# Patient Record
Sex: MALE | Race: OTHER | HISPANIC OR LATINO | Employment: FULL TIME | URBAN - METROPOLITAN AREA
[De-identification: names, ages, dates, MRNs, and addresses within clinical notes are randomized per-mention and may not be internally consistent; named-entity substitution may affect disease eponyms.]

---

## 2018-09-17 ENCOUNTER — OFFICE VISIT (OUTPATIENT)
Dept: CARDIOLOGY CLINIC | Facility: CLINIC | Age: 23
End: 2018-09-17
Payer: COMMERCIAL

## 2018-09-17 VITALS
BODY MASS INDEX: 36.58 KG/M2 | HEART RATE: 70 BPM | HEIGHT: 66 IN | SYSTOLIC BLOOD PRESSURE: 166 MMHG | DIASTOLIC BLOOD PRESSURE: 94 MMHG | OXYGEN SATURATION: 98 % | WEIGHT: 227.6 LBS

## 2018-09-17 DIAGNOSIS — I10 ESSENTIAL HYPERTENSION: Primary | ICD-10-CM

## 2018-09-17 DIAGNOSIS — R53.83 FATIGUE, UNSPECIFIED TYPE: ICD-10-CM

## 2018-09-17 DIAGNOSIS — I10 HYPERTENSION, UNCONTROLLED: ICD-10-CM

## 2018-09-17 PROCEDURE — 99214 OFFICE O/P EST MOD 30 MIN: CPT | Performed by: INTERNAL MEDICINE

## 2018-09-17 PROCEDURE — 93000 ELECTROCARDIOGRAM COMPLETE: CPT | Performed by: INTERNAL MEDICINE

## 2018-09-17 RX ORDER — HYDROCHLOROTHIAZIDE 12.5 MG/1
12.5 TABLET ORAL DAILY
Qty: 30 TABLET | Refills: 11 | Status: SHIPPED | OUTPATIENT
Start: 2018-09-17 | End: 2018-10-18

## 2018-09-17 RX ORDER — AMLODIPINE BESYLATE 10 MG/1
10 TABLET ORAL
COMMUNITY
Start: 2018-09-06 | End: 2018-10-18 | Stop reason: SDUPTHER

## 2018-09-17 NOTE — PROGRESS NOTES
Cardiology Consultation     He Meneses  [id number]  1995  [address] [facility] [address]    1  Essential hypertension  POCT ECG     C/c:HYPERTENSION    HPI: 21-YEAR-OLD MALE with no significant past medical history except anxiety was recently diagnosed with hypertension  PATIENT IS HERE FOR EVALUATION OF HYPERTENSION  Patient complains of fatigue which was started recently after anxiety attacks but denies having any chest pain, shortness of breath, palpitations, dizziness syncope  Patient took Norvasc at 11:00 a m  This morning and blood pressure has still remain high  Patient is family history of hypertension in mother and maternal grandparents  His maternal grandparents have congestive heart failure  EKG shows normal sinus rhythm,, normal EKG  Past Medical History:   Diagnosis Date    Anxiety     Hypertension      Social History     Social History    Marital status: Single     Spouse name: N/A    Number of children: N/A    Years of education: N/A     Occupational History    Not on file       Social History Main Topics    Smoking status: Never Smoker    Smokeless tobacco: Never Used    Alcohol use No    Drug use: No    Sexual activity: Not on file     Other Topics Concern    Not on file     Social History Narrative    No narrative on file      Family History   Problem Relation Age of Onset    Heart failure Maternal Grandmother     Heart failure Maternal Grandfather      Past Surgical History:   Procedure Laterality Date    ANKLE SURGERY      FETAL SURGERY FOR CONGENITAL HERNIA         Current Outpatient Prescriptions:     amLODIPine (NORVASC) 10 mg tablet, Take 10 mg by mouth, Disp: , Rfl:   No Known Allergies  Vitals:    09/17/18 1414   BP: 166/94   Pulse: 70   SpO2: 98%   Weight: 103 kg (227 lb 9 6 oz)   Height: 5' 5 5" (1 664 m)       Labs:  No results found for any previous visit  Imaging: No results found  Review of Systems:  Review of Systems   Constitutional: Positive for fatigue  Negative for diaphoresis  HENT: Negative for congestion and facial swelling  Eyes: Negative for photophobia and visual disturbance  Respiratory: Negative for chest tightness and shortness of breath  Cardiovascular: Negative for chest pain, palpitations and leg swelling  Gastrointestinal: Negative for abdominal pain and blood in stool  Endocrine: Negative for cold intolerance and heat intolerance  Musculoskeletal: Negative for arthralgias and myalgias  Skin: Negative for pallor and rash  Neurological: Negative for dizziness and syncope  Physical Exam:  Physical Exam   Constitutional: He is oriented to person, place, and time  He appears well-developed and well-nourished  HENT:   Head: Normocephalic and atraumatic  Eyes: Conjunctivae and EOM are normal  Pupils are equal, round, and reactive to light  Neck: Normal range of motion  Neck supple  No JVD present  No thyromegaly present  Cardiovascular: Normal rate, regular rhythm, normal heart sounds and intact distal pulses  Exam reveals no gallop and no friction rub  No murmur heard  Pulmonary/Chest: Effort normal and breath sounds normal  No respiratory distress  He has no wheezes  He has no rales  Abdominal: Soft  Bowel sounds are normal  He exhibits no distension  There is no tenderness  Musculoskeletal: Normal range of motion  He exhibits no edema  Neurological: He is alert and oriented to person, place, and time  He has normal reflexes  Skin: Skin is warm and dry  Psychiatric: He has a normal mood and affect  Discussion/Summary:  1  Hypertension:  Uncontrolled in spite of taking amlodipine  Fatigue is probably due to uncontrolled hypertension     recheck blood pressure by me was still 150/90 mm of mercury   I will add hydrochlorothiazide low-dose   echocardiogram to evaluate for left ventricular hypertrophy  If the blood pressure remains high will consider getting renal Doppler to rule out renal artery stenosis      Salt restriction     patient was counseled about monitoring blood pressure, salt restriction and weight loss     follow-up in 4-6 weeks

## 2018-10-18 ENCOUNTER — OFFICE VISIT (OUTPATIENT)
Dept: CARDIOLOGY CLINIC | Facility: CLINIC | Age: 23
End: 2018-10-18
Payer: COMMERCIAL

## 2018-10-18 VITALS
OXYGEN SATURATION: 97 % | BODY MASS INDEX: 36.64 KG/M2 | SYSTOLIC BLOOD PRESSURE: 170 MMHG | DIASTOLIC BLOOD PRESSURE: 66 MMHG | WEIGHT: 228 LBS | HEART RATE: 84 BPM | HEIGHT: 66 IN

## 2018-10-18 DIAGNOSIS — I10 HYPERTENSION, UNCONTROLLED: Primary | ICD-10-CM

## 2018-10-18 DIAGNOSIS — R53.83 FATIGUE, UNSPECIFIED TYPE: ICD-10-CM

## 2018-10-18 PROCEDURE — 99213 OFFICE O/P EST LOW 20 MIN: CPT | Performed by: INTERNAL MEDICINE

## 2018-10-18 RX ORDER — AMLODIPINE BESYLATE 10 MG/1
10 TABLET ORAL DAILY
Qty: 90 TABLET | Refills: 3 | Status: SHIPPED | OUTPATIENT
Start: 2018-10-18 | End: 2018-11-14 | Stop reason: ALTCHOICE

## 2018-10-18 RX ORDER — CHLORTHALIDONE 25 MG/1
25 TABLET ORAL DAILY
Qty: 30 TABLET | Refills: 11 | Status: SHIPPED | OUTPATIENT
Start: 2018-10-18 | End: 2018-11-14 | Stop reason: ALTCHOICE

## 2018-10-18 NOTE — PROGRESS NOTES
Cardiology Consultation     Esau Ramos  [id number]  1995  [address] [place] [address]    C/c:HYPERTENSION     HPI:  77-year-old male with past medical history of hypertension is here for follow-up  Patient was started on hydrochlorothiazide last visit but blood pressure appears to be still elevated  He constantly monitors his blood pressure, I went over the log  His systolic blood pressure is usually between 140-160 mm of mercury  Fatigue is improved  He denies having any other cardiac symptoms including chest pain, shortness of breath, palpitations, dizziness or syncope      Patient is family history of hypertension in mother and maternal grandparents  His maternal grandparents have congestive heart failure  Patient Active Problem List   Diagnosis    Hypertension, uncontrolled    Fatigue     Past Medical History:   Diagnosis Date    Anxiety     Hypertension      Social History     Social History    Marital status: Single     Spouse name: N/A    Number of children: N/A    Years of education: N/A     Occupational History    Not on file       Social History Main Topics    Smoking status: Never Smoker    Smokeless tobacco: Never Used    Alcohol use No    Drug use: No    Sexual activity: Not on file     Other Topics Concern    Not on file     Social History Narrative    No narrative on file      Family History   Problem Relation Age of Onset    Heart failure Maternal Grandmother     Heart failure Maternal Grandfather      Past Surgical History:   Procedure Laterality Date    ANKLE SURGERY      FETAL SURGERY FOR CONGENITAL HERNIA         Current Outpatient Prescriptions:     hydrochlorothiazide (HYDRODIURIL) 12 5 mg tablet, Take 1 tablet (12 5 mg total) by mouth daily, Disp: 30 tablet, Rfl: 11    amLODIPine (NORVASC) 10 mg tablet, Take 10 mg by mouth, Disp: , Rfl: No Known Allergies  Vitals:    10/18/18 1024   BP: 170/66   BP Location: Right arm   Patient Position: Sitting   Cuff Size: Adult   Pulse: 84   SpO2: 97%   Weight: 103 kg (228 lb)   Height: 5' 5 5" (1 664 m)       Labs:  No results found for any previous visit  Imaging: No results found  Review of Systems:  Review of Systems   Constitutional: Negative for diaphoresis and fatigue  HENT: Negative for congestion and facial swelling  Eyes: Negative for photophobia and visual disturbance  Respiratory: Negative for chest tightness and shortness of breath  Cardiovascular: Negative for chest pain, palpitations and leg swelling  Gastrointestinal: Negative for abdominal pain and blood in stool  Endocrine: Negative for cold intolerance and heat intolerance  Musculoskeletal: Negative for arthralgias and myalgias  Skin: Negative for pallor and rash  Neurological: Negative for dizziness and syncope  Physical Exam:  Physical Exam   Constitutional: He is oriented to person, place, and time  He appears well-developed and well-nourished  HENT:   Head: Normocephalic and atraumatic  Eyes: Pupils are equal, round, and reactive to light  Conjunctivae and EOM are normal    Neck: Normal range of motion  Neck supple  No JVD present  No thyromegaly present  Cardiovascular: Normal rate, regular rhythm, normal heart sounds and intact distal pulses  Exam reveals no gallop and no friction rub  No murmur heard  Pulmonary/Chest: Effort normal and breath sounds normal  No respiratory distress  He has no wheezes  He has no rales  Abdominal: Soft  Bowel sounds are normal  He exhibits no distension  There is no tenderness  Musculoskeletal: Normal range of motion  He exhibits no edema  Neurological: He is alert and oriented to person, place, and time  He has normal reflexes  Skin: Skin is warm and dry  Psychiatric: He has a normal mood and affect         Discussion/Summary:  Hypertension, uncontrolled:  I switch the patient chlorthalidone  Continue Norvasc  Check BMP to evaluate for hyperkalemia and ROSI  ASKED PATIENT CONTINUE TO MONITOR HIS BLOOD PRESSURE AND CALL ME IF IT REMAINS HIGH    Echocardiogram pending    Follow-up in 6 weeks

## 2018-11-14 ENCOUNTER — OFFICE VISIT (OUTPATIENT)
Dept: FAMILY MEDICINE CLINIC | Facility: CLINIC | Age: 23
End: 2018-11-14
Payer: COMMERCIAL

## 2018-11-14 VITALS
RESPIRATION RATE: 16 BRPM | DIASTOLIC BLOOD PRESSURE: 82 MMHG | HEIGHT: 66 IN | OXYGEN SATURATION: 98 % | HEART RATE: 76 BPM | WEIGHT: 224 LBS | BODY MASS INDEX: 36 KG/M2 | SYSTOLIC BLOOD PRESSURE: 122 MMHG

## 2018-11-14 DIAGNOSIS — I10 ESSENTIAL HYPERTENSION: Primary | ICD-10-CM

## 2018-11-14 DIAGNOSIS — M25.50 ARTHRALGIA, UNSPECIFIED JOINT: ICD-10-CM

## 2018-11-14 DIAGNOSIS — E78.2 MIXED HYPERLIPIDEMIA: ICD-10-CM

## 2018-11-14 DIAGNOSIS — E55.9 HYPOVITAMINOSIS D: ICD-10-CM

## 2018-11-14 DIAGNOSIS — I10 HYPERTENSION, UNCONTROLLED: ICD-10-CM

## 2018-11-14 DIAGNOSIS — R53.82 CHRONIC FATIGUE: ICD-10-CM

## 2018-11-14 PROCEDURE — 3074F SYST BP LT 130 MM HG: CPT | Performed by: FAMILY MEDICINE

## 2018-11-14 PROCEDURE — 1036F TOBACCO NON-USER: CPT | Performed by: FAMILY MEDICINE

## 2018-11-14 PROCEDURE — 99202 OFFICE O/P NEW SF 15 MIN: CPT | Performed by: FAMILY MEDICINE

## 2018-11-14 PROCEDURE — 3079F DIAST BP 80-89 MM HG: CPT | Performed by: FAMILY MEDICINE

## 2018-11-14 PROCEDURE — 3008F BODY MASS INDEX DOCD: CPT | Performed by: FAMILY MEDICINE

## 2018-11-14 RX ORDER — LISINOPRIL AND HYDROCHLOROTHIAZIDE 12.5; 1 MG/1; MG/1
1 TABLET ORAL DAILY
Qty: 90 TABLET | Refills: 1 | Status: SHIPPED | OUTPATIENT
Start: 2018-11-14 | End: 2019-03-20 | Stop reason: ALTCHOICE

## 2018-11-14 RX ORDER — PROPRANOLOL HCL 60 MG
60 CAPSULE, EXTENDED RELEASE 24HR ORAL DAILY
Qty: 90 CAPSULE | Refills: 1 | Status: SHIPPED | OUTPATIENT
Start: 2018-11-14 | End: 2019-06-28 | Stop reason: SDUPTHER

## 2018-11-14 NOTE — PATIENT INSTRUCTIONS
Get the fasting blood work done  Limit the salt intake to less than 1500 mg a day  A avoid fast food  Increase the intake of fresh green leafy vegetables   Walk daily  We will call you with result of the blood work and vitamin-D  Take those blood pressure pills every single day

## 2019-02-08 ENCOUNTER — TELEPHONE (OUTPATIENT)
Dept: FAMILY MEDICINE CLINIC | Facility: CLINIC | Age: 24
End: 2019-02-08

## 2019-02-08 ENCOUNTER — HOSPITAL ENCOUNTER (EMERGENCY)
Facility: HOSPITAL | Age: 24
Discharge: HOME | End: 2019-02-08
Attending: EMERGENCY MEDICINE | Admitting: EMERGENCY MEDICINE
Payer: COMMERCIAL

## 2019-02-08 ENCOUNTER — APPOINTMENT (EMERGENCY)
Dept: RADIOLOGY | Facility: HOSPITAL | Age: 24
End: 2019-02-08
Payer: COMMERCIAL

## 2019-02-08 VITALS
SYSTOLIC BLOOD PRESSURE: 133 MMHG | OXYGEN SATURATION: 97 % | WEIGHT: 241.84 LBS | RESPIRATION RATE: 15 BRPM | BODY MASS INDEX: 38.87 KG/M2 | HEART RATE: 62 BPM | DIASTOLIC BLOOD PRESSURE: 60 MMHG | TEMPERATURE: 97.5 F | HEIGHT: 66 IN

## 2019-02-08 DIAGNOSIS — I49.1 PREMATURE ATRIAL CONTRACTIONS: ICD-10-CM

## 2019-02-08 DIAGNOSIS — R00.2 PALPITATIONS: Primary | ICD-10-CM

## 2019-02-08 LAB
ANION GAP SERPL CALCULATED.3IONS-SCNC: 7 MMOL/L (ref 4–13)
BASOPHILS # BLD AUTO: 0.02 THOUSANDS/ΜL (ref 0–0.1)
BASOPHILS NFR BLD AUTO: 0 % (ref 0–1)
BUN SERPL-MCNC: 12 MG/DL (ref 5–25)
CALCIUM SERPL-MCNC: 9.3 MG/DL (ref 8.3–10.1)
CHLORIDE SERPL-SCNC: 101 MMOL/L (ref 100–108)
CO2 SERPL-SCNC: 30 MMOL/L (ref 21–32)
CREAT SERPL-MCNC: 0.88 MG/DL (ref 0.6–1.3)
EOSINOPHIL # BLD AUTO: 0.07 THOUSAND/ΜL (ref 0–0.61)
EOSINOPHIL NFR BLD AUTO: 2 % (ref 0–6)
ERYTHROCYTE [DISTWIDTH] IN BLOOD BY AUTOMATED COUNT: 11.9 % (ref 11.6–15.1)
GFR SERPL CREATININE-BSD FRML MDRD: 120 ML/MIN/1.73SQ M
GLUCOSE SERPL-MCNC: 97 MG/DL (ref 65–140)
HCT VFR BLD AUTO: 45.1 % (ref 36.5–49.3)
HGB BLD-MCNC: 15.3 G/DL (ref 12–17)
IMM GRANULOCYTES # BLD AUTO: 0.02 THOUSAND/UL (ref 0–0.2)
IMM GRANULOCYTES NFR BLD AUTO: 0 % (ref 0–2)
LYMPHOCYTES # BLD AUTO: 1.63 THOUSANDS/ΜL (ref 0.6–4.47)
LYMPHOCYTES NFR BLD AUTO: 36 % (ref 14–44)
MCH RBC QN AUTO: 28 PG (ref 26.8–34.3)
MCHC RBC AUTO-ENTMCNC: 33.9 G/DL (ref 31.4–37.4)
MCV RBC AUTO: 83 FL (ref 82–98)
MONOCYTES # BLD AUTO: 0.42 THOUSAND/ΜL (ref 0.17–1.22)
MONOCYTES NFR BLD AUTO: 9 % (ref 4–12)
NEUTROPHILS # BLD AUTO: 2.36 THOUSANDS/ΜL (ref 1.85–7.62)
NEUTS SEG NFR BLD AUTO: 53 % (ref 43–75)
NRBC BLD AUTO-RTO: 0 /100 WBCS
PLATELET # BLD AUTO: 240 THOUSANDS/UL (ref 149–390)
PMV BLD AUTO: 9.5 FL (ref 8.9–12.7)
POTASSIUM SERPL-SCNC: 4.2 MMOL/L (ref 3.5–5.3)
RBC # BLD AUTO: 5.46 MILLION/UL (ref 3.88–5.62)
SODIUM SERPL-SCNC: 138 MMOL/L (ref 136–145)
TROPONIN I SERPL-MCNC: <0.02 NG/ML
WBC # BLD AUTO: 4.52 THOUSAND/UL (ref 4.31–10.16)

## 2019-02-08 PROCEDURE — 99285 EMERGENCY DEPT VISIT HI MDM: CPT

## 2019-02-08 PROCEDURE — 80048 BASIC METABOLIC PNL TOTAL CA: CPT | Performed by: EMERGENCY MEDICINE

## 2019-02-08 PROCEDURE — 71046 X-RAY EXAM CHEST 2 VIEWS: CPT

## 2019-02-08 PROCEDURE — 93005 ELECTROCARDIOGRAM TRACING: CPT

## 2019-02-08 PROCEDURE — 85025 COMPLETE CBC W/AUTO DIFF WBC: CPT | Performed by: EMERGENCY MEDICINE

## 2019-02-08 PROCEDURE — 84484 ASSAY OF TROPONIN QUANT: CPT | Performed by: EMERGENCY MEDICINE

## 2019-02-08 PROCEDURE — 36415 COLL VENOUS BLD VENIPUNCTURE: CPT | Performed by: EMERGENCY MEDICINE

## 2019-02-08 RX ADMIN — METOPROLOL TARTRATE 12.5 MG: 25 TABLET, FILM COATED ORAL at 12:43

## 2019-02-08 NOTE — ED PROVIDER NOTES
History  Chief Complaint   Patient presents with    Palpitations     pt states chest palpitations with chest tightness starting yesterday;     17yo male with h/o depression anxiety p/w intermittent feeling of palpitations, feels like his chest has a "hiccup" for the past day and with the hiccup it feels tight for a second but no actual pain  Has had similar in the past and was seen at [facility], saw a cardiologist as outpatient and saw a PCP and on medications for anxiety and blood pressure  No leg pain/swelling  No SOB  No fever/chills/n/v/d  No abd pain  History provided by:  Patient  Palpitations   Palpitations quality:  Irregular  Onset quality: At rest  Duration:  1 hour  Timing:  Intermittent  Progression:  Waxing and waning  Chronicity:  New  Context: anxiety    Context: not nicotine and not stimulant use    Relieved by:  Nothing  Worsened by:  Stress  Ineffective treatments:  None tried  Associated symptoms: no back pain, no chest pressure, no diaphoresis, no dizziness, no lower extremity edema, no malaise/fatigue, no shortness of breath and no vomiting    Risk factors: no hx of PE and no hypercoagulable state        Prior to Admission Medications   Prescriptions Last Dose Informant Patient Reported? Taking?   lisinopril-hydrochlorothiazide (PRINZIDE,ZESTORETIC) 10-12 5 MG per tablet   No Yes   Sig: Take 1 tablet by mouth daily   propranolol (INDERAL LA) 60 mg 24 hr capsule   No Yes   Sig: Take 1 capsule (60 mg total) by mouth daily      Facility-Administered Medications: None       Past Medical History:   Diagnosis Date    Anxiety     Hypertension        Past Surgical History:   Procedure Laterality Date    ANKLE SURGERY      FETAL SURGERY FOR CONGENITAL HERNIA         Family History   Problem Relation Age of Onset    Heart failure Maternal Grandmother     Heart failure Maternal Grandfather      I have reviewed and agree with the history as documented      Social History   Substance Use Topics  Smoking status: Never Smoker    Smokeless tobacco: Never Used    Alcohol use No        Review of Systems   Constitutional: Negative for diaphoresis and malaise/fatigue  Respiratory: Negative for shortness of breath  Gastrointestinal: Negative for vomiting  Musculoskeletal: Negative for back pain  Neurological: Negative for dizziness  All other systems reviewed and are negative  Physical Exam  Physical Exam   Constitutional: He appears well-developed and well-nourished  HENT:   Head: Normocephalic and atraumatic  Eyes: Pupils are equal, round, and reactive to light  EOM are normal    Neck: Neck supple  Cardiovascular: Normal rate, regular rhythm and normal heart sounds  No murmur heard  Pulmonary/Chest: Effort normal and breath sounds normal  No respiratory distress  He has no wheezes  Abdominal: Soft  Bowel sounds are normal  He exhibits no distension  There is no tenderness  Musculoskeletal: He exhibits no edema or tenderness  Neurological: He is alert  Psychiatric: His mood appears anxious  Vitals reviewed        Vital Signs  ED Triage Vitals   Temperature Pulse Respirations Blood Pressure SpO2   02/08/19 1050 02/08/19 1048 02/08/19 1048 02/08/19 1048 02/08/19 1048   97 5 °F (36 4 °C) 79 18 143/77 98 %      Temp Source Heart Rate Source Patient Position - Orthostatic VS BP Location FiO2 (%)   02/08/19 1050 02/08/19 1048 02/08/19 1048 02/08/19 1048 --   Oral Monitor Lying Right arm       Pain Score       --                  Vitals:    02/08/19 1048   BP: 143/77   Pulse: 79   Patient Position - Orthostatic VS: Lying       Visual Acuity      ED Medications  Medications   metoprolol tartrate (LOPRESSOR) tablet 12 5 mg (not administered)       Diagnostic Studies  Results Reviewed     Procedure Component Value Units Date/Time    Troponin I [[id number]]  (Normal) Collected:  02/08/19 1146    Lab Status:  Final result Specimen:  Blood from Arm, Right Updated:  02/08/19 1212 Troponin I <0 02 ng/mL     Basic metabolic panel [[id number]] Collected:  02/08/19 1146    Lab Status:  Final result Specimen:  Blood from Arm, Right Updated:  02/08/19 1202     Sodium 138 mmol/L      Potassium 4 2 mmol/L      Chloride 101 mmol/L      CO2 30 mmol/L      ANION GAP 7 mmol/L      BUN 12 mg/dL      Creatinine 0 88 mg/dL      Glucose 97 mg/dL      Calcium 9 3 mg/dL      eGFR 120 ml/min/1 73sq m     Narrative:         National Kidney Disease Education Program recommendations are as follows:  GFR calculation is accurate only with a steady state creatinine  Chronic Kidney disease less than 60 ml/min/1 73 sq  meters  Kidney failure less than 15 ml/min/1 73 sq  meters      CBC and differential [[id number]] Collected:  02/08/19 1146    Lab Status:  Final result Specimen:  Blood from Arm, Right Updated:  02/08/19 1151     WBC 4 52 Thousand/uL      RBC 5 46 Million/uL      Hemoglobin 15 3 g/dL      Hematocrit 45 1 %      MCV 83 fL      MCH 28 0 pg      MCHC 33 9 g/dL      RDW 11 9 %      MPV 9 5 fL      Platelets 126 Thousands/uL      nRBC 0 /100 WBCs      Neutrophils Relative 53 %      Immat GRANS % 0 %      Lymphocytes Relative 36 %      Monocytes Relative 9 %      Eosinophils Relative 2 %      Basophils Relative 0 %      Neutrophils Absolute 2 36 Thousands/µL      Immature Grans Absolute 0 02 Thousand/uL      Lymphocytes Absolute 1 63 Thousands/µL      Monocytes Absolute 0 42 Thousand/µL      Eosinophils Absolute 0 07 Thousand/µL      Basophils Absolute 0 02 Thousands/µL                  XR chest 2 views   ED Interpretation by Cherelle Tompkins MD (02/08 1232)   NAD                 Procedures  ECG 12 Lead Documentation  Date/Time: 2/8/2019 12:11 PM  Performed by: Riya Gardner  Authorized by: Riya Gardner     Indications / Diagnosis:  Palpitations  ECG reviewed by me, the ED Provider: yes    Patient location:  ED  Rate:     ECG rate:  71    ECG rate assessment: normal    Rhythm:     Rhythm: sinus rhythm Ectopy:     Ectopy: PAC    ST segments:     ST segments:  Normal  T waves:     T waves: inverted      Inverted:  III and aVF           Phone Contacts  ED Phone Contact    ED Course                               MDM  Number of Diagnoses or Management Options  Palpitations: new and requires workup  Premature atrial contractions: new and requires workup     Amount and/or Complexity of Data Reviewed  Clinical lab tests: ordered and reviewed  Tests in the radiology section of CPT®: ordered and reviewed  Independent visualization of images, tracings, or specimens: yes    Risk of Complications, Morbidity, and/or Mortality  Presenting problems: high    Patient Progress  Patient progress: stable      Disposition  Final diagnoses:   Palpitations   Premature atrial contractions     Time reflects when diagnosis was documented in both MDM as applicable and the Disposition within this note     Time User Action Codes Description Comment    2/8/2019 12:19 PM Abhijeet Oswaldo, [address] [R00 2] Palpitations     2/8/2019 12:19 PM Abhijeet Oswaldo, [address] [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     2/8/2019 12:20 PM Janet Matias [I49 1] Premature atrial contractions       ED Disposition     ED Disposition Condition Date/Time Comment    Discharge  Fri Feb 8, 2019 12:20 PM Lloyd Saldaña discharge to home/self care      Condition at discharge: Good        Follow-up Information     Follow up With Specialties Details Why Contact Info Additional Information    Cleophas Cogan, [address] [facility], Nurse Practitioner   [phone] [facility] Emergency Department Emergency Medicine  If symptoms worsen [address] [address] ED, [address], [place], [address]          Patient's Medications   Discharge Prescriptions    No medications on file     No discharge procedures on file      ED Provider  Electronically Signed by           Jamee Edward MD  02/08/19 5621

## 2019-02-08 NOTE — DISCHARGE INSTRUCTIONS
Premature Atrial Contractions, Ambulatory Care   GENERAL INFORMATION:   Premature atrial contractions (PACs)  are an interruption in your heart rhythm  PACs happen when your heart gets an early signal to pump  PACs are common and usually have no cause  Your risk is increased by stress, fatigue, caffeine, alcohol, or tobacco  Pregnancy and medical conditions such as heart disease or high blood pressure may also increase your risk  Most people have skipped heartbeats from time to time  Follow up with your healthcare provider so the cause of your [facility] can be diagnosed and treated  Common symptoms include the following:   · Palpitations (fast, forceful heartbeats in an irregular rhythm)    · A missed or skipped heartbeat     · Chest pain or shortness of breath    · Lightheadedness, dizziness, or feeling faint    · Tiredness with exercise or activity  Seek immediate care for the following symptoms:   · Squeezing, pressure, or pain in your chest that lasts longer than 5 minutes or returns    · Discomfort or pain in your back, neck, jaw, stomach, or arm     · Trouble breathing    · Nausea or vomiting    · Lightheadedness or a sudden cold sweat, especially with chest pain or trouble breathing  Treatment for PACs  is usually not needed  You may be given medicine to strengthen or regulate your heartbeat  Prevent more PACs:   · Avoid alcohol and caffeine  These can increase your PACs  · Do not smoke  If you smoke, it is never too late to quit  Smoking may worsen heart problems  Ask your healthcare provider for information if you need help quitting  · Exercise as directed  Ask your healthcare provider about the best exercise plan for you  Follow up with your healthcare provider as directed:  Write down your questions so you remember to ask them during your visits  CARE AGREEMENT:   You have the right to help plan your care  Learn about your health condition and how it may be treated   Discuss treatment options with your caregivers to decide what care you want to receive  You always have the right to refuse treatment  The above information is an  only  It is not intended as medical advice for individual conditions or treatments  Talk to your doctor, nurse or pharmacist before following any medical regimen to see if it is safe and effective for you  © 2014 [address] is for End User's use only and may not be sold, redistributed or otherwise used for commercial purposes  All illustrations and images included in CareNotes® are the copyrighted property of [employer] , [employer]  or [employer]  Heart Palpitations   WHAT YOU NEED TO KNOW:   Heart palpitations are feelings that your heart races, jumps, throbs, or flutters  You may feel extra beats, no beats for a short time, or skipped beats  You may have these feelings in your chest, throat, or neck  They may happen when you are sitting, standing, or lying  Heart palpitations may be frightening, but are usually not caused by a serious problem  DISCHARGE INSTRUCTIONS:   Call 911 or have someone else call for any of the following:   · You have any of the following signs of a heart attack:      ¨ Squeezing, pressure, or pain in your chest that lasts longer than 5 minutes or returns    ¨ Discomfort or pain in your back, neck, jaw, stomach, or arm     ¨ Trouble breathing    ¨ Nausea or vomiting    ¨ Lightheadedness or a sudden cold sweat, especially with chest pain or trouble breathing    · You have any of the following signs of a stroke:      ¨ Numbness or drooping on one side of your face     ¨ Weakness in an arm or leg    ¨ Confusion or difficulty speaking    ¨ Dizziness, a severe headache, or vision loss    · You faint or lose consciousness  Return to the emergency department if:   · Your palpitations happen more often or get more intense       Contact your healthcare provider if:   · You have new or worsening swelling in your feet or ankles  · You have questions or concerns about your condition or care  Follow up with your healthcare provider as directed: You may need to follow up with a cardiologist  Cristina Denny may need tests to check for heart problems that cause palpitations  Write down your questions so you remember to ask them during your visits  Keep a record:  Write down when your palpitations start and stop, what you were doing when they started, and your symptoms  Keep track of what you ate or drank within a few hours of your palpitations  Include anything that seemed to help your symptoms, such as lying down or holding your breath  This record will help you and your healthcare provider learn what triggers your palpitations  Bring this record with you to your follow up visits  Help prevent heart palpitations:   · Manage stress and anxiety  Find ways to relax such as listening to music, meditating, or doing yoga  Exercise can also help decrease stress and anxiety  Talk to someone you trust about your stress or anxiety  You can also talk to a therapist      · Get plenty of sleep every night  Ask your healthcare provider how much sleep you need each night  · Do not drink caffeine or alcohol  Caffeine and alcohol can make your palpitations worse  Caffeine is found in soda, coffee, tea, chocolate, and drinks that increase your energy  · Do not smoke  Nicotine and other chemicals in cigarettes and cigars may damage your heart and blood vessels  Ask your healthcare provider for information if you currently smoke and need help to quit  E-cigarettes or smokeless tobacco still contain nicotine  Talk to your healthcare provider before you use these products  · Do not use illegal drugs  Talk to your healthcare provider if you use illegal drugs and want help to quit  © 2017 [address] Information is for End User's use only and may not be sold, redistributed or otherwise used for commercial purposes   All illustrations and images included in CareNotes® are the copyrighted property of A [employer] A M [employer]  or [employer]  The above information is an  only  It is not intended as medical advice for individual conditions or treatments  Talk to your doctor, nurse or pharmacist before following any medical regimen to see if it is safe and effective for you

## 2019-02-08 NOTE — ED NOTES
Discharged reviewed with pt  Pt verbalized understanding and has no further questions at this time  Pt ambulatory off unit with steady gait       Shelli Currie RN  02/08/19 1925

## 2019-02-08 NOTE — TELEPHONE ENCOUNTER
Patient's mother called in today stating that the patient has been c/o having palpitations since yesterday she stated that he took his medication throughout the day developed them, she was not sure which medication was causing them  I informed the patient mom that he has not been seen since November and has this ever happened before she said no he's never had them and has been on the meds regularly, I advised that he go to the ER or call Dr Nalini Duke because he is a pt there and he is having cardiac symptoms, mom complied I also told her that if they cannot get her in then we will try to get her in with another provider

## 2019-02-09 LAB
ATRIAL RATE: 62 BPM
P AXIS: 45 DEGREES
PR INTERVAL: 138 MS
QRS AXIS: 98 DEGREES
QRSD INTERVAL: 88 MS
QT INTERVAL: 344 MS
QTC INTERVAL: 373 MS
T WAVE AXIS: 3 DEGREES
VENTRICULAR RATE: 71 BPM

## 2019-02-09 PROCEDURE — 93010 ELECTROCARDIOGRAM REPORT: CPT | Performed by: INTERNAL MEDICINE

## 2019-02-13 ENCOUNTER — OFFICE VISIT (OUTPATIENT)
Dept: FAMILY MEDICINE CLINIC | Facility: CLINIC | Age: 24
End: 2019-02-13
Payer: COMMERCIAL

## 2019-02-13 VITALS
OXYGEN SATURATION: 97 % | HEIGHT: 66 IN | SYSTOLIC BLOOD PRESSURE: 102 MMHG | DIASTOLIC BLOOD PRESSURE: 70 MMHG | HEART RATE: 76 BPM | WEIGHT: 234 LBS | RESPIRATION RATE: 16 BRPM | TEMPERATURE: 98.4 F | BODY MASS INDEX: 37.61 KG/M2

## 2019-02-13 DIAGNOSIS — R05.9 COUGH: Primary | ICD-10-CM

## 2019-02-13 DIAGNOSIS — R01.1 CARDIAC MURMUR: ICD-10-CM

## 2019-02-13 PROCEDURE — 3008F BODY MASS INDEX DOCD: CPT | Performed by: FAMILY MEDICINE

## 2019-02-13 PROCEDURE — 99214 OFFICE O/P EST MOD 30 MIN: CPT | Performed by: FAMILY MEDICINE

## 2019-02-13 PROCEDURE — 1036F TOBACCO NON-USER: CPT | Performed by: FAMILY MEDICINE

## 2019-02-13 RX ORDER — AZITHROMYCIN 250 MG/1
TABLET, FILM COATED ORAL
Qty: 6 TABLET | Refills: 0 | Status: SHIPPED | OUTPATIENT
Start: 2019-02-13 | End: 2019-02-13

## 2019-02-13 RX ORDER — AZITHROMYCIN 250 MG/1
TABLET, FILM COATED ORAL
Qty: 6 TABLET | Refills: 0 | Status: SHIPPED | OUTPATIENT
Start: 2019-02-13 | End: 2019-02-17

## 2019-02-13 NOTE — PROGRESS NOTES
Standard Visit   Assessment/Plan:     Visit Diagnosis:  Diagnoses and all orders for this visit:    Cough  -     Discontinue: azithromycin (ZITHROMAX) 250 mg tablet; Take 2 tablets today then 1 tablet daily x 4 days  -     azithromycin (ZITHROMAX) 250 mg tablet; Take 2 tablets today then 1 tablet daily x 4 days    Cardiac murmur  -     Holter monitor - 24 hour; Future  -     Echo complete with contrast if indicated; Future      Please cancel the Zithromax that went to the [address] [place] [address] Street  I have reset the Zithromax to his home start pharmacy in [facility]  Holter monitor has been ordered  2D echo has been ordered  He has been advised to avoid all caffeine  Make sure you get enough sleep  Lack of sleep has been associated with increased palpitations and APCs  Lambert Thompson has been advised the difference between PVCs and APCs  Make sure you get enough sleep avoid excess alcohol  no redbull  and  energy drinks    Subjective:    Patient ID: Lisseth Corona is a 25 y o  male       Patient is here with the following concerns:    Was seen in the emergency room for palpitations  Is being treated for hypertension  with lisinopril/hydrochlorothiazide  Also takes Inderal LA 60 mg once a day   Does see a cardiologist Dr Ada Pederson  EKG was done in the emergency room which showed normal sinus rhythm with APCs  He is compliant and consistent with his Inderal LA  Blood pressure in the office today was 102/64  Electrolytes in the emergency room show a potassium of 4 2  Troponins were negative  We will do a magnesium level as follow-up  He does have a dry cough which could be a Ace cough  The we could consider switching the lisinopril due to this dry cough  He does complain of postnasal drip and sinus congestion  We are going to consider a trial of a Z-Bertin to clear up his sinuses and postnasal drip and cough if his cough continues  We will consider switching him to losartan due to the cough  He does state he is not getting a proper amount of sleep  His chanelle is pregnant and she goes to work at 4 o'clock in the morning and they speak every night at this time        The following portions of the patient's history were reviewed and updated as appropriate: allergies, current medications, past family history, past medical history, past social history, past surgical history and problem list     Review of Systems   HENT: Positive for congestion and sinus pain  Respiratory: Positive for cough  Cardiovascular: Positive for palpitations  Negative for chest pain and leg swelling  Psychiatric/Behavioral: Positive for sleep disturbance           /70   Pulse 76   Temp 98 4 °F (36 9 °C)   Resp 16   Ht 5' 6" (1 676 m)   Wt 106 kg (234 lb)   SpO2 97%   BMI 37 77 kg/m²   Social History     Socioeconomic History    Marital status: Single     Spouse name: Not on file    Number of children: Not on file    Years of education: Not on file    Highest education level: Not on file   Occupational History    Not on file   Social Needs    Financial resource strain: Not on file    Food insecurity:     Worry: Not on file     Inability: Not on file    Transportation needs:     Medical: Not on file     Non-medical: Not on file   Tobacco Use    Smoking status: Never Smoker    Smokeless tobacco: Never Used   Substance and Sexual Activity    Alcohol use: No    Drug use: No    Sexual activity: Not on file   Lifestyle    Physical activity:     Days per week: Not on file     Minutes per session: Not on file    Stress: Not on file   Relationships    Social connections:     Talks on phone: Not on file     Gets together: Not on file     Attends Yarsanism service: Not on file     Active member of club or organization: Not on file     Attends meetings of clubs or organizations: Not on file     Relationship status: Not on file    Intimate partner violence:     Fear of current or ex partner: Not on file     Emotionally abused: Not on file     Physically abused: Not on file     Forced sexual activity: Not on file   Other Topics Concern    Not on file   Social History Narrative    Not on file     Past Medical History:   Diagnosis Date    Anxiety     Hypertension      Family History   Problem Relation Age of Onset    Heart failure Maternal Grandmother     Heart failure Maternal Grandfather      Past Surgical History:   Procedure Laterality Date    ANKLE SURGERY      FETAL SURGERY FOR CONGENITAL HERNIA         Current Outpatient Medications:     lisinopril-hydrochlorothiazide (PRINZIDE,ZESTORETIC) 10-12 5 MG per tablet, Take 1 tablet by mouth daily, Disp: 90 tablet, Rfl: 01    propranolol (INDERAL LA) 60 mg 24 hr capsule, Take 1 capsule (60 mg total) by mouth daily, Disp: 90 capsule, Rfl: 1    azithromycin (ZITHROMAX) 250 mg tablet, Take 2 tablets today then 1 tablet daily x 4 days, Disp: 6 tablet, Rfl: 0      Objective:     Physical Exam   Constitutional: He is oriented to person, place, and time  He appears well-developed and well-nourished  HENT:   Head: Normocephalic and atraumatic  Eyes: Pupils are equal, round, and reactive to light  Neck: Normal range of motion  Neck supple  Cardiovascular: Normal rate  Pulmonary/Chest: Effort normal and breath sounds normal    Abdominal: Soft  Musculoskeletal: Normal range of motion  Neurological: He is alert and oriented to person, place, and time  Skin: Skin is warm and dry  Psychiatric: He has a normal mood and affect  Nursing note and vitals reviewed        Social History     Socioeconomic History    Marital status: Single     Spouse name: Not on file    Number of children: Not on file    Years of education: Not on file    Highest education level: Not on file   Occupational History    Not on file   Social Needs    Financial resource strain: Not on file    Food insecurity:     Worry: Not on file     Inability: Not on file    Transportation needs:     Medical: Not on file     Non-medical: Not on file   Tobacco Use    Smoking status: Never Smoker    Smokeless tobacco: Never Used   Substance and Sexual Activity    Alcohol use: No    Drug use: No    Sexual activity: Not on file   Lifestyle    Physical activity:     Days per week: Not on file     Minutes per session: Not on file    Stress: Not on file   Relationships    Social connections:     Talks on phone: Not on file     Gets together: Not on file     Attends Buddhism service: Not on file     Active member of club or organization: Not on file     Attends meetings of clubs or organizations: Not on file     Relationship status: Not on file    Intimate partner violence:     Fear of current or ex partner: Not on file     Emotionally abused: Not on file     Physically abused: Not on file     Forced sexual activity: Not on file   Other Topics Concern    Not on file   Social History Narrative    Not on file     Past Medical History:   Diagnosis Date    Anxiety     Hypertension        Current Outpatient Medications:     lisinopril-hydrochlorothiazide (PRINZIDE,ZESTORETIC) 10-12 5 MG per tablet, Take 1 tablet by mouth daily, Disp: 90 tablet, Rfl: 01    propranolol (INDERAL LA) 60 mg 24 hr capsule, Take 1 capsule (60 mg total) by mouth daily, Disp: 90 capsule, Rfl: 1    azithromycin (ZITHROMAX) 250 mg tablet, Take 2 tablets today then 1 tablet daily x 4 days, Disp: 6 tablet, Rfl: 0  Family History   Problem Relation Age of Onset    Heart failure Maternal Grandmother     Heart failure Maternal Grandfather        Patient Instructions   Please cancel the Zithromax that went to the [address] [place] [address] Street  I have reset the Zithromax to his home start pharmacy in [facility]  Holter monitor has been ordered  2D echo has been ordered  He has been advised to avoid all caffeine  Make sure you get enough sleep  Lack of sleep has been associated with increased palpitations and Kostas Cullen has been advised the difference between PVCs and APCs  Make sure you get enough sleep avoid excess alcohol  no redbull  and  energy drinks          MARTIN Gregory

## 2019-02-13 NOTE — PATIENT INSTRUCTIONS
Please cancel the Zithromax that went to the [address] [place] [address]  I have reset the Zithromax to his home start pharmacy in [facility]  Holter monitor has been ordered  2D echo has been ordered  He has been advised to avoid all caffeine  Make sure you get enough sleep  Lack of sleep has been associated with increased palpitations and APCs  Braden has been advised the difference between PVCs and APCs  Make sure you get enough sleep avoid excess alcohol  no redbull  and  energy drinks

## 2019-03-06 ENCOUNTER — HOSPITAL ENCOUNTER (OUTPATIENT)
Dept: NON INVASIVE DIAGNOSTICS | Facility: CLINIC | Age: 24
Discharge: HOME | End: 2019-03-06
Payer: COMMERCIAL

## 2019-03-06 DIAGNOSIS — R01.1 CARDIAC MURMUR: ICD-10-CM

## 2019-03-06 DIAGNOSIS — R00.2 PALPITATIONS: ICD-10-CM

## 2019-03-06 PROCEDURE — 93225 XTRNL ECG REC<48 HRS REC: CPT

## 2019-03-06 PROCEDURE — 93306 TTE W/DOPPLER COMPLETE: CPT

## 2019-03-06 PROCEDURE — 93226 XTRNL ECG REC<48 HR SCAN A/R: CPT

## 2019-03-06 PROCEDURE — 93306 TTE W/DOPPLER COMPLETE: CPT | Performed by: INTERNAL MEDICINE

## 2019-03-08 PROCEDURE — 93227 XTRNL ECG REC<48 HR R&I: CPT | Performed by: INTERNAL MEDICINE

## 2019-03-20 ENCOUNTER — OFFICE VISIT (OUTPATIENT)
Dept: FAMILY MEDICINE CLINIC | Facility: CLINIC | Age: 24
End: 2019-03-20
Payer: COMMERCIAL

## 2019-03-20 VITALS
HEIGHT: 66 IN | OXYGEN SATURATION: 97 % | RESPIRATION RATE: 14 BRPM | SYSTOLIC BLOOD PRESSURE: 140 MMHG | BODY MASS INDEX: 38.25 KG/M2 | TEMPERATURE: 96.6 F | DIASTOLIC BLOOD PRESSURE: 80 MMHG | HEART RATE: 76 BPM | WEIGHT: 238 LBS

## 2019-03-20 DIAGNOSIS — R00.2 PALPITATION: ICD-10-CM

## 2019-03-20 DIAGNOSIS — E78.2 MIXED HYPERLIPIDEMIA: ICD-10-CM

## 2019-03-20 DIAGNOSIS — I10 ESSENTIAL HYPERTENSION: Primary | ICD-10-CM

## 2019-03-20 DIAGNOSIS — D64.9 ANEMIA, UNSPECIFIED TYPE: ICD-10-CM

## 2019-03-20 PROCEDURE — 3008F BODY MASS INDEX DOCD: CPT | Performed by: FAMILY MEDICINE

## 2019-03-20 PROCEDURE — 1036F TOBACCO NON-USER: CPT | Performed by: FAMILY MEDICINE

## 2019-03-20 PROCEDURE — 99214 OFFICE O/P EST MOD 30 MIN: CPT | Performed by: FAMILY MEDICINE

## 2019-03-20 RX ORDER — AMLODIPINE BESYLATE AND BENAZEPRIL HYDROCHLORIDE 5; 20 MG/1; MG/1
CAPSULE ORAL
Qty: 90 CAPSULE | Refills: 1 | Status: SHIPPED | OUTPATIENT
Start: 2019-03-20 | End: 2019-06-28 | Stop reason: SDUPTHER

## 2019-03-20 NOTE — PROGRESS NOTES
Standard Visit   Assessment/Plan:     Visit Diagnosis:  Diagnoses and all orders for this visit:    Essential hypertension  -     amLODIPine-benazepril (LOTREL 5-20) 5-20 MG per capsule; Take 1 pill daily in the morning  -     Microalbumin / creatinine urine ratio  -     TSH, 3rd generation with Free T4 reflex; Future  -     Comprehensive metabolic panel; Future    Mixed hyperlipidemia  -     Lipid panel; Future    Anemia, unspecified type  -     CBC and differential; Future    Palpitation          Subjective:    Patient ID: Gilbert Pinto is a 25 y o  male  Patient is here with the following concerns:    Here for follow-up  Blood pressure is up a little bit today  He is on lisinopril/hydrochlorothiazide  Blood pressure is not at goal  He is on Inderal LA a  For palpitations  The palpitations have settled down  With the blood pressure is not at goal  He was seen in the emergency room for palpitations  We did an echo  Which showed trivial valve regurgitation other than that he was good  Ejection fraction was normal  Holter monitor showed just a few APCs other than that every the echo was essentially normal  He is here with his  They are expecting their 1st baby in September all is well        The following portions of the patient's history were reviewed and updated as appropriate: allergies, current medications, past family history, past medical history, past social history, past surgical history and problem list     Review of Systems   Constitutional: Negative for activity change and fever  HENT: Negative for nosebleeds and trouble swallowing  Eyes: Negative  Respiratory: Negative  Cardiovascular: Negative for palpitations  Gastrointestinal: Negative for abdominal pain, blood in stool and vomiting  Endocrine: Negative for cold intolerance  Genitourinary: Negative  Musculoskeletal: Negative for neck stiffness  Skin: Negative for pallor     Allergic/Immunologic: Negative for immunocompromised state  Neurological: Negative for seizures and facial asymmetry  Hematological: Negative for adenopathy  Psychiatric/Behavioral: Negative for agitation and suicidal ideas           /80   Pulse 76   Temp (!) 96 6 °F (35 9 °C)   Resp 14   Ht 5' 6" (1 676 m)   Wt 108 kg (238 lb)   SpO2 97%   BMI 38 41 kg/m²   Social History     Socioeconomic History    Marital status: Single     Spouse name: Not on file    Number of children: Not on file    Years of education: Not on file    Highest education level: Not on file   Occupational History    Not on file   Social Needs    Financial resource strain: Not on file    Food insecurity:     Worry: Not on file     Inability: Not on file    Transportation needs:     Medical: Not on file     Non-medical: Not on file   Tobacco Use    Smoking status: Never Smoker    Smokeless tobacco: Never Used   Substance and Sexual Activity    Alcohol use: No    Drug use: No    Sexual activity: Not on file   Lifestyle    Physical activity:     Days per week: Not on file     Minutes per session: Not on file    Stress: Not on file   Relationships    Social connections:     Talks on phone: Not on file     Gets together: Not on file     Attends Religion service: Not on file     Active member of club or organization: Not on file     Attends meetings of clubs or organizations: Not on file     Relationship status: Not on file    Intimate partner violence:     Fear of current or ex partner: Not on file     Emotionally abused: Not on file     Physically abused: Not on file     Forced sexual activity: Not on file   Other Topics Concern    Not on file   Social History Narrative    Not on file     Past Medical History:   Diagnosis Date    Anxiety     Hypertension      Family History   Problem Relation Age of Onset    Heart failure Maternal Grandmother     Heart failure Maternal Grandfather      Past Surgical History:   Procedure Laterality Date    ANKLE SURGERY      FETAL SURGERY FOR CONGENITAL HERNIA         Current Outpatient Medications:     propranolol (INDERAL LA) 60 mg 24 hr capsule, Take 1 capsule (60 mg total) by mouth daily, Disp: 90 capsule, Rfl: 1    amLODIPine-benazepril (LOTREL 5-20) 5-20 MG per capsule, Take 1 pill daily in the morning, Disp: 90 capsule, Rfl: 1      Objective:     Physical Exam   Constitutional: He is oriented to person, place, and time  He appears well-developed and well-nourished  HENT:   Head: Normocephalic and atraumatic  Eyes: Pupils are equal, round, and reactive to light  Neck: Normal range of motion  Neck supple  Cardiovascular: Normal rate  Pulmonary/Chest: Effort normal and breath sounds normal    Abdominal: Soft  Musculoskeletal: Normal range of motion  Neurological: He is alert and oriented to person, place, and time  Skin: Skin is warm and dry  Psychiatric: He has a normal mood and affect  Nursing note and vitals reviewed        Social History     Socioeconomic History    Marital status: Single     Spouse name: Not on file    Number of children: Not on file    Years of education: Not on file    Highest education level: Not on file   Occupational History    Not on file   Social Needs    Financial resource strain: Not on file    Food insecurity:     Worry: Not on file     Inability: Not on file    Transportation needs:     Medical: Not on file     Non-medical: Not on file   Tobacco Use    Smoking status: Never Smoker    Smokeless tobacco: Never Used   Substance and Sexual Activity    Alcohol use: No    Drug use: No    Sexual activity: Not on file   Lifestyle    Physical activity:     Days per week: Not on file     Minutes per session: Not on file    Stress: Not on file   Relationships    Social connections:     Talks on phone: Not on file     Gets together: Not on file     Attends Rastafarian service: Not on file     Active member of club or organization: Not on file     Attends meetings of clubs or organizations: Not on file     Relationship status: Not on file    Intimate partner violence:     Fear of current or ex partner: Not on file     Emotionally abused: Not on file     Physically abused: Not on file     Forced sexual activity: Not on file   Other Topics Concern    Not on file   Social History Narrative    Not on file     Past Medical History:   Diagnosis Date    Anxiety     Hypertension        Current Outpatient Medications:     propranolol (INDERAL LA) 60 mg 24 hr capsule, Take 1 capsule (60 mg total) by mouth daily, Disp: 90 capsule, Rfl: 1    amLODIPine-benazepril (LOTREL 5-20) 5-20 MG per capsule, Take 1 pill daily in the morning, Disp: 90 capsule, Rfl: 1  Family History   Problem Relation Age of Onset    Heart failure Maternal Grandmother     Heart failure Maternal Grandfather        Patient Instructions   Stop the lisinopril hydrochlorothiazide  We are going to start Lotrel  After 4 or 5 days get her blood work done fasting  Keep going with the Inderal propanolol however  Please watch the salt  In fact throw the salt out of the house              Angelina Caldera

## 2019-03-20 NOTE — PATIENT INSTRUCTIONS
Stop the lisinopril hydrochlorothiazide  We are going to start Lotrel  After 4 or 5 days get her blood work done fasting  Keep going with the Inderal propanolol however  Please watch the salt  In fact throw the salt out of the house

## 2019-06-28 DIAGNOSIS — I10 ESSENTIAL HYPERTENSION: ICD-10-CM

## 2019-06-28 RX ORDER — AMLODIPINE BESYLATE AND BENAZEPRIL HYDROCHLORIDE 5; 20 MG/1; MG/1
CAPSULE ORAL
Qty: 90 CAPSULE | Refills: 0 | Status: SHIPPED | OUTPATIENT
Start: 2019-06-28 | End: 2020-05-27

## 2019-07-03 ENCOUNTER — TELEPHONE (OUTPATIENT)
Dept: FAMILY MEDICINE CLINIC | Facility: CLINIC | Age: 24
End: 2019-07-03

## 2019-07-03 RX ORDER — AMLODIPINE BESYLATE AND BENAZEPRIL HYDROCHLORIDE 5; 20 MG/1; MG/1
CAPSULE ORAL
Qty: 90 CAPSULE | Refills: 1 | Status: SHIPPED | OUTPATIENT
Start: 2019-07-03 | End: 2019-07-09 | Stop reason: SDUPTHER

## 2019-07-03 RX ORDER — PROPRANOLOL HCL 60 MG
CAPSULE, EXTENDED RELEASE 24HR ORAL
Qty: 90 CAPSULE | Refills: 0 | Status: SHIPPED | OUTPATIENT
Start: 2019-07-03 | End: 2019-11-05 | Stop reason: SDUPTHER

## 2019-07-09 DIAGNOSIS — I10 ESSENTIAL HYPERTENSION: ICD-10-CM

## 2019-07-09 RX ORDER — AMLODIPINE BESYLATE AND BENAZEPRIL HYDROCHLORIDE 5; 20 MG/1; MG/1
CAPSULE ORAL
Qty: 90 CAPSULE | Refills: 1 | Status: SHIPPED | OUTPATIENT
Start: 2019-07-09 | End: 2020-03-26 | Stop reason: ALTCHOICE

## 2019-11-05 DIAGNOSIS — I10 ESSENTIAL HYPERTENSION: ICD-10-CM

## 2019-11-06 RX ORDER — PROPRANOLOL HCL 60 MG
CAPSULE, EXTENDED RELEASE 24HR ORAL
Qty: 90 CAPSULE | Refills: 0 | Status: SHIPPED | OUTPATIENT
Start: 2019-11-06 | End: 2020-02-25

## 2020-02-25 DIAGNOSIS — I10 ESSENTIAL HYPERTENSION: ICD-10-CM

## 2020-02-25 RX ORDER — PROPRANOLOL HCL 60 MG
CAPSULE, EXTENDED RELEASE 24HR ORAL
Qty: 30 CAPSULE | Refills: 0 | Status: SHIPPED | OUTPATIENT
Start: 2020-02-25 | End: 2020-04-03 | Stop reason: SDUPTHER

## 2020-02-25 NOTE — TELEPHONE ENCOUNTER
Please let him know I approved his meds, but he needs appt with [facility] for further refills

## 2020-02-25 NOTE — TELEPHONE ENCOUNTER
Requested medication(s) are due for refill today: Yes  Patient has already received a courtesy refill: Yes  Other reason request has been forwarded to provider: Patient has not been seen since 3/2019   Please assist Dolsie patient

## 2020-03-25 ENCOUNTER — TELEPHONE (OUTPATIENT)
Dept: FAMILY MEDICINE CLINIC | Facility: CLINIC | Age: 25
End: 2020-03-25

## 2020-03-26 ENCOUNTER — TELEMEDICINE (OUTPATIENT)
Dept: FAMILY MEDICINE CLINIC | Facility: CLINIC | Age: 25
End: 2020-03-26
Payer: COMMERCIAL

## 2020-03-26 VITALS
BODY MASS INDEX: 38.25 KG/M2 | DIASTOLIC BLOOD PRESSURE: 88 MMHG | WEIGHT: 238 LBS | HEIGHT: 66 IN | SYSTOLIC BLOOD PRESSURE: 122 MMHG

## 2020-03-26 DIAGNOSIS — Z11.4 SCREENING FOR HIV WITHOUT PRESENCE OF RISK FACTORS: ICD-10-CM

## 2020-03-26 DIAGNOSIS — R53.83 FATIGUE, UNSPECIFIED TYPE: ICD-10-CM

## 2020-03-26 DIAGNOSIS — I10 ESSENTIAL HYPERTENSION: Primary | ICD-10-CM

## 2020-03-26 PROCEDURE — 99213 OFFICE O/P EST LOW 20 MIN: CPT | Performed by: FAMILY MEDICINE

## 2020-03-26 RX ORDER — AMLODIPINE BESYLATE AND BENAZEPRIL HYDROCHLORIDE 10; 40 MG/1; MG/1
1 CAPSULE ORAL DAILY
Qty: 90 CAPSULE | Refills: 1 | Status: SHIPPED | OUTPATIENT
Start: 2020-03-26 | End: 2020-05-26 | Stop reason: SDUPTHER

## 2020-03-26 NOTE — PROGRESS NOTES
Virtual Regular Visit    Problem List Items Addressed This Visit        Other    Fatigue      Other Visit Diagnoses     Essential hypertension    -  Primary    Relevant Medications    amLODIPine-benazepril (LOTREL) 10-40 MG per capsule    Screening for HIV without presence of risk factors        Relevant Orders    HIV 1/2 Antigen/Antibody (4th Generation) w Reflex [facility]               Reason for visit is blood pressure check and review of blood pressure medications      Encounter provider MARTIN Ojeda    Provider located at [facility] [address] [place] [id number] [phone]      Recent Visits  Date Type Provider Dept   03/25/20 Telephone Daniel Cano [address] recent visits within past 7 days and meeting all other requirements     Today's Visits  Date Type Provider Dept   03/26/20 Telemedicine MARTIN Ojeda [facility] [address] [facility]   Showing today's visits and meeting all other requirements     Future Appointments  No visits were found meeting these conditions  Showing future appointments within next 150 days and meeting all other requirements        After connecting through [employer], the patient was identified by name and date of birth  Farhana Florence was informed that this is a telemedicine visit and that the visit is being conducted through [facility] and patient was informed that this is not a secure, HIPAA-complaint platform  he agrees to proceed  which may not be secure and therefore, might not be HIPAA-compliant  My office door was closed  No one else was in the room  He acknowledged consent and understanding of privacy and security of the video platform  The patient has agreed to participate and understands they can discontinue the visit at any time      Subjective  Farhana Florence is a 22 y o  male with history of essential hypertension  Last seen patient was put on Lotrel 5/20  He is also on propanolol LA for palpitations  And blood pressure control  When started on the Lotrel he was asked to get fasting blood work done and to check his potassium which he did not do  He is reminded to get that blood work done now today  Blood pressure is not quite to goal  Blood pressure has come down  However he does have episodes where he states his blood pressure is 150/100  He is 5'6 "and weighs 238  He has an elevated BMI   He has no travel history        Past Medical History:   Diagnosis Date    Anxiety     Hypertension        Past Surgical History:   Procedure Laterality Date    ANKLE SURGERY      FETAL SURGERY FOR CONGENITAL HERNIA         Current Outpatient Medications   Medication Sig Dispense Refill    amLODIPine-benazepril (LOTREL 5-20) 5-20 MG per capsule TAKE ONE CAPSULE BY MOUTH EVERY MORNING 90 capsule 0    amLODIPine-benazepril (LOTREL) 10-40 MG per capsule Take 1 capsule by mouth daily 90 capsule 1    propranolol (INDERAL LA) 60 mg 24 hr capsule TAKE ONE CAPSULE BY MOUTH DAILY IN THE AM 30 capsule 0     No current facility-administered medications for this visit  No Known Allergies    Review of Systems   Constitutional: Negative for activity change and fever  HENT: Negative for nosebleeds and trouble swallowing  Eyes: Negative  Respiratory: Negative  Cardiovascular: Negative for palpitations  Gastrointestinal: Negative for abdominal pain, blood in stool and vomiting  Endocrine: Negative for cold intolerance  Genitourinary: Negative  Musculoskeletal: Negative for neck stiffness  Skin: Negative for pallor  Allergic/Immunologic: Negative for immunocompromised state  Neurological: Negative for seizures and facial asymmetry  Hematological: Negative for adenopathy  Psychiatric/Behavioral: Negative for agitation and suicidal ideas  Physical Exam   Constitutional: He is oriented to person, place, and time   He appears well-developed and well-nourished  No distress  HENT:   Head: Normocephalic and atraumatic  Eyes: Pupils are equal, round, and reactive to light  Conjunctivae are normal  Right eye exhibits no discharge  Neck: Normal range of motion  No tracheal deviation present  Thick neck   Pulmonary/Chest: Effort normal  No respiratory distress  Musculoskeletal: Normal range of motion  He exhibits no edema or deformity  Neurological: He is alert and oriented to person, place, and time  Skin: Skin is dry  No rash noted  He is not diaphoretic  No erythema  No pallor  Psychiatric: He has a normal mood and affect  His behavior is normal  Judgment and thought content normal    Vitals reviewed  I spent 20 minutes with the patient during this visit

## 2020-03-26 NOTE — PATIENT INSTRUCTIONS
Increase Lotrel to 10/40  New script called in to pharmacy  Must get potassium and kidney function checked  Advised to get labs drawn today at hospital lab  Reminded to keep sodium intake less than 1500 mg daily  Weight loss  Exercise  Follow-up in 4 weeks  Blood pressure check daily write down the numbers

## 2020-04-03 DIAGNOSIS — I10 ESSENTIAL HYPERTENSION: ICD-10-CM

## 2020-04-03 RX ORDER — PROPRANOLOL HCL 60 MG
60 CAPSULE, EXTENDED RELEASE 24HR ORAL DAILY
Qty: 30 CAPSULE | Refills: 3 | Status: SHIPPED | OUTPATIENT
Start: 2020-04-03 | End: 2020-05-04 | Stop reason: SDUPTHER

## 2020-05-04 DIAGNOSIS — I10 ESSENTIAL HYPERTENSION: ICD-10-CM

## 2020-05-04 RX ORDER — PROPRANOLOL HCL 60 MG
60 CAPSULE, EXTENDED RELEASE 24HR ORAL DAILY
Qty: 30 CAPSULE | Refills: 3 | Status: SHIPPED | OUTPATIENT
Start: 2020-05-04 | End: 2020-10-02 | Stop reason: SDUPTHER

## 2020-05-26 DIAGNOSIS — I10 ESSENTIAL HYPERTENSION: ICD-10-CM

## 2020-05-27 ENCOUNTER — TELEPHONE (OUTPATIENT)
Dept: FAMILY MEDICINE CLINIC | Facility: CLINIC | Age: 25
End: 2020-05-27

## 2020-05-27 DIAGNOSIS — I10 ESSENTIAL HYPERTENSION: ICD-10-CM

## 2020-05-27 RX ORDER — AMLODIPINE BESYLATE AND BENAZEPRIL HYDROCHLORIDE 10; 40 MG/1; MG/1
1 CAPSULE ORAL DAILY
Qty: 30 CAPSULE | Refills: 0 | Status: SHIPPED | OUTPATIENT
Start: 2020-05-27 | End: 2020-05-27

## 2020-05-27 RX ORDER — AMLODIPINE BESYLATE AND BENAZEPRIL HYDROCHLORIDE 10; 40 MG/1; MG/1
1 CAPSULE ORAL DAILY
Qty: 90 CAPSULE | Refills: 1 | Status: SHIPPED | OUTPATIENT
Start: 2020-05-27 | End: 2020-12-21 | Stop reason: SDUPTHER

## 2020-10-02 DIAGNOSIS — I10 ESSENTIAL HYPERTENSION: ICD-10-CM

## 2020-10-02 RX ORDER — PROPRANOLOL HCL 60 MG
60 CAPSULE, EXTENDED RELEASE 24HR ORAL DAILY
Qty: 30 CAPSULE | Refills: 3 | Status: SHIPPED | OUTPATIENT
Start: 2020-10-02 | End: 2021-04-29

## 2020-12-21 DIAGNOSIS — I10 ESSENTIAL HYPERTENSION: ICD-10-CM

## 2020-12-22 RX ORDER — AMLODIPINE BESYLATE AND BENAZEPRIL HYDROCHLORIDE 10; 40 MG/1; MG/1
CAPSULE ORAL
Qty: 90 CAPSULE | Refills: 0 | Status: SHIPPED | OUTPATIENT
Start: 2020-12-22 | End: 2021-07-08

## 2020-12-22 RX ORDER — AMLODIPINE BESYLATE AND BENAZEPRIL HYDROCHLORIDE 10; 40 MG/1; MG/1
1 CAPSULE ORAL DAILY
Qty: 90 CAPSULE | Refills: 1 | Status: SHIPPED | OUTPATIENT
Start: 2020-12-22 | End: 2021-07-07

## 2021-02-09 ENCOUNTER — HOSPITAL ENCOUNTER (EMERGENCY)
Facility: HOSPITAL | Age: 26
Discharge: HOME | End: 2021-02-09
Attending: EMERGENCY MEDICINE | Admitting: EMERGENCY MEDICINE
Payer: COMMERCIAL

## 2021-02-09 ENCOUNTER — APPOINTMENT (EMERGENCY)
Dept: RADIOLOGY | Facility: HOSPITAL | Age: 26
End: 2021-02-09
Payer: COMMERCIAL

## 2021-02-09 VITALS
HEART RATE: 75 BPM | HEIGHT: 66 IN | WEIGHT: 260 LBS | RESPIRATION RATE: 18 BRPM | OXYGEN SATURATION: 99 % | SYSTOLIC BLOOD PRESSURE: 138 MMHG | TEMPERATURE: 98.7 F | DIASTOLIC BLOOD PRESSURE: 80 MMHG | BODY MASS INDEX: 41.78 KG/M2
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
## 2021-02-09 DIAGNOSIS — S16.1XXA STRAIN OF NECK MUSCLE, INITIAL ENCOUNTER: Primary | ICD-10-CM

## 2021-02-09 PROCEDURE — 72040 X-RAY EXAM NECK SPINE 2-3 VW: CPT

## 2021-02-09 PROCEDURE — 99284 EMERGENCY DEPT VISIT MOD MDM: CPT | Performed by: PHYSICIAN ASSISTANT

## 2021-02-09 PROCEDURE — 99284 EMERGENCY DEPT VISIT MOD MDM: CPT

## 2021-02-09 RX ORDER — CYCLOBENZAPRINE HCL 10 MG
10 TABLET ORAL 2 TIMES DAILY PRN
Qty: 20 TABLET | Refills: 0 | Status: SHIPPED | OUTPATIENT
Start: 2021-02-09 | End: 2022-03-22

## 2021-02-09 RX ORDER — NAPROXEN 500 MG/1
500 TABLET ORAL 2 TIMES DAILY WITH MEALS
Qty: 30 TABLET | Refills: 0 | Status: SHIPPED | OUTPATIENT
Start: 2021-02-09 | End: 2022-03-22
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
## 2021-02-22 ENCOUNTER — TELEMEDICINE (OUTPATIENT)
Dept: FAMILY MEDICINE CLINIC | Facility: CLINIC | Age: 26
End: 2021-02-22
Payer: COMMERCIAL

## 2021-02-22 DIAGNOSIS — Z20.822 EXPOSURE TO COVID-19 VIRUS: ICD-10-CM

## 2021-02-22 DIAGNOSIS — B34.9 VIRAL INFECTION, UNSPECIFIED: ICD-10-CM

## 2021-02-22 PROCEDURE — 99214 OFFICE O/P EST MOD 30 MIN: CPT | Performed by: NURSE PRACTITIONER

## 2021-02-22 PROCEDURE — U0003 INFECTIOUS AGENT DETECTION BY NUCLEIC ACID (DNA OR RNA); SEVERE ACUTE RESPIRATORY SYNDROME CORONAVIRUS 2 (SARS-COV-2) (CORONAVIRUS DISEASE [COVID-19]), AMPLIFIED PROBE TECHNIQUE, MAKING USE OF HIGH THROUGHPUT TECHNOLOGIES AS DESCRIBED BY CMS-2020-01-R: HCPCS | Performed by: NURSE PRACTITIONER

## 2021-02-22 PROCEDURE — U0005 INFEC AGEN DETEC AMPLI PROBE: HCPCS | Performed by: NURSE PRACTITIONER

## 2021-02-22 NOTE — PROGRESS NOTES
COVID-19 Virtual Visit     Assessment/Plan:    Problem List Items Addressed This Visit     None      Visit Diagnoses     Exposure to COVID-19 virus          Advised 10 day quarantine from last exposure on 02/14  Relevant Orders    Novel Coronavirus (Covid-19),PCR [facility] - Collected in Office    Viral infection, unspecified          Will obtain COVID-19 testing in office  Advised isolation  Advised symptomatic care  Relevant Orders    Novel Coronavirus (Covid-19),PCR [facility] - Collected in Office         Disposition:     I recommended the patient to come to our office to perform PCR testing for COVID-19  I have spent 15 minutes directly with the patient  Encounter provider MARTIN Long    Provider located at [address]  [place] [address] [facility]  [address] [place] [address] [address]    Recent Visits  No visits were found meeting these conditions  Showing recent visits within past 7 days and meeting all other requirements     Today's Visits  Date Type Provider Dept   02/22/21 Telemedicine MARTIN Wu Pg Jose Prescott 8 today's visits and meeting all other requirements     Future Appointments  No visits were found meeting these conditions  Showing future appointments within next 150 days and meeting all other requirements      This virtual check-in was done via Google Duo and patient was informed that this is not a secure, HIPAA-compliant platform  He agrees to proceed  Patient agrees to participate in a virtual check in via telephone or video visit instead of presenting to the office to address urgent/immediate medical needs  Patient is aware this is a billable service  After connecting through [facility], the patient was identified by name and date of birth   Severo Oleg was informed that this was a telemedicine visit and that the exam was being conducted confidentially over secure lines  My office door was closed  No one else was in the room  Ramiro Davis acknowledged consent and understanding of privacy and security of the telemedicine visit  I informed the patient that I have reviewed his record in Epic and presented the opportunity for him to ask any questions regarding the visit today  The patient agreed to participate  Subjective:   Ramiro Davis is a 32 y o  male who is concerned about COVID-19  Patient's symptoms include nasal congestion, rhinorrhea, loss of taste and headache  Patient denies fever, chills, fatigue, malaise, sore throat, anosmia, cough, shortness of breath, chest tightness, abdominal pain, nausea, vomiting, diarrhea and myalgias       Date of symptom onset: 2/21/2021  Date of exposure: 2/14/2021    Exposure:   Contact with a person who is under investigation (PUI) for or who is positive for COVID-19 within the last 14 days?: Yes    Hospitalized recently for fever and/or lower respiratory symptoms?: No      Currently a healthcare worker that is involved in direct patient care?: No      Works in a special setting where the risk of COVID-19 transmission may be high? (this may include long-term care, correctional and penitentiary facilities; homeless shelters; assisted-living facilities and group homes ): No      Resident in a special setting where the risk of COVID-19 transmission may be high? (this may include long-term care, correctional and penitentiary facilities; homeless shelters; assisted-living facilities and group homes ): No      No results found for: Esau Parr, [address] [place] [address], [address] [place] [address],[address] [address]  Past Medical History:   Diagnosis Date    Anxiety     Hypertension      Past Surgical History:   Procedure Laterality Date    ANKLE SURGERY      FETAL SURGERY FOR CONGENITAL HERNIA       Current Outpatient Medications   Medication Sig Dispense Refill    amLODIPine-benazepril (LOTREL) 10-40 MG per capsule Take 1 capsule by mouth daily 90 capsule 1    amLODIPine-benazepril (LOTREL) 10-40 MG per capsule TAKE ONE CAPSULE BY MOUTH EVERY DAY 90 capsule 0    cyclobenzaprine (FLEXERIL) 10 mg tablet Take 1 tablet (10 mg total) by mouth 2 (two) times a day as needed for muscle spasms 20 tablet 0    naproxen (NAPROSYN) 500 mg tablet Take 1 tablet (500 mg total) by mouth 2 (two) times a day with meals 30 tablet 0    propranolol (INDERAL LA) 60 mg 24 hr capsule Take 1 capsule (60 mg total) by mouth daily 30 capsule 3     No current facility-administered medications for this visit  No Known Allergies    Review of Systems   Constitutional: Negative for chills, fatigue and fever  HENT: Positive for congestion and rhinorrhea  Negative for sore throat  Respiratory: Negative for cough, chest tightness and shortness of breath  Gastrointestinal: Negative for abdominal pain, diarrhea, nausea and vomiting  Musculoskeletal: Negative for myalgias  Neurological: Positive for headaches  Objective: There were no vitals filed for this visit  Physical Exam  Constitutional:       Appearance: Normal appearance  Pulmonary:      Effort: Pulmonary effort is normal    Neurological:      Mental Status: He is alert and oriented to person, place, and time  Psychiatric:         Mood and Affect: Mood normal        VIRTUAL VISIT DISCLAIMER    Marco Christine acknowledges that he has consented to an online visit or consultation  He understands that the online visit is based solely on information provided by him, and that, in the absence of a face-to-face physical evaluation by the physician, the diagnosis he receives is both limited and provisional in terms of accuracy and completeness  This is not intended to replace a full medical face-to-face evaluation by the physician  Marco Christine understands and accepts these terms

## 2021-02-23 LAB — SARS-COV-2 RNA RESP QL NAA+PROBE: POSITIVE

## 2021-02-24 ENCOUNTER — TELEPHONE (OUTPATIENT)
Dept: FAMILY MEDICINE CLINIC | Facility: CLINIC | Age: 26
End: 2021-02-24

## 2021-02-24 ENCOUNTER — TELEMEDICINE (OUTPATIENT)
Dept: FAMILY MEDICINE CLINIC | Facility: CLINIC | Age: 26
End: 2021-02-24
Payer: COMMERCIAL

## 2021-02-24 DIAGNOSIS — U07.1 COVID-19: Primary | ICD-10-CM

## 2021-02-24 PROCEDURE — 99213 OFFICE O/P EST LOW 20 MIN: CPT | Performed by: NURSE PRACTITIONER

## 2021-02-24 NOTE — PROGRESS NOTES
COVID-19 Virtual Visit     Assessment/Plan:    Problem List Items Addressed This Visit        Other    COVID-19 - Primary      Discussed monoclonal antibody treatment with patient  Patient declined treatment as he is improving rapidly  Patient is to quarantine for 10 days from the onset of symptoms  His quarantine will end on 03/04/2021  Patient is isolating by himself  Patient will call if he has any further questions  Disposition:     I recommended continued isolation until at least 24 hours have passed since recovery defined as resolution of fever without the use of fever-reducing medications AND improvement in COVID symptoms AND 10 days have passed since onset of symptoms (or 10 days have passed since date of first positive viral diagnostic test for asymptomatic patients)  Patient declines monoclonal antibody treatment  I have spent 15 minutes directly with the patient  Encounter provider MARTIN Nuñez    Provider located at [address] [place] [id number]  [phone]    Recent Visits  Date Type Provider Dept   02/22/21 Telemedicine Nikki Yeager, [address] [place] [address]   Showing recent visits within past 7 days and meeting all other requirements     Today's Visits  Date Type Provider Dept   02/24/21 Telephone Vonda Yeager, [address] [place] [address]   02/24/21 Telephone MARTIN Velázquez [facility] [address] [place] [address]   02/24/21 Telemedicine MARTIN Wu [facility] [address] [place] [address]   Showing today's visits and meeting all other requirements     Future Appointments  No visits were found meeting these conditions     Showing future appointments within next 150 days and meeting all other requirements      This virtual check-in was done via Google Duo and patient was informed that this is not a secure, HIPAA-compliant platform  He agrees to proceed  Patient agrees to participate in a virtual check in via telephone or video visit instead of presenting to the office to address urgent/immediate medical needs  Patient is aware this is a billable service  After connecting through [facility], the patient was identified by name and date of birth  Dorcas Henry was informed that this was a telemedicine visit and that the exam was being conducted confidentially over secure lines  My office door was closed  No one else was in the room  Dorcas Henry acknowledged consent and understanding of privacy and security of the telemedicine visit  I informed the patient that I have reviewed his record in Epic and presented the opportunity for him to ask any questions regarding the visit today  The patient agreed to participate  Subjective:   Dorcas Henry is a 32 y o  male who has been screened for COVID-19  Symptom change since last report: improving  Patient's symptoms include nasal congestion, rhinorrhea, anosmia and loss of taste  Patient denies fever, chills, fatigue, malaise, sore throat, cough, shortness of breath, chest tightness, abdominal pain, nausea, vomiting, diarrhea, myalgias and headaches  Vanessa Hennessy has been staying home and has isolated themselves in his home  He is taking care to not share personal items and is cleaning all surfaces that are touched often, like counters, tabletops, and doorknobs using household cleaning sprays or wipes  He is wearing a mask when he leaves his room       Date of symptom onset: 2/21/2021  Date of exposure: 2/14/2021  Date of positive COVID-19 PCR: 2/22/2021    Lab Results   Component Value Date    SARSCOV2 Positive (A) 02/22/2021     Past Medical History:   Diagnosis Date    Anxiety     Hypertension      Past Surgical History:   Procedure Laterality Date    ANKLE SURGERY      FETAL SURGERY FOR CONGENITAL HERNIA       Current Outpatient Medications   Medication Sig Dispense Refill    amLODIPine-benazepril (LOTREL) 10-40 MG per capsule Take 1 capsule by mouth daily 90 capsule 1    amLODIPine-benazepril (LOTREL) 10-40 MG per capsule TAKE ONE CAPSULE BY MOUTH EVERY DAY 90 capsule 0    cyclobenzaprine (FLEXERIL) 10 mg tablet Take 1 tablet (10 mg total) by mouth 2 (two) times a day as needed for muscle spasms 20 tablet 0    naproxen (NAPROSYN) 500 mg tablet Take 1 tablet (500 mg total) by mouth 2 (two) times a day with meals 30 tablet 0    propranolol (INDERAL LA) 60 mg 24 hr capsule Take 1 capsule (60 mg total) by mouth daily 30 capsule 3     No current facility-administered medications for this visit  No Known Allergies    Review of Systems   Constitutional: Negative for chills, fatigue and fever  HENT: Positive for congestion and rhinorrhea  Negative for sore throat  Respiratory: Negative for cough, chest tightness and shortness of breath  Gastrointestinal: Negative for abdominal pain, diarrhea, nausea and vomiting  Musculoskeletal: Negative for myalgias  Neurological: Negative for headaches  Objective: There were no vitals filed for this visit  Physical Exam  Constitutional:       Appearance: Normal appearance  Pulmonary:      Effort: Pulmonary effort is normal    Neurological:      Mental Status: He is alert and oriented to person, place, and time  Psychiatric:         Mood and Affect: Mood normal        VIRTUAL VISIT DISCLAIMER    Zuly Thorne acknowledges that he has consented to an online visit or consultation  He understands that the online visit is based solely on information provided by him, and that, in the absence of a face-to-face physical evaluation by the physician, the diagnosis he receives is both limited and provisional in terms of accuracy and completeness  This is not intended to replace a full medical face-to-face evaluation by the physician  Zuly Thorne understands and accepts these terms

## 2021-02-24 NOTE — PROGRESS NOTES
COVID-19 Virtual Visit     Assessment/Plan:    Problem List Items Addressed This Visit     None      Visit Diagnoses     [id number]    -  Primary         COVID-19 Plan     Encounter provider MARTIN Gilliland    Provider located at [address] [id number] [phone]    Recent Visits  Date Type Provider Dept   02/22/21 Telemedicine MARTIN Wu Pg 8 recent visits within past 7 days and meeting all other requirements     Today's Visits  Date Type Provider Dept   02/24/21 Telemedicine MARTIN Wu Pg [place] [address] [facility]   Showing today's visits and meeting all other requirements     Future Appointments  No visits were found meeting these conditions  Showing future appointments within next 150 days and meeting all other requirements      COVID-19 HPI  Lab Results   Component Value Date    SARSCOV2 Positive (A) 02/22/2021     Past Medical History:   Diagnosis Date    Anxiety     Hypertension      Past Surgical History:   Procedure Laterality Date    ANKLE SURGERY      FETAL SURGERY FOR CONGENITAL HERNIA       Current Outpatient Medications   Medication Sig Dispense Refill    amLODIPine-benazepril (LOTREL) 10-40 MG per capsule Take 1 capsule by mouth daily 90 capsule 1    amLODIPine-benazepril (LOTREL) 10-40 MG per capsule TAKE ONE CAPSULE BY MOUTH EVERY DAY 90 capsule 0    cyclobenzaprine (FLEXERIL) 10 mg tablet Take 1 tablet (10 mg total) by mouth 2 (two) times a day as needed for muscle spasms 20 tablet 0    naproxen (NAPROSYN) 500 mg tablet Take 1 tablet (500 mg total) by mouth 2 (two) times a day with meals 30 tablet 0    propranolol (INDERAL LA) 60 mg 24 hr capsule Take 1 capsule (60 mg total) by mouth daily 30 capsule 3     No current facility-administered medications for this visit        No Known Allergies    Review of Systems  Objective: There were no vitals filed for this visit  Physical Exam  VIRTUAL VISIT DISCLAIMER    Key Iqbal acknowledges that he has consented to an online visit or consultation  He understands that the online visit is based solely on information provided by him, and that, in the absence of a face-to-face physical evaluation by the physician, the diagnosis he receives is both limited and provisional in terms of accuracy and completeness  This is not intended to replace a full medical face-to-face evaluation by the physician  Key Iqbal understands and accepts these terms

## 2021-02-24 NOTE — ASSESSMENT & PLAN NOTE
Discussed monoclonal antibody treatment with patient  Patient declined treatment as he is improving rapidly  Patient is to quarantine for 10 days from the onset of symptoms  His quarantine will end on 03/04/2021  Patient is isolating by himself  Patient will call if he has any further questions

## 2021-04-29 DIAGNOSIS — I10 ESSENTIAL HYPERTENSION: ICD-10-CM

## 2021-04-29 RX ORDER — PROPRANOLOL HCL 60 MG
CAPSULE, EXTENDED RELEASE 24HR ORAL
Qty: 90 CAPSULE | Refills: 0 | Status: SHIPPED | OUTPATIENT
Start: 2021-04-29 | End: 2021-04-29 | Stop reason: SDUPTHER

## 2021-05-06 RX ORDER — PROPRANOLOL HCL 60 MG
60 CAPSULE, EXTENDED RELEASE 24HR ORAL DAILY
Qty: 90 CAPSULE | Refills: 0 | Status: SHIPPED | OUTPATIENT
Start: 2021-05-06 | End: 2021-07-08

## 2021-05-06 NOTE — TELEPHONE ENCOUNTER
Patient called back  He has still not received his medication  This was called in when Lindsay Bueno went on maternity leave  Could you please send this in for the patient?

## 2021-07-07 DIAGNOSIS — I10 ESSENTIAL HYPERTENSION: ICD-10-CM

## 2021-07-08 DIAGNOSIS — I10 ESSENTIAL HYPERTENSION: ICD-10-CM

## 2021-07-08 RX ORDER — AMLODIPINE BESYLATE AND BENAZEPRIL HYDROCHLORIDE 10; 40 MG/1; MG/1
1 CAPSULE ORAL DAILY
Qty: 90 CAPSULE | Refills: 0 | Status: SHIPPED | OUTPATIENT
Start: 2021-07-08 | End: 2021-07-08 | Stop reason: SDUPTHER

## 2021-07-08 RX ORDER — PROPRANOLOL HCL 60 MG
CAPSULE, EXTENDED RELEASE 24HR ORAL
Qty: 90 CAPSULE | Refills: 0 | Status: SHIPPED | OUTPATIENT
Start: 2021-07-08 | End: 2021-07-08 | Stop reason: SDUPTHER

## 2021-07-08 NOTE — TELEPHONE ENCOUNTER
Patient is requesting medication be sent to [address] [place] [address] in [address]  Please advise

## 2021-07-09 RX ORDER — AMLODIPINE BESYLATE AND BENAZEPRIL HYDROCHLORIDE 10; 40 MG/1; MG/1
1 CAPSULE ORAL DAILY
Qty: 90 CAPSULE | Refills: 0 | OUTPATIENT
Start: 2021-07-09

## 2021-07-28 ENCOUNTER — TELEMEDICINE (OUTPATIENT)
Dept: FAMILY MEDICINE CLINIC | Facility: CLINIC | Age: 26
End: 2021-07-28
Payer: COMMERCIAL

## 2021-07-28 DIAGNOSIS — B34.9 VIRAL INFECTION, UNSPECIFIED: Primary | ICD-10-CM

## 2021-07-28 PROCEDURE — 99213 OFFICE O/P EST LOW 20 MIN: CPT | Performed by: NURSE PRACTITIONER

## 2021-07-28 NOTE — PROGRESS NOTES
COVID-19 Outpatient Progress Note    Assessment/Plan:    Problem List Items Addressed This Visit     None      Visit Diagnoses     Viral infection, unspecified    -  Primary      Will obtain COVID-19 test in office  Advised to isolate until results come back  Advised supportive care with fluids and TheraFlu as this is helping  Relevant Orders    Novel Coronavirus (Covid-19),PCR [facility] - Collected in Office         Disposition:     I recommended the patient to come to our office to perform PCR testing for COVID-19  I have spent 15 minutes directly with the patient  Verification of patient location:    Patient is located in the following state in which I hold an active license [place]    Encounter provider MARTIN Grajeda    Provider located at [address] [place] [address] [address] [facility] [address]  [address] [address] [phone]    Recent Visits  No visits were found meeting these conditions  Showing recent visits within past 7 days and meeting all other requirements  Today's Visits  Date Type Provider Dept   07/28/21 Telemedicine MARTIN Wu Pg Jose Prescott 8 today's visits and meeting all other requirements  Future Appointments  No visits were found meeting these conditions  Showing future appointments within next 150 days and meeting all other requirements     This virtual check-in was done via [employer] and patient was informed that this is a secure, HIPAA-compliant platform  He agrees to proceed  Patient agrees to participate in a virtual check in via telephone or video visit instead of presenting to the office to address urgent/immediate medical needs  Patient is aware this is a billable service  After connecting through [facility], the patient was identified by name and date of birth   Attica Cousin was informed that this was a telemedicine visit and that the exam was being conducted confidentially over secure lines  My office door was closed  No one else was in the room  Ammy Shankar acknowledged consent and understanding of privacy and security of the telemedicine visit  I informed the patient that I have reviewed his record in Epic and presented the opportunity for him to ask any questions regarding the visit today  The patient agreed to participate  Subjective:   Ammy Shankar is a 32 y o  male who is concerned about COVID-19  Patient's symptoms include nasal congestion, rhinorrhea, sore throat, shortness of breath and chest tightness  Patient denies fever, chills, fatigue, malaise, anosmia, loss of taste, cough, abdominal pain, nausea, vomiting and diarrhea  Date of symptom onset: 7/23/2021    Exposure:   Contact with a person who is under investigation (PUI) for or who is positive for COVID-19 within the last 14 days?: No    Hospitalized recently for fever and/or lower respiratory symptoms?: No      Currently a healthcare worker that is involved in direct patient care?: No      Lab Results   Component Value Date    SARSCOV2 Positive (A) 02/22/2021     Past Medical History:   Diagnosis Date    Anxiety     Hypertension      Past Surgical History:   Procedure Laterality Date    ANKLE SURGERY      FETAL SURGERY FOR CONGENITAL HERNIA       Current Outpatient Medications   Medication Sig Dispense Refill    amLODIPine-benazepril (LOTREL) 10-40 MG per capsule Take 1 capsule by mouth daily 90 capsule 0    cyclobenzaprine (FLEXERIL) 10 mg tablet Take 1 tablet (10 mg total) by mouth 2 (two) times a day as needed for muscle spasms 20 tablet 0    naproxen (NAPROSYN) 500 mg tablet Take 1 tablet (500 mg total) by mouth 2 (two) times a day with meals 30 tablet 0    propranolol (INDERAL LA) 60 mg 24 hr capsule Take 1 capsule (60 mg total) by mouth daily 90 capsule 0     No current facility-administered medications for this visit       No Known Allergies    Review of Systems   Constitutional: Negative for chills, fatigue and fever  HENT: Positive for congestion, rhinorrhea and sore throat  Respiratory: Positive for chest tightness and shortness of breath  Negative for cough  Gastrointestinal: Negative for abdominal pain, diarrhea, nausea and vomiting  Objective: There were no vitals filed for this visit  Physical Exam  Constitutional:       Appearance: Normal appearance  Pulmonary:      Effort: Pulmonary effort is normal    Neurological:      Mental Status: He is alert and oriented to person, place, and time  VIRTUAL VISIT 19 Claire Nelida verbally agrees to participate in [employer]  Pt is aware that [employer] could be limited without vital signs or the ability to perform a full hands-on physical Bria Walls understands he or the provider may request at any time to terminate the video visit and request the patient to seek care or treatment in person

## 2021-07-29 PROCEDURE — U0005 INFEC AGEN DETEC AMPLI PROBE: HCPCS | Performed by: NURSE PRACTITIONER

## 2021-07-29 PROCEDURE — U0003 INFECTIOUS AGENT DETECTION BY NUCLEIC ACID (DNA OR RNA); SEVERE ACUTE RESPIRATORY SYNDROME CORONAVIRUS 2 (SARS-COV-2) (CORONAVIRUS DISEASE [COVID-19]), AMPLIFIED PROBE TECHNIQUE, MAKING USE OF HIGH THROUGHPUT TECHNOLOGIES AS DESCRIBED BY CMS-2020-01-R: HCPCS | Performed by: NURSE PRACTITIONER

## 2021-07-30 LAB — SARS-COV-2 RNA RESP QL NAA+PROBE: NEGATIVE

## 2021-08-27 ENCOUNTER — OFFICE VISIT (OUTPATIENT)
Dept: CARDIOLOGY CLINIC | Facility: CLINIC | Age: 26
End: 2021-08-27
Payer: COMMERCIAL

## 2021-08-27 VITALS
BODY MASS INDEX: 41.46 KG/M2 | OXYGEN SATURATION: 99 % | WEIGHT: 258 LBS | SYSTOLIC BLOOD PRESSURE: 156 MMHG | HEIGHT: 66 IN | DIASTOLIC BLOOD PRESSURE: 100 MMHG | HEART RATE: 71 BPM

## 2021-08-27 DIAGNOSIS — I10 ESSENTIAL HYPERTENSION: Primary | ICD-10-CM

## 2021-08-27 PROCEDURE — 99214 OFFICE O/P EST MOD 30 MIN: CPT | Performed by: INTERNAL MEDICINE

## 2021-08-27 PROCEDURE — 93000 ELECTROCARDIOGRAM COMPLETE: CPT | Performed by: INTERNAL MEDICINE

## 2021-08-27 RX ORDER — HYDROCHLOROTHIAZIDE 25 MG/1
25 TABLET ORAL DAILY
Qty: 90 TABLET | Refills: 3 | Status: SHIPPED | OUTPATIENT
Start: 2021-08-27 | End: 2021-08-30
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
## 2021-08-31 ENCOUNTER — TELEMEDICINE (OUTPATIENT)
Dept: FAMILY MEDICINE CLINIC | Facility: CLINIC | Age: 26
End: 2021-08-31
Payer: COMMERCIAL

## 2021-08-31 DIAGNOSIS — Z11.9 ENCOUNTER FOR SCREENING FOR INFECTIOUS AND PARASITIC DISEASES, UNSPECIFIED: Primary | ICD-10-CM

## 2021-08-31 PROCEDURE — 99212 OFFICE O/P EST SF 10 MIN: CPT | Performed by: NURSE PRACTITIONER

## 2021-08-31 NOTE — PROGRESS NOTES
COVID-19 Outpatient Progress Note    Assessment/Plan:    Problem List Items Addressed This Visit     None      Visit Diagnoses     Encounter for screening for infectious and parasitic diseases, unspecified    -  Primary    Screening test for [id number] for concert  Relevant Orders    Novel Coronavirus (Covid-19),PCR [facility] - Collected in Office         Disposition:     I recommended the patient to come to our office to perform PCR testing for COVID-19  Patient needs screening COVID-19 test to attend concert on Friday, 9/3  I have spent 15 minutes directly with the patient  Verification of patient location:    Patient is located in the following state in which I hold an active license [place]    Encounter provider MARTIN King    Provider located at [address] [place] [address] [address] [place] [id number] [phone]    Recent Visits  No visits were found meeting these conditions  Showing recent visits within past 7 days and meeting all other requirements  Today's Visits  Date Type Provider Dept   08/31/21 Telemedicine MARTIN Wu Pg Jose Prescott 8 today's visits and meeting all other requirements  Future Appointments  No visits were found meeting these conditions  Showing future appointments within next 150 days and meeting all other requirements     This virtual check-in was done via [employer] and patient was informed that this is a secure, HIPAA-compliant platform  He agrees to proceed  Patient agrees to participate in a virtual check in via telephone or video visit instead of presenting to the office to address urgent/immediate medical needs  Patient is aware this is a billable service  After connecting through [facility], the patient was identified by name and date of birth   Addis Tam was informed that this was a telemedicine visit and that the exam was being conducted facility-administered medications for this visit  No Known Allergies    Review of Systems   Constitutional: Negative for chills, fatigue and fever  HENT: Negative for congestion, rhinorrhea and sore throat  Respiratory: Negative for cough, chest tightness and shortness of breath  Gastrointestinal: Negative for abdominal pain, diarrhea, nausea and vomiting  Musculoskeletal: Negative for myalgias  Neurological: Negative for headaches  Objective: There were no vitals filed for this visit  Physical Exam  Constitutional:       Appearance: Normal appearance  Pulmonary:      Effort: Pulmonary effort is normal    Neurological:      Mental Status: He is alert and oriented to person, place, and time  VIRTUAL VISIT 19 Clarie Krause verbally agrees to participate in [employer]  Pt is aware that [employer] could be limited without vital signs or the ability to perform a full hands-on physical John Mares understands he or the provider may request at any time to terminate the video visit and request the patient to seek care or treatment in person

## 2021-09-01 PROCEDURE — U0003 INFECTIOUS AGENT DETECTION BY NUCLEIC ACID (DNA OR RNA); SEVERE ACUTE RESPIRATORY SYNDROME CORONAVIRUS 2 (SARS-COV-2) (CORONAVIRUS DISEASE [COVID-19]), AMPLIFIED PROBE TECHNIQUE, MAKING USE OF HIGH THROUGHPUT TECHNOLOGIES AS DESCRIBED BY CMS-2020-01-R: HCPCS | Performed by: NURSE PRACTITIONER

## 2021-09-01 PROCEDURE — U0005 INFEC AGEN DETEC AMPLI PROBE: HCPCS | Performed by: NURSE PRACTITIONER

## 2021-09-02 ENCOUNTER — CLINICAL SUPPORT (OUTPATIENT)
Dept: CARDIOLOGY CLINIC | Facility: CLINIC | Age: 26
End: 2021-09-02
Payer: COMMERCIAL

## 2021-09-02 VITALS
RESPIRATION RATE: 16 BRPM | SYSTOLIC BLOOD PRESSURE: 130 MMHG | DIASTOLIC BLOOD PRESSURE: 76 MMHG | BODY MASS INDEX: 41.46 KG/M2 | HEIGHT: 66 IN | OXYGEN SATURATION: 96 % | HEART RATE: 73 BPM | WEIGHT: 258 LBS

## 2021-09-02 DIAGNOSIS — I10 HYPERTENSION, UNCONTROLLED: Primary | ICD-10-CM

## 2021-09-02 LAB — SARS-COV-2 RNA RESP QL NAA+PROBE: NEGATIVE

## 2021-09-02 PROCEDURE — 99211 OFF/OP EST MAY X REQ PHY/QHP: CPT

## 2021-09-02 RX ORDER — HYDROCHLOROTHIAZIDE 25 MG/1
25 TABLET ORAL DAILY
Qty: 90 TABLET | Refills: 3 | Status: SHIPPED | OUTPATIENT
Start: 2021-09-02 | End: 2022-04-18 | Stop reason: SDUPTHER

## 2021-09-02 NOTE — PROGRESS NOTES
Patient is in the office today instructed by Dr Markell Hunt for a nurse visit BP check  Patient is currently taking Amlodipine-Benazepril 10-40 mg and Propanolol 60 mg  No symptoms out of the ordinary  LA: 130/76  RA: 124/74   HR: 73     Discussed with Dr Stevenson Hodges  No changes

## 2021-09-02 NOTE — PROGRESS NOTES
Good Morning Dr Elisha Bennett,    All three telephone numbers for this patient are either disconnected or restricted  The [phone] telephone number the voice mail is full

## 2021-09-03 NOTE — PROGRESS NOTES
I spoke to the patient over the phone yesterday evening    He is taking hydrochlorothiazide   Please send him lab script so he can get it done( patient should fast at least 8 hour prior to labs)

## 2021-09-09 ENCOUNTER — TELEPHONE (OUTPATIENT)
Dept: CARDIOLOGY CLINIC | Facility: CLINIC | Age: 26
End: 2021-09-09

## 2021-09-27 ENCOUNTER — HOSPITAL ENCOUNTER (OUTPATIENT)
Dept: NON INVASIVE DIAGNOSTICS | Facility: CLINIC | Age: 26
Discharge: HOME | End: 2021-09-27
Payer: COMMERCIAL

## 2021-09-27 DIAGNOSIS — I10 ESSENTIAL HYPERTENSION: ICD-10-CM

## 2021-09-27 PROCEDURE — 93306 TTE W/DOPPLER COMPLETE: CPT

## 2021-09-27 PROCEDURE — 93306 TTE W/DOPPLER COMPLETE: CPT | Performed by: INTERNAL MEDICINE

## 2021-10-25 DIAGNOSIS — I10 ESSENTIAL HYPERTENSION: ICD-10-CM

## 2021-10-26 RX ORDER — AMLODIPINE BESYLATE AND BENAZEPRIL HYDROCHLORIDE 10; 40 MG/1; MG/1
1 CAPSULE ORAL DAILY
Qty: 90 CAPSULE | Refills: 0 | OUTPATIENT
Start: 2021-10-26 | End: 2022-01-24

## 2021-12-09 ENCOUNTER — TELEMEDICINE (OUTPATIENT)
Dept: FAMILY MEDICINE CLINIC | Facility: CLINIC | Age: 26
End: 2021-12-09
Payer: COMMERCIAL

## 2021-12-09 DIAGNOSIS — Z20.822 EXPOSURE TO COVID-19 VIRUS: ICD-10-CM

## 2021-12-09 DIAGNOSIS — B34.9 VIRAL INFECTION, UNSPECIFIED: ICD-10-CM

## 2021-12-09 PROCEDURE — U0005 INFEC AGEN DETEC AMPLI PROBE: HCPCS | Performed by: NURSE PRACTITIONER

## 2021-12-09 PROCEDURE — 99213 OFFICE O/P EST LOW 20 MIN: CPT | Performed by: NURSE PRACTITIONER

## 2021-12-09 PROCEDURE — U0003 INFECTIOUS AGENT DETECTION BY NUCLEIC ACID (DNA OR RNA); SEVERE ACUTE RESPIRATORY SYNDROME CORONAVIRUS 2 (SARS-COV-2) (CORONAVIRUS DISEASE [COVID-19]), AMPLIFIED PROBE TECHNIQUE, MAKING USE OF HIGH THROUGHPUT TECHNOLOGIES AS DESCRIBED BY CMS-2020-01-R: HCPCS | Performed by: NURSE PRACTITIONER

## 2021-12-10 LAB — SARS-COV-2 RNA RESP QL NAA+PROBE: NEGATIVE

## 2021-12-28 ENCOUNTER — IMMUNIZATIONS (OUTPATIENT)
Dept: FAMILY MEDICINE CLINIC | Facility: HOSPITAL | Age: 26
End: 2021-12-28

## 2021-12-28 DIAGNOSIS — Z23 ENCOUNTER FOR IMMUNIZATION: Primary | ICD-10-CM

## 2021-12-28 PROCEDURE — 91300 COVID-19 PFIZER VACC 0.3 ML: CPT

## 2021-12-28 PROCEDURE — 0001A COVID-19 PFIZER VACC 0.3 ML: CPT

## 2021-12-29 DIAGNOSIS — Z20.822 EXPOSURE TO COVID-19 VIRUS: Primary | ICD-10-CM

## 2021-12-29 DIAGNOSIS — R68.83 CHILLS: ICD-10-CM

## 2021-12-29 DIAGNOSIS — R52 BODY ACHES: ICD-10-CM

## 2021-12-29 PROCEDURE — U0003 INFECTIOUS AGENT DETECTION BY NUCLEIC ACID (DNA OR RNA); SEVERE ACUTE RESPIRATORY SYNDROME CORONAVIRUS 2 (SARS-COV-2) (CORONAVIRUS DISEASE [COVID-19]), AMPLIFIED PROBE TECHNIQUE, MAKING USE OF HIGH THROUGHPUT TECHNOLOGIES AS DESCRIBED BY CMS-2020-01-R: HCPCS | Performed by: FAMILY MEDICINE

## 2021-12-29 PROCEDURE — U0005 INFEC AGEN DETEC AMPLI PROBE: HCPCS | Performed by: FAMILY MEDICINE

## 2021-12-30 LAB — SARS-COV-2 RNA RESP QL NAA+PROBE: POSITIVE

## 2022-01-03 ENCOUNTER — TELEPHONE (OUTPATIENT)
Dept: FAMILY MEDICINE CLINIC | Facility: CLINIC | Age: 27
End: 2022-01-03

## 2022-01-03 NOTE — TELEPHONE ENCOUNTER
Called patient was not accepting calls right know and was not able to leave a message on his voice mail

## 2022-01-03 NOTE — TELEPHONE ENCOUNTER
Joby Zimmerman   2017 9:45 AM   Office Visit    Dept Phone:  [phone]   Encounter #:  [id number]    Provider:  RADHA Lima   Department:  [facility] MEDICAL [facility] INTERNAL MEDICINE                Your Full Care Plan              Your Updated Medication List          This list is accurate as of: 17 10:43 AM.  Always use your most recent med list.                raNITIdine 150 MG tablet   Commonly known as:  ZANTAC   Take 1 tablet by mouth 2 (Two) Times a Day.               We Performed the Following     Tdap Vaccine Greater Than or Equal To 8yo IM       You Were Diagnosed With        Codes Comments    Routine general medical examination at a health care facility    -  Primary ICD-10-CM: Z00.00  ICD-9-CM: V70.0     Anxiety     ICD-10-CM: F41.9  ICD-9-CM: 300.00     Chest pain, unspecified type     ICD-10-CM: R07.9  ICD-9-CM: 786.50     Need for Tdap vaccination     ICD-10-CM: Z23  ICD-9-CM: V06.1       Instructions     None    Patient Instructions History      Upcoming Appointments     Visit Type Date Time Department    NEW PATIENT 2017  9:45 AM [address]      [employer] Signup     [facility] [employer] allows you to send messages to your doctor, view your test results, renew your prescriptions, schedule appointments, and more. To sign up, go to [employer] and click on the Sign Up Now link in the New User? box. Enter your [employer] Activation Code exactly as it appears below along with the last four digits of your Social Security Number and your Date of Birth () to complete the sign-up process. If you do not sign up before the expiration date, you must request a new code.    [employer] Activation Code: [email or web address]  Expires: 2017 12:46 AM    If you have questions, you can email [email or web address] or call [phone] to talk to our [employer] staff. Remember, [employer] is NOT to be used for urgent needs. For medical  Was calling to let patient know his covid was + needs an f/u visit per Marge Womack "emergencies, dial 911.               Other Info from Your Visit           Allergies     No Known Allergies      Reason for Visit     Establish Care     ER follow up from 1/11/17 Chest pain, left arm numbness and tingling, Anxiety.     Heartburn Prescribed Ranitidine at the ER and was told his chest pain was from heartburn.    Anxiety           Vital Signs     Blood Pressure Temperature Height Weight Body Mass Index Smoking Status    110/82 98.2 °F (36.8 °C) (Temporal Artery ) 72\" (182.9 cm) 197 lb (89.4 kg) 26.72 kg/m2 Former Smoker      Problems and Diagnoses Noted     Routine medical exam    -  Primary    Anxiety problem        Chest pain        Need for diphtheria-tetanus-pertussis (Tdap) vaccine            "

## 2022-01-24 DIAGNOSIS — I10 ESSENTIAL HYPERTENSION: ICD-10-CM

## 2022-01-24 RX ORDER — AMLODIPINE BESYLATE AND BENAZEPRIL HYDROCHLORIDE 10; 40 MG/1; MG/1
CAPSULE ORAL
Qty: 76 CAPSULE | Refills: 0 | Status: SHIPPED | OUTPATIENT
Start: 2022-01-24 | End: 2022-04-12 | Stop reason: SDUPTHER

## 2022-01-24 RX ORDER — PROPRANOLOL HCL 60 MG
60 CAPSULE, EXTENDED RELEASE 24HR ORAL DAILY
Qty: 90 CAPSULE | Refills: 0 | Status: SHIPPED | OUTPATIENT
Start: 2022-01-24 | End: 2022-04-18 | Stop reason: SDUPTHER

## 2022-03-22 ENCOUNTER — OFFICE VISIT (OUTPATIENT)
Dept: UROLOGY | Facility: CLINIC | Age: 27
End: 2022-03-22
Payer: COMMERCIAL

## 2022-03-22 VITALS
DIASTOLIC BLOOD PRESSURE: 66 MMHG | OXYGEN SATURATION: 97 % | BODY MASS INDEX: 41.14 KG/M2 | SYSTOLIC BLOOD PRESSURE: 128 MMHG | WEIGHT: 256 LBS | HEART RATE: 69 BPM | HEIGHT: 66 IN | RESPIRATION RATE: 18 BRPM

## 2022-03-22 DIAGNOSIS — D29.4 ANGIOKERATOMA OF SCROTUM: ICD-10-CM

## 2022-03-22 DIAGNOSIS — M54.50 ACUTE RIGHT-SIDED LOW BACK PAIN WITHOUT SCIATICA: ICD-10-CM

## 2022-03-22 DIAGNOSIS — N50.82 SCROTAL PAIN: Primary | ICD-10-CM

## 2022-03-22 LAB
BACTERIA UR QL AUTO: ABNORMAL /HPF
BILIRUB UR QL STRIP: NEGATIVE
CLARITY UR: CLEAR
COLOR UR: ABNORMAL
GLUCOSE UR STRIP-MCNC: NEGATIVE MG/DL
HGB UR QL STRIP.AUTO: NEGATIVE
KETONES UR STRIP-MCNC: NEGATIVE MG/DL
LEUKOCYTE ESTERASE UR QL STRIP: NEGATIVE
MUCOUS THREADS UR QL AUTO: ABNORMAL
NITRITE UR QL STRIP: NEGATIVE
NON-SQ EPI CELLS URNS QL MICRO: ABNORMAL /HPF
PH UR STRIP.AUTO: 5.5 [PH]
PROT UR STRIP-MCNC: NEGATIVE MG/DL
RBC #/AREA URNS AUTO: ABNORMAL /HPF
SP GR UR STRIP.AUTO: 1.02 (ref 1–1.03)
TRANS CELLS #/AREA URNS HPF: PRESENT /[HPF]
UROBILINOGEN UR STRIP-ACNC: <2 MG/DL
WBC #/AREA URNS AUTO: ABNORMAL /HPF

## 2022-03-22 PROCEDURE — 99204 OFFICE O/P NEW MOD 45 MIN: CPT | Performed by: UROLOGY

## 2022-03-22 PROCEDURE — 87086 URINE CULTURE/COLONY COUNT: CPT | Performed by: UROLOGY

## 2022-03-22 PROCEDURE — 81001 URINALYSIS AUTO W/SCOPE: CPT | Performed by: UROLOGY

## 2022-03-22 RX ORDER — AMOXICILLIN 875 MG/1
875 TABLET, COATED ORAL EVERY 12 HOURS
COMMUNITY
Start: 2022-03-18 | End: 2022-04-18 | Stop reason: ALTCHOICE

## 2022-03-22 RX ORDER — HYDROCODONE BITARTRATE AND ACETAMINOPHEN 5; 325 MG/1; MG/1
TABLET ORAL
COMMUNITY
Start: 2022-03-18 | End: 2022-03-22

## 2022-03-22 RX ORDER — IBUPROFEN 800 MG/1
TABLET ORAL
COMMUNITY
Start: 2022-03-18 | End: 2022-04-18 | Stop reason: ALTCHOICE
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
## 2022-03-22 NOTE — PATIENT INSTRUCTIONS
Scrotal Pain   WHAT YOU NEED TO KNOW:   What do I need to know about scrotal pain? Scrotal pain can happen at any age  The cause of scrotal pain can range from a minor injury to a serious medical condition  It is very important to seek immediate care if you have scrotal pain  The pain may be a warning sign of a serious condition that will need treatment  Without immediate care, you may be at increased risk for losing a testicle or being sterile (not having children)  What may cause scrotal pain? · Torsion (twisting) of the testicle, cord that carries sperm from the testicle, or tissue attached to the testicle    · An infection of the testicle or other area in the scrotum    · A hydrocele (fluid buildup around the testicle) or varicocele (blood backup in veins in the scrotum)    · An inguinal hernia (tissue pushed out of place in your groin)    · Christina gangrene (tissue death of the area between the scrotum and anus)    · A urinary tract infection or stone that is passing, or an infected appendix    · An injury in your groin or scrotum    What are the warning signs of a serious medical problem? Seek care immediately if you have any of the following:  · Pain that starts suddenly or is severe    · Swelling in your scrotum or groin, especially if you also have severe pain or are vomiting    · Red or black patches of skin on your scrotum or area between your penis and anus    · Blisters anywhere in your groin or scrotum    · A fever    How is the cause of scrotal pain diagnosed? Your healthcare provider will examine you and ask about your pain  Tell the provider when the pain started and how long it lasts  Your provider will ask if pain started in another area and moved to your scrotum  The pain may also have moved from your scrotum to another area  Tell your provider if you have pain during exercise or if you had an injury to your groin   Also tell your provider if you have any problems urinating or if any discharge came out of your penis  Your provider may also ask about your sexual activity  · Blood tests  may be used to check for signs of infection  · Ultrasound pictures  may show a problem with your testicles or tissues in your scrotum  An ultrasound may also show kidney stones or other problems that may be causing your pain  How is scrotal pain treated? Treatment will depend on the cause of your pain:  · Prescription pain medicine  may be given  Ask your healthcare provider how to take this medicine safely  Some prescription pain medicines contain acetaminophen  Do not take other medicines that contain acetaminophen without talking to your healthcare provider  Too much acetaminophen may cause liver damage  Prescription pain medicine may cause constipation  Ask your healthcare provider how to prevent or treat constipation  · NSAIDs , such as ibuprofen, help decrease swelling, pain, and fever  This medicine is available with or without a doctor's order  NSAIDs can cause stomach bleeding or kidney problems in certain people  If you take blood thinner medicine, always ask your healthcare provider if NSAIDs are safe for you  Always read the medicine label and follow directions  · Antibiotics  are used to treat a bacterial infection  · Surgery  may be needed to untwist the testicle, or cord, or to remove dead or infected tissue  What can I do to manage my symptoms? · Wear a support device, if directed  A support device, such as a jock strap, can help keep your scrotum lifted and supported  This can help decrease pain  · Apply ice to your scrotum  Ice helps decrease pain and swelling  Use an ice pack, or put crushed ice in a plastic bag  Cover the pack or bag with a towel before you apply it to your scrotum  Apply ice for 15 to 20 minutes every hour, or as directed  When should I seek immediate care? · You have any warning signs of a serious problem      · You have pain or swelling that starts or gets worse quickly  · You have skin changes in your scrotum, such as a dark patch  · You have a fever  When should I contact my healthcare provider? · Your pain does not get better, even after you take pain medicine  · You have new or worsening pain  · You have questions or concerns about your condition or care  CARE AGREEMENT:   You have the right to help plan your care  Learn about your health condition and how it may be treated  Discuss treatment options with your healthcare providers to decide what care you want to receive  You always have the right to refuse treatment  The above information is an  only  It is not intended as medical advice for individual conditions or treatments  Talk to your doctor, nurse or pharmacist before following any medical regimen to see if it is safe and effective for you  © Copyright [employer] 2022 Information is for End User's use only and may not be sold, redistributed or otherwise used for commercial purposes   All illustrations and images included in CareNotes® are the copyrighted property of [employer]  or [address] [place], [place] [address]elizabeth

## 2022-03-23 LAB — BACTERIA UR CULT: NORMAL

## 2022-04-12 DIAGNOSIS — I10 ESSENTIAL HYPERTENSION: ICD-10-CM

## 2022-04-12 RX ORDER — AMLODIPINE BESYLATE AND BENAZEPRIL HYDROCHLORIDE 10; 40 MG/1; MG/1
1 CAPSULE ORAL DAILY
Qty: 76 CAPSULE | Refills: 0 | OUTPATIENT
Start: 2022-04-12

## 2022-04-12 RX ORDER — AMLODIPINE BESYLATE AND BENAZEPRIL HYDROCHLORIDE 10; 40 MG/1; MG/1
1 CAPSULE ORAL DAILY
Qty: 7 CAPSULE | Refills: 0 | Status: SHIPPED | OUTPATIENT
Start: 2022-04-12 | End: 2022-04-18 | Stop reason: SDUPTHER

## 2022-04-12 NOTE — TELEPHONE ENCOUNTER
Patient came in wondering if you could send in a weeks worth supply   He made an appointment for Monday
Patient has not been seen in the office >1 year
Pt  Called for a refill of  amLODIPine-benazepril (LOTREL) 10-40 MG per capsule
Tried to call patient to let him know medication was sent in  Phone says it is not taking calls right now 
15 y/o s/p PSF l5-S1 for idiopathic scoliosis on 8/20    Plan:  Continue present care  PO ad rosibel  Pain via rapid recovery guideline  D/C a line  OOB  PT

## 2022-04-18 ENCOUNTER — OFFICE VISIT (OUTPATIENT)
Dept: FAMILY MEDICINE CLINIC | Facility: CLINIC | Age: 27
End: 2022-04-18
Payer: COMMERCIAL

## 2022-04-18 VITALS
WEIGHT: 260.8 LBS | HEIGHT: 66 IN | SYSTOLIC BLOOD PRESSURE: 128 MMHG | BODY MASS INDEX: 41.91 KG/M2 | HEART RATE: 79 BPM | DIASTOLIC BLOOD PRESSURE: 74 MMHG | OXYGEN SATURATION: 97 % | TEMPERATURE: 96.7 F

## 2022-04-18 DIAGNOSIS — Z00.00 ANNUAL PHYSICAL EXAM: Primary | ICD-10-CM

## 2022-04-18 DIAGNOSIS — R00.2 PALPITATIONS: ICD-10-CM

## 2022-04-18 DIAGNOSIS — F41.9 ANXIETY: ICD-10-CM

## 2022-04-18 DIAGNOSIS — H61.23 BILATERAL IMPACTED CERUMEN: ICD-10-CM

## 2022-04-18 DIAGNOSIS — L30.9 ECZEMA, UNSPECIFIED TYPE: ICD-10-CM

## 2022-04-18 DIAGNOSIS — E66.01 MORBID OBESITY WITH BMI OF 40.0-44.9, ADULT (HCC): ICD-10-CM

## 2022-04-18 DIAGNOSIS — Z11.4 SCREENING FOR HIV (HUMAN IMMUNODEFICIENCY VIRUS): ICD-10-CM

## 2022-04-18 DIAGNOSIS — I10 ESSENTIAL HYPERTENSION: ICD-10-CM

## 2022-04-18 DIAGNOSIS — I10 HYPERTENSION, UNCONTROLLED: ICD-10-CM

## 2022-04-18 DIAGNOSIS — Z11.59 NEED FOR HEPATITIS C SCREENING TEST: ICD-10-CM

## 2022-04-18 DIAGNOSIS — R53.83 FATIGUE, UNSPECIFIED TYPE: ICD-10-CM

## 2022-04-18 PROCEDURE — 69210 REMOVE IMPACTED EAR WAX UNI: CPT

## 2022-04-18 PROCEDURE — 99395 PREV VISIT EST AGE 18-39: CPT

## 2022-04-18 RX ORDER — PROPRANOLOL HCL 60 MG
60 CAPSULE, EXTENDED RELEASE 24HR ORAL DAILY
Qty: 90 CAPSULE | Refills: 0 | Status: SHIPPED | OUTPATIENT
Start: 2022-04-18 | End: 2022-08-01 | Stop reason: SDUPTHER

## 2022-04-18 RX ORDER — MOMETASONE FUROATE 1 MG/G
CREAM TOPICAL DAILY
Qty: 45 G | Refills: 0 | Status: SHIPPED | OUTPATIENT
Start: 2022-04-18 | End: 2022-07-14 | Stop reason: ALTCHOICE

## 2022-04-18 RX ORDER — HYDROCHLOROTHIAZIDE 25 MG/1
25 TABLET ORAL DAILY
Qty: 90 TABLET | Refills: 3 | Status: SHIPPED | OUTPATIENT
Start: 2022-04-18

## 2022-04-18 RX ORDER — HYDROXYZINE HYDROCHLORIDE 25 MG/1
25 TABLET, FILM COATED ORAL EVERY 6 HOURS PRN
Qty: 30 TABLET | Refills: 0 | Status: SHIPPED | OUTPATIENT
Start: 2022-04-18 | End: 2022-07-14 | Stop reason: ALTCHOICE

## 2022-04-18 RX ORDER — AMLODIPINE BESYLATE AND BENAZEPRIL HYDROCHLORIDE 10; 40 MG/1; MG/1
1 CAPSULE ORAL DAILY
Qty: 7 CAPSULE | Refills: 0 | Status: SHIPPED | OUTPATIENT
Start: 2022-04-18 | End: 2022-04-25 | Stop reason: SDUPTHER

## 2022-04-18 NOTE — PATIENT INSTRUCTIONS
Discussed with patient  Blood pressure medications sent to pharmacy  Try to increase your activity to 150 minutes a week  Try to get 7-8 hours of sleep every night  Okay to take Atarax half an hour before bed and as needed for anxiety during the day  If it causes excessive sleepiness during the day will reduce dose  Sent mometasone for rash on your right foot  You can use twice a day  Keep feet dry and clean and well moisturized  Have stress test will call with results  Lab work from Cardiology ordered as well as lab work for me please have that done while fasting  Wellness Visit for Adults   AMBULATORY CARE:   A wellness visit  is when you see your healthcare provider to get screened for health problems  Your healthcare provider will also give you advice on how to stay healthy  Write down your questions so you remember to ask them  Ask your healthcare provider how often you should have a wellness visit  What happens at a wellness visit:  Your healthcare provider will ask about your health, and your family history of health problems  This includes high blood pressure, heart disease, and cancer  He or she will ask if you have symptoms that concern you, if you smoke, and about your mood  You may also be asked about your intake of medicines, supplements, food, and alcohol  Any of the following may be done:  · Your weight  will be checked  Your height may also be checked so your body mass index (BMI) can be calculated  Your BMI shows if you are at a healthy weight  · Your blood pressure  and heart rate will be checked  Your temperature may also be checked  · Blood and urine tests  may be done  Blood tests may be done to check your cholesterol levels  Abnormal cholesterol levels increase your risk for heart disease and stroke  You may also need a blood or urine test to check for diabetes if you are at increased risk  Urine tests may be done to look for signs of an infection or kidney disease      · A physical exam  includes checking your heartbeat and lungs with a stethoscope  Your healthcare provider may also check your skin to look for sun damage  · Screening tests  may be recommended  A screening test is done to check for diseases that may not cause symptoms  The screening tests you may need depend on your age, gender, family history, and lifestyle habits  For example, colorectal screening may be recommended if you are 48years old or older  Screening tests you need if you are a woman:   · A Pap smear  is used to screen for cervical cancer  Pap smears are usually done every 3 to 5 years depending on your age  You may need them more often if you have had abnormal Pap smear test results in the past  Ask your healthcare provider how often you should have a Pap smear  · A mammogram  is an x-ray of your breasts to screen for breast cancer  Experts recommend mammograms every 2 years starting at age 48 years  You may need a mammogram at age 52 years or younger if you have an increased risk for breast cancer  Talk to your healthcare provider about when you should start having mammograms and how often you need them  Vaccines you may need:   · Get an influenza vaccine  every year  The influenza vaccine protects you from the flu  Several types of viruses cause the flu  The viruses change over time, so new vaccines are made each year  · Get a tetanus-diphtheria (Td) booster vaccine  every 10 years  This vaccine protects you against tetanus and diphtheria  Tetanus is a severe infection that may cause painful muscle spasms and lockjaw  Diphtheria is a severe bacterial infection that causes a thick covering in the back of your mouth and throat  · Get a human papillomavirus (HPV) vaccine  if you are female and aged 23 to 32 or male 23 to 24 and never received it  This vaccine protects you from HPV infection  HPV is the most common infection spread by sexual contact   HPV may also cause vaginal, penile, and anal cancers  · Get a pneumococcal vaccine  if you are aged 72 years or older  The pneumococcal vaccine is an injection given to protect you from pneumococcal disease  Pneumococcal disease is an infection caused by pneumococcal bacteria  The infection may cause pneumonia, meningitis, or an ear infection  · Get a shingles vaccine  if you are 60 or older, even if you have had shingles before  The shingles vaccine is an injection to protect you from the varicella-zoster virus  This is the same virus that causes chickenpox  Shingles is a painful rash that develops in people who had chickenpox or have been exposed to the virus  How to eat healthy:  My Plate is a model for planning healthy meals  It shows the types and amounts of foods that should go on your plate  Fruits and vegetables make up about half of your plate, and grains and protein make up the other half  A serving of dairy is included on the side of your plate  The amount of calories and serving sizes you need depends on your age, gender, weight, and height  Examples of healthy foods are listed below:  · Eat a variety of vegetables  such as dark green, red, and orange vegetables  You can also include canned vegetables low in sodium (salt) and frozen vegetables without added butter or sauces  · Eat a variety of fresh fruits , canned fruit in 100% juice, frozen fruit, and dried fruit  · Include whole grains  At least half of the grains you eat should be whole grains  Examples include whole-wheat bread, wheat pasta, brown rice, and whole-grain cereals such as oatmeal     · Eat a variety of protein foods such as seafood (fish and shellfish), lean meat, and poultry without skin (turkey and chicken)  Examples of lean meats include pork leg, shoulder, or tenderloin, and beef round, sirloin, tenderloin, and extra lean ground beef  Other protein foods include eggs and egg substitutes, beans, peas, soy products, nuts, and seeds      · Choose low-fat dairy products such as skim or 1% milk or low-fat yogurt, cheese, and cottage cheese  · Limit unhealthy fats  such as butter, hard margarine, and shortening  Exercise:  Exercise at least 30 minutes per day on most days of the week  Some examples of exercise include walking, biking, dancing, and swimming  You can also fit in more physical activity by taking the stairs instead of the elevator or parking farther away from stores  Include muscle strengthening activities 2 days each week  Regular exercise provides many health benefits  It helps you manage your weight, and decreases your risk for type 2 diabetes, heart disease, stroke, and high blood pressure  Exercise can also help improve your mood  Ask your healthcare provider about the best exercise plan for you  General health and safety guidelines:   · Do not smoke  Nicotine and other chemicals in cigarettes and cigars can cause lung damage  Ask your healthcare provider for information if you currently smoke and need help to quit  E-cigarettes or smokeless tobacco still contain nicotine  Talk to your healthcare provider before you use these products  · Limit alcohol  A drink of alcohol is 12 ounces of beer, 5 ounces of wine, or 1½ ounces of liquor  · Lose weight, if needed  Being overweight increases your risk of certain health conditions  These include heart disease, high blood pressure, type 2 diabetes, and certain types of cancer  · Protect your skin  Do not sunbathe or use tanning beds  Use sunscreen with a SPF 15 or higher  Apply sunscreen at least 15 minutes before you go outside  Reapply sunscreen every 2 hours  Wear protective clothing, hats, and sunglasses when you are outside  · Drive safely  Always wear your seatbelt  Make sure everyone in your car wears a seatbelt  A seatbelt can save your life if you are in an accident  Do not use your cell phone when you are driving  This could distract you and cause an accident   Pull over if you need to make a call or send a text message  · Practice safe sex  Use latex condoms if are sexually active and have more than one partner  Your healthcare provider may recommend screening tests for sexually transmitted infections (STIs)  · Wear helmets, lifejackets, and protective gear  Always wear a helmet when you ride a bike or motorcycle, go skiing, or play sports that could cause a head injury  Wear protective equipment when you play sports  Wear a lifejacket when you are on a boat or doing water sports  © Copyright [employer] 2022 Information is for End User's use only and may not be sold, redistributed or otherwise used for commercial purposes  All illustrations and images included in CareNotes® are the copyrighted property of [employer] A M [employer] Inc  or Chad Lui  The above information is an  only  It is not intended as medical advice for individual conditions or treatments  Talk to your doctor, nurse or pharmacist before following any medical regimen to see if it is safe and effective for you  Weight Management   AMBULATORY CARE:   Why it is important to manage your weight:  Being overweight increases your risk of health conditions such as heart disease, high blood pressure, type 2 diabetes, and certain types of cancer  It can also increase your risk for osteoarthritis, sleep apnea, and other respiratory problems  Aim for a slow, steady weight loss  Even a small amount of weight loss can lower your risk of health problems  Risks of being overweight:  Extra weight can cause many health problems, including the following:  · Diabetes (high blood sugar level)    · High blood pressure or high cholesterol    · Heart disease    · Stroke    · Gallbladder or liver disease    · Cancer of the colon, breast, prostate, liver, or kidney    · Sleep apnea    · Arthritis or gout    Screening  is done to check for health conditions before you have signs or symptoms   If you are 35 to 70 years old, your blood sugar level may be checked every 3 years for signs of prediabetes or diabetes  Your healthcare provider will check your blood pressure at each visit  High blood pressure can lead to a stroke or other problems  Your provider may check for signs of heart disease, cancer, or other health problems  How to lose weight safely:  A safe and healthy way to lose weight is to eat fewer calories and get regular exercise  · You can lose up about 1 pound a week by decreasing the number of calories you eat by 500 calories each day  You can decrease calories by eating smaller portion sizes or by cutting out high-calorie foods  Read labels to find out how many calories are in the foods you eat  · You can also burn calories with exercise such as walking, swimming, or biking  You will be more likely to keep weight off if you make these changes part of your lifestyle  Exercise at least 30 minutes per day on most days of the week  You can also fit in more physical activity by taking the stairs instead of the elevator or parking farther away from stores  Ask your healthcare provider about the best exercise plan for you  Healthy meal plan for weight management:  A healthy meal plan includes a variety of foods, contains fewer calories, and helps you stay healthy  A healthy meal plan includes the following:     · Eat whole-grain foods more often  A healthy meal plan should contain fiber  Fiber is the part of grains, fruits, and vegetables that is not broken down by your body  Whole-grain foods are healthy and provide extra fiber in your diet  Some examples of whole-grain foods are whole-wheat breads and pastas, oatmeal, brown rice, and bulgur  · Eat a variety of vegetables every day  Include dark, leafy greens such as spinach, kale, obb greens, and mustard greens  Eat yellow and orange vegetables such as carrots, sweet potatoes, and winter squash  · Eat a variety of fruits every day  Choose fresh or canned fruit (canned in its own juice or light syrup) instead of juice  Fruit juice has very little or no fiber  · Eat low-fat dairy foods  Drink fat-free (skim) milk or 1% milk  Eat fat-free yogurt and low-fat cottage cheese  Try low-fat cheeses such as mozzarella and other reduced-fat cheeses  · Choose meat and other protein foods that are low in fat  Choose beans or other legumes such as split peas or lentils  Choose fish, skinless poultry (chicken or turkey), or lean cuts of red meat (beef or pork)  Before you cook meat or poultry, cut off any visible fat  · Use less fat and oil  Try baking foods instead of frying them  Add less fat, such as margarine, sour cream, regular salad dressing and mayonnaise to foods  Eat fewer high-fat foods  Some examples of high-fat foods include french fries, doughnuts, ice cream, and cakes  · Eat fewer sweets  Limit foods and drinks that are high in sugar  This includes candy, cookies, regular soda, and sweetened drinks  Ways to decrease calories:   · Eat smaller portions  ? Use a small plate with smaller servings  ? Do not eat second helpings  ? When you eat at a restaurant, ask for a box and place half of your meal in the box before you eat  ? Share an entrée with someone else  · Replace high-calorie snacks with healthy, low-calorie snacks  ? Choose fresh fruit, vegetables, fat-free rice cakes, or air-popped popcorn instead of potato chips, nuts, or chocolate  ? Choose water or calorie-free drinks instead of soda or sweetened drinks  · Do not shop for groceries when you are hungry  You may be more likely to make unhealthy food choices  Take a grocery list of healthy foods and shop after you have eaten  · Eat regular meals  Do not skip meals  Skipping meals can lead to overeating later in the day  This can make it harder for you to lose weight   Eat a healthy snack in place of a meal if you do not have time to eat a regular meal  Talk with a dietitian to help you create a meal plan and schedule that is right for you  Other things to consider as you try to lose weight:   · Be aware of situations that may give you the urge to overeat, such as eating while watching television  Find ways to avoid these situations  For example, read a book, go for a walk, or do crafts  · Meet with a weight loss support group or friends who are also trying to lose weight  This may help you stay motivated to continue working on your weight loss goals  © Copyright [employer] 2022 Information is for End User's use only and may not be sold, redistributed or otherwise used for commercial purposes  All illustrations and images included in CareNotes® are the copyrighted property of A [employer] A M , Inc  or Chad Conway   The above information is an  only  It is not intended as medical advice for individual conditions or treatments  Talk to your doctor, nurse or pharmacist before following any medical regimen to see if it is safe and effective for you  Obesity   AMBULATORY CARE:   Obesity  means your body mass index (BMI) is greater than 30  Your healthcare provider will use your height and weight to measure your BMI  The risks of obesity include  many health problems, including injuries or physical disability  · Diabetes (high blood sugar level)    · High blood pressure or high cholesterol    · Heart disease    · Stroke    · Gallbladder or liver disease    · Cancer of the colon, breast, prostate, liver, or kidney    · Sleep apnea    · Arthritis or gout    Screening  is done to check for health conditions before you have signs or symptoms  If you are 28to 79years old, your blood sugar level may be checked every 3 years for signs of prediabetes or diabetes  Your healthcare provider will check your blood pressure at each visit  High blood pressure can lead to a stroke or other problems   Your provider may check for signs of heart disease, cancer, or other health problems  Seek care immediately if:   · You have a severe headache, confusion, or difficulty speaking  · You have weakness on one side of your body  · You have chest pain, sweating, or shortness of breath  Call your doctor if:   · You have symptoms of gallbladder or liver disease, such as pain in your upper abdomen  · You have knee or hip pain and discomfort while walking  · You have symptoms of diabetes, such as intense hunger and thirst, and frequent urination  · You have symptoms of sleep apnea, such as snoring or daytime sleepiness  · You have questions or concerns about your condition or care  Treatment for obesity  focuses on helping you lose weight to improve your health  Even a small decrease in BMI can reduce the risk for many health problems  Your healthcare provider will help you set a weight-loss goal   · Lifestyle changes  are the first step in treating obesity  These include making healthy food choices and getting regular physical activity  Your healthcare provider may suggest a weight-loss program that involves coaching, education, and therapy  · Medicine  may help you lose weight when it is used with a healthy foods and physical activity  · Surgery  can help you lose weight if you are very obese and have other health problems  There are several types of weight-loss surgery  Ask your healthcare provider for more information  Tips for safe weight loss:   · Set small, realistic goals  An example of a small goal is to walk for 20 minutes 5 days a week  Yuridia goal is to lose 5% of your body weight  · Tell friends, family members, and coworkers about your goals  and ask for their support  Ask a friend to lose weight with you, or join a weight-loss support group  · Identify foods or triggers that may cause you to overeat , and find ways to avoid them  Remove tempting high-calorie foods from your home and workplace   Place a bowl of fresh fruit on your kitchen counter  If stress causes you to eat, then find other ways to cope with stress  A counselor or therapist may be able to help you  · Keep a diary to track what you eat and drink  Also write down how many minutes of physical activity you do each day  Weigh yourself once a week and record it in your diary  Eating changes: You will need to eat 500 to 1,000 fewer calories each day than you currently eat to lose 1 to 2 pounds a week  The following changes will help you cut calories:  · Eat smaller portions  Use small plates, no larger than 9 inches in diameter  Fill your plate half full of fruits and vegetables  Measure your food using measuring cups until you know what a serving size looks like  · Eat 3 meals and 1 or 2 snacks each day  Plan your meals in advance  Anh Resendiz and eat at home most of the time  Eat slowly  Do not skip meals  Skipping meals can lead to overeating later in the day  This can make it harder for you to lose weight  Talk with a dietitian to help you make a meal plan and schedule that is right for you  · Eat fruits and vegetables at every meal   They are low in calories and high in fiber, which makes you feel full  Do not add butter, margarine, or cream sauce to vegetables  Use herbs to season steamed vegetables  · Eat less fat and fewer fried foods  Eat more baked or grilled chicken and fish  These protein sources are lower in calories and fat than red meat  Limit fast food  Dress your salads with olive oil and vinegar instead of bottled dressing  · Limit the amount of sugar you eat  Do not drink sugary beverages  Limit alcohol  Activity changes:  Physical activity is good for your body in many ways  It helps you burn calories and build strong muscles  It decreases stress and depression, and improves your mood  It can also help you sleep better   Talk to your healthcare provider before you begin an exercise program   · Exercise for at least 30 minutes 5 days a week  Start slowly  Set aside time each day for physical activity that you enjoy and that is convenient for you  It is best to do both weight training and an activity that increases your heart rate, such as walking, bicycling, or swimming  · Find ways to be more active  Do yard work and housecleaning  Walk up the stairs instead of using elevators  Spend your leisure time going to events that require walking, such as outdoor festivals or fairs  This extra physical activity can help you lose weight and keep it off  Follow up with your doctor as directed: You may need to meet with a dietitian  Write down your questions so you remember to ask them during your visits  © Copyright [employer] 2022 Information is for End User's use only and may not be sold, redistributed or otherwise used for commercial purposes  All illustrations and images included in CareNotes® are the copyrighted property of [employer] A M , Inc  or [facility] [employer]   The above information is an  only  It is not intended as medical advice for individual conditions or treatments  Talk to your doctor, nurse or pharmacist before following any medical regimen to see if it is safe and effective for you  Cholesterol and Your Health   AMBULATORY CARE:   Cholesterol  is a waxy, fat-like substance  Your body uses cholesterol to make hormones and new cells, and to protect nerves  Cholesterol is made by your body  It also comes from certain foods you eat, such as meat and dairy products  Your healthcare provider can help you set goals for your cholesterol levels  He or she can help you create a plan to meet your goals  Cholesterol level goals: Your cholesterol level goals depend on your risk for heart disease, your age, and your other health conditions   The following are general guidelines:  · Total cholesterol  includes low-density lipoprotein (LDL), high-density lipoprotein (HDL), and triglyceride levels  The total cholesterol level should be lower than 200 mg/dL and is best at about 150 mg/dL  · LDL cholesterol  is called bad cholesterol  because it forms plaque in your arteries  As plaque builds up, your arteries become narrow, and less blood flows through  When plaque decreases blood flow to your heart, you may have chest pain  If plaque completely blocks an artery that brings blood to your heart, you may have a heart attack  Plaque can break off and form blood clots  Blood clots may block arteries in your brain and cause a stroke  The level should be less than 130 mg/dL and is best at about 100 mg/dL  · HDL cholesterol  is called good cholesterol  because it helps remove LDL cholesterol from your arteries  It does this by attaching to LDL cholesterol and carrying it to your liver  Your liver breaks down LDL cholesterol so your body can get rid of it  High levels of HDL cholesterol can help prevent a heart attack and stroke  Low levels of HDL cholesterol can increase your risk for heart disease, heart attack, and stroke  The level should be 60 mg/dL or higher  · Triglycerides  are a type of fat that store energy from foods you eat  High levels of triglycerides also cause plaque buildup  This can increase your risk for a heart attack or stroke  If your triglyceride level is high, your LDL cholesterol level may also be high  The level should be less than 150 mg/dL  Any of the following can increase your risk for high cholesterol:   · Smoking cigarettes    · Being overweight or obese, or not getting enough exercise    · Drinking large amounts of alcohol    · A medical condition such as hypertension (high blood pressure) or diabetes    · Certain genes passed from your parents to you    · Age older than 65 years    What you need to know about having your cholesterol levels checked: Adults 21to 39years of age should have their cholesterol levels checked every 4 to 6 years   Adults 45 years or older should have their cholesterol checked every 1 to 2 years  You may need your cholesterol checked more often, or at a younger age, if you have risk factors for heart disease  You may also need to have your cholesterol checked more often if you have other health conditions, such as diabetes  Blood tests are used to check cholesterol levels  Blood tests measure your levels of triglycerides, LDL cholesterol, and HDL cholesterol  How healthy fats affect your cholesterol levels:  Healthy fats, also called unsaturated fats, help lower LDL cholesterol and triglyceride levels  Healthy fats include the following:  · Monounsaturated fats  are found in foods such as olive oil, canola oil, avocado, nuts, and olives  · Polyunsaturated fats,  such as omega 3 fats, are found in fish, such as salmon, trout, and tuna  They can also be found in plant foods such as flaxseed, walnuts, and soybeans  How unhealthy fats affect your cholesterol levels:  Unhealthy fats increase LDL cholesterol and triglyceride levels  They are found in foods high in cholesterol, saturated fat, and trans fat:  · Cholesterol  is found in eggs, dairy, and meat  · Saturated fat  is found in butter, cheese, ice cream, whole milk, and coconut oil  Saturated fat is also found in meat, such as sausage, hot dogs, and bologna  · Trans fat  is found in liquid oils and is used in fried and baked foods  Foods that contain trans fats include chips, crackers, muffins, sweet rolls, microwave popcorn, and cookies  Treatment  for high cholesterol will also decrease your risk of heart disease, heart attack, and stroke  Treatment may include any of the following:  · Lifestyle changes  may include food, exercise, weight loss, and quitting smoking  You may also need to decrease the amount of alcohol you drink  Your healthcare provider will want you to start with lifestyle changes  Other treatment may be added if lifestyle changes are not enough   Your healthcare provider may recommend you work with a team to manage hyperlipidemia  The team may include medical experts such as a dietitian, an exercise or physical therapist, and a behavior therapist  Your family members may be included in helping you create lifestyle changes  · Medicines  may be given to lower your LDL cholesterol, triglyceride levels, or total cholesterol level  You may need medicines to lower your cholesterol if any of the following is true:    ? You have a history of stroke, TIA, unstable angina, or a heart attack  ? Your LDL cholesterol level is 190 mg/dL or higher  ? You are age 36 to 76 years, have diabetes or heart disease risk factors, and your LDL cholesterol is 70 mg/dL or higher  · Supplements  include fish oil, red yeast rice, and garlic  Fish oil may help lower your triglyceride and LDL cholesterol levels  It may also increase your HDL cholesterol level  Red yeast rice may help decrease your total cholesterol level and LDL cholesterol level  Garlic may help lower your total cholesterol level  Do not take any supplements without talking to your healthcare provider  Food changes you can make to lower your cholesterol levels:  A dietitian can help you create a healthy eating plan  He or she can show you how to read food labels and choose foods low in saturated fat, trans fats, and cholesterol  · Decrease the total amount of fat you eat  Choose lean meats, fat-free or 1% fat milk, and low-fat dairy products, such as yogurt and cheese  Try to limit or avoid red meats  Limit or do not eat fried foods or baked goods, such as cookies  · Replace unhealthy fats with healthy fats  Cook foods in olive oil or canola oil  Choose soft margarines that are low in saturated fat and trans fat  Seeds, nuts, and avocados are other examples of healthy fats  · Eat foods with omega-3 fats  Examples include salmon, tuna, mackerel, walnuts, and flaxseed  Eat fish 2 times per week  Pregnant women should not eat fish that have high levels of mercury, such as shark, swordfish, and ritika mackerel  · Increase the amount of high-fiber foods you eat  High-fiber foods can help lower your LDL cholesterol  Aim to get between 20 and 30 grams of fiber each day  Fruits and vegetables are high in fiber  Eat at least 5 servings each day  Other high-fiber foods are whole-grain or whole-wheat breads, pastas, or cereals, and brown rice  Eat 3 ounces of whole-grain foods each day  Increase fiber slowly  You may have abdominal discomfort, bloating, and gas if you add fiber to your diet too quickly  · Eat healthy protein foods  Examples include low-fat dairy products, skinless chicken and turkey, fish, and nuts  · Limit foods and drinks that are high in sugar  Your dietitian or healthcare provider can help you create daily limits for high-sugar foods and drinks  The limit may be lower if you have diabetes or another health condition  Limits can also help you lose weight if needed  Lifestyle changes you can make to lower your cholesterol levels:   · Maintain a healthy weight  Ask your healthcare provider what a healthy weight is for you  Ask him or her to help you create a weight loss plan if needed  Weight loss can decrease your total cholesterol and triglyceride levels  Weight loss may also help keep your blood pressure at a healthy level  · Be physically active throughout the day  Physical activity, such as exercise, can help lower your total cholesterol level and maintain a healthy weight  Physical activity can also help increase your HDL cholesterol level  Work with your healthcare provider to create an program that is right for you  Get at least 30 to 40 minutes of moderate physical activity most days of the week  Examples of exercise include brisk walking, swimming, or biking  Also include strength training at least 2 times each week   Your healthcare providers can help you create a physical activity plan  · Do not smoke  Nicotine and other chemicals in cigarettes and cigars can raise your cholesterol levels  Ask your healthcare provider for information if you currently smoke and need help to quit  E-cigarettes or smokeless tobacco still contain nicotine  Talk to your healthcare provider before you use these products  · Limit or do not drink alcohol  Alcohol can increase your triglyceride levels  Ask your healthcare provider before you drink alcohol  Ask how much is okay for you to drink in 24 hours or 1 week  Follow up with your doctor as directed:  Write down your questions so you remember to ask them during your visits  © Copyright [employer] 2022 Information is for End User's use only and may not be sold, redistributed or otherwise used for commercial purposes  All illustrations and images included in CareNotes® are the copyrighted property of [employer]  or [facility] Prasanth Conway   The above information is an  only  It is not intended as medical advice for individual conditions or treatments  Talk to your doctor, nurse or pharmacist before following any medical regimen to see if it is safe and effective for you

## 2022-04-18 NOTE — ASSESSMENT & PLAN NOTE
Mometasone steroid cream sent to pharmacy  Use cream twice daily  Keep feet clean and dry and well moisturized

## 2022-04-18 NOTE — ASSESSMENT & PLAN NOTE
Okay to take Atarax as needed during the day for anxiety  If it makes you excessively sleepy call and we will reduce your daytime dose  Try to increase sleep to 7-8 hours a night

## 2022-04-18 NOTE — PROGRESS NOTES
[facility] [address]    NAME: Marco Christine  AGE: 32 y o  SEX: male  : 1995     DATE: 2022     Assessment and Plan:     Problem List Items Addressed This Visit        Cardiovascular and Mediastinum    Essential hypertension       Recheck 128/74  Continue current medications  Reduce salt in your diet any NSAIDs  Make follow-up appointment with Cardiology  Try to increase sleep to 70 hours a night  Start exercising 150 minutes a week  Tried to lose weight  Relevant Medications    amLODIPine-benazepril (LOTREL) 10-40 MG per capsule    hydrochlorothiazide (HYDRODIURIL) 25 mg tablet    propranolol (INDERAL LA) 60 mg 24 hr capsule       Musculoskeletal and Integument    Eczema       Mometasone steroid cream sent to pharmacy  Use cream twice daily  Keep feet clean and dry and well moisturized  Relevant Medications    mometasone (ELOCON) 0 1 % cream       Other    Fatigue      Try to increase sleep to 7-8 hours a night  Take Atarax 1/2 hour to 1 hour before going to bed  Limit TV video games and cell phone use an hour before bed  Vitamin D and thyroid levels checked  Will call with results  Relevant Orders    Vitamin D 25 hydroxy    Anxiety       Okay to take Atarax as needed during the day for anxiety  If it makes you excessively sleepy call and we will reduce your daytime dose  Try to increase sleep to 7-8 hours a night  Relevant Medications    hydrOXYzine HCL (ATARAX) 25 mg tablet    Morbid obesity with BMI of 40 0-44 9, adult (HCC)       Try to reduce portion sizes  Eat lots of fruits and vegetables  Increase exercise to 150 minutes a week  Referral to weight management placed  Relevant Orders    Microalbumin / creatinine urine ratio    Ambulatory referral to Weight Management    Palpitations       Get 7-8 hours of sleep a night    Have stress test performed will call with results  Continue to abstain from caffeine  Relevant Orders    Stress test only, exercise      Other Visit Diagnoses     Annual physical exam    -  Primary    Screening for HIV (human immunodeficiency virus)        Relevant Orders    HIV 1/2 Antigen/Antibody (4th Generation) w Reflex [facility]    Need for hepatitis C screening test        Relevant Orders    Hepatitis C antibody          Immunizations and preventive care screenings were discussed with patient today  Appropriate education was printed on patient's after visit summary  Counseling:  Alcohol/drug use: discussed moderation in alcohol intake, the recommendations for healthy alcohol use, and avoidance of illicit drug use  Dental Health: discussed importance of regular tooth brushing, flossing, and dental visits  Injury prevention: discussed safety/seat belts, safety helmets, smoke detectors, carbon dioxide detectors, and smoking near bedding or upholstery  Sexual health: discussed sexually transmitted diseases, partner selection, use of condoms, avoidance of unintended pregnancy, and contraceptive alternatives  · Exercise: the importance of regular exercise/physical activity was discussed  Recommend exercise 3-5 times per week for at least 30 minutes  Return in 1 year (on 4/18/2023)  Chief Complaint:     Chief Complaint   Patient presents with    Medication Refill     annual physical due     Follow-up      History of Present Illness:     Adult Annual Physical   Patient here for a comprehensive physical exam  The patient reports no problems  Diet and Physical Activity  · Diet/Nutrition: poor diet and consuming 3-5 servings of fruits/vegetables daily  · Exercise: no formal exercise  Depression Screening  PHQ-2/9 Depression Screening         General Health  · Sleep: gets 4-6 hours of sleep on average  · Hearing: normal - bilateral   · Vision: goes for regular eye exams     · Dental: regular dental visits   Health  · History of STDs?: no      Review of Systems:     Review of Systems   Constitutional: Negative for chills, fatigue and fever  HENT: Negative for ear pain, sinus pressure, sinus pain and sore throat  Eyes: Negative for pain and visual disturbance  Respiratory: Negative for cough and shortness of breath  Cardiovascular: Positive for palpitations (randomly)  Negative for chest pain  Gastrointestinal: Negative for abdominal pain, constipation, diarrhea and vomiting  Genitourinary: Negative for dysuria and hematuria  Musculoskeletal: Positive for arthralgias (Feels it's related to weight gain  )  Negative for back pain  Skin: Positive for rash (Right foot eczematous rash on dorsal dise of foot  )  Negative for color change  Neurological: Negative for seizures and syncope  Psychiatric/Behavioral: Positive for agitation  Negative for decreased concentration  All other systems reviewed and are negative       Past Medical History:     Past Medical History:   Diagnosis Date    Anxiety     Hypertension       Past Surgical History:     Past Surgical History:   Procedure Laterality Date    ANKLE SURGERY      FETAL SURGERY FOR CONGENITAL HERNIA        Social History:     Social History     Socioeconomic History    Marital status: Single     Spouse name: None    Number of children: None    Years of education: None    Highest education level: None   Occupational History    None   Tobacco Use    Smoking status: Never Smoker    Smokeless tobacco: Never Used   Substance and Sexual Activity    Alcohol use: No    Drug use: No    Sexual activity: None   Other Topics Concern    None   Social History Narrative    None     Social Determinants of Health     Financial Resource Strain: Not on file   Food Insecurity: Not on file   Transportation Needs: Not on file   Physical Activity: Not on file   Stress: Not on file   Social Connections: Not on file   Intimate Partner Violence: Not on file   Housing Stability: Not on file      Family History:     Family History   Problem Relation Age of Onset    Heart failure Maternal Grandmother     Heart failure Maternal Grandfather       Current Medications:     Current Outpatient Medications   Medication Sig Dispense Refill    amLODIPine-benazepril (LOTREL) 10-40 MG per capsule Take 1 capsule by mouth daily 7 capsule 0    hydrochlorothiazide (HYDRODIURIL) 25 mg tablet Take 1 tablet (25 mg total) by mouth daily 90 tablet 3    propranolol (INDERAL LA) 60 mg 24 hr capsule Take 1 capsule (60 mg total) by mouth daily 90 capsule 0    hydrOXYzine HCL (ATARAX) 25 mg tablet Take 1 tablet (25 mg total) by mouth every 6 (six) hours as needed for itching 30 tablet 0    mometasone (ELOCON) 0 1 % cream Apply topically daily 45 g 0     No current facility-administered medications for this visit  Allergies:     No Known Allergies   Physical Exam:     /74   Pulse 79   Temp (!) 96 7 °F (35 9 °C) (Tympanic)   Ht 5' 6" (1 676 m)   Wt 118 kg (260 lb 12 8 oz)   SpO2 97%   BMI 42 09 kg/m²     Physical Exam  Vitals and nursing note reviewed  Constitutional:       Appearance: Normal appearance  He is well-developed  He is obese  HENT:      Head: Normocephalic and atraumatic  Right Ear: There is impacted cerumen  Left Ear: There is impacted cerumen  Nose: No congestion  Mouth/Throat:      Mouth: Mucous membranes are moist       Pharynx: No posterior oropharyngeal erythema  Eyes:      Conjunctiva/sclera: Conjunctivae normal    Cardiovascular:      Rate and Rhythm: Normal rate and regular rhythm  Heart sounds: No murmur heard  Pulmonary:      Effort: Pulmonary effort is normal  No respiratory distress  Breath sounds: Normal breath sounds  Abdominal:      Palpations: Abdomen is soft  Tenderness: There is no abdominal tenderness  Musculoskeletal:      Cervical back: Normal range of motion and neck supple  Right lower leg: No edema  Left lower leg: No edema  Skin:     General: Skin is warm and dry  Neurological:      General: No focal deficit present  Mental Status: He is alert  Psychiatric:         Mood and Affect: Mood normal          Behavior: Behavior normal           Godwin Sullivan, 1959 [facility] [address] [facility]   Ear cerumen removal    Date/Time: 4/18/2022 10:52 AM  Performed by: MARTIN Mata  Authorized by: MARTIN Mata   Universal Protocol:  Consent: Verbal consent obtained  Risks and benefits: risks, benefits and alternatives were discussed  Consent given by: patient  Time out: Immediately prior to procedure a "time out" was called to verify the correct patient, procedure, equipment, support staff and site/side marked as required  Timeout called at: 4/18/2022 10:53 AM   Patient understanding: patient states understanding of the procedure being performed  Required items: required blood products, implants, devices, and special equipment available  Patient identity confirmed: verbally with patient      Patient location:  Clinic  Procedure details:     Local anesthetic:  None    Location:  L ear and R ear    Procedure type: irrigation with instrumentation      Instrumentation: loop      Approach:  External  Post-procedure details:     Complication:  None    Hearing quality:  Improved    Patient tolerance of procedure:   Tolerated well, no immediate complications

## 2022-04-18 NOTE — ASSESSMENT & PLAN NOTE
Try to reduce portion sizes  Eat lots of fruits and vegetables  Increase exercise to 150 minutes a week  Referral to weight management placed

## 2022-04-18 NOTE — ASSESSMENT & PLAN NOTE
Recheck 128/74  Continue current medications  Reduce salt in your diet any NSAIDs  Make follow-up appointment with Cardiology  Try to increase sleep to 70 hours a night  Start exercising 150 minutes a week  Tried to lose weight

## 2022-04-18 NOTE — ASSESSMENT & PLAN NOTE
Get 7-8 hours of sleep a night  Have stress test performed will call with results  Continue to abstain from caffeine

## 2022-04-18 NOTE — ASSESSMENT & PLAN NOTE
Try to increase sleep to 7-8 hours a night  Take Atarax 1/2 hour to 1 hour before going to bed  Limit TV video games and cell phone use an hour before bed  Vitamin D and thyroid levels checked  Will call with results

## 2022-04-25 DIAGNOSIS — I10 ESSENTIAL HYPERTENSION: ICD-10-CM

## 2022-04-25 RX ORDER — AMLODIPINE BESYLATE AND BENAZEPRIL HYDROCHLORIDE 10; 40 MG/1; MG/1
1 CAPSULE ORAL DAILY
Qty: 30 CAPSULE | Refills: 0 | Status: SHIPPED | OUTPATIENT
Start: 2022-04-25 | End: 2022-05-26 | Stop reason: SDUPTHER

## 2022-04-25 NOTE — TELEPHONE ENCOUNTER
Patient called stating his amlodipine that was sent in was only a seven day supply  Could you please send in a 30 day supply?

## 2022-04-27 ENCOUNTER — TELEPHONE (OUTPATIENT)
Dept: FAMILY MEDICINE CLINIC | Facility: CLINIC | Age: 27
End: 2022-04-27

## 2022-05-03 ENCOUNTER — TELEPHONE (OUTPATIENT)
Dept: OTHER | Facility: OTHER | Age: 27
End: 2022-05-03

## 2022-05-26 DIAGNOSIS — I10 ESSENTIAL HYPERTENSION: ICD-10-CM

## 2022-05-26 RX ORDER — AMLODIPINE BESYLATE AND BENAZEPRIL HYDROCHLORIDE 10; 40 MG/1; MG/1
1 CAPSULE ORAL DAILY
Qty: 30 CAPSULE | Refills: 0 | Status: SHIPPED | OUTPATIENT
Start: 2022-05-26 | End: 2022-07-01 | Stop reason: SDUPTHER

## 2022-05-27 NOTE — TELEPHONE ENCOUNTER
Tried calling patient to make him aware that his refill was approved  His voicemail was not to setup to leave a message

## 2022-05-28 DIAGNOSIS — M25.571 PAIN, JOINT, ANKLE AND FOOT, RIGHT: Primary | ICD-10-CM

## 2022-06-23 ENCOUNTER — HOSPITAL ENCOUNTER (EMERGENCY)
Facility: HOSPITAL | Age: 27
Discharge: HOME | End: 2022-06-23
Attending: EMERGENCY MEDICINE
Payer: COMMERCIAL

## 2022-06-23 ENCOUNTER — APPOINTMENT (EMERGENCY)
Dept: RADIOLOGY | Facility: HOSPITAL | Age: 27
End: 2022-06-23
Payer: COMMERCIAL

## 2022-06-23 VITALS
TEMPERATURE: 97.6 F | BODY MASS INDEX: 41.97 KG/M2 | SYSTOLIC BLOOD PRESSURE: 136 MMHG | DIASTOLIC BLOOD PRESSURE: 70 MMHG | OXYGEN SATURATION: 100 % | RESPIRATION RATE: 18 BRPM | HEART RATE: 73 BPM | WEIGHT: 260 LBS

## 2022-06-23 DIAGNOSIS — R00.2 PALPITATIONS: Primary | ICD-10-CM

## 2022-06-23 PROCEDURE — 99284 EMERGENCY DEPT VISIT MOD MDM: CPT

## 2022-06-23 PROCEDURE — 99285 EMERGENCY DEPT VISIT HI MDM: CPT | Performed by: EMERGENCY MEDICINE

## 2022-06-23 PROCEDURE — 93005 ELECTROCARDIOGRAM TRACING: CPT

## 2022-06-23 NOTE — ED PROVIDER NOTES
History  Chief Complaint   Patient presents with    Chest Pain     Pt  Presents to ER with c/o midsternal chest pain that started while he was sitting down about 30 minutes ago  Pt  Describes the pain as "a stuck hiccup in my chest "      Patient presents to emergency department for evaluation of palpitations  He feels like his heart skips a beat  He was not too fast or too slow  The ulcer was less than an hour prior to arrival and has already improved  He does have a history of hypertension and anxiety  Marissa Suárez no nausea initially has some arm numbness but now he does not have and feels normal     The patient does not smoke or drink  Patient's past surgical history is negative      History provided by:  Patient and parent   used: No        Prior to Admission Medications   Prescriptions Last Dose Informant Patient Reported? Taking? amLODIPine-benazepril (LOTREL) 10-40 MG per capsule   No No   Sig: Take 1 capsule by mouth daily   hydrOXYzine HCL (ATARAX) 25 mg tablet   No No   Sig: Take 1 tablet (25 mg total) by mouth every 6 (six) hours as needed for itching   hydrochlorothiazide (HYDRODIURIL) 25 mg tablet   No No   Sig: Take 1 tablet (25 mg total) by mouth daily   mometasone (ELOCON) 0 1 % cream   No No   Sig: Apply topically daily   propranolol (INDERAL LA) 60 mg 24 hr capsule   No No   Sig: Take 1 capsule (60 mg total) by mouth daily      Facility-Administered Medications: None       Past Medical History:   Diagnosis Date    Anxiety     Hypertension        Past Surgical History:   Procedure Laterality Date    ANKLE SURGERY      FETAL SURGERY FOR CONGENITAL HERNIA         Family History   Problem Relation Age of Onset    Heart failure Maternal Grandmother     Heart failure Maternal Grandfather      I have reviewed and agree with the history as documented      E-Cigarette/Vaping    E-Cigarette Use Never User      E-Cigarette/Vaping Substances     Social History     Tobacco Use    Smoking status: Never Smoker    Smokeless tobacco: Never Used   Vaping Use    Vaping Use: Never used   Substance Use Topics    Alcohol use: No    Drug use: No       Review of Systems   Constitutional: Negative for chills and fever  HENT: Negative for ear pain and sore throat  Eyes: Negative for pain and visual disturbance  Respiratory: Negative for cough and shortness of breath  Cardiovascular: Negative for chest pain, palpitations and leg swelling  Gastrointestinal: Negative for abdominal pain and vomiting  Genitourinary: Negative for dysuria and hematuria  Musculoskeletal: Negative for arthralgias and back pain  Skin: Negative for color change and rash  Neurological: Negative for seizures and syncope  All other systems reviewed and are negative  Physical Exam  Physical Exam  Vitals and nursing note reviewed  Constitutional:       Appearance: He is well-developed  HENT:      Head: Normocephalic and atraumatic  Eyes:      Extraocular Movements: Extraocular movements intact  Conjunctiva/sclera: Conjunctivae normal       Pupils: Pupils are equal, round, and reactive to light  Cardiovascular:      Rate and Rhythm: Normal rate and regular rhythm  Heart sounds: Normal heart sounds  Heart sounds not distant  No murmur heard  Pulmonary:      Effort: Pulmonary effort is normal  No tachypnea or respiratory distress  Breath sounds: Normal breath sounds  No stridor  Chest:      Chest wall: No mass, tenderness or edema  Abdominal:      Palpations: Abdomen is soft  Tenderness: There is no abdominal tenderness  Musculoskeletal:      Cervical back: Neck supple  Skin:     General: Skin is warm and dry  Capillary Refill: Capillary refill takes less than 2 seconds  Neurological:      General: No focal deficit present  Mental Status: He is alert and oriented to person, place, and time     Psychiatric:         Mood and Affect: Mood normal          Behavior: Behavior normal          Vital Signs  ED Triage Vitals [06/23/22 1642]   Temperature Pulse Respirations Blood Pressure SpO2   97 6 °F (36 4 °C) 73 18 136/70 100 %      Temp Source Heart Rate Source Patient Position - Orthostatic VS BP Location FiO2 (%)   Temporal Monitor Sitting Left arm --      Pain Score       --           Vitals:    06/23/22 1642   BP: 136/70   Pulse: 73   Patient Position - Orthostatic VS: Sitting         Visual Acuity      ED Medications  Medications - No data to display    Diagnostic Studies  Results Reviewed     None                 XR chest 2 views    (Results Pending)              Procedures  Procedures         ED Course             HEART Risk Score    Flowsheet Row Most Recent Value   Heart Score Risk Calculator    History 0 Filed at: 06/23/2022 1716   ECG 0 Filed at: 06/23/2022 1716   Age 0 Filed at: 06/23/2022 1716   Risk Factors 0 Filed at: 06/23/2022 1716   Troponin --   HEART Score --                                      MDM  Number of Diagnoses or Management Options  Palpitations  Diagnosis management comments: EKG was done at 5:00 p m     This shows normal sinus rhythm rate of 71 beats per minute  Right axis deviation  No ST elevations or depressions  Initially order a chest x-ray because of chief complaint of chest pain however patient does not have any chest pain or had any chest pain prior to arrival   I offered blood work to look at the electrolytes but after consultation with mom who was in the room they decided not to do blood work  Since symptoms already improved         Amount and/or Complexity of Data Reviewed  Tests in the radiology section of CPT®: ordered and reviewed    Patient Progress  Patient progress: stable      Disposition  Final diagnoses:   Palpitations     Time reflects when diagnosis was documented in both MDM as applicable and the Disposition within this note     Time User Action Codes Description Comment    6/23/2022  5:16 PM Yonathan Barnes Add [R00 2] Palpitations       ED Disposition     ED Disposition   Discharge    Condition   Stable    Date/Time   Thu Jun 23, 2022  5:16 PM    Comment   Gilbert Pinto discharge to home/self care  Follow-up Information     Follow up With Specialties Details Why [address] [facility] Schedule an appointment as soon as possible for a visit in 1 week  [address] [facility] [address] [phone]            Patient's Medications   Discharge Prescriptions    No medications on file       No discharge procedures on file      PDMP Review     None          ED Provider  Electronically Signed by           Ronnie Weston MD  06/23/22 1453

## 2022-06-24 LAB
ATRIAL RATE: 71 BPM
P AXIS: 60 DEGREES
PR INTERVAL: 152 MS
QRS AXIS: 97 DEGREES
QRSD INTERVAL: 88 MS
QT INTERVAL: 350 MS
QTC INTERVAL: 380 MS
T WAVE AXIS: 30 DEGREES
VENTRICULAR RATE: 71 BPM

## 2022-06-24 PROCEDURE — 93010 ELECTROCARDIOGRAM REPORT: CPT | Performed by: INTERNAL MEDICINE

## 2022-07-01 DIAGNOSIS — I10 ESSENTIAL HYPERTENSION: ICD-10-CM

## 2022-07-01 RX ORDER — AMLODIPINE BESYLATE AND BENAZEPRIL HYDROCHLORIDE 10; 40 MG/1; MG/1
1 CAPSULE ORAL DAILY
Qty: 30 CAPSULE | Refills: 0 | Status: SHIPPED | OUTPATIENT
Start: 2022-07-01 | End: 2022-08-01 | Stop reason: SDUPTHER

## 2022-07-01 NOTE — TELEPHONE ENCOUNTER
Patient would like to refill   amLODIPine-benazepril (LOTREL) 10-40 MG per capsule Take      He is currently completely out      Thank You

## 2022-07-08 ENCOUNTER — OFFICE VISIT (OUTPATIENT)
Dept: CARDIOLOGY CLINIC | Facility: CLINIC | Age: 27
End: 2022-07-08
Payer: COMMERCIAL

## 2022-07-08 VITALS
HEIGHT: 66 IN | RESPIRATION RATE: 16 BRPM | OXYGEN SATURATION: 99 % | BODY MASS INDEX: 41.62 KG/M2 | WEIGHT: 259 LBS | SYSTOLIC BLOOD PRESSURE: 128 MMHG | HEART RATE: 84 BPM | DIASTOLIC BLOOD PRESSURE: 76 MMHG

## 2022-07-08 DIAGNOSIS — I10 PRIMARY HYPERTENSION: Primary | ICD-10-CM

## 2022-07-08 DIAGNOSIS — I10 ESSENTIAL HYPERTENSION: ICD-10-CM

## 2022-07-08 PROCEDURE — 99214 OFFICE O/P EST MOD 30 MIN: CPT | Performed by: INTERNAL MEDICINE
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
## 2022-07-14 ENCOUNTER — TELEMEDICINE (OUTPATIENT)
Dept: FAMILY MEDICINE CLINIC | Facility: CLINIC | Age: 27
End: 2022-07-14
Payer: COMMERCIAL

## 2022-07-14 DIAGNOSIS — Z20.822 EXPOSURE TO COVID-19 VIRUS: Primary | ICD-10-CM

## 2022-07-14 PROCEDURE — 99213 OFFICE O/P EST LOW 20 MIN: CPT | Performed by: NURSE PRACTITIONER

## 2022-07-14 PROCEDURE — U0005 INFEC AGEN DETEC AMPLI PROBE: HCPCS | Performed by: NURSE PRACTITIONER

## 2022-07-14 PROCEDURE — U0003 INFECTIOUS AGENT DETECTION BY NUCLEIC ACID (DNA OR RNA); SEVERE ACUTE RESPIRATORY SYNDROME CORONAVIRUS 2 (SARS-COV-2) (CORONAVIRUS DISEASE [COVID-19]), AMPLIFIED PROBE TECHNIQUE, MAKING USE OF HIGH THROUGHPUT TECHNOLOGIES AS DESCRIBED BY CMS-2020-01-R: HCPCS | Performed by: NURSE PRACTITIONER

## 2022-07-14 NOTE — PROGRESS NOTES
COVID-19 Outpatient Progress Note    Assessment/Plan:    Problem List Items Addressed This Visit    None     Visit Diagnoses     Exposure to COVID-19 virus    -  Primary    Household exposures, needs to be tested for work  At this time, denies symptoms  Discussed quarantine until results, educated on quarantine length if positve    Relevant Orders    COVID Only - Office Collect         Disposition:     Recommended patient to come to the office to test for COVID-19  I have spent 5 minutes directly with the patient  Greater than 50% of this time was spent in counseling/coordination of care regarding: patient and family education and impressions  Encounter provider MARTIN Esteban    Provider located at [facility] [address] [facility]  [address] [facility] [address] [phone]    Recent Visits  No visits were found meeting these conditions  Showing recent visits within past 7 days and meeting all other requirements  Today's Visits  Date Type Provider Dept   07/14/22 [address] [facility], MARTIN [facility] [address] [facility]   Showing today's visits and meeting all other requirements  Future Appointments  No visits were found meeting these conditions  Showing future appointments within next 150 days and meeting all other requirements     This virtual check-in was done via [facility] and patient was informed that this is a secure, HIPAA-compliant platform  He agrees to proceed  Patient agrees to participate in a virtual check in via telephone or video visit instead of presenting to the office to address urgent/immediate medical needs  Patient is aware this is a billable service  After connecting through [facility], the patient was identified by name and date of birth  Verónica Rich was informed that this was a telemedicine visit and that the exam was being conducted confidentially over secure lines   My office door was closed  No one else was in the room  Yandel Palencia acknowledged consent and understanding of privacy and security of the telemedicine visit  I informed the patient that I have reviewed his record in Epic and presented the opportunity for him to ask any questions regarding the visit today  The patient agreed to participate  Verification of patient location:  Patient is located in the following state in which I hold an active license: [place]    Subjective:   Yandel Palencia is a 32 y o  male who is concerned about COVID-19  Patient is currently asymptomatic  Patient denies fever, chills, fatigue, malaise, congestion, rhinorrhea, sore throat, anosmia, loss of taste, cough, shortness of breath, chest tightness, abdominal pain, nausea, vomiting, diarrhea, myalgias and headaches       - Date of exposure: 7/11/2022      COVID-19 vaccination status: Partially vaccinated    Exposure:   Contact with a person who is under investigation (PUI) for or who is positive for COVID-19 within the last 14 days?: Yes    Hospitalized recently for fever and/or lower respiratory symptoms?: No      Currently a healthcare worker that is involved in direct patient care?: No      Works in a special setting where the risk of COVID-19 transmission may be high? (this may include long-term care, correctional and custodial facilities; homeless shelters; assisted-living facilities and group homes ): No      Resident in a special setting where the risk of COVID-19 transmission may be high? (this may include long-term care, correctional and custodial facilities; homeless shelters; assisted-living facilities and group homes ): No      Lab Results   Component Value Date    SARSCOV2 Positive (A) 12/29/2021     Past Medical History:   Diagnosis Date    Anxiety     Hypertension      Past Surgical History:   Procedure Laterality Date    ANKLE SURGERY      FETAL SURGERY FOR CONGENITAL HERNIA       Current Outpatient Medications   Medication Sig Dispense Refill    amLODIPine-benazepril (LOTREL) 10-40 MG per capsule Take 1 capsule by mouth daily 30 capsule 0    hydrochlorothiazide (HYDRODIURIL) 25 mg tablet Take 1 tablet (25 mg total) by mouth daily 90 tablet 3    propranolol (INDERAL LA) 60 mg 24 hr capsule Take 1 capsule (60 mg total) by mouth daily 90 capsule 0     No current facility-administered medications for this visit  No Known Allergies    Review of Systems   Constitutional: Negative for chills, fatigue and fever  HENT: Negative for congestion, rhinorrhea and sore throat  Eyes: Negative for pain and itching  Respiratory: Negative for cough, chest tightness and shortness of breath  Cardiovascular: Negative for chest pain and palpitations  Gastrointestinal: Negative for abdominal pain, diarrhea, nausea and vomiting  Genitourinary: Negative for decreased urine volume  Musculoskeletal: Negative for myalgias  Neurological: Negative for dizziness, weakness and headaches  Psychiatric/Behavioral: Negative for confusion  Objective: There were no vitals filed for this visit  Physical Exam  Constitutional:       General: He is not in acute distress  Appearance: Normal appearance  He is not ill-appearing  HENT:      Head: Normocephalic  Right Ear: External ear normal       Left Ear: External ear normal    Pulmonary:      Effort: Pulmonary effort is normal  No respiratory distress  Musculoskeletal:         General: Normal range of motion  Cervical back: Normal range of motion  Skin:     Coloration: Skin is not pale  Neurological:      General: No focal deficit present  Mental Status: He is alert and oriented to person, place, and time  Psychiatric:         Mood and Affect: Mood normal          Behavior: Behavior normal          VIRTUAL VISIT DISCLAIMER    Victorina Calero verbally agrees to participate in [employer]   Pt is aware that Virtual Care Services could be limited without vital signs or the ability to perform a full hands-on physical Angel Boyer understands he or the provider may request at any time to terminate the video visit and request the patient to seek care or treatment in person

## 2022-07-16 LAB — SARS-COV-2 RNA RESP QL NAA+PROBE: NEGATIVE

## 2022-07-26 ENCOUNTER — APPOINTMENT (OUTPATIENT)
Dept: LAB | Facility: HOSPITAL | Age: 27
End: 2022-07-26
Payer: COMMERCIAL

## 2022-07-26 DIAGNOSIS — R53.83 FATIGUE, UNSPECIFIED TYPE: ICD-10-CM

## 2022-07-26 DIAGNOSIS — Z11.59 NEED FOR HEPATITIS C SCREENING TEST: ICD-10-CM

## 2022-07-26 DIAGNOSIS — E66.01 MORBID OBESITY WITH BMI OF 40.0-44.9, ADULT (HCC): ICD-10-CM

## 2022-07-26 DIAGNOSIS — Z11.4 SCREENING FOR HIV (HUMAN IMMUNODEFICIENCY VIRUS): ICD-10-CM

## 2022-07-26 DIAGNOSIS — I10 HYPERTENSION, UNCONTROLLED: ICD-10-CM

## 2022-07-26 LAB
25(OH)D3 SERPL-MCNC: 24.3 NG/ML (ref 30–100)
ANION GAP SERPL CALCULATED.3IONS-SCNC: 9 MMOL/L (ref 4–13)
BUN SERPL-MCNC: 13 MG/DL (ref 5–25)
CALCIUM SERPL-MCNC: 9.4 MG/DL (ref 8.3–10.1)
CHLORIDE SERPL-SCNC: 98 MMOL/L (ref 96–108)
CO2 SERPL-SCNC: 29 MMOL/L (ref 21–32)
CREAT SERPL-MCNC: 0.81 MG/DL (ref 0.6–1.3)
CREAT UR-MCNC: 188 MG/DL
GFR SERPL CREATININE-BSD FRML MDRD: 121 ML/MIN/1.73SQ M
GLUCOSE P FAST SERPL-MCNC: 90 MG/DL (ref 65–99)
MICROALBUMIN UR-MCNC: 9.2 MG/L (ref 0–20)
MICROALBUMIN/CREAT 24H UR: 5 MG/G CREATININE (ref 0–30)
POTASSIUM SERPL-SCNC: 3.9 MMOL/L (ref 3.5–5.3)
SODIUM SERPL-SCNC: 136 MMOL/L (ref 135–147)

## 2022-07-26 PROCEDURE — 36415 COLL VENOUS BLD VENIPUNCTURE: CPT | Performed by: INTERNAL MEDICINE

## 2022-07-26 PROCEDURE — 87389 HIV-1 AG W/HIV-1&-2 AB AG IA: CPT

## 2022-07-26 PROCEDURE — 80048 BASIC METABOLIC PNL TOTAL CA: CPT | Performed by: INTERNAL MEDICINE

## 2022-07-26 PROCEDURE — 82570 ASSAY OF URINE CREATININE: CPT

## 2022-07-26 PROCEDURE — 82043 UR ALBUMIN QUANTITATIVE: CPT

## 2022-07-26 PROCEDURE — 82306 VITAMIN D 25 HYDROXY: CPT

## 2022-07-26 PROCEDURE — 86803 HEPATITIS C AB TEST: CPT

## 2022-07-27 ENCOUNTER — TELEPHONE (OUTPATIENT)
Dept: FAMILY MEDICINE CLINIC | Facility: CLINIC | Age: 27
End: 2022-07-27

## 2022-07-27 LAB
HCV AB SER QL: NORMAL
HIV 1+2 AB+HIV1 P24 AG SERPL QL IA: NORMAL

## 2022-07-27 NOTE — TELEPHONE ENCOUNTER
----- Message from Nadiya Obrien, [address]  sent at 7/27/2022 10:03 AM EDT -----  Can you let her know her labs look good except her vit d is low and I would like her to start taking D3 2000 u daily and we will recheck in 3 months  Thanks

## 2022-07-27 NOTE — TELEPHONE ENCOUNTER
Tried calling pt about results on his Vitamin D but his voicemail box has not been set up yet so I can't leave a voicemail  I will try again later and leave a wizboo message as well  Last login was 07/16

## 2022-08-01 DIAGNOSIS — I10 ESSENTIAL HYPERTENSION: ICD-10-CM

## 2022-08-01 RX ORDER — AMLODIPINE BESYLATE AND BENAZEPRIL HYDROCHLORIDE 10; 40 MG/1; MG/1
1 CAPSULE ORAL DAILY
Qty: 30 CAPSULE | Refills: 0 | Status: SHIPPED | OUTPATIENT
Start: 2022-08-01 | End: 2022-09-01 | Stop reason: SDUPTHER

## 2022-08-01 RX ORDER — PROPRANOLOL HCL 60 MG
60 CAPSULE, EXTENDED RELEASE 24HR ORAL DAILY
Qty: 90 CAPSULE | Refills: 0 | Status: SHIPPED | OUTPATIENT
Start: 2022-08-01

## 2022-09-01 DIAGNOSIS — I10 ESSENTIAL HYPERTENSION: ICD-10-CM

## 2022-09-01 RX ORDER — AMLODIPINE BESYLATE AND BENAZEPRIL HYDROCHLORIDE 10; 40 MG/1; MG/1
1 CAPSULE ORAL DAILY
Qty: 30 CAPSULE | Refills: 2 | Status: SHIPPED | OUTPATIENT
Start: 2022-09-01 | End: 2022-10-07 | Stop reason: SDUPTHER

## 2022-09-16 DIAGNOSIS — I10 HYPERTENSION, UNCONTROLLED: ICD-10-CM

## 2022-09-16 RX ORDER — HYDROCHLOROTHIAZIDE 25 MG/1
25 TABLET ORAL DAILY
Qty: 90 TABLET | Refills: 3 | Status: SHIPPED | OUTPATIENT
Start: 2022-09-16

## 2022-10-07 DIAGNOSIS — I10 ESSENTIAL HYPERTENSION: ICD-10-CM

## 2022-10-07 RX ORDER — AMLODIPINE BESYLATE AND BENAZEPRIL HYDROCHLORIDE 10; 40 MG/1; MG/1
1 CAPSULE ORAL DAILY
Qty: 90 CAPSULE | Refills: 1 | Status: SHIPPED | OUTPATIENT
Start: 2022-10-07
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
## 2022-11-15 DIAGNOSIS — I10 ESSENTIAL HYPERTENSION: ICD-10-CM

## 2022-11-15 RX ORDER — PROPRANOLOL HCL 60 MG
60 CAPSULE, EXTENDED RELEASE 24HR ORAL DAILY
Qty: 90 CAPSULE | Refills: 0 | Status: SHIPPED | OUTPATIENT
Start: 2022-11-15

## 2022-12-27 DIAGNOSIS — I10 HYPERTENSION, UNCONTROLLED: ICD-10-CM

## 2022-12-27 RX ORDER — HYDROCHLOROTHIAZIDE 25 MG/1
25 TABLET ORAL DAILY
Qty: 90 TABLET | Refills: 3 | Status: SHIPPED | OUTPATIENT
Start: 2022-12-27

## 2022-12-28 ENCOUNTER — VBI (OUTPATIENT)
Dept: ADMINISTRATIVE | Facility: OTHER | Age: 27
End: 2022-12-28

## 2023-01-09 ENCOUNTER — OFFICE VISIT (OUTPATIENT)
Dept: FAMILY MEDICINE CLINIC | Facility: CLINIC | Age: 28
End: 2023-01-09

## 2023-01-09 VITALS
HEIGHT: 66 IN | DIASTOLIC BLOOD PRESSURE: 70 MMHG | OXYGEN SATURATION: 98 % | HEART RATE: 81 BPM | TEMPERATURE: 97 F | SYSTOLIC BLOOD PRESSURE: 120 MMHG | BODY MASS INDEX: 42.01 KG/M2 | WEIGHT: 261.4 LBS

## 2023-01-09 DIAGNOSIS — E66.01 MORBID OBESITY WITH BMI OF 40.0-44.9, ADULT (HCC): ICD-10-CM

## 2023-01-09 DIAGNOSIS — I10 ESSENTIAL HYPERTENSION: ICD-10-CM

## 2023-01-09 DIAGNOSIS — Z00.00 ANNUAL PHYSICAL EXAM: Primary | ICD-10-CM

## 2023-01-09 DIAGNOSIS — H61.23 BILATERAL IMPACTED CERUMEN: ICD-10-CM

## 2023-01-09 DIAGNOSIS — F41.9 ANXIETY: ICD-10-CM

## 2023-01-09 DIAGNOSIS — R00.2 PALPITATIONS: ICD-10-CM

## 2023-01-09 RX ORDER — AMLODIPINE BESYLATE AND BENAZEPRIL HYDROCHLORIDE 10; 40 MG/1; MG/1
1 CAPSULE ORAL DAILY
Qty: 90 CAPSULE | Refills: 1 | Status: SHIPPED | OUTPATIENT
Start: 2023-01-09

## 2023-01-09 NOTE — ASSESSMENT & PLAN NOTE
Following with cardiology, will get smart watch  Currently asymptomatic  Did have palps a few weeks ago, but feels it was related to anxiety

## 2023-01-09 NOTE — PROGRESS NOTES
[facility] [address]     NAME: Erica Naranjo  AGE: 32 y o  SEX: male  : 1995     DATE: 2023     Assessment and Plan:     Problem List Items Addressed This Visit        Cardiovascular and Mediastinum    Essential hypertension     Blood pressure is perfect today  Please schedule renal ultrasound  Continue current medications  Have fasting lab work  Follow-up in 6 months  Relevant Medications    amLODIPine-benazepril (LOTREL) 10-40 MG per capsule    Other Relevant Orders    Lipid panel    Microalbumin / creatinine urine ratio    CBC and Platelet    Comprehensive metabolic panel    TSH, 3rd generation with Free T4 reflex    Vitamin D 25 hydroxy    VAS renal artery complete       Other    Anxiety     Patient is aware of anxiety and able to better control it  Stable at this time  Morbid obesity with BMI of 40 0-44 9, adult ([address] )     Will increase exercise to 20-30 minutes daily  Will increase fruits and vegetables, have lab work, follow up in 6 months  Relevant Orders    Lipid panel    Hemoglobin A1C    Microalbumin / creatinine urine ratio    CBC and Platelet    Comprehensive metabolic panel    TSH, 3rd generation with Free T4 reflex    Vitamin D 25 hydroxy    Palpitations     Following with cardiology, will get smart watch  Currently asymptomatic  Did have palps a few weeks ago, but feels it was related to anxiety  Other Visit Diagnoses     Annual physical exam    -  Primary    Relevant Orders    Ambulatory Referral to Ophthalmology    Bilateral impacted cerumen        Relevant Orders    Ear cerumen removal (Completed)          Immunizations and preventive care screenings were discussed with patient today  Appropriate education was printed on patient's after visit summary      Counseling:  Alcohol/drug use: discussed moderation in alcohol intake, the recommendations for healthy alcohol use, and avoidance of illicit drug use  Dental Health: discussed importance of regular tooth brushing, flossing, and dental visits  Injury prevention: discussed safety/seat belts, safety helmets, smoke detectors, carbon dioxide detectors, and smoking near bedding or upholstery  Sexual health: discussed sexually transmitted diseases, partner selection, use of condoms, avoidance of unintended pregnancy, and contraceptive alternatives  Exercise: the importance of regular exercise/physical activity was discussed  Recommend exercise 3-5 times per week for at least 30 minutes  BMI Counseling: Body mass index is 42 19 kg/m²  The BMI is above normal  Nutrition recommendations include decreasing portion sizes, encouraging healthy choices of fruits and vegetables, increasing intake of lean protein and reducing intake of cholesterol  Exercise recommendations include exercising 3-5 times per week  Rationale for BMI follow-up plan is due to patient being overweight or obese  Depression Screening and Follow-up Plan: Patient was screened for depression during today's encounter  They screened negative with a PHQ-2 score of 0  Return in 6 months (on 7/9/2023)  Chief Complaint:     Chief Complaint   Patient presents with   • Annual Exam      History of Present Illness:     Adult Annual Physical   Patient here for a comprehensive physical exam  The patient reports no problems  Diet and Physical Activity  Diet/Nutrition: consuming 3-5 servings of fruits/vegetables daily  Exercise: no formal exercise  Depression Screening  PHQ-2/9 Depression Screening    Little interest or pleasure in doing things: 0 - not at all  Feeling down, depressed, or hopeless: 0 - not at all  PHQ-2 Score: 0  PHQ-2 Interpretation: Negative depression screen       General Health  Sleep: sleeps well and gets 7-8 hours of sleep on average     Hearing: normal - bilateral   Vision: vision problems: bright lights in the perphery     Dental: regular dental visits, brushes teeth twice daily and flosses teeth occasionally   Health  History of STDs?: no      Review of Systems:     Review of Systems   Constitutional: Negative for chills, diaphoresis, fatigue and fever  HENT: Positive for congestion  Negative for ear pain, rhinorrhea, sinus pain and sore throat  Eyes: Negative for pain and visual disturbance  Respiratory: Negative for cough, chest tightness, shortness of breath and wheezing  Cardiovascular: Positive for palpitations (resolved)  Negative for chest pain  Gastrointestinal: Negative for abdominal pain, constipation, diarrhea, nausea and vomiting  Genitourinary: Negative for dysuria, frequency, hematuria and urgency  Musculoskeletal: Negative for arthralgias, back pain and myalgias  Skin: Negative for color change and rash  Neurological: Negative for dizziness, seizures, syncope, light-headedness and headaches  Psychiatric/Behavioral: Negative for dysphoric mood and sleep disturbance  The patient is nervous/anxious  All other systems reviewed and are negative       Past Medical History:     Past Medical History:   Diagnosis Date   • Anxiety    • Hypertension       Past Surgical History:     Past Surgical History:   Procedure Laterality Date   • ANKLE SURGERY     • FETAL SURGERY FOR CONGENITAL HERNIA        Social History:     Social History     Socioeconomic History   • Marital status: Single     Spouse name: None   • Number of children: None   • Years of education: None   • Highest education level: None   Occupational History   • None   Tobacco Use   • Smoking status: Never   • Smokeless tobacco: Never   Vaping Use   • Vaping Use: Never used   Substance and Sexual Activity   • Alcohol use: No   • Drug use: No   • Sexual activity: None   Other Topics Concern   • None   Social History Narrative   • None     Social Determinants of Health     Financial Resource Strain: Not on file   Food Insecurity: Not on file   Transportation Needs: Not on file   Physical Activity: Not on file   Stress: Not on file   Social Connections: Not on file   Intimate Partner Violence: Not on file   Housing Stability: Not on file      Family History:     Family History   Problem Relation Age of Onset   • Heart failure Maternal Grandmother    • Heart failure Maternal Grandfather       Current Medications:     Current Outpatient Medications   Medication Sig Dispense Refill   • amLODIPine-benazepril (LOTREL) 10-40 MG per capsule Take 1 capsule by mouth daily 90 capsule 1   • hydrochlorothiazide (HYDRODIURIL) 25 mg tablet Take 1 tablet (25 mg total) by mouth daily 90 tablet 3   • propranolol (INDERAL LA) 60 mg 24 hr capsule Take 1 capsule (60 mg total) by mouth daily 90 capsule 0     No current facility-administered medications for this visit  Allergies:     No Known Allergies   Physical Exam:     /70 (BP Location: Left arm, Patient Position: Sitting)   Pulse 81   Temp (!) 97 °F (36 1 °C) (Tympanic)   Ht 5' 6" (1 676 m)   Wt 119 kg (261 lb 6 4 oz)   SpO2 98%   BMI 42 19 kg/m²     Physical Exam  Vitals and nursing note reviewed  Constitutional:       General: He is not in acute distress  Appearance: Normal appearance  He is well-developed  He is not ill-appearing  HENT:      Head: Normocephalic and atraumatic  Right Ear: There is impacted cerumen  Left Ear: There is impacted cerumen  Nose: Nose normal       Mouth/Throat:      Mouth: Mucous membranes are moist       Pharynx: No posterior oropharyngeal erythema  Eyes:      Conjunctiva/sclera: Conjunctivae normal    Cardiovascular:      Rate and Rhythm: Normal rate and regular rhythm  Heart sounds: No murmur heard  Pulmonary:      Effort: Pulmonary effort is normal  No respiratory distress  Breath sounds: Normal breath sounds  Abdominal:      Palpations: Abdomen is soft  Tenderness: There is no abdominal tenderness     Musculoskeletal: General: No swelling  Cervical back: Neck supple  Right lower leg: Edema (trace) present  Left lower leg: Edema (trace) present  Skin:     General: Skin is warm and dry  Capillary Refill: Capillary refill takes less than 2 seconds  Neurological:      General: No focal deficit present  Mental Status: He is alert  Psychiatric:         Mood and Affect: Mood normal          Behavior: Behavior normal           MARTIN Gold   [address]   Ear cerumen removal    Date/Time: 1/9/2023 10:13 AM  Performed by: MARTIN Gold  Authorized by: MARTIN Gold   Universal Protocol:  Consent: Verbal consent obtained  Risks and benefits: risks, benefits and alternatives were discussed  Consent given by: patient  Time out: Immediately prior to procedure a "time out" was called to verify the correct patient, procedure, equipment, support staff and site/side marked as required  Timeout called at: 1/9/2023 10:13 AM   Patient understanding: patient states understanding of the procedure being performed  Patient consent: the patient's understanding of the procedure matches consent given  Required items: required blood products, implants, devices, and special equipment available  Patient identity confirmed: verbally with patient      Patient location:  Clinic  Procedure details:     Local anesthetic:  None    Location:  L ear and R ear    Procedure type: irrigation with instrumentation      Instrumentation: curette      Approach:  External  Post-procedure details:     Complication:  None    Hearing quality:  Improved    Patient tolerance of procedure:   Tolerated well, no immediate complications

## 2023-01-09 NOTE — ASSESSMENT & PLAN NOTE
Blood pressure is perfect today  Please schedule renal ultrasound  Continue current medications  Have fasting lab work  Follow-up in 6 months

## 2023-01-09 NOTE — PATIENT INSTRUCTIONS
Problem List Items Addressed This Visit          Cardiovascular and Mediastinum    Essential hypertension     Blood pressure is perfect today  Please schedule renal ultrasound  Continue current medications  Have fasting lab work  Follow-up in 6 months  Relevant Orders    Lipid panel    Microalbumin / creatinine urine ratio    CBC and Platelet    Comprehensive metabolic panel    TSH, 3rd generation with Free T4 reflex    Vitamin D 25 hydroxy       Other    Anxiety     Patient is aware of anxiety and able to better control it  Stable at this time  Morbid obesity with BMI of 40 0-44 9, adult ([facility] [address] )     Will increase exercise to 20-30 minutes daily  Will increase fruits and vegetables, have lab work, follow up in 6 months  Relevant Orders    Lipid panel    Hemoglobin A1C    Microalbumin / creatinine urine ratio    CBC and Platelet    Comprehensive metabolic panel    TSH, 3rd generation with Free T4 reflex    Vitamin D 25 hydroxy    Palpitations     Following with cardiology, will get smart watch  Currently asymptomatic  Did have palps a few weeks ago, but feels it was related to anxiety  Other Visit Diagnoses       Annual physical exam    -  Primary    Relevant Orders    Ambulatory Referral to Ophthalmology            Wellness Visit for Adults   AMBULATORY CARE:   A wellness visit  is when you see your healthcare provider to get screened for health problems  Your healthcare provider will also give you advice on how to stay healthy  Write down your questions so you remember to ask them  Ask your healthcare provider how often you should have a wellness visit  What happens at a wellness visit:  Your healthcare provider will ask about your health, and your family history of health problems  This includes high blood pressure, heart disease, and cancer  He or she will ask if you have symptoms that concern you, if you smoke, and about your mood   You may also be asked about your intake of medicines, supplements, food, and alcohol  Any of the following may be done: Your weight  will be checked  Your height may also be checked so your body mass index (BMI) can be calculated  Your BMI shows if you are at a healthy weight  Your blood pressure  and heart rate will be checked  Your temperature may also be checked  Blood and urine tests  may be done  Blood tests may be done to check your cholesterol levels  Abnormal cholesterol levels increase your risk for heart disease and stroke  You may also need a blood or urine test to check for diabetes if you are at increased risk  Urine tests may be done to look for signs of an infection or kidney disease  A physical exam  includes checking your heartbeat and lungs with a stethoscope  Your healthcare provider may also check your skin to look for sun damage  Screening tests  may be recommended  A screening test is done to check for diseases that may not cause symptoms  The screening tests you may need depend on your age, gender, family history, and lifestyle habits  For example, colorectal screening may be recommended if you are 48years old or older  Screening tests you need if you are a woman:   A Pap smear  is used to screen for cervical cancer  Pap smears are usually done every 3 to 5 years depending on your age  You may need them more often if you have had abnormal Pap smear test results in the past  Ask your healthcare provider how often you should have a Pap smear  A mammogram  is an x-ray of your breasts to screen for breast cancer  Experts recommend mammograms every 2 years starting at age 48 years  You may need a mammogram at age 52 years or younger if you have an increased risk for breast cancer  Talk to your healthcare provider about when you should start having mammograms and how often you need them  Vaccines you may need:   Get an influenza vaccine  every year  The influenza vaccine protects you from the flu   Several types of viruses cause the flu  The viruses change over time, so new vaccines are made each year  Get a tetanus-diphtheria (Td) booster vaccine  every 10 years  This vaccine protects you against tetanus and diphtheria  Tetanus is a severe infection that may cause painful muscle spasms and lockjaw  Diphtheria is a severe bacterial infection that causes a thick covering in the back of your mouth and throat  Get a human papillomavirus (HPV) vaccine  if you are female and aged 23 to 32 or male 23 to 24 and never received it  This vaccine protects you from HPV infection  HPV is the most common infection spread by sexual contact  HPV may also cause vaginal, penile, and anal cancers  Get a pneumococcal vaccine  if you are aged 72 years or older  The pneumococcal vaccine is an injection given to protect you from pneumococcal disease  Pneumococcal disease is an infection caused by pneumococcal bacteria  The infection may cause pneumonia, meningitis, or an ear infection  Get a shingles vaccine  if you are 60 or older, even if you have had shingles before  The shingles vaccine is an injection to protect you from the varicella-zoster virus  This is the same virus that causes chickenpox  Shingles is a painful rash that develops in people who had chickenpox or have been exposed to the virus  How to eat healthy:  My Plate is a model for planning healthy meals  It shows the types and amounts of foods that should go on your plate  Fruits and vegetables make up about half of your plate, and grains and protein make up the other half  A serving of dairy is included on the side of your plate  The amount of calories and serving sizes you need depends on your age, gender, weight, and height  Examples of healthy foods are listed below:  Eat a variety of vegetables  such as dark green, red, and orange vegetables  You can also include canned vegetables low in sodium (salt) and frozen vegetables without added butter or sauces      Eat a variety of fresh fruits , canned fruit in 100% juice, frozen fruit, and dried fruit  Include whole grains  At least half of the grains you eat should be whole grains  Examples include whole-wheat bread, wheat pasta, brown rice, and whole-grain cereals such as oatmeal     Eat a variety of protein foods such as seafood (fish and shellfish), lean meat, and poultry without skin (turkey and chicken)  Examples of lean meats include pork leg, shoulder, or tenderloin, and beef round, sirloin, tenderloin, and extra lean ground beef  Other protein foods include eggs and egg substitutes, beans, peas, soy products, nuts, and seeds  Choose low-fat dairy products such as skim or 1% milk or low-fat yogurt, cheese, and cottage cheese  Limit unhealthy fats  such as butter, hard margarine, and shortening  Exercise:  Exercise at least 30 minutes per day on most days of the week  Some examples of exercise include walking, biking, dancing, and swimming  You can also fit in more physical activity by taking the stairs instead of the elevator or parking farther away from stores  Include muscle strengthening activities 2 days each week  Regular exercise provides many health benefits  It helps you manage your weight, and decreases your risk for type 2 diabetes, heart disease, stroke, and high blood pressure  Exercise can also help improve your mood  Ask your healthcare provider about the best exercise plan for you  General health and safety guidelines:   Do not smoke  Nicotine and other chemicals in cigarettes and cigars can cause lung damage  Ask your healthcare provider for information if you currently smoke and need help to quit  E-cigarettes or smokeless tobacco still contain nicotine  Talk to your healthcare provider before you use these products  Limit alcohol  A drink of alcohol is 12 ounces of beer, 5 ounces of wine, or 1½ ounces of liquor  Lose weight, if needed    Being overweight increases your risk of certain health conditions  These include heart disease, high blood pressure, type 2 diabetes, and certain types of cancer  Protect your skin  Do not sunbathe or use tanning beds  Use sunscreen with a SPF 15 or higher  Apply sunscreen at least 15 minutes before you go outside  Reapply sunscreen every 2 hours  Wear protective clothing, hats, and sunglasses when you are outside  Drive safely  Always wear your seatbelt  Make sure everyone in your car wears a seatbelt  A seatbelt can save your life if you are in an accident  Do not use your cell phone when you are driving  This could distract you and cause an accident  Pull over if you need to make a call or send a text message  Practice safe sex  Use latex condoms if are sexually active and have more than one partner  Your healthcare provider may recommend screening tests for sexually transmitted infections (STIs)  Wear helmets, lifejackets, and protective gear  Always wear a helmet when you ride a bike or motorcycle, go skiing, or play sports that could cause a head injury  Wear protective equipment when you play sports  Wear a lifejacket when you are on a boat or doing water sports  © Copyright [employer] 2022 Information is for End User's use only and may not be sold, redistributed or otherwise used for commercial purposes  All illustrations and images included in CareNotes® are the copyrighted property of A D A M , Inc  or Chad Lui  The above information is an  only  It is not intended as medical advice for individual conditions or treatments  Talk to your doctor, nurse or pharmacist before following any medical regimen to see if it is safe and effective for you

## 2023-01-09 NOTE — ASSESSMENT & PLAN NOTE
Will increase exercise to 20-30 minutes daily  Will increase fruits and vegetables, have lab work, follow up in 6 months

## 2023-01-13 ENCOUNTER — TELEMEDICINE (OUTPATIENT)
Dept: FAMILY MEDICINE CLINIC | Facility: CLINIC | Age: 28
End: 2023-01-13

## 2023-01-13 DIAGNOSIS — J11.1 INFLUENZA: Primary | ICD-10-CM

## 2023-01-13 PROBLEM — U07.1 COVID-19: Status: RESOLVED | Noted: 2021-02-24 | Resolved: 2023-01-13

## 2023-01-13 RX ORDER — FLUTICASONE PROPIONATE 50 MCG
1 SPRAY, SUSPENSION (ML) NASAL DAILY
Qty: 18.2 ML | Refills: 1 | Status: SHIPPED | OUTPATIENT
Start: 2023-01-13

## 2023-01-13 RX ORDER — OSELTAMIVIR PHOSPHATE 75 MG/1
75 CAPSULE ORAL EVERY 12 HOURS SCHEDULED
Qty: 10 CAPSULE | Refills: 0 | Status: SHIPPED | OUTPATIENT
Start: 2023-01-13 | End: 2023-01-18

## 2023-01-13 RX ORDER — PREDNISONE 20 MG/1
20 TABLET ORAL DAILY
Qty: 5 TABLET | Refills: 0 | Status: SHIPPED | OUTPATIENT
Start: 2023-01-13 | End: 2023-01-18

## 2023-01-13 NOTE — PROGRESS NOTES
Virtual Regular Visit    Verification of patient location:    Patient is located in the following state in which I hold an active license [place]      Assessment/Plan:    Problem List Items Addressed This Visit    None  Visit Diagnoses     Influenza    -  Primary    swab today, will treat with Tamiflu twice daily for 5 days  flonase, mucinex twice daily, hydrate vit c, d and zinc  steroid burst for sore throat  Relevant Medications    oseltamivir (TAMIFLU) 75 mg capsule    fluticasone (FLONASE) 50 mcg/act nasal spray    predniSONE 20 mg tablet               Reason for visit is   Chief Complaint   Patient presents with   • Virtual Regular Visit        Encounter provider Lady Vega, [address]    Provider located at [facility] [id number] [facility]  [address] [place] [id number]  [phone]      Recent Visits  Date Type Provider Dept   01/09/23 Office Visit Lady Vega, [address] recent visits within past 7 days and meeting all other requirements  Today's Visits  Date Type Provider Dept   01/13/23 [facility] [phone] [address] [facility]   Showing today's visits and meeting all other requirements  Future Appointments  No visits were found meeting these conditions  Showing future appointments within next 150 days and meeting all other requirements       The patient was identified by name and date of birth  Reyes Jules was informed that this is a telemedicine visit and that the visit is being conducted through the [employer]  He agrees to proceed     My office door was closed  No one else was in the room  He acknowledged consent and understanding of privacy and security of the video platform  The patient has agreed to participate and understands they can discontinue the visit at any time  Patient is aware this is a billable service       Subjective  Selin Maciel Zan Hwang is a 32 y o  male feels like her has the flu again  Huan Brown started to feel sick Wednesday afternoon around 5 pm         Past Medical History:   Diagnosis Date   • Anxiety    • Hypertension        Past Surgical History:   Procedure Laterality Date   • ANKLE SURGERY     • FETAL SURGERY FOR CONGENITAL HERNIA         Current Outpatient Medications   Medication Sig Dispense Refill   • fluticasone (FLONASE) 50 mcg/act nasal spray 1 spray into each nostril daily 18 2 mL 1   • oseltamivir (TAMIFLU) 75 mg capsule Take 1 capsule (75 mg total) by mouth every 12 (twelve) hours for 5 days 10 capsule 0   • predniSONE 20 mg tablet Take 1 tablet (20 mg total) by mouth daily for 5 days 5 tablet 0   • amLODIPine-benazepril (LOTREL) 10-40 MG per capsule Take 1 capsule by mouth daily 90 capsule 1   • hydrochlorothiazide (HYDRODIURIL) 25 mg tablet Take 1 tablet (25 mg total) by mouth daily 90 tablet 3   • propranolol (INDERAL LA) 60 mg 24 hr capsule Take 1 capsule (60 mg total) by mouth daily 90 capsule 0     No current facility-administered medications for this visit  No Known Allergies    Review of Systems   Constitutional: Positive for activity change, chills, diaphoresis and fatigue  Negative for fever  HENT: Positive for congestion, sinus pressure, sinus pain and sore throat  Respiratory: Negative for cough, chest tightness and shortness of breath  Gastrointestinal: Positive for abdominal pain  Negative for constipation, diarrhea, nausea and vomiting  Genitourinary: Negative for dysuria, frequency and urgency  Musculoskeletal: Positive for arthralgias, myalgias, neck pain and neck stiffness  Neurological: Positive for dizziness, light-headedness and headaches  Psychiatric/Behavioral: Positive for sleep disturbance  Video Exam    There were no vitals filed for this visit  Physical Exam  Constitutional:       Appearance: He is ill-appearing  HENT:      Head: Normocephalic        Nose: Congestion present  Eyes:      Extraocular Movements: Extraocular movements intact  Pulmonary:      Effort: Pulmonary effort is normal    Musculoskeletal:      Cervical back: Normal range of motion  Neurological:      General: No focal deficit present  Mental Status: He is alert     Psychiatric:         Mood and Affect: Mood normal          Behavior: Behavior normal           I spent 10 minutes directly with the patient during this visit

## 2023-01-14 LAB
FLUAV RNA RESP QL NAA+PROBE: NEGATIVE
FLUBV RNA RESP QL NAA+PROBE: NEGATIVE
SARS-COV-2 RNA RESP QL NAA+PROBE: NEGATIVE

## 2023-02-15 DIAGNOSIS — I10 ESSENTIAL HYPERTENSION: ICD-10-CM

## 2023-02-15 RX ORDER — PROPRANOLOL HCL 60 MG
60 CAPSULE, EXTENDED RELEASE 24HR ORAL DAILY
Qty: 90 CAPSULE | Refills: 0 | Status: SHIPPED | OUTPATIENT
Start: 2023-02-15

## 2023-04-03 DIAGNOSIS — I10 HYPERTENSION, UNCONTROLLED: ICD-10-CM

## 2023-04-03 RX ORDER — HYDROCHLOROTHIAZIDE 25 MG/1
25 TABLET ORAL DAILY
Qty: 90 TABLET | Refills: 3 | Status: SHIPPED | OUTPATIENT
Start: 2023-04-03

## 2023-05-23 DIAGNOSIS — I10 ESSENTIAL HYPERTENSION: ICD-10-CM

## 2023-05-23 DIAGNOSIS — I10 HYPERTENSION, UNCONTROLLED: ICD-10-CM

## 2023-05-23 RX ORDER — HYDROCHLOROTHIAZIDE 25 MG/1
25 TABLET ORAL DAILY
Qty: 90 TABLET | Refills: 3 | Status: SHIPPED | OUTPATIENT
Start: 2023-05-23

## 2023-05-23 RX ORDER — PROPRANOLOL HCL 60 MG
60 CAPSULE, EXTENDED RELEASE 24HR ORAL DAILY
Qty: 90 CAPSULE | Refills: 0 | Status: SHIPPED | OUTPATIENT
Start: 2023-05-23

## 2023-06-05 ENCOUNTER — OFFICE VISIT (OUTPATIENT)
Dept: FAMILY MEDICINE CLINIC | Facility: CLINIC | Age: 28
End: 2023-06-05
Payer: COMMERCIAL

## 2023-06-05 VITALS
SYSTOLIC BLOOD PRESSURE: 126 MMHG | TEMPERATURE: 98 F | WEIGHT: 261 LBS | BODY MASS INDEX: 41.95 KG/M2 | OXYGEN SATURATION: 97 % | HEART RATE: 85 BPM | DIASTOLIC BLOOD PRESSURE: 78 MMHG | RESPIRATION RATE: 18 BRPM | HEIGHT: 66 IN

## 2023-06-05 DIAGNOSIS — G47.00 INSOMNIA, UNSPECIFIED TYPE: Primary | ICD-10-CM

## 2023-06-05 DIAGNOSIS — H61.23 BILATERAL IMPACTED CERUMEN: ICD-10-CM

## 2023-06-05 PROCEDURE — 99214 OFFICE O/P EST MOD 30 MIN: CPT

## 2023-06-05 PROCEDURE — 69210 REMOVE IMPACTED EAR WAX UNI: CPT

## 2023-06-05 RX ORDER — HYDROXYZINE HYDROCHLORIDE 10 MG/1
10 TABLET, FILM COATED ORAL
Qty: 90 TABLET | Refills: 0 | Status: SHIPPED | OUTPATIENT
Start: 2023-06-05

## 2023-06-05 NOTE — PROGRESS NOTES
Assessment/Plan:         Problem List Items Addressed This Visit        Other    Insomnia - Primary     Hydroxyzine 10 mg prior to bed, follow up as needed  Relevant Medications    hydrOXYzine HCL (ATARAX) 10 mg tablet   Other Visit Diagnoses     Bilateral impacted cerumen        ears bilaterally cleaned today    Relevant Orders    Ear cerumen removal            Subjective:      Patient ID: Nancy White is a 29 y o  male  Chin Cruz is here for ear cleaning  The following portions of the patient's history were reviewed and updated as appropriate:   Past Medical History:  He has a past medical history of Anxiety and Hypertension  ,  _______________________________________________________________________  Medical Problems:  does not have any pertinent problems on file ,  _______________________________________________________________________  Past Surgical History:   has a past surgical history that includes Ankle surgery and Fetal surgery for congenital hernia ,  _______________________________________________________________________  Family History:  family history includes Heart failure in his maternal grandfather and maternal grandmother ,  _______________________________________________________________________  Social History:   reports that he has never smoked  He has never used smokeless tobacco  He reports that he does not drink alcohol and does not use drugs  ,  _______________________________________________________________________  Allergies:  has No Known Allergies     _______________________________________________________________________  Current Outpatient Medications   Medication Sig Dispense Refill   • amLODIPine-benazepril (LOTREL) 10-40 MG per capsule Take 1 capsule by mouth daily 90 capsule 1   • hydrochlorothiazide (HYDRODIURIL) 25 mg tablet Take 1 tablet (25 mg total) by mouth daily 90 tablet 3   • hydrOXYzine HCL (ATARAX) 10 mg tablet Take 1 tablet (10 mg total) by mouth daily at "bedtime 90 tablet 0   • propranolol (INDERAL LA) 60 mg 24 hr capsule Take 1 capsule (60 mg total) by mouth daily 90 capsule 0   • fluticasone (FLONASE) 50 mcg/act nasal spray 1 spray into each nostril daily 18 2 mL 1     No current facility-administered medications for this visit      _______________________________________________________________________  Review of Systems   Constitutional: Negative for chills, diaphoresis, fatigue and fever  HENT: Negative for congestion, ear pain, nosebleeds, postnasal drip, rhinorrhea, sinus pressure, sinus pain and sore throat  Eyes: Negative for pain and visual disturbance  Respiratory: Negative for cough, chest tightness, shortness of breath and wheezing  Cardiovascular: Negative for chest pain and palpitations  Gastrointestinal: Negative for abdominal pain, constipation, diarrhea, nausea and vomiting  Genitourinary: Negative for dysuria, frequency, hematuria and urgency  Musculoskeletal: Negative for arthralgias, back pain and myalgias  Skin: Negative for color change and rash  Neurological: Negative for dizziness, seizures, syncope, light-headedness and headaches  Psychiatric/Behavioral: Positive for sleep disturbance  Negative for dysphoric mood  The patient is not nervous/anxious  All other systems reviewed and are negative  Objective:  Vitals:    06/05/23 1417   BP: 126/78   BP Location: Left arm   Patient Position: Sitting   Cuff Size: Standard   Pulse: 85   Resp: 18   Temp: 98 °F (36 7 °C)   TempSrc: Tympanic   SpO2: 97%   Weight: 118 kg (261 lb)   Height: 5' 6\" (1 676 m)     Body mass index is 42 13 kg/m²  Physical Exam  Vitals and nursing note reviewed  Constitutional:       General: He is not in acute distress  Appearance: Normal appearance  He is not ill-appearing  HENT:      Head: Normocephalic  Right Ear: Tympanic membrane, ear canal and external ear normal  There is no impacted cerumen        Left Ear: Tympanic " "membrane, ear canal and external ear normal  There is no impacted cerumen  Nose: Nose normal  No congestion  Mouth/Throat:      Mouth: Mucous membranes are moist       Pharynx: No posterior oropharyngeal erythema  Eyes:      General:         Right eye: No discharge  Left eye: No discharge  Extraocular Movements: Extraocular movements intact  Conjunctiva/sclera: Conjunctivae normal       Pupils: Pupils are equal, round, and reactive to light  Cardiovascular:      Rate and Rhythm: Normal rate and regular rhythm  Pulses: Normal pulses  Heart sounds: Normal heart sounds  No murmur heard  Pulmonary:      Effort: Pulmonary effort is normal  No respiratory distress  Breath sounds: Normal breath sounds  No wheezing  Abdominal:      General: Abdomen is flat  Bowel sounds are normal    Musculoskeletal:         General: Normal range of motion  Cervical back: Normal range of motion  Right lower leg: No edema  Left lower leg: No edema  Skin:     General: Skin is warm and dry  Neurological:      General: No focal deficit present  Mental Status: He is alert and oriented to person, place, and time  Psychiatric:         Mood and Affect: Mood normal          Behavior: Behavior normal          Ear cerumen removal    Date/Time: 6/5/2023 2:00 PM    Performed by: MARTIN Clinton  Authorized by: MARTIN Clinton  Universal Protocol:  Consent: Verbal consent obtained  Risks and benefits: risks, benefits and alternatives were discussed  Consent given by: patient  Time out: Immediately prior to procedure a \"time out\" was called to verify the correct patient, procedure, equipment, support staff and site/side marked as required    Timeout called at: 6/5/2023 2:39 PM   Patient understanding: patient states understanding of the procedure being performed  Patient consent: the patient's understanding of the procedure matches consent given  Procedure consent: " procedure consent matches procedure scheduled  Required items: required blood products, implants, devices, and special equipment available  Patient identity confirmed: verbally with patient      Patient location:  Clinic  Procedure details:     Location:  L ear and R ear    Procedure type: irrigation with instrumentation      Instrumentation: curette      Approach:  External  Post-procedure details:     Complication:  None    Hearing quality:  Improved    Patient tolerance of procedure:   Tolerated well, no immediate complications

## 2023-07-14 DIAGNOSIS — I10 HYPERTENSION, UNCONTROLLED: ICD-10-CM

## 2023-07-14 RX ORDER — HYDROCHLOROTHIAZIDE 25 MG/1
25 TABLET ORAL DAILY
Qty: 90 TABLET | Refills: 3 | Status: SHIPPED | OUTPATIENT
Start: 2023-07-14

## 2023-07-21 DIAGNOSIS — I10 ESSENTIAL HYPERTENSION: ICD-10-CM

## 2023-07-21 RX ORDER — AMLODIPINE AND BENAZEPRIL HYDROCHLORIDE 10; 40 MG/1; MG/1
1 CAPSULE ORAL DAILY
Qty: 90 CAPSULE | Refills: 1 | Status: SHIPPED | OUTPATIENT
Start: 2023-07-21

## 2023-08-24 DIAGNOSIS — I10 ESSENTIAL HYPERTENSION: ICD-10-CM

## 2023-08-24 RX ORDER — PROPRANOLOL HCL 60 MG
60 CAPSULE, EXTENDED RELEASE 24HR ORAL DAILY
Qty: 90 CAPSULE | Refills: 0 | Status: SHIPPED | OUTPATIENT
Start: 2023-08-24

## 2023-10-09 DIAGNOSIS — I10 ESSENTIAL HYPERTENSION: ICD-10-CM

## 2023-10-09 RX ORDER — AMLODIPINE AND BENAZEPRIL HYDROCHLORIDE 10; 40 MG/1; MG/1
1 CAPSULE ORAL DAILY
Qty: 90 CAPSULE | Refills: 1 | Status: SHIPPED | OUTPATIENT
Start: 2023-10-09

## 2023-10-17 ENCOUNTER — OFFICE VISIT (OUTPATIENT)
Dept: FAMILY MEDICINE CLINIC | Facility: CLINIC | Age: 28
End: 2023-10-17
Payer: COMMERCIAL

## 2023-10-17 VITALS
WEIGHT: 269.9 LBS | OXYGEN SATURATION: 98 % | SYSTOLIC BLOOD PRESSURE: 132 MMHG | DIASTOLIC BLOOD PRESSURE: 74 MMHG | BODY MASS INDEX: 43.37 KG/M2 | TEMPERATURE: 96.8 F | HEART RATE: 73 BPM | HEIGHT: 66 IN

## 2023-10-17 DIAGNOSIS — R05.9 COUGH, UNSPECIFIED TYPE: ICD-10-CM

## 2023-10-17 DIAGNOSIS — J02.9 SORE THROAT: Primary | ICD-10-CM

## 2023-10-17 LAB
S PYO AG THROAT QL: POSITIVE
SARS-COV-2 AG UPPER RESP QL IA: NEGATIVE
VALID CONTROL: NORMAL

## 2023-10-17 PROCEDURE — 99214 OFFICE O/P EST MOD 30 MIN: CPT

## 2023-10-17 PROCEDURE — 87880 STREP A ASSAY W/OPTIC: CPT

## 2023-10-17 PROCEDURE — 87811 SARS-COV-2 COVID19 W/OPTIC: CPT

## 2023-10-17 RX ORDER — PREDNISONE 20 MG/1
20 TABLET ORAL DAILY
Qty: 5 TABLET | Refills: 0 | Status: SHIPPED | OUTPATIENT
Start: 2023-10-17 | End: 2023-10-22

## 2023-10-17 RX ORDER — AMOXICILLIN 875 MG/1
875 TABLET, COATED ORAL 2 TIMES DAILY
Qty: 20 TABLET | Refills: 0 | Status: SHIPPED | OUTPATIENT
Start: 2023-10-17 | End: 2023-10-27

## 2023-10-17 NOTE — PROGRESS NOTES
Assessment/Plan:         Problem List Items Addressed This Visit    None  Visit Diagnoses     Sore throat    -  Primary    Will treat with amoxicillin and steroid, salt water gargles, new tooth brush after 24 hours. Stay well hydrated. Vitamin C, D and Zinc.    Relevant Medications    amoxicillin (AMOXIL) 875 mg tablet    predniSONE 20 mg tablet    Other Relevant Orders    POCT rapid strepA (Completed)    Cough, unspecified type        Will treat with amoxicillin and steroid, salt water gargles, new tooth brush after 24 hours. Stay well hydrated. Vitamin C, D and Zinc.    Relevant Orders    POCT Rapid Covid Ag (Completed)            Subjective:      Patient ID: Rosa Washington is a 29 y.o. male. Alix Pastrana is here, he has been sick since Sunday. He has just been drinking tea. He was outside putting up [employer] on Saturday and woke up like this on Sunday. Sore Throat   Associated symptoms include congestion and coughing. Pertinent negatives include no abdominal pain, diarrhea, ear pain, headaches, shortness of breath or vomiting. Cough  Associated symptoms include a sore throat. Pertinent negatives include no chest pain, chills, ear pain, fever, headaches, myalgias, rash, rhinorrhea or shortness of breath. The following portions of the patient's history were reviewed and updated as appropriate:   Past Medical History:  He has a past medical history of Anxiety and Hypertension. ,  _______________________________________________________________________  Medical Problems:  does not have any pertinent problems on file.,  _______________________________________________________________________  Past Surgical History:   has a past surgical history that includes Ankle surgery and Fetal surgery for congenital hernia.,  _______________________________________________________________________  Family History:  family history includes Heart failure in his maternal grandfather and maternal grandmother.,  _______________________________________________________________________  Social History:   reports that he has never smoked. He has never used smokeless tobacco. He reports that he does not drink alcohol and does not use drugs. ,  _______________________________________________________________________  Allergies:  has No Known Allergies. .  _______________________________________________________________________  Current Outpatient Medications   Medication Sig Dispense Refill   • amLODIPine-benazepril (LOTREL) 10-40 MG per capsule Take 1 capsule by mouth daily 90 capsule 1   • amoxicillin (AMOXIL) 875 mg tablet Take 1 tablet (875 mg total) by mouth 2 (two) times a day for 10 days 20 tablet 0   • hydrochlorothiazide (HYDRODIURIL) 25 mg tablet Take 1 tablet (25 mg total) by mouth daily 90 tablet 3   • hydrOXYzine HCL (ATARAX) 10 mg tablet Take 1 tablet (10 mg total) by mouth daily at bedtime 90 tablet 0   • predniSONE 20 mg tablet Take 1 tablet (20 mg total) by mouth daily for 5 days 5 tablet 0   • propranolol (INDERAL LA) 60 mg 24 hr capsule Take 1 capsule (60 mg total) by mouth daily 90 capsule 0   • fluticasone (FLONASE) 50 mcg/act nasal spray 1 spray into each nostril daily 18.2 mL 1     No current facility-administered medications for this visit.     _______________________________________________________________________  Review of Systems   Constitutional:  Positive for fatigue. Negative for chills and fever. HENT:  Positive for congestion and sore throat. Negative for ear pain, rhinorrhea, sinus pressure and sinus pain. Eyes:  Negative for pain and visual disturbance. Respiratory:  Positive for cough. Negative for shortness of breath. Cardiovascular:  Negative for chest pain and palpitations. Gastrointestinal:  Negative for abdominal pain, diarrhea, nausea and vomiting. Genitourinary:  Negative for dysuria, frequency, hematuria and urgency.    Musculoskeletal:  Negative for arthralgias, back pain and myalgias. Skin:  Negative for color change and rash. Neurological:  Negative for dizziness, seizures, syncope, light-headedness and headaches. Psychiatric/Behavioral:  Negative for dysphoric mood and sleep disturbance. The patient is not nervous/anxious. All other systems reviewed and are negative. Objective:  Vitals:    10/17/23 1401   BP: 132/74   BP Location: Left arm   Patient Position: Sitting   Cuff Size: Large   Pulse: 73   Temp: (!) 96.8 °F (36 °C)   SpO2: 98%   Weight: 122 kg (269 lb 14.4 oz)   Height: 5' 6" (1.676 m)     Body mass index is 43.56 kg/m². Physical Exam  Vitals and nursing note reviewed. Constitutional:       General: He is not in acute distress. Appearance: Normal appearance. He is not ill-appearing. HENT:      Head: Normocephalic. Right Ear: External ear normal. There is impacted cerumen. Left Ear: External ear normal. There is impacted cerumen. Nose: Congestion present. Mouth/Throat:      Mouth: Mucous membranes are moist.      Pharynx: Posterior oropharyngeal erythema present. Eyes:      General:         Right eye: No discharge. Left eye: No discharge. Extraocular Movements: Extraocular movements intact. Conjunctiva/sclera: Conjunctivae normal.      Pupils: Pupils are equal, round, and reactive to light. Cardiovascular:      Rate and Rhythm: Normal rate and regular rhythm. Pulses: Normal pulses. Heart sounds: Normal heart sounds. No murmur heard. Pulmonary:      Effort: Pulmonary effort is normal. No respiratory distress. Breath sounds: Normal breath sounds. No wheezing. Abdominal:      General: Abdomen is flat. Bowel sounds are normal.   Musculoskeletal:         General: Normal range of motion. Cervical back: Normal range of motion. Right lower leg: No edema. Left lower leg: No edema. Lymphadenopathy:      Cervical: No cervical adenopathy.    Skin:     General: Skin is warm and dry. Neurological:      General: No focal deficit present. Mental Status: He is alert and oriented to person, place, and time.    Psychiatric:         Mood and Affect: Mood normal.         Behavior: Behavior normal.

## 2023-11-15 ENCOUNTER — OFFICE VISIT (OUTPATIENT)
Dept: FAMILY MEDICINE CLINIC | Facility: CLINIC | Age: 28
End: 2023-11-15
Payer: COMMERCIAL

## 2023-11-15 VITALS
HEART RATE: 78 BPM | TEMPERATURE: 97.6 F | BODY MASS INDEX: 43.93 KG/M2 | DIASTOLIC BLOOD PRESSURE: 68 MMHG | SYSTOLIC BLOOD PRESSURE: 118 MMHG | WEIGHT: 272.2 LBS | OXYGEN SATURATION: 98 %

## 2023-11-15 DIAGNOSIS — U07.1 COVID-19: Primary | ICD-10-CM

## 2023-11-15 LAB
SARS-COV-2 AG UPPER RESP QL IA: POSITIVE
VALID CONTROL: ABNORMAL

## 2023-11-15 PROCEDURE — 99214 OFFICE O/P EST MOD 30 MIN: CPT

## 2023-11-15 PROCEDURE — 87811 SARS-COV-2 COVID19 W/OPTIC: CPT

## 2023-11-15 RX ORDER — NIRMATRELVIR AND RITONAVIR 300-100 MG
3 KIT ORAL 2 TIMES DAILY
Qty: 30 TABLET | Refills: 0 | Status: SHIPPED | OUTPATIENT
Start: 2023-11-15 | End: 2023-11-20

## 2023-11-15 RX ORDER — FLUTICASONE PROPIONATE 50 MCG
1 SPRAY, SUSPENSION (ML) NASAL DAILY
Qty: 15.8 ML | Refills: 1 | Status: SHIPPED | OUTPATIENT
Start: 2023-11-15

## 2023-11-15 NOTE — LETTER
November 15, 2023     Patient: Freda Jain  YOB: 1995  Date of Visit: 11/15/2023      To Whom it May Concern:    Freda Jain is under my professional care. Tung Zabala was seen in my office on 11/15/2023. Tung Zabala may return to work on 11/22/23 . If you have any questions or concerns, please don't hesitate to call.          Sincerely,          MARTIN Ballard        CC: No Recipients

## 2023-11-15 NOTE — PROGRESS NOTES
Assessment/Plan:         Problem List Items Addressed This Visit    None  Visit Diagnoses     COVID-19    -  Primary    Will start paxlovid, flonase daily, vit D, C and zinc, increase hydration, rest.    Relevant Medications    nirmatrelvir & ritonavir (Paxlovid, 300/100,) tablet therapy pack    fluticasone (FLONASE) 50 mcg/act nasal spray    Other Relevant Orders    POCT Rapid Covid Ag (Completed)            Subjective:      Patient ID: Emily Granger is a 29 y.o. male. [place] started to feel sick on Sunday. He tested pos for covid today. Headache      The following portions of the patient's history were reviewed and updated as appropriate:   Past Medical History:  He has a past medical history of Anxiety and Hypertension. ,  _______________________________________________________________________  Medical Problems:  does not have any pertinent problems on file.,  _______________________________________________________________________  Past Surgical History:   has a past surgical history that includes Ankle surgery and Fetal surgery for congenital hernia.,  _______________________________________________________________________  Family History:  family history includes Heart failure in his maternal grandfather and maternal grandmother.,  _______________________________________________________________________  Social History:   reports that he has never smoked. He has never used smokeless tobacco. He reports that he does not drink alcohol and does not use drugs. ,  _______________________________________________________________________  Allergies:  has No Known Allergies. .  _______________________________________________________________________  Current Outpatient Medications   Medication Sig Dispense Refill   • amLODIPine-benazepril (LOTREL) 10-40 MG per capsule Take 1 capsule by mouth daily 90 capsule 1   • fluticasone (FLONASE) 50 mcg/act nasal spray 1 spray into each nostril daily 15.8 mL 1   • hydrochlorothiazide (HYDRODIURIL) 25 mg tablet Take 1 tablet (25 mg total) by mouth daily 90 tablet 3   • hydrOXYzine HCL (ATARAX) 10 mg tablet Take 1 tablet (10 mg total) by mouth daily at bedtime 90 tablet 0   • nirmatrelvir & ritonavir (Paxlovid, 300/100,) tablet therapy pack Take 3 tablets by mouth 2 (two) times a day for 5 days Take 2 nirmatrelvir tablets + 1 ritonavir tablet together per dose 30 tablet 0   • propranolol (INDERAL LA) 60 mg 24 hr capsule Take 1 capsule (60 mg total) by mouth daily 90 capsule 0   • fluticasone (FLONASE) 50 mcg/act nasal spray 1 spray into each nostril daily 18.2 mL 1     No current facility-administered medications for this visit.     _______________________________________________________________________  Review of Systems   Constitutional:  Positive for diaphoresis. Negative for chills, fatigue and fever. HENT:  Positive for congestion. Negative for ear pain, sinus pressure, sinus pain and sore throat. Eyes:  Negative for pain and visual disturbance. Respiratory:  Negative for cough, chest tightness, shortness of breath and wheezing. Cardiovascular:  Negative for chest pain and palpitations. Gastrointestinal:  Negative for abdominal pain, constipation, diarrhea, nausea and vomiting. Genitourinary:  Negative for dysuria, frequency, hematuria and urgency. Musculoskeletal:  Negative for arthralgias, back pain and myalgias. Skin:  Negative for color change and rash. Neurological:  Positive for headaches. Negative for seizures and syncope. All other systems reviewed and are negative. Objective:  Vitals:    11/15/23 1438   BP: 118/68   BP Location: Left arm   Patient Position: Sitting   Cuff Size: Standard   Pulse: 78   Temp: 97.6 °F (36.4 °C)   SpO2: 98%   Weight: 123 kg (272 lb 3.2 oz)     Body mass index is 43.93 kg/m². Physical Exam  Vitals and nursing note reviewed. Constitutional:       Appearance: Normal appearance. He is not ill-appearing. HENT:      Head: Normocephalic. Right Ear: Tympanic membrane, ear canal and external ear normal. There is no impacted cerumen. Left Ear: Tympanic membrane, ear canal and external ear normal. There is no impacted cerumen. Nose: Nose normal. No congestion. Mouth/Throat:      Mouth: Mucous membranes are moist.   Eyes:      Extraocular Movements: Extraocular movements intact. Conjunctiva/sclera: Conjunctivae normal.      Pupils: Pupils are equal, round, and reactive to light. Cardiovascular:      Rate and Rhythm: Normal rate and regular rhythm. Heart sounds: Normal heart sounds. No murmur heard. Pulmonary:      Effort: Pulmonary effort is normal.      Breath sounds: Normal breath sounds. No wheezing. Abdominal:      Palpations: Abdomen is soft. Tenderness: There is no abdominal tenderness. Musculoskeletal:      Cervical back: Normal range of motion. No tenderness. Right lower leg: No edema. Left lower leg: No edema. Lymphadenopathy:      Cervical: No cervical adenopathy. Skin:     General: Skin is warm and dry. Neurological:      General: No focal deficit present. Mental Status: He is alert.    Psychiatric:         Mood and Affect: Mood normal.         Behavior: Behavior normal.

## 2023-12-04 ENCOUNTER — APPOINTMENT (OUTPATIENT)
Dept: LAB | Facility: HOSPITAL | Age: 28
End: 2023-12-04
Payer: COMMERCIAL

## 2023-12-04 DIAGNOSIS — E66.01 MORBID OBESITY WITH BMI OF 40.0-44.9, ADULT (HCC): ICD-10-CM

## 2023-12-04 DIAGNOSIS — I10 ESSENTIAL HYPERTENSION: ICD-10-CM

## 2023-12-04 LAB
25(OH)D3 SERPL-MCNC: 13.9 NG/ML (ref 30–100)
ALBUMIN SERPL BCP-MCNC: 4.4 G/DL (ref 3.5–5)
ALP SERPL-CCNC: 73 U/L (ref 34–104)
ALT SERPL W P-5'-P-CCNC: 24 U/L (ref 7–52)
ANION GAP SERPL CALCULATED.3IONS-SCNC: 5 MMOL/L
AST SERPL W P-5'-P-CCNC: 18 U/L (ref 13–39)
BILIRUB SERPL-MCNC: 0.47 MG/DL (ref 0.2–1)
BUN SERPL-MCNC: 13 MG/DL (ref 5–25)
CALCIUM SERPL-MCNC: 9.5 MG/DL (ref 8.4–10.2)
CHLORIDE SERPL-SCNC: 101 MMOL/L (ref 96–108)
CHOLEST SERPL-MCNC: 160 MG/DL
CO2 SERPL-SCNC: 31 MMOL/L (ref 21–32)
CREAT SERPL-MCNC: 0.76 MG/DL (ref 0.6–1.3)
CREAT UR-MCNC: 104.7 MG/DL
ERYTHROCYTE [DISTWIDTH] IN BLOOD BY AUTOMATED COUNT: 12 % (ref 11.6–15.1)
GFR SERPL CREATININE-BSD FRML MDRD: 124 ML/MIN/1.73SQ M
GLUCOSE P FAST SERPL-MCNC: 89 MG/DL (ref 65–99)
HCT VFR BLD AUTO: 42.6 % (ref 36.5–49.3)
HDLC SERPL-MCNC: 31 MG/DL
HGB BLD-MCNC: 14.2 G/DL (ref 12–17)
LDLC SERPL CALC-MCNC: 77 MG/DL (ref 0–100)
MCH RBC QN AUTO: 27.8 PG (ref 26.8–34.3)
MCHC RBC AUTO-ENTMCNC: 33.3 G/DL (ref 31.4–37.4)
MCV RBC AUTO: 83 FL (ref 82–98)
MICROALBUMIN UR-MCNC: <7 MG/L
MICROALBUMIN/CREAT 24H UR: <7 MG/G CREATININE (ref 0–30)
NONHDLC SERPL-MCNC: 129 MG/DL
PLATELET # BLD AUTO: 334 THOUSANDS/UL (ref 149–390)
PMV BLD AUTO: 9.3 FL (ref 8.9–12.7)
POTASSIUM SERPL-SCNC: 4.3 MMOL/L (ref 3.5–5.3)
PROT SERPL-MCNC: 8.7 G/DL (ref 6.4–8.4)
RBC # BLD AUTO: 5.11 MILLION/UL (ref 3.88–5.62)
SODIUM SERPL-SCNC: 137 MMOL/L (ref 135–147)
T4 FREE SERPL-MCNC: 0.69 NG/DL (ref 0.61–1.12)
TRIGL SERPL-MCNC: 261 MG/DL
TSH SERPL DL<=0.05 MIU/L-ACNC: 6.54 UIU/ML (ref 0.45–4.5)
WBC # BLD AUTO: 4.25 THOUSAND/UL (ref 4.31–10.16)

## 2023-12-04 PROCEDURE — 80053 COMPREHEN METABOLIC PANEL: CPT

## 2023-12-04 PROCEDURE — 80061 LIPID PANEL: CPT

## 2023-12-04 PROCEDURE — 83036 HEMOGLOBIN GLYCOSYLATED A1C: CPT

## 2023-12-04 PROCEDURE — 82306 VITAMIN D 25 HYDROXY: CPT

## 2023-12-04 PROCEDURE — 36415 COLL VENOUS BLD VENIPUNCTURE: CPT

## 2023-12-04 PROCEDURE — 82570 ASSAY OF URINE CREATININE: CPT

## 2023-12-04 PROCEDURE — 85027 COMPLETE CBC AUTOMATED: CPT

## 2023-12-04 PROCEDURE — 84443 ASSAY THYROID STIM HORMONE: CPT

## 2023-12-04 PROCEDURE — 82043 UR ALBUMIN QUANTITATIVE: CPT

## 2023-12-04 PROCEDURE — 84439 ASSAY OF FREE THYROXINE: CPT

## 2023-12-05 ENCOUNTER — OFFICE VISIT (OUTPATIENT)
Dept: FAMILY MEDICINE CLINIC | Facility: CLINIC | Age: 28
End: 2023-12-05
Payer: COMMERCIAL

## 2023-12-05 VITALS
BODY MASS INDEX: 43.23 KG/M2 | DIASTOLIC BLOOD PRESSURE: 78 MMHG | WEIGHT: 269 LBS | HEIGHT: 66 IN | HEART RATE: 78 BPM | OXYGEN SATURATION: 98 % | SYSTOLIC BLOOD PRESSURE: 120 MMHG

## 2023-12-05 DIAGNOSIS — R73.01 ELEVATED FASTING BLOOD SUGAR: Primary | ICD-10-CM

## 2023-12-05 DIAGNOSIS — I10 HYPERTENSION, UNCONTROLLED: ICD-10-CM

## 2023-12-05 DIAGNOSIS — D72.819 LEUKOPENIA, UNSPECIFIED TYPE: ICD-10-CM

## 2023-12-05 DIAGNOSIS — Z80.9 FAMILY HISTORY OF CANCER: ICD-10-CM

## 2023-12-05 DIAGNOSIS — R77.9 ELEVATED SERUM PROTEIN LEVEL: ICD-10-CM

## 2023-12-05 DIAGNOSIS — E03.9 HYPOTHYROIDISM, UNSPECIFIED TYPE: ICD-10-CM

## 2023-12-05 DIAGNOSIS — E55.9 VITAMIN D DEFICIENCY: ICD-10-CM

## 2023-12-05 DIAGNOSIS — I10 ESSENTIAL HYPERTENSION: ICD-10-CM

## 2023-12-05 LAB
EST. AVERAGE GLUCOSE BLD GHB EST-MCNC: 157 MG/DL
HBA1C MFR BLD: 7.1 %

## 2023-12-05 PROCEDURE — 99214 OFFICE O/P EST MOD 30 MIN: CPT

## 2023-12-05 RX ORDER — ERGOCALCIFEROL 1.25 MG/1
50000 CAPSULE ORAL WEEKLY
Qty: 16 CAPSULE | Refills: 0 | Status: SHIPPED | OUTPATIENT
Start: 2023-12-05

## 2023-12-05 RX ORDER — LEVOTHYROXINE SODIUM 0.03 MG/1
25 TABLET ORAL DAILY
Qty: 30 TABLET | Refills: 1 | Status: SHIPPED | OUTPATIENT
Start: 2023-12-05

## 2023-12-05 RX ORDER — PROPRANOLOL HCL 60 MG
60 CAPSULE, EXTENDED RELEASE 24HR ORAL DAILY
Qty: 90 CAPSULE | Refills: 0 | Status: SHIPPED | OUTPATIENT
Start: 2023-12-05

## 2023-12-05 RX ORDER — HYDROCHLOROTHIAZIDE 25 MG/1
25 TABLET ORAL DAILY
Qty: 90 TABLET | Refills: 3 | Status: SHIPPED | OUTPATIENT
Start: 2023-12-05

## 2023-12-05 RX ORDER — METFORMIN HYDROCHLORIDE 500 MG/1
500 TABLET, EXTENDED RELEASE ORAL
Qty: 30 TABLET | Refills: 3 | Status: SHIPPED | OUTPATIENT
Start: 2023-12-05

## 2023-12-05 NOTE — PROGRESS NOTES
Assessment/Plan:         Problem List Items Addressed This Visit        Endocrine    Hypothyroidism     Will start levothyroxine, have ultrasound, follow up in 6 weeks with lab work. Relevant Medications    levothyroxine (Euthyrox) 25 mcg tablet    Other Relevant Orders    TSH, 3rd generation with Free T4 reflex    US thyroid       Other    Vitamin D deficiency     Vitamin D is low, will send weekly prescription and also suggest nightly D3 4000 u or more           Relevant Medications    ergocalciferol (VITAMIN D2) 50,000 units    Family history of cancer     Patient requests referral to genetics. Relevant Orders    Ambulatory Referral to Genetics   Other Visit Diagnoses     Elevated fasting blood sugar    -  Primary    Nutrition referal, metformin daily, diet enducation given, recomend 20 minutes of exercise daily follow up in 3 months or sooner. Relevant Medications    metFORMIN (GLUCOPHAGE-XR) 500 mg 24 hr tablet    Other Relevant Orders    Comprehensive metabolic panel    CBC and differential    Ambulatory referral to Diabetic Education - use to refer for diabetes group classes, individual diabetes education, medical nutrition therapy, device training    Elevated serum protein level        Repeat lab work in 6 weeks, follow up in 6 weeks. Relevant Orders    Protein electrophoresis, serum    Protein electrophoresis, urine    Leukopenia, unspecified type        Repeat lab work in 6 weeks, follow up in 6 weeks. Relevant Orders    Peripheral Smear    Leukemia/Lymphoma flow cytometry            Subjective:      Patient ID: Mame Willis is a 29 y.o. male. [facility] [place] go [place] labs, he is concerned with elevated sugar, elevated protein, tsh abnormality.          The following portions of the patient's history were reviewed and updated as appropriate:   Past Medical History:  He has a past medical history of Anxiety and Hypertension. ,  _______________________________________________________________________  Medical Problems:  does not have any pertinent problems on file.,  _______________________________________________________________________  Past Surgical History:   has a past surgical history that includes Ankle surgery and Fetal surgery for congenital hernia.,  _______________________________________________________________________  Family History:  family history includes Heart failure in his maternal grandfather and maternal grandmother.,  _______________________________________________________________________  Social History:   reports that he has never smoked. He has never used smokeless tobacco. He reports that he does not drink alcohol and does not use drugs. ,  _______________________________________________________________________  Allergies:  has No Known Allergies. .  _______________________________________________________________________  Current Outpatient Medications   Medication Sig Dispense Refill   • amLODIPine-benazepril (LOTREL) 10-40 MG per capsule Take 1 capsule by mouth daily 90 capsule 1   • ergocalciferol (VITAMIN D2) 50,000 units Take 1 capsule (50,000 Units total) by mouth once a week 16 capsule 0   • hydrochlorothiazide (HYDRODIURIL) 25 mg tablet Take 1 tablet (25 mg total) by mouth daily 90 tablet 3   • levothyroxine (Euthyrox) 25 mcg tablet Take 1 tablet (25 mcg total) by mouth daily 30 tablet 1   • metFORMIN (GLUCOPHAGE-XR) 500 mg 24 hr tablet Take 1 tablet (500 mg total) by mouth daily with dinner 30 tablet 3   • propranolol (INDERAL LA) 60 mg 24 hr capsule Take 1 capsule (60 mg total) by mouth daily 90 capsule 0     No current facility-administered medications for this visit.     _______________________________________________________________________  Review of Systems   Constitutional:  Negative for chills and fever.    HENT:  Negative for congestion, ear pain, rhinorrhea, sinus pressure, sinus pain and sore throat. Eyes:  Negative for pain and visual disturbance. Respiratory:  Negative for cough, chest tightness, shortness of breath and wheezing. Cardiovascular:  Negative for chest pain and palpitations. Gastrointestinal:  Negative for abdominal pain, constipation, diarrhea, nausea and vomiting. Genitourinary:  Negative for dysuria, frequency, hematuria and urgency. Musculoskeletal:  Negative for arthralgias and back pain. Skin:  Negative for color change and rash. Neurological:  Negative for dizziness, seizures, syncope, light-headedness and headaches. Psychiatric/Behavioral:  Negative for dysphoric mood and sleep disturbance. The patient is not nervous/anxious. All other systems reviewed and are negative. Objective:  Vitals:    12/05/23 1137   BP: 120/78   Pulse: 78   SpO2: 98%   Weight: 122 kg (269 lb)   Height: 5' 6" (1.676 m)     Body mass index is 43.42 kg/m². Physical Exam  Vitals and nursing note reviewed. Constitutional:       General: He is not in acute distress. Appearance: Normal appearance. He is not ill-appearing. HENT:      Head: Normocephalic. Right Ear: Tympanic membrane, ear canal and external ear normal. There is no impacted cerumen. Left Ear: Tympanic membrane, ear canal and external ear normal. There is no impacted cerumen. Nose: Nose normal.      Mouth/Throat:      Mouth: Mucous membranes are moist.      Pharynx: No posterior oropharyngeal erythema. Eyes:      General:         Right eye: No discharge. Left eye: No discharge. Conjunctiva/sclera: Conjunctivae normal.   Cardiovascular:      Rate and Rhythm: Normal rate and regular rhythm. Pulses: Normal pulses. Heart sounds: Normal heart sounds. No murmur heard. Pulmonary:      Effort: Pulmonary effort is normal. No respiratory distress. Breath sounds: Normal breath sounds. No wheezing. Abdominal:      General: Abdomen is flat.  Bowel sounds are normal.   Musculoskeletal:         General: Normal range of motion. Cervical back: Normal range of motion. Right lower leg: No edema. Left lower leg: No edema. Skin:     General: Skin is warm and dry. Neurological:      General: No focal deficit present. Mental Status: He is alert and oriented to person, place, and time.    Psychiatric:         Mood and Affect: Mood normal.         Behavior: Behavior normal.

## 2023-12-05 NOTE — PATIENT INSTRUCTIONS
Problem List Items Addressed This Visit          Endocrine    Hypothyroidism     Will start levothyroxine, have ultrasound, follow up in 6 weeks with lab work. Relevant Medications    levothyroxine (Euthyrox) 25 mcg tablet    Other Relevant Orders    TSH, 3rd generation with Free T4 reflex    US thyroid       Other    Vitamin D deficiency     Vitamin D is low, will send weekly prescription and also suggest nightly D3 4000 u or more           Relevant Medications    ergocalciferol (VITAMIN D2) 50,000 units    Family history of cancer     Patient requests referral to genetics. Relevant Orders    Ambulatory Referral to Genetics     Other Visit Diagnoses       Elevated fasting blood sugar    -  Primary    Nutrition referal, metformin daily, diet enducation given, recomend 20 minutes of exercise daily follow up in 3 months or sooner. Relevant Medications    metFORMIN (GLUCOPHAGE-XR) 500 mg 24 hr tablet    Other Relevant Orders    Comprehensive metabolic panel    CBC and differential    Ambulatory referral to Diabetic Education - use to refer for diabetes group classes, individual diabetes education, medical nutrition therapy, device training    Elevated serum protein level        Repeat lab work in 6 weeks, follow up in 6 weeks. Relevant Orders    Protein electrophoresis, serum    Protein electrophoresis, urine    Leukopenia, unspecified type        Repeat lab work in 6 weeks, follow up in 6 weeks. Relevant Orders    Peripheral Smear    Leukemia/Lymphoma flow cytometry          Type 2 Diabetes Management for Adults   AMBULATORY CARE:   Type 2 diabetes  is a disease that affects how your body uses glucose (sugar). Either your body cannot make enough insulin, or it cannot use the insulin correctly. It is important to keep diabetes controlled to prevent damage to your heart, blood vessels, and other organs. Management will help you feel well and enjoy your daily activities.  Your diabetes care team providers can help you make a plan to fit diabetes care into your schedule. Your plan can change over time to fit your needs and your family's needs. Have someone call your local emergency number (911 in the [address]) if:   You cannot be woken. You have signs of diabetic ketoacidosis:     confusion, fatigue    vomiting    rapid heartbeat    fruity smelling breath    extreme thirst    dry mouth and skin    You have any of the following signs of a heart attack:      Squeezing, pressure, or pain in your chest    You may  also have any of the following:     Discomfort or pain in your back, neck, jaw, stomach, or arm    Shortness of breath    Nausea or vomiting    Lightheadedness or a sudden cold sweat    You have any of the following signs of a stroke:      Numbness or drooping on one side of your face     Weakness in an arm or leg    Confusion or difficulty speaking    Dizziness, a severe headache, or vision loss    Call your doctor or diabetes care team provider if:   You have a sore or wound that will not heal.    You have a change in the amount you urinate. Your blood sugar levels are higher than your target goals. You often have lower blood sugar levels than your target goals. Your skin is red, dry, warm, or swollen. You have trouble coping with diabetes, or you feel anxious or depressed. You have trouble following any part of your care plan, such as your meal plan. You have questions or concerns about your condition or care. What you need to know about high blood sugar levels:  High blood sugar levels may not cause any symptoms. You may feel more thirsty or urinate more often than usual. Over time, high blood sugar levels can damage your nerves, blood vessels, tissues, and organs.  The following can increase your blood sugar levels:  Large meals or large amounts of carbohydrates at one time    Less physical activity    Stress    Illness    A lower dose of diabetes medicine or insulin, or a late dose    What you need to know about low blood sugar levels:  Symptoms include feeling shaky, dizzy, irritable, or confused. You can prevent symptoms by keeping your blood sugar levels from going too low. Treat a low blood sugar level right away:      Drink 4 ounces of juice or have 1 tube of glucose gel. Check your blood sugar level again 10 to 15 minutes later. When the level goes back to normal, eat a meal or snack to prevent another decrease. Keep glucose gel, raisins, or hard candy with you at all times to treat a low blood sugar level. Your blood sugar level can get too low if you take diabetes medicine or insulin and do not eat enough food. If you use insulin, check your blood sugar level before you exercise. If your blood sugar level is below 100 mg/dL, eat 4 crackers or 2 ounces of raisins, or drink 4 ounces of juice. Check your level every 30 minutes if you exercise longer than 1 hour. You may need a snack during or after exercise. What you can do to manage your blood sugar levels:   Check your blood sugar levels as directed and as needed. Several items are available to use to check your levels. You may need to check by testing a drop of blood in a glucose monitor. You may instead be given a continuous glucose monitoring (CGM) device. The device is worn at all times. The CGM checks your blood sugar level every 5 minutes. It sends results to an electronic device such as a smart phone. A CGM can be used with or without an insulin pump. You and your diabetes care team providers will decide on the best method for you. The goal for blood sugar levels before meals  is between 80 and 130 mg/dL and 2 hours after eating  is lower than 180 mg/dL. Make healthy food choices. Work with a dietitian to create a meal plan that works for you and your schedule.  A dietitian can help you learn how to eat the right amount of carbohydrates (sugar and starchy foods) during your meals and snacks. Examples of carbohydrates are breads, cereals, rice, pasta, fruit, low-fat dairy, and sweets. Carbohydrates can raise your blood sugar level if you eat too many at one time. Eat high-fiber foods as directed. Fiber helps improve blood sugar levels. Fiber also lowers your risk for heart disease and other problems diabetes can cause. Examples of high-fiber foods include vegetables, whole-grain bread, and beans such as gerardo beans. Your dietitian can tell you how much fiber to have each day. Get regular physical activity. Physical activity can help you get to your target blood sugar level goal and manage your weight. Get at least 150 minutes of moderate to vigorous aerobic physical activity each week. Resistance training, such as lifting weights, should be done 3 times each week. Do not miss more than 2 days of physical activity in a row. Do not sit longer than 30 minutes at a time. Your healthcare provider can help you create an activity plan. The plan can include the best activities for you and can help you build your strength and endurance. Maintain a healthy weight. Ask your team what a healthy weight is for you. A healthy weight can help you control diabetes and prevent heart disease. Ask your team to help you create a weight-loss plan, if needed. Even a loss of 3% to 7% of your excess body weight can help make a difference in managing diabetes. Your team will help you set a weight-loss goal, such as 10 to 15 pounds, or 5% of your extra weight. Together you and your team can set manageable weight-loss goals. Take your diabetes medicine or insulin as directed. You may need diabetes medicine, insulin, or both to help control your blood sugar levels. Your healthcare provider will teach you how and when to take your diabetes medicine or insulin. You will also be taught about side effects oral diabetes medicine can cause.  Insulin may be injected or given through a pump or pen. You and your providers will decide on the best method for you: An insulin pump  is an implanted device that gives your insulin 24 hours a day. An insulin pump prevents the need for multiple insulin injections in a day. An insulin pen  is a device prefilled with the right amount of insulin. You and your family members will be taught how to draw up and give insulin  if this is the best method for you. Your providers will also teach you how to dispose of needles and syringes. You will learn how much insulin you need  and when to give it. You will be taught when not to give insulin. You will also be taught what to do if your blood sugar level drops too low. This may happen if you take insulin and do not eat the right amount of carbohydrates. More ways to manage type 2 diabetes:   Wear medical alert identification. Wear medical alert jewelry or carry a card that says you have diabetes. Ask your provider where to get these items. Do not smoke. Nicotine and other chemicals in cigarettes and cigars can cause lung and blood vessel damage. It also makes it more difficult to manage your diabetes. Ask your provider for information if you currently smoke and need help to quit. Do not use e-cigarettes or smokeless tobacco in place of cigarettes or to help you quit. They still contain nicotine. Check your feet each day for cuts, scratches, calluses, or other wounds. Look for redness and swelling, and feel for warmth. Wear shoes that fit well. Check your shoes for rocks or other objects that can hurt your feet. Do not walk barefoot or wear shoes without socks. Wear cotton socks to help keep your feet dry. Ask about vaccines you may need. You have a higher risk for serious illness if you get the flu, pneumonia, COVID-19, or hepatitis. Ask your provider if you should get vaccines to prevent these or other diseases, and when to get the vaccines.     Talk to your provider if you become stressed about diabetes care. Sometimes being able to fit diabetes care into your life can cause increased stress. The stress can cause you not to take care of yourself properly. Your provider can help by offering tips about self-care. A mental health provider can listen and offer help with self-care issues. Other types of counseling can help you make nutrition or physical activity changes. Have your A1c checked as directed. Your provider may check your A1c every 3 months, or 2 times each year if your diabetes is controlled. An A1c test shows the average amount of sugar in your blood over the past 2 to 3 months. Your provider will tell you what your A1c level should be. Have screening tests as directed. Your provider may recommend screening for complications of diabetes and other conditions that may develop. Some screenings may begin right away and some may happen within the first 5 years of diagnosis:    Examples of diabetes complications  include kidney problems, high cholesterol, high blood pressure, blood vessel problems, eye problems, and sleep apnea. You may be screened for a low vitamin B level  if you take oral diabetes medicine for a long time. You may be screened for polycystic ovarian syndrome (PCOS)  if you are of childbearing age. Follow up with your doctor or diabetes care team providers as directed: You may need to have blood tests done before your follow-up visit. The test results will show if changes need to be made in your treatment or self-care. Talk to your provider if you cannot afford your medicine. Write down your questions so you remember to ask them during your visits. © Copyright [employer] 2023 Information is for End User's use only and may not be sold, redistributed or otherwise used for commercial purposes. The above information is an  only. It is not intended as medical advice for individual conditions or treatments.  Talk to your doctor, nurse or pharmacist before following any medical regimen to see if it is safe and effective for you. Basic Carbohydrate Counting   AMBULATORY CARE:   Carbohydrate counting  is a way to plan your meals by counting the amount of carbohydrate in foods. Carbohydrates are the sugars, starches, and fiber found in fruit, grains, vegetables, and milk products. Carbohydrates increase your blood sugar levels. Carbohydrate counting can help you eat the right amount of carbohydrate to keep your blood sugar levels under control. What you need to know about planning meals using carbohydrate counting:  A dietitian or healthcare provider will help you develop a healthy meal plan that works best for you. You will be taught how much carbohydrate to eat or drink for each meal and snack. Your meal plan will be based on your age, weight, usual food intake, and physical activity level. If you have diabetes, it will also include your blood sugar levels and diabetes medicine. Once you know how much carbohydrate you should eat, you can decide what type of food you want to eat. You will need to know what foods contain carbohydrate and how much they contain. Keep track of the amount of carbohydrate in meals and snacks in order to follow your meal plan. Do not avoid carbohydrates or skip meals. Your blood sugar may fall too low if you do not eat enough carbohydrate or you skip meals. Foods that contain carbohydrate:   Breads:  Each serving of food listed below contains about 15 g of carbohydrate . 1 slice of bread (1 ounce) or 1 flour or corn tortilla (6 inch)    ½ of a hamburger bun or ¼ of a large bagel (about 1 ounce)    1 pancake (about 4 inches across and ¼ inch thick)    Cereals and grains:  Serving sizes of ready-to-eat cereals vary. Look at the serving size and the total carbohydrate amount listed on the food label. Each serving of food listed below contains about 15 g of carbohydrate .     ¾ cup of dry, unsweetened, ready-to-eat cereal or ¼ cup of low-fat granola     ½ cup of oatmeal or other cooked cereal     ? cup of cooked rice or pasta    Starchy vegetables and beans:  Each serving of food listed below contains about 15 g of carbohydrate . ½ cup of corn, green peas, sweet potatoes, or mashed potatoes    ¼ of a large baked potato    ½ cup of beans, lentils, and peas (garbanzo, gerardo, kidney, white, split, black-eyed)    Crackers and snacks:  Each serving of food listed below contains about 15 g of carbohydrate . 3 carmen cracker squares or 8 animal crackers     6 saltine-type crackers    3 cups of popcorn or ¾ ounce of pretzels, potato chips, or tortilla chips    Fruit:  Each serving of food listed below contains about 15 g of carbohydrate . 1 small (4 ounce) piece of fresh fruit or ¾ to 1 cup of fresh fruit    ½ cup of canned or frozen fruit, packed in natural juice    ½ cup (4 ounces) of unsweetened fruit juice    2 tablespoons of dried fruit    Desserts or sugary foods:  Each serving of food listed below contains about 15 g of carbohydrate . 2-inch square unfrosted cake or brownie     2 small cookies    ½ cup of ice cream, frozen yogurt, or nondairy frozen yogurt    ¼ cup of sherbet or sorbet    1 tablespoon of regular syrup, jam, or jelly    2 tablespoons of light syrup    Milk and yogurt:  Foods from the milk group contain about 12 g of carbohydrate per serving. 1 cup of fat-free or low-fat milk    1 cup of soy milk    ? cup of fat-free, yogurt sweetened with artificial sweetener    Non-starchy vegetables:  Each serving contains about 5 g of carbohydrate . Three servings of non-starch vegetables count as 1 carbohydrate serving. ½ cup of cooked vegetables or 1 cup of raw vegetables.  This includes beets, broccoli, cabbage, cauliflower, cucumber, mushrooms, tomatoes, and zucchini    ½ cup of vegetable juice    How to use carbohydrate counting to plan meals:   Count carbohydrate amounts using serving sizes:      Nathana dinner example: You plan to have pasta, tossed salad, and an 8-ounce glass of milk. Your healthcare provider tells you that you may have 4 carbohydrate servings for dinner. One carbohydrate serving of pasta is ? cup. One cup of pasta will equal 3 carbohydrate servings. An 8-ounce glass of milk will count as 1 carbohydrate serving. These amounts of food would equal 4 carbohydrate servings. One cup of tossed salad does not count toward your carbohydrate servings. Count carbohydrate amounts using food labels:  Find the total amount of carbohydrate in a packaged food by reading the food label. Food labels tell you the serving size of the food and the total carbohydrate amount in each serving. Find the serving size on the food label and then decide how many servings you will eat. Multiply the number of servings you plan to eat by the carbohydrate amount per serving. Granola bar snack example: Your meal plan allows you to have 2 carbohydrate servings (30 grams) of carbohydrate for a snack. You plan to eat 1 package of granola bars, which contains 2 bars. According to the food label, the serving size of food in this package is 1 bar. Each serving (1 bar) contains 25 grams of carbohydrate. The total amount of carbohydrate in this package of granola bars would be 50 g. Based on your meal plan, you should eat only 1 bar. Follow up with your doctor as directed:  Write down your questions so you remember to ask them during your visits. © Copyright [employer] 2023 Information is for End User's use only and may not be sold, redistributed or otherwise used for commercial purposes. The above information is an  only. It is not intended as medical advice for individual conditions or treatments. Talk to your doctor, nurse or pharmacist before following any medical regimen to see if it is safe and effective for you.     What to Do if Your Blood Sugar is Low   AMBULATORY CARE:   Low blood sugar levels (hypoglycemia) can happen with Type 1 and Type 2 diabetes. Low levels are more likely to happen if you use insulin. Hypoglycemia can cause you to have falls, accidents, and injuries. A blood sugar level that gets too low can lead to seizures, coma, and death. Learn to recognize the symptoms early so you can get treatment quickly. When your blood sugar is low you may feel:  Sweaty    Nervous or shaky    Anxious or irritable    Confused    A fast, pounding heartbeat    Extremely hungry    Have someone call your local emergency number (911 in the [address]) if:   You cannot be woken. You have a seizure. Call your doctor if:   You have symptoms of a low blood sugar level, such as trouble thinking, sweating, or a pounding heartbeat. Your blood sugar level is lower than normal and it does not improve with treatment. You often have lower blood sugar levels than your target goals. You have trouble coping with your illness, or you feel anxious or depressed. You have questions or concerns about your condition or care. What to do if you have symptoms of low blood sugar:   Check your blood sugar level, if possible. Your blood sugar level is too low if it is at or below 70 mg/dL. Eat or drink 15 grams of fast-acting carbohydrate. Fast-acting carbohydrates will raise your blood sugar level quickly. Examples of 15 grams of fast-acting carbohydrates:     4 ounces (½ cup) of fruit juice     4 ounces of regular soda    2 tablespoons of raisins     1 tube of glucose gel or 3 to 4 glucose tablets       Check your blood sugar level 15 minutes later. If the level is still low (less than 100 mg/dL), eat another 15 grams of carbohydrate. When the level returns to 100 mg/dL, eat a snack or meal that contains carbohydrates. This will help prevent another drop in blood sugar. Teach people close to you how to use your glucagon kit. Your blood sugar may be too low for you to be awake.  People need to know when and how to use your kit. Prevent low blood sugar levels:  Prevent low blood sugar by knowing what increases your risk. Ask your healthcare provider for ways to prevent low blood sugar levels. Any of the following can increase your risk of low blood sugar:  Fasting for tests or procedures    During or after intense exercise    Late or postponed meals    Sleeping (you may need a bedtime snack)     Drinking alcohol if you use insulin or insulin releasing pills    Follow up with your doctor as directed:  Write down your questions so you remember to ask them during your visits. © Copyright [employer] 2023 Information is for End User's use only and may not be sold, redistributed or otherwise used for commercial purposes. The above information is an  only. It is not intended as medical advice for individual conditions or treatments. Talk to your doctor, nurse or pharmacist before following any medical regimen to see if it is safe and effective for you.

## 2023-12-06 ENCOUNTER — TELEPHONE (OUTPATIENT)
Dept: HEMATOLOGY ONCOLOGY | Facility: CLINIC | Age: 28
End: 2023-12-06

## 2023-12-06 NOTE — TELEPHONE ENCOUNTER
I called [employer] to schedule a new patient appointment with the [facility].      Outcome: There was no answer and I was unable to leave a voice message. Follow-up:   At this time the referral will be closed and we will wait to hear back from the patient regarding scheduling this appointment.

## 2024-01-04 ENCOUNTER — TELEPHONE (OUTPATIENT)
Dept: DIABETES SERVICES | Facility: CLINIC | Age: 29
End: 2024-01-04

## 2024-01-04 DIAGNOSIS — E11.9 TYPE 2 DIABETES MELLITUS WITHOUT COMPLICATION, WITHOUT LONG-TERM CURRENT USE OF INSULIN (HCC): Primary | ICD-10-CM

## 2024-01-04 NOTE — TELEPHONE ENCOUNTER
Ben is scheduled to see the diabetic educator next week for medical nutrition therapy. While prepping her charts she noticed that he has since had an A1C done. Is there anyway you can update his diabetes education referral to include a diagnosis of diabetes? Thank you

## 2024-01-09 ENCOUNTER — TELEPHONE (OUTPATIENT)
Dept: DIABETES SERVICES | Facility: HOSPITAL | Age: 29
End: 2024-01-09

## 2024-01-09 NOTE — TELEPHONE ENCOUNTER
Pt was a no show for virtual diabetes education through [employer] today. I did try to call but you just get a busy tone, same thing when our staff tried to call and confirm.   Happy to try again in the future.

## 2024-01-11 ENCOUNTER — TELEMEDICINE (OUTPATIENT)
Dept: DIABETES SERVICES | Facility: HOSPITAL | Age: 29
End: 2024-01-11
Payer: COMMERCIAL

## 2024-01-11 VITALS — WEIGHT: 269 LBS | HEIGHT: 67 IN | BODY MASS INDEX: 42.22 KG/M2

## 2024-01-11 DIAGNOSIS — E11.9 TYPE 2 DIABETES MELLITUS WITHOUT COMPLICATION, WITHOUT LONG-TERM CURRENT USE OF INSULIN (HCC): Primary | ICD-10-CM

## 2024-01-11 PROCEDURE — 97802 MEDICAL NUTRITION INDIV IN: CPT | Performed by: DIETITIAN, REGISTERED

## 2024-01-11 NOTE — PROGRESS NOTES
Virtual Regular Visit    Verification of patient location:    Patient is located at Other in the following state in which I hold an active license [place]      Assessment/Plan:    Problem List Items Addressed This Visit    None           Reason for visit is   Chief Complaint   Patient presents with    Virtual Regular Visit          Encounter provider Agustina Mcghee RD    Provider located at [facility] [address] [place] [address]      Recent Visits  Date Type Provider Dept   01/09/24 Telephone Agustina Mcghee RD [facility] [place]   Showing recent visits within past 7 days and meeting all other requirements  Today's Visits  Date Type Provider Dept   01/11/24 Telemedicine Agustina Mcghee RD [facility] [place]   Showing today's visits and meeting all other requirements  Future Appointments  No visits were found meeting these conditions.  Showing future appointments within next 150 days and meeting all other requirements       The patient was identified by name and date of birth. Ben Daniel was informed that this is a telemedicine visit and that the visit is being conducted through the [employer] Teams platform. He agrees to proceed..  My office door was closed. No one else was in the room.  He acknowledged consent and understanding of privacy and security of the video platform. The patient has agreed to participate and understands they can discontinue the visit at any time.    Patient is aware this is a billable service.     Medical Nutrition Therapy     Assessment    Visit Type: Initial visit  Chief complaint:  Type 2 Diabetes     HPI: Met with Ben for initial medical nutrition therapy.  Newly diagnosed type 2 diabetes, A1c 7.1%.  Here today for help with eating and lifestyle change.  Since diagnosis he has been working on making changes.  He used to eat a larger breakfast, snacks somewhat throughout the day, have  "lunch and then go out for fast food of a second lunch later, and snack on junk food at night.  Since starting the metformin he states that his appetite is less.  He is eating eggs with a low-carb wrap for breakfast and that lasts him for many hours, sometimes does not even eat lunch.  We did discuss the importance of eating consistently and having 3 meals a day.  At his other meals, he has reduced his portions and reduced starches.  States he does not mind and actually enjoys healthy eating, has issue has always been eating too much.  That is been his primary focus.  He is also going to the gym on his days off, discussed the importance of regular physical activity as a part of diabetes management.  Together we discussed what foods contain CHO, reading a food label, serving sizes, and set a carb goal of 30-45g CHO/meal to promote weight loss with 15g snacks. Put together sample meals for Ben's reference and evaluated Ben's current eating plan. Good understanding, Bne will call with questions or for more education. Follow up as needed if not improving.    He is interested in being able to check his blood sugars.  Discussed that his insurance should pay for both a fingerstick meter and as of this week medical assistance will pay for continuous glucose monitor for people not on insulin.  He will call his PCP to request what ever he wants.          Ht Readings from Last 1 Encounters:   01/11/24 5' 6.5\" (1.689 m)     Wt Readings from Last 3 Encounters:   01/11/24 122 kg (269 lb)   12/05/23 122 kg (269 lb)   11/15/23 123 kg (272 lb 3.2 oz)        Body mass index is 42.77 kg/m².     Lab Results   Component Value Date    HGBA1C 7.1 (H) 12/04/2023       No results found for: \"CHOL\"  Lab Results   Component Value Date    HDL 31 (L) 12/04/2023     Lab Results   Component Value Date    LDLCALC 77 12/04/2023     Lab Results   Component Value Date    TRIG 261 (H) 12/04/2023     No results found for: \"CHOLHDL\"    Weight " Change: No    Medical Diagnosis/ICD 10 Code:  E11.9    Barriers to Learning: no barriers    Do you follow any special diet presently?: No  Who shops: patient and spouse  Who cooks: patient and spouse    Food Log: Completed via the method of food recall    Breakfast:up around 6:45am, breakfast 7:30am: omletes with veggies with low carb wraps every day since dr visit. Before was doing rudd egg and cheese on a hoagie roll  Morning Snack:not now.   Lunch: often isn't hungry now, if eating now it will be later 2:30: wife had been cooking, turkey meatloaf, salad with grilled chicken, lots of quiona, before was white rice and beans and potatoes or fast food, double lunch   Afternoon Snack:   Dinner: 6pm henry: same lunch. Soups  Evening Snack: stopped, atkins cheesecake bars, was chips of cake and chocolate milk. Bedtime 10ish used to be 1am. Likes yogurt. Not much fruit. [place] milk PB frozen fruit and flax seeds in exchange of lunch.   Beverages: water, stopped crystal light, chocolate milk at night done,   Eating out/Take out:was a lot more.   Exercise ADL, nothing structured, started doing the bike a little twice a week.     Nutrition Diagnosis:  Food and nutrition related knowledge deficit  related to Lack of prior exposure to accurate nutrition related information as evidenced by Verbalizes inaccurate or incomplete information    Intervention: plate method, label reading, behavior modification strategies, carbohydrate counting, meal planning, individualized meal plan, and monitoring portion control     Treatment Goals: Patient understands education and recommendations    Education Material Given  Ben was provided the Portion Booklet    Monitoring and evaluation:    Term code indicator   1.6.3 Carbohydrate Intake Criteria: 30-45g CHO per meal, 15g CHO snacks    Patient’s Response to Instruction:  Comprehensiongood  Motivationgood  Expected Compliancegood    Thank you for coming to the [facility]  [place] for education today.  Please feel free to call with any questions or concerns.    Agustina Mcghee, RD  [address]  [facility] [address]  [facility] [address]          Subjective  Ben Daniel is a 28 y.o. male see notes above .      HPI     Past Medical History:   Diagnosis Date    Anxiety     Hypertension        Past Surgical History:   Procedure Laterality Date    ANKLE SURGERY      FETAL SURGERY FOR CONGENITAL HERNIA         Current Outpatient Medications   Medication Sig Dispense Refill    amLODIPine-benazepril (LOTREL) 10-40 MG per capsule Take 1 capsule by mouth daily 90 capsule 1    ergocalciferol (VITAMIN D2) 50,000 units Take 1 capsule (50,000 Units total) by mouth once a week 16 capsule 0    hydrochlorothiazide (HYDRODIURIL) 25 mg tablet Take 1 tablet (25 mg total) by mouth daily 90 tablet 3    levothyroxine (Euthyrox) 25 mcg tablet Take 1 tablet (25 mcg total) by mouth daily 30 tablet 1    metFORMIN (GLUCOPHAGE-XR) 500 mg 24 hr tablet Take 1 tablet (500 mg total) by mouth daily with dinner 30 tablet 3    propranolol (INDERAL LA) 60 mg 24 hr capsule Take 1 capsule (60 mg total) by mouth daily 90 capsule 0     No current facility-administered medications for this visit.        No Known Allergies    Review of Systems    Video Exam    There were no vitals filed for this visit.    Physical Exam     Visit Time  Total Visit Duration: 45min

## 2024-01-23 ENCOUNTER — APPOINTMENT (OUTPATIENT)
Dept: LAB | Facility: HOSPITAL | Age: 29
End: 2024-01-23
Payer: COMMERCIAL

## 2024-01-23 DIAGNOSIS — D72.819 LEUKOPENIA, UNSPECIFIED TYPE: ICD-10-CM

## 2024-01-23 DIAGNOSIS — R73.01 ELEVATED FASTING BLOOD SUGAR: ICD-10-CM

## 2024-01-23 DIAGNOSIS — E03.9 HYPOTHYROIDISM, UNSPECIFIED TYPE: ICD-10-CM

## 2024-01-23 DIAGNOSIS — R77.9 ELEVATED SERUM PROTEIN LEVEL: ICD-10-CM

## 2024-01-23 LAB
ALBUMIN SERPL BCP-MCNC: 4.5 G/DL (ref 3.5–5)
ALP SERPL-CCNC: 68 U/L (ref 34–104)
ALT SERPL W P-5'-P-CCNC: 15 U/L (ref 7–52)
ANION GAP SERPL CALCULATED.3IONS-SCNC: 7 MMOL/L
AST SERPL W P-5'-P-CCNC: 17 U/L (ref 13–39)
BILIRUB SERPL-MCNC: 0.43 MG/DL (ref 0.2–1)
BUN SERPL-MCNC: 16 MG/DL (ref 5–25)
CALCIUM SERPL-MCNC: 9.5 MG/DL (ref 8.4–10.2)
CHLORIDE SERPL-SCNC: 102 MMOL/L (ref 96–108)
CO2 SERPL-SCNC: 29 MMOL/L (ref 21–32)
CREAT SERPL-MCNC: 0.85 MG/DL (ref 0.6–1.3)
ERYTHROCYTE [DISTWIDTH] IN BLOOD BY AUTOMATED COUNT: 11.9 % (ref 11.6–15.1)
GFR SERPL CREATININE-BSD FRML MDRD: 118 ML/MIN/1.73SQ M
GLUCOSE P FAST SERPL-MCNC: 92 MG/DL (ref 65–99)
HCT VFR BLD AUTO: 42.4 % (ref 36.5–49.3)
HGB BLD-MCNC: 14.5 G/DL (ref 12–17)
MCH RBC QN AUTO: 27.7 PG (ref 26.8–34.3)
MCHC RBC AUTO-ENTMCNC: 34.2 G/DL (ref 31.4–37.4)
MCV RBC AUTO: 81 FL (ref 82–98)
NRBC BLD AUTO-RTO: 0 /100 WBCS
PLATELET # BLD AUTO: 249 THOUSANDS/UL (ref 149–390)
PMV BLD AUTO: 9.3 FL (ref 8.9–12.7)
POTASSIUM SERPL-SCNC: 4.1 MMOL/L (ref 3.5–5.3)
PROT SERPL-MCNC: 8.5 G/DL (ref 6.4–8.4)
RBC # BLD AUTO: 5.23 MILLION/UL (ref 3.88–5.62)
SODIUM SERPL-SCNC: 138 MMOL/L (ref 135–147)
T4 FREE SERPL-MCNC: 0.95 NG/DL (ref 0.61–1.12)
TSH SERPL DL<=0.05 MIU/L-ACNC: 5.74 UIU/ML (ref 0.45–4.5)
WBC # BLD AUTO: 4.32 THOUSAND/UL (ref 4.31–10.16)

## 2024-01-23 PROCEDURE — 86334 IMMUNOFIX E-PHORESIS SERUM: CPT

## 2024-01-23 PROCEDURE — 36415 COLL VENOUS BLD VENIPUNCTURE: CPT

## 2024-01-23 PROCEDURE — 85007 BL SMEAR W/DIFF WBC COUNT: CPT

## 2024-01-23 PROCEDURE — 80053 COMPREHEN METABOLIC PANEL: CPT

## 2024-01-23 PROCEDURE — 84165 PROTEIN E-PHORESIS SERUM: CPT

## 2024-01-23 PROCEDURE — 88184 FLOWCYTOMETRY/ TC 1 MARKER: CPT

## 2024-01-23 PROCEDURE — 84443 ASSAY THYROID STIM HORMONE: CPT

## 2024-01-23 PROCEDURE — 88185 FLOWCYTOMETRY/TC ADD-ON: CPT

## 2024-01-23 PROCEDURE — 84439 ASSAY OF FREE THYROXINE: CPT

## 2024-01-24 DIAGNOSIS — D72.9 ABNORMAL WHITE BLOOD CELL (WBC): Primary | ICD-10-CM

## 2024-01-24 LAB
ALBUMIN SERPL ELPH-MCNC: 4.28 G/DL (ref 3.2–5.1)
ALBUMIN SERPL ELPH-MCNC: 52.9 % (ref 48–70)
ALBUMIN UR ELPH-MCNC: 100 %
ALPHA1 GLOB MFR UR ELPH: 0 %
ALPHA1 GLOB SERPL ELPH-MCNC: 0.28 G/DL (ref 0.15–0.47)
ALPHA1 GLOB SERPL ELPH-MCNC: 3.4 % (ref 1.8–7)
ALPHA2 GLOB MFR UR ELPH: 0 %
ALPHA2 GLOB SERPL ELPH-MCNC: 0.72 G/DL (ref 0.42–1.04)
ALPHA2 GLOB SERPL ELPH-MCNC: 8.9 % (ref 5.9–14.9)
B-GLOBULIN MFR UR ELPH: 0 %
BETA GLOB ABNORMAL SERPL ELPH-MCNC: 0.48 G/DL (ref 0.31–0.57)
BETA1 GLOB SERPL ELPH-MCNC: 5.9 % (ref 4.7–7.7)
BETA2 GLOB SERPL ELPH-MCNC: 5.1 % (ref 3.1–7.9)
BETA2+GAMMA GLOB SERPL ELPH-MCNC: 0.41 G/DL (ref 0.2–0.58)
GAMMA GLOB ABNORMAL SERPL ELPH-MCNC: 1.93 G/DL (ref 0.4–1.66)
GAMMA GLOB MFR UR ELPH: 0 %
GAMMA GLOB SERPL ELPH-MCNC: 23.8 % (ref 6.9–22.3)
IGG/ALB SER: 1.12 {RATIO} (ref 1.1–1.8)
INTERPRETATION UR IFE-IMP: NORMAL
PROT PATTERN SERPL ELPH-IMP: ABNORMAL
PROT PATTERN UR ELPH-IMP: NORMAL
PROT SERPL-MCNC: 8.1 G/DL (ref 6.4–8.2)
PROT UR-MCNC: 9.2 MG/DL
SCAN RESULT: NORMAL

## 2024-01-24 PROCEDURE — 86334 IMMUNOFIX E-PHORESIS SERUM: CPT | Performed by: PATHOLOGY

## 2024-01-24 PROCEDURE — 84165 PROTEIN E-PHORESIS SERUM: CPT | Performed by: PATHOLOGY

## 2024-01-24 PROCEDURE — 84166 PROTEIN E-PHORESIS/URINE/CSF: CPT | Performed by: PATHOLOGY

## 2024-01-26 ENCOUNTER — TELEMEDICINE (OUTPATIENT)
Dept: FAMILY MEDICINE CLINIC | Facility: CLINIC | Age: 29
End: 2024-01-26
Payer: COMMERCIAL

## 2024-01-26 DIAGNOSIS — E03.9 HYPOTHYROIDISM, UNSPECIFIED TYPE: ICD-10-CM

## 2024-01-26 DIAGNOSIS — R79.9 ABNORMAL BLOOD SMEAR: ICD-10-CM

## 2024-01-26 DIAGNOSIS — I10 ESSENTIAL HYPERTENSION: Primary | ICD-10-CM

## 2024-01-26 DIAGNOSIS — D47.2 GAMMOPATHY: Primary | ICD-10-CM

## 2024-01-26 DIAGNOSIS — Z11.3 SCREEN FOR STD (SEXUALLY TRANSMITTED DISEASE): ICD-10-CM

## 2024-01-26 LAB
BASOPHILS NFR BLD MANUAL: 1 % (ref 0–1)
IMM EOSINOPHIL NFR BLD MANUAL: 3 % (ref 0–6)
LYMPHOCYTES NFR BLD: 38 % (ref 14–44)
MONOCYTES NFR BLD AUTO: 12 % (ref 4–12)
NEUTS SEG NFR BLD AUTO: 46 % (ref 45–77)
PATHOLOGY REVIEW: YES
PLATELET BLD QL SMEAR: ADEQUATE
RBC MORPH BLD: NORMAL
TOTAL CELLS COUNTED SPEC: 100

## 2024-01-26 PROCEDURE — 99214 OFFICE O/P EST MOD 30 MIN: CPT

## 2024-01-26 PROCEDURE — 85060 BLOOD SMEAR INTERPRETATION: CPT | Performed by: PATHOLOGY

## 2024-01-26 RX ORDER — LEVOTHYROXINE SODIUM 0.05 MG/1
50 TABLET ORAL DAILY
Qty: 30 TABLET | Refills: 1 | Status: SHIPPED | OUTPATIENT
Start: 2024-01-26

## 2024-01-26 NOTE — ASSESSMENT & PLAN NOTE
Start increased dose of levothyroxine, repeat lab work in 4 to 6 weeks.  Call with any side effects.

## 2024-01-26 NOTE — PROGRESS NOTES
Virtual Regular Visit    Verification of patient location:    Patient is located at Other in the following state in which I hold an active license [place]      Assessment/Plan:    Problem List Items Addressed This Visit        Endocrine    Hypothyroidism     Start increased dose of levothyroxine, repeat lab work in 4 to 6 weeks.  Call with any side effects.         Relevant Orders    Comprehensive metabolic panel    Hemoglobin A1C       Cardiovascular and Mediastinum    Essential hypertension - Primary     Great job with with weight loss, continue current medicines.  Will continue to monitor         Relevant Orders    Comprehensive metabolic panel    Hemoglobin A1C   Other Visit Diagnoses     Abnormal blood smear        Will repeat lab work appointment is scheduled with hematology for follow-up.    Relevant Orders    Protein electrophoresis, serum    CBC and differential    Screen for STD (sexually transmitted disease)        Patient requests routine screening.    Relevant Orders    RPR-Syphilis Screening (Total Syphilis IGG/IGM)    Herpes I/II IgG DARIAN w Reflex to HSV-2    Chlamydia/GC amplified DNA by PCR    HIV 1/2 AG/AB w Reflex [facility] for 2 yr old and above    Hepatitis C antibody                 Reason for visit is   Chief Complaint   Patient presents with   • Virtual Regular Visit          Encounter provider MARTIN Shearer    Provider located at [place] [address] [place] [address] [address] [place] [address] [place] [address]  [phone]      Recent Visits  No visits were found meeting these conditions.  Showing recent visits within past 7 days and meeting all other requirements  Today's Visits  Date Type Provider Dept   01/26/24 Telemedicine MARTIN Shearer [address]   Showing today's visits and meeting all other requirements  Future Appointments  No visits were found meeting these conditions.  Showing future appointments  within next 150 days and meeting all other requirements       The patient was identified by name and date of birth. Ben Daniel was informed that this is a telemedicine visit and that the visit is being conducted through the [employer] platform. He agrees to proceed..  My office door was closed. No one else was in the room.  He acknowledged consent and understanding of privacy and security of the video platform. The patient has agreed to participate and understands they can discontinue the visit at any time.    Patient is aware this is a billable service.     Subjective  Ben Daniel is a 28 y.o. male visit to review labs.      Ben is having a visit to review labs.          Past Medical History:   Diagnosis Date   • Anxiety    • Hypertension        Past Surgical History:   Procedure Laterality Date   • ANKLE SURGERY     • FETAL SURGERY FOR CONGENITAL HERNIA         Current Outpatient Medications   Medication Sig Dispense Refill   • amLODIPine-benazepril (LOTREL) 10-40 MG per capsule Take 1 capsule by mouth daily 90 capsule 1   • ergocalciferol (VITAMIN D2) 50,000 units Take 1 capsule (50,000 Units total) by mouth once a week 16 capsule 0   • hydrochlorothiazide (HYDRODIURIL) 25 mg tablet Take 1 tablet (25 mg total) by mouth daily 90 tablet 3   • levothyroxine (Euthyrox) 50 mcg tablet Take 1 tablet (50 mcg total) by mouth daily 30 tablet 1   • metFORMIN (GLUCOPHAGE-XR) 500 mg 24 hr tablet Take 1 tablet (500 mg total) by mouth daily with dinner 30 tablet 3   • propranolol (INDERAL LA) 60 mg 24 hr capsule Take 1 capsule (60 mg total) by mouth daily 90 capsule 0     No current facility-administered medications for this visit.        No Known Allergies    Review of Systems   Constitutional:  Positive for fatigue. Negative for chills, diaphoresis, fever and unexpected weight change.   HENT:  Positive for congestion. Negative for ear pain, rhinorrhea, sinus pressure, sinus pain and sore throat.    Eyes:  Negative for pain  and visual disturbance.   Respiratory:  Negative for cough, chest tightness and shortness of breath.    Cardiovascular:  Negative for chest pain and palpitations.   Gastrointestinal:  Negative for abdominal pain, constipation, diarrhea, nausea and vomiting.   Genitourinary:  Negative for dysuria, frequency, hematuria and urgency.   Musculoskeletal:  Negative for arthralgias, back pain, joint swelling and myalgias.   Skin:  Negative for color change and rash.   Neurological:  Positive for headaches. Negative for dizziness, seizures, syncope and light-headedness.   Psychiatric/Behavioral:  Negative for dysphoric mood and sleep disturbance. The patient is not nervous/anxious.    All other systems reviewed and are negative.      Video Exam    There were no vitals filed for this visit.    Physical Exam  Vitals reviewed: patient unable to connect to video.   Pulmonary:      Effort: Pulmonary effort is normal.   Neurological:      General: No focal deficit present.      Mental Status: He is alert.   Psychiatric:         Mood and Affect: Mood normal.         Behavior: Behavior normal.          Visit Time  Total Visit Duration: 15

## 2024-01-30 ENCOUNTER — APPOINTMENT (OUTPATIENT)
Dept: LAB | Facility: HOSPITAL | Age: 29
End: 2024-01-30
Payer: COMMERCIAL

## 2024-01-30 DIAGNOSIS — D47.2 GAMMOPATHY: ICD-10-CM

## 2024-01-30 DIAGNOSIS — R79.9 ABNORMAL BLOOD SMEAR: ICD-10-CM

## 2024-01-30 DIAGNOSIS — E03.9 HYPOTHYROIDISM, UNSPECIFIED TYPE: ICD-10-CM

## 2024-01-30 DIAGNOSIS — Z11.3 SCREEN FOR STD (SEXUALLY TRANSMITTED DISEASE): ICD-10-CM

## 2024-01-30 DIAGNOSIS — I10 ESSENTIAL HYPERTENSION: ICD-10-CM

## 2024-01-30 LAB
ALBUMIN SERPL BCP-MCNC: 4.6 G/DL (ref 3.5–5)
ALP SERPL-CCNC: 68 U/L (ref 34–104)
ALT SERPL W P-5'-P-CCNC: 18 U/L (ref 7–52)
ANION GAP SERPL CALCULATED.3IONS-SCNC: 7 MMOL/L
AST SERPL W P-5'-P-CCNC: 14 U/L (ref 13–39)
BASOPHILS # BLD AUTO: 0.02 THOUSANDS/ÂΜL (ref 0–0.1)
BASOPHILS NFR BLD AUTO: 1 % (ref 0–1)
BILIRUB SERPL-MCNC: 0.56 MG/DL (ref 0.2–1)
BUN SERPL-MCNC: 15 MG/DL (ref 5–25)
CALCIUM SERPL-MCNC: 9.8 MG/DL (ref 8.4–10.2)
CHLORIDE SERPL-SCNC: 102 MMOL/L (ref 96–108)
CO2 SERPL-SCNC: 26 MMOL/L (ref 21–32)
CREAT SERPL-MCNC: 0.81 MG/DL (ref 0.6–1.3)
EOSINOPHIL # BLD AUTO: 0.06 THOUSAND/ÂΜL (ref 0–0.61)
EOSINOPHIL NFR BLD AUTO: 2 % (ref 0–6)
ERYTHROCYTE [DISTWIDTH] IN BLOOD BY AUTOMATED COUNT: 11.9 % (ref 11.6–15.1)
EST. AVERAGE GLUCOSE BLD GHB EST-MCNC: 111 MG/DL
GFR SERPL CREATININE-BSD FRML MDRD: 120 ML/MIN/1.73SQ M
GLUCOSE P FAST SERPL-MCNC: 91 MG/DL (ref 65–99)
HBA1C MFR BLD: 5.5 %
HCT VFR BLD AUTO: 45 % (ref 36.5–49.3)
HGB BLD-MCNC: 15.4 G/DL (ref 12–17)
HIV 1+2 AB+HIV1 P24 AG SERPL QL IA: NORMAL
HIV 2 AB SERPL QL IA: NORMAL
HIV1 AB SERPL QL IA: NORMAL
HIV1 P24 AG SERPL QL IA: NORMAL
IMM GRANULOCYTES # BLD AUTO: 0.01 THOUSAND/UL (ref 0–0.2)
IMM GRANULOCYTES NFR BLD AUTO: 0 % (ref 0–2)
LYMPHOCYTES # BLD AUTO: 1.75 THOUSANDS/ÂΜL (ref 0.6–4.47)
LYMPHOCYTES NFR BLD AUTO: 43 % (ref 14–44)
MCH RBC QN AUTO: 27.9 PG (ref 26.8–34.3)
MCHC RBC AUTO-ENTMCNC: 34.2 G/DL (ref 31.4–37.4)
MCV RBC AUTO: 82 FL (ref 82–98)
MONOCYTES # BLD AUTO: 0.39 THOUSAND/ÂΜL (ref 0.17–1.22)
MONOCYTES NFR BLD AUTO: 10 % (ref 4–12)
NEUTROPHILS # BLD AUTO: 1.8 THOUSANDS/ÂΜL (ref 1.85–7.62)
NEUTS SEG NFR BLD AUTO: 44 % (ref 43–75)
NRBC BLD AUTO-RTO: 0 /100 WBCS
PLATELET # BLD AUTO: 266 THOUSANDS/UL (ref 149–390)
PMV BLD AUTO: 9.2 FL (ref 8.9–12.7)
POTASSIUM SERPL-SCNC: 4 MMOL/L (ref 3.5–5.3)
PROT SERPL-MCNC: 8.6 G/DL (ref 6.4–8.4)
RBC # BLD AUTO: 5.51 MILLION/UL (ref 3.88–5.62)
SODIUM SERPL-SCNC: 135 MMOL/L (ref 135–147)
T4 FREE SERPL-MCNC: 0.98 NG/DL (ref 0.61–1.12)
TREPONEMA PALLIDUM IGG+IGM AB [PRESENCE] IN SERUM OR PLASMA BY IMMUNOASSAY: NORMAL
TSH SERPL DL<=0.05 MIU/L-ACNC: 5.54 UIU/ML (ref 0.45–4.5)
WBC # BLD AUTO: 4.03 THOUSAND/UL (ref 4.31–10.16)

## 2024-01-30 PROCEDURE — 3044F HG A1C LEVEL LT 7.0%: CPT

## 2024-01-30 PROCEDURE — 84439 ASSAY OF FREE THYROXINE: CPT

## 2024-01-30 PROCEDURE — 36415 COLL VENOUS BLD VENIPUNCTURE: CPT

## 2024-01-30 PROCEDURE — 84166 PROTEIN E-PHORESIS/URINE/CSF: CPT

## 2024-01-30 PROCEDURE — 86780 TREPONEMA PALLIDUM: CPT

## 2024-01-30 PROCEDURE — 86696 HERPES SIMPLEX TYPE 2 TEST: CPT

## 2024-01-30 PROCEDURE — 84165 PROTEIN E-PHORESIS SERUM: CPT

## 2024-01-30 PROCEDURE — 86803 HEPATITIS C AB TEST: CPT

## 2024-01-30 PROCEDURE — 84443 ASSAY THYROID STIM HORMONE: CPT

## 2024-01-30 PROCEDURE — 85025 COMPLETE CBC W/AUTO DIFF WBC: CPT

## 2024-01-30 PROCEDURE — 87591 N.GONORRHOEAE DNA AMP PROB: CPT

## 2024-01-30 PROCEDURE — 87389 HIV-1 AG W/HIV-1&-2 AB AG IA: CPT

## 2024-01-30 PROCEDURE — 80053 COMPREHEN METABOLIC PANEL: CPT

## 2024-01-30 PROCEDURE — 87491 CHLMYD TRACH DNA AMP PROBE: CPT

## 2024-01-30 PROCEDURE — 86695 HERPES SIMPLEX TYPE 1 TEST: CPT

## 2024-01-30 PROCEDURE — 83036 HEMOGLOBIN GLYCOSYLATED A1C: CPT

## 2024-01-31 ENCOUNTER — TELEPHONE (OUTPATIENT)
Dept: FAMILY MEDICINE CLINIC | Facility: CLINIC | Age: 29
End: 2024-01-31

## 2024-01-31 DIAGNOSIS — R73.09 ELEVATED HEMOGLOBIN A1C: Primary | ICD-10-CM

## 2024-01-31 DIAGNOSIS — E03.9 HYPOTHYROIDISM, UNSPECIFIED TYPE: Primary | ICD-10-CM

## 2024-01-31 LAB
ALBUMIN SERPL ELPH-MCNC: 4.4 G/DL (ref 3.2–5.1)
ALBUMIN SERPL ELPH-MCNC: 53.6 % (ref 48–70)
ALBUMIN UR ELPH-MCNC: 100 %
ALPHA1 GLOB MFR UR ELPH: 0 %
ALPHA1 GLOB SERPL ELPH-MCNC: 0.26 G/DL (ref 0.15–0.47)
ALPHA1 GLOB SERPL ELPH-MCNC: 3.2 % (ref 1.8–7)
ALPHA2 GLOB MFR UR ELPH: 0 %
ALPHA2 GLOB SERPL ELPH-MCNC: 0.7 G/DL (ref 0.42–1.04)
ALPHA2 GLOB SERPL ELPH-MCNC: 8.5 % (ref 5.9–14.9)
B-GLOBULIN MFR UR ELPH: 0 %
BETA GLOB ABNORMAL SERPL ELPH-MCNC: 0.48 G/DL (ref 0.31–0.57)
BETA1 GLOB SERPL ELPH-MCNC: 5.8 % (ref 4.7–7.7)
BETA2 GLOB SERPL ELPH-MCNC: 5.4 % (ref 3.1–7.9)
BETA2+GAMMA GLOB SERPL ELPH-MCNC: 0.44 G/DL (ref 0.2–0.58)
C TRACH DNA SPEC QL NAA+PROBE: NEGATIVE
GAMMA GLOB ABNORMAL SERPL ELPH-MCNC: 1.93 G/DL (ref 0.4–1.66)
GAMMA GLOB MFR UR ELPH: 0 %
GAMMA GLOB SERPL ELPH-MCNC: 23.5 % (ref 6.9–22.3)
HCV AB SER QL: NORMAL
HSV1 IGG SER IA-ACNC: 38.8 INDEX (ref 0–0.9)
HSV2 IGG SER IA-ACNC: <0.91 INDEX (ref 0–0.9)
IGG/ALB SER: 1.16 {RATIO} (ref 1.1–1.8)
N GONORRHOEA DNA SPEC QL NAA+PROBE: NEGATIVE
PROT PATTERN SERPL ELPH-IMP: ABNORMAL
PROT PATTERN UR ELPH-IMP: NORMAL
PROT SERPL-MCNC: 8.2 G/DL (ref 6.4–8.2)
PROT UR-MCNC: 15 MG/DL

## 2024-01-31 PROCEDURE — 84166 PROTEIN E-PHORESIS/URINE/CSF: CPT | Performed by: PATHOLOGY

## 2024-01-31 PROCEDURE — 84165 PROTEIN E-PHORESIS SERUM: CPT | Performed by: PATHOLOGY

## 2024-02-01 ENCOUNTER — TELEPHONE (OUTPATIENT)
Dept: FAMILY MEDICINE CLINIC | Facility: CLINIC | Age: 29
End: 2024-02-01

## 2024-02-01 DIAGNOSIS — D47.2 GAMMOPATHY: Primary | ICD-10-CM

## 2024-02-01 NOTE — TELEPHONE ENCOUNTER
----- Message from MARTIN Shearer sent at 2/1/2024  2:19 PM EST -----  Can you let Ben know that I spoke with hematology oncology and they want him to repeat the one test that was abnormal in 4-6 weeks. They said they think the results were most likely a result of infection or inflammation.

## 2024-02-02 ENCOUNTER — TELEPHONE (OUTPATIENT)
Dept: FAMILY MEDICINE CLINIC | Facility: CLINIC | Age: 29
End: 2024-02-02

## 2024-02-02 NOTE — TELEPHONE ENCOUNTER
Patient returned call and was given message to repeat Labs in 6 weeks Tyroid, CBC & Sugar.  Thanks

## 2024-02-27 ENCOUNTER — TELEPHONE (OUTPATIENT)
Dept: HEMATOLOGY ONCOLOGY | Facility: CLINIC | Age: 29
End: 2024-02-27

## 2024-02-27 NOTE — TELEPHONE ENCOUNTER
Appointment Change  Cancel, Reschedule, Change to Virtual      Who are you speaking with? Patient   If it is not the patient, is the caller listed on the communication consent form? N/A   Which provider is the appointment scheduled with? MARTIN Ma   When was the original appointment scheduled?    Please list date and time 2/28/24 @   At which location is the appointment scheduled to take place? [place]   Was the appointment rescheduled?     Was the appointment changed from an in person visit to a virtual visit?    If so, please list the details of the change. 3/25/24 @ 10   What is the reason for the appointment change? Has to work       Was STAR transport scheduled? No   Does STAR transport need to be scheduled for the new visit (if applicable) No   Does the patient need an infusion appointment rescheduled? No   Does the patient have an upcoming infusion appointment scheduled? If so, when? No   Is the patient undergoing chemotherapy? No   For appointments cancelled with less than 24 hours:  Was the no-show policy reviewed? N/A

## 2024-03-25 ENCOUNTER — OFFICE VISIT (OUTPATIENT)
Dept: HEMATOLOGY ONCOLOGY | Facility: CLINIC | Age: 29
End: 2024-03-25
Payer: COMMERCIAL

## 2024-03-25 VITALS
HEART RATE: 72 BPM | TEMPERATURE: 97.7 F | BODY MASS INDEX: 42.77 KG/M2 | OXYGEN SATURATION: 96 % | DIASTOLIC BLOOD PRESSURE: 82 MMHG | HEIGHT: 67 IN | RESPIRATION RATE: 17 BRPM | SYSTOLIC BLOOD PRESSURE: 120 MMHG

## 2024-03-25 DIAGNOSIS — D72.9 ABNORMAL WHITE BLOOD CELL (WBC): ICD-10-CM

## 2024-03-25 DIAGNOSIS — D53.9 NUTRITIONAL ANEMIA: ICD-10-CM

## 2024-03-25 DIAGNOSIS — E03.9 HYPOTHYROIDISM, UNSPECIFIED TYPE: ICD-10-CM

## 2024-03-25 DIAGNOSIS — D89.2 HYPERGAMMAGLOBULINEMIA: ICD-10-CM

## 2024-03-25 DIAGNOSIS — I10 ESSENTIAL HYPERTENSION: ICD-10-CM

## 2024-03-25 DIAGNOSIS — D70.8 OTHER NEUTROPENIA (HCC): Primary | ICD-10-CM

## 2024-03-25 PROCEDURE — 99244 OFF/OP CNSLTJ NEW/EST MOD 40: CPT

## 2024-03-25 NOTE — PROGRESS NOTES
Oncology Outpatient Consult Note  Ben Daniel 29 y.o. male MRN: @ Encounter: [id number]        Date:  3/25/2024        CC: Neutropenia      HPI:  Ben Daniel is seen for initial consultation 3/25/2024 regarding neutropenia.  Patient has PMH significant for HTN, hypothyroidism.    Patient had a positive COVID test on 11/15/2023.  On labs obtained 12/4/2023, he was found to be mildly leukopenic.  Repeat labs were obtained 1/23/2024, and a smear showed 1 large granular lymphocyte with path review which showed normocytic RBCs, 1 normal number and morphology of platelets, reactive granulocytes, monocytes and lymphocytes.  Patient reports that this time he was recovering from a common cold.  A flow cytometry was completed and was negative.  An SPEP and UPEP were completed as well.  SPEP showed hypogammaglobulinemia however, no monoclonal bands were noted.    Patient reports he has frequent upper respiratory infections in the wintertime.  He has had COVID 3 times per patient.  Denies any increased fatigue, fevers, drenching night sweats, unintentional weight loss, decreased appetite.  No abdominal pain, constipation or diarrhea.  No CP, palpitations, SOB.  Patient is not a vegetarian/vegan.  He drinks occasionally, and is a former smoker.  No lymphadenopathy noted on exam.  Patient reports he has rhinorrhea today.    Labs:  1/23/2024: WBC 4.32, 1 large granular lymphocyte on smear otherwise WNL, Hgb 14.5, MCV 81, platelets 249,000, SPEP shows hypergammaglobulinemia and an immunofixation shows no monoclonal bands, no monoclonal bands on UPEP, flow cytometry negative, no renal insufficiency noted    12/4/2023: WBC 4.25, Hgb 14.2, MCV 83, platelets 334,000    ROS: As stated in history of present illness otherwise her 14 point review of systems today was negative.    ECOG PS: 0  Test Results:    Imaging: No results found.    Labs:   Lab Results   Component Value Date    WBC 4.03 (L) 01/30/2024    HGB 15.4 01/30/2024    HCT  45.0 01/30/2024    MCV 82 01/30/2024     01/30/2024     Lab Results   Component Value Date    K 4.0 01/30/2024     01/30/2024    CO2 26 01/30/2024    BUN 15 01/30/2024    CREATININE 0.81 01/30/2024    GLUF 91 01/30/2024    CALCIUM 9.8 01/30/2024    AST 14 01/30/2024    ALT 18 01/30/2024    ALKPHOS 68 01/30/2024    EGFR 120 01/30/2024         Lab Results   Component Value Date    SPEP See Comment 01/30/2024    UPEP See Comment 01/30/2024     Active Problems:   Patient Active Problem List   Diagnosis    Essential hypertension    Fatigue    Cervical strain    Scrotal pain    Acute right-sided low back pain without sciatica    Angiokeratoma of scrotum    Anxiety    Morbid obesity with BMI of 40.0-44.9, adult (HCC)    Palpitations    Eczema    Insomnia    Vitamin D deficiency    Hypothyroidism    Family history of cancer       Past Medical History:   Past Medical History:   Diagnosis Date    Anxiety     Hypertension        Surgical History:   Past Surgical History:   Procedure Laterality Date    ANKLE SURGERY      FETAL SURGERY FOR CONGENITAL HERNIA         Family History:    Family History   Problem Relation Age of Onset    Heart failure Maternal Grandmother     Heart failure Maternal Grandfather        Cancer-related family history is not on file.    Social History:   Social History     Socioeconomic History    Marital status: Single     Spouse name: Not on file    Number of children: Not on file    Years of education: Not on file    Highest education level: Not on file   Occupational History    Not on file   Tobacco Use    Smoking status: Never    Smokeless tobacco: Never   Vaping Use    Vaping status: Never Used   Substance and Sexual Activity    Alcohol use: No    Drug use: No    Sexual activity: Not on file   Other Topics Concern    Not on file   Social History Narrative    Not on file     Social Determinants of Health     Financial Resource Strain: Not on file   Food Insecurity: Not on file    Transportation Needs: Not on file   Physical Activity: Not on file   Stress: Not on file   Social Connections: Not on file   Intimate Partner Violence: Not on file   Housing Stability: Not on file       Current Medications:   Current Outpatient Medications   Medication Sig Dispense Refill    amLODIPine-benazepril (LOTREL) 10-40 MG per capsule Take 1 capsule by mouth daily 90 capsule 1    ergocalciferol (VITAMIN D2) 50,000 units Take 1 capsule (50,000 Units total) by mouth once a week 16 capsule 0    hydrochlorothiazide (HYDRODIURIL) 25 mg tablet Take 1 tablet (25 mg total) by mouth daily 90 tablet 3    levothyroxine (Euthyrox) 50 mcg tablet Take 1 tablet (50 mcg total) by mouth daily 30 tablet 1    propranolol (INDERAL LA) 60 mg 24 hr capsule Take 1 capsule (60 mg total) by mouth daily 90 capsule 0     No current facility-administered medications for this visit.       Allergies: No Known Allergies      Physical Exam:    Body surface area is 2.28 meters squared.    Wt Readings from Last 3 Encounters:   01/11/24 122 kg (269 lb)   12/05/23 122 kg (269 lb)   11/15/23 123 kg (272 lb 3.2 oz)        Temp Readings from Last 3 Encounters:   03/25/24 97.7 °F (36.5 °C)   11/15/23 97.6 °F (36.4 °C)   10/17/23 (!) 96.8 °F (36 °C)        BP Readings from Last 3 Encounters:   03/25/24 120/82   12/05/23 120/78   11/15/23 118/68         Pulse Readings from Last 3 Encounters:   03/25/24 72   12/05/23 78   11/15/23 78     @LASTSAO2(3)@    Physical Exam     Constitutional   General appearance: No acute distress, well appearing and well nourished.    Eyes   Conjunctiva and lids: No swelling, erythema or discharge.    Pupils and irises: Equal, round and reactive to light.    Ears, Nose, Mouth, and Throat   External inspection of ears and nose: Normal.    Nasal mucosa, septum, and turbinates: Normal without edema or erythema.    Oropharynx: Normal with no erythema, edema, exudate or lesions.    Pulmonary   Respiratory effort: No  increased work of breathing or signs of respiratory distress.    Auscultation of lungs: Clear to auscultation.    Cardiovascular   Palpation of heart: Normal PMI, no thrills.    Auscultation of heart: Normal rate and rhythm, normal S1 and S2, without murmurs.    Examination of extremities for edema and/or varicosities: Normal.    Carotid pulses: Normal.    Abdomen   Abdomen: Non-tender, no masses.    Liver and spleen: No hepatomegaly or splenomegaly.    Lymphatic   Palpation of lymph nodes in neck: No lymphadenopathy.    Musculoskeletal   Gait and station: Normal.    Digits and nails: Normal without clubbing or cyanosis.    Inspection/palpation of joints, bones, and muscles: Normal.    Skin   Skin and subcutaneous tissue: Normal without rashes or lesions.    Neurologic   Cranial nerves: Cranial nerves 2-12 intact.    Sensation: No sensory loss.    Psychiatric   Orientation to person, place, and time: Normal.    Mood and affect: Normal.          Assessment/ Plan:  Discussed leukopenia can be caused by an inflammatory process, infections, viruses, nutritional deficiencies, autoimmune disorders or bone marrow disorders/malignancies.  Hypergammaglobulinemia is also seen in infection/inflammation.  We discussed his mild neutropenia likely related to his back-to-back viral illnesses.  We discussed that the flow cytometry was negative, the path review showed reactive monocytosis granulocytosis and lymphocytosis, which was likely due to his viral illnesses.    At this time, we will repeat CBCD, vitamin B12, folate, LDH, MALENA screen and CRP.  Since patient has rhinorrhea today, recommend he obtain this blood work when he is not having any symptoms of upper respiratory infection.  Once I have the results, I will give him a call and review and determine a plan moving forward.  Patient is agreeable with plan.  He is aware to contact us for any additional questions/concerns or worsening symptoms.    I spent 45 minutes in chart  review, face to face counseling, coordination of care, and documentation.

## 2024-03-26 RX ORDER — PROPRANOLOL HCL 60 MG
60 CAPSULE, EXTENDED RELEASE 24HR ORAL DAILY
Qty: 90 CAPSULE | Refills: 0 | Status: SHIPPED | OUTPATIENT
Start: 2024-03-26

## 2024-03-26 RX ORDER — LEVOTHYROXINE SODIUM 0.05 MG/1
50 TABLET ORAL DAILY
Qty: 30 TABLET | Refills: 0 | Status: SHIPPED | OUTPATIENT
Start: 2024-03-26

## 2024-03-30 DIAGNOSIS — E03.9 HYPOTHYROIDISM, UNSPECIFIED TYPE: ICD-10-CM

## 2024-04-01 RX ORDER — LEVOTHYROXINE SODIUM 0.05 MG/1
50 TABLET ORAL DAILY
Qty: 30 TABLET | Refills: 0 | Status: SHIPPED | OUTPATIENT
Start: 2024-04-01

## 2024-05-19 DIAGNOSIS — E03.9 HYPOTHYROIDISM, UNSPECIFIED TYPE: ICD-10-CM

## 2024-05-19 RX ORDER — LEVOTHYROXINE SODIUM 0.05 MG/1
50 TABLET ORAL DAILY
Qty: 30 TABLET | Refills: 0 | Status: SHIPPED | OUTPATIENT
Start: 2024-05-19

## 2024-05-23 DIAGNOSIS — I10 ESSENTIAL HYPERTENSION: ICD-10-CM

## 2024-05-24 RX ORDER — AMLODIPINE AND BENAZEPRIL HYDROCHLORIDE 10; 40 MG/1; MG/1
1 CAPSULE ORAL DAILY
Qty: 90 CAPSULE | Refills: 1 | Status: SHIPPED | OUTPATIENT
Start: 2024-05-24

## 2024-06-18 DIAGNOSIS — E03.9 HYPOTHYROIDISM, UNSPECIFIED TYPE: ICD-10-CM

## 2024-06-18 RX ORDER — LEVOTHYROXINE SODIUM 0.05 MG/1
50 TABLET ORAL DAILY
Qty: 30 TABLET | Refills: 5 | Status: SHIPPED | OUTPATIENT
Start: 2024-06-18

## 2024-07-06 ENCOUNTER — APPOINTMENT (OUTPATIENT)
Dept: CT IMAGING | Facility: HOSPITAL | Age: 29
DRG: 133 | End: 2024-07-06
Payer: COMMERCIAL

## 2024-07-06 ENCOUNTER — HOSPITAL ENCOUNTER (INPATIENT)
Facility: HOSPITAL | Age: 29
LOS: 6 days | Discharge: HOME | DRG: 133 | End: 2024-07-14
Attending: EMERGENCY MEDICINE | Admitting: ANESTHESIOLOGY
Payer: COMMERCIAL

## 2024-07-06 ENCOUNTER — APPOINTMENT (EMERGENCY)
Dept: RADIOLOGY | Facility: HOSPITAL | Age: 29
DRG: 133 | End: 2024-07-06
Payer: COMMERCIAL

## 2024-07-06 DIAGNOSIS — J80 ARDS (ADULT RESPIRATORY DISTRESS SYNDROME) (HCC): ICD-10-CM

## 2024-07-06 DIAGNOSIS — R91.8 LUNG NODULES: ICD-10-CM

## 2024-07-06 DIAGNOSIS — J06.9 URI (UPPER RESPIRATORY INFECTION): ICD-10-CM

## 2024-07-06 DIAGNOSIS — R06.02 SOB (SHORTNESS OF BREATH): ICD-10-CM

## 2024-07-06 DIAGNOSIS — R80.9 PROTEINURIA, UNSPECIFIED TYPE: ICD-10-CM

## 2024-07-06 DIAGNOSIS — N05.8 C3 GLOMERULONEPHRITIS: ICD-10-CM

## 2024-07-06 DIAGNOSIS — N17.9 AKI (ACUTE KIDNEY INJURY) (HCC): Primary | ICD-10-CM

## 2024-07-06 DIAGNOSIS — J96.01 ACUTE RESPIRATORY FAILURE WITH HYPOXIA (HCC): ICD-10-CM

## 2024-07-06 LAB
2HR DELTA HS TROPONIN: <-1 NG/L
4HR DELTA HS TROPONIN: <-1 NG/L
ALBUMIN SERPL BCG-MCNC: 3.4 G/DL (ref 3.5–5)
ALP SERPL-CCNC: 68 U/L (ref 34–104)
ALT SERPL W P-5'-P-CCNC: 22 U/L (ref 7–52)
ANION GAP SERPL CALCULATED.3IONS-SCNC: 7 MMOL/L (ref 4–13)
ANION GAP SERPL CALCULATED.3IONS-SCNC: 7 MMOL/L (ref 4–13)
AST SERPL W P-5'-P-CCNC: 16 U/L (ref 13–39)
BACTERIA UR QL AUTO: ABNORMAL /HPF
BASOPHILS # BLD AUTO: 0.02 THOUSANDS/ÂΜL (ref 0–0.1)
BASOPHILS NFR BLD AUTO: 1 % (ref 0–1)
BILIRUB SERPL-MCNC: 0.5 MG/DL (ref 0.2–1)
BILIRUB UR QL STRIP: NEGATIVE
BNP SERPL-MCNC: 173 PG/ML (ref 0–100)
BUN SERPL-MCNC: 44 MG/DL (ref 5–25)
BUN SERPL-MCNC: 48 MG/DL (ref 5–25)
CALCIUM ALBUM COR SERPL-MCNC: 9.3 MG/DL (ref 8.3–10.1)
CALCIUM SERPL-MCNC: 8.8 MG/DL (ref 8.4–10.2)
CALCIUM SERPL-MCNC: 8.9 MG/DL (ref 8.4–10.2)
CARDIAC TROPONIN I PNL SERPL HS: 3 NG/L
CARDIAC TROPONIN I PNL SERPL HS: <2 NG/L
CARDIAC TROPONIN I PNL SERPL HS: <2 NG/L
CHLORIDE SERPL-SCNC: 107 MMOL/L (ref 96–108)
CHLORIDE SERPL-SCNC: 108 MMOL/L (ref 96–108)
CLARITY UR: CLEAR
CO2 SERPL-SCNC: 23 MMOL/L (ref 21–32)
CO2 SERPL-SCNC: 24 MMOL/L (ref 21–32)
COLOR UR: ABNORMAL
CREAT SERPL-MCNC: 1.67 MG/DL (ref 0.6–1.3)
CREAT SERPL-MCNC: 1.76 MG/DL (ref 0.6–1.3)
EOSINOPHIL # BLD AUTO: 0.11 THOUSAND/ÂΜL (ref 0–0.61)
EOSINOPHIL NFR BLD AUTO: 3 % (ref 0–6)
ERYTHROCYTE [DISTWIDTH] IN BLOOD BY AUTOMATED COUNT: 11.9 % (ref 11.6–15.1)
FLUAV RNA RESP QL NAA+PROBE: NEGATIVE
FLUBV RNA RESP QL NAA+PROBE: NEGATIVE
GFR SERPL CREATININE-BSD FRML MDRD: 51 ML/MIN/1.73SQ M
GFR SERPL CREATININE-BSD FRML MDRD: 54 ML/MIN/1.73SQ M
GLUCOSE SERPL-MCNC: 104 MG/DL (ref 65–140)
GLUCOSE SERPL-MCNC: 94 MG/DL (ref 65–140)
GLUCOSE UR STRIP-MCNC: NEGATIVE MG/DL
HCT VFR BLD AUTO: 36.1 % (ref 36.5–49.3)
HGB BLD-MCNC: 12.2 G/DL (ref 12–17)
HGB UR QL STRIP.AUTO: ABNORMAL
HYALINE CASTS #/AREA URNS LPF: ABNORMAL /LPF
IMM GRANULOCYTES # BLD AUTO: 0.01 THOUSAND/UL (ref 0–0.2)
IMM GRANULOCYTES NFR BLD AUTO: 0 % (ref 0–2)
KETONES UR STRIP-MCNC: NEGATIVE MG/DL
LEUKOCYTE ESTERASE UR QL STRIP: NEGATIVE
LYMPHOCYTES # BLD AUTO: 0.99 THOUSANDS/ÂΜL (ref 0.6–4.47)
LYMPHOCYTES NFR BLD AUTO: 24 % (ref 14–44)
MCH RBC QN AUTO: 27.4 PG (ref 26.8–34.3)
MCHC RBC AUTO-ENTMCNC: 33.8 G/DL (ref 31.4–37.4)
MCV RBC AUTO: 81 FL (ref 82–98)
MONOCYTES # BLD AUTO: 0.7 THOUSAND/ÂΜL (ref 0.17–1.22)
MONOCYTES NFR BLD AUTO: 17 % (ref 4–12)
NEUTROPHILS # BLD AUTO: 2.22 THOUSANDS/ÂΜL (ref 1.85–7.62)
NEUTS SEG NFR BLD AUTO: 55 % (ref 43–75)
NITRITE UR QL STRIP: NEGATIVE
NON-SQ EPI CELLS URNS QL MICRO: ABNORMAL /HPF
NRBC BLD AUTO-RTO: 0 /100 WBCS
PH UR STRIP.AUTO: 6 [PH]
PLATELET # BLD AUTO: 188 THOUSANDS/UL (ref 149–390)
PLATELET # BLD AUTO: 239 THOUSANDS/UL (ref 149–390)
PMV BLD AUTO: 8.8 FL (ref 8.9–12.7)
PMV BLD AUTO: 8.8 FL (ref 8.9–12.7)
POTASSIUM SERPL-SCNC: 4.6 MMOL/L (ref 3.5–5.3)
POTASSIUM SERPL-SCNC: 5.2 MMOL/L (ref 3.5–5.3)
PROT SERPL-MCNC: 7.7 G/DL (ref 6.4–8.4)
PROT UR STRIP-MCNC: ABNORMAL MG/DL
RBC # BLD AUTO: 4.46 MILLION/UL (ref 3.88–5.62)
RBC #/AREA URNS AUTO: ABNORMAL /HPF
RSV RNA RESP QL NAA+PROBE: NEGATIVE
SARS-COV-2 RNA RESP QL NAA+PROBE: NEGATIVE
SODIUM SERPL-SCNC: 137 MMOL/L (ref 135–147)
SODIUM SERPL-SCNC: 139 MMOL/L (ref 135–147)
SP GR UR STRIP.AUTO: 1.02 (ref 1–1.03)
UROBILINOGEN UR STRIP-ACNC: <2 MG/DL
WBC # BLD AUTO: 4.05 THOUSAND/UL (ref 4.31–10.16)
WBC #/AREA URNS AUTO: ABNORMAL /HPF

## 2024-07-06 PROCEDURE — 85025 COMPLETE CBC W/AUTO DIFF WBC: CPT

## 2024-07-06 PROCEDURE — 99285 EMERGENCY DEPT VISIT HI MDM: CPT

## 2024-07-06 PROCEDURE — 80048 BASIC METABOLIC PNL TOTAL CA: CPT | Performed by: STUDENT IN AN ORGANIZED HEALTH CARE EDUCATION/TRAINING PROGRAM

## 2024-07-06 PROCEDURE — 36415 COLL VENOUS BLD VENIPUNCTURE: CPT

## 2024-07-06 PROCEDURE — 71046 X-RAY EXAM CHEST 2 VIEWS: CPT

## 2024-07-06 PROCEDURE — 0241U HB NFCT DS VIR RESP RNA 4 TRGT: CPT | Performed by: EMERGENCY MEDICINE

## 2024-07-06 PROCEDURE — 81001 URINALYSIS AUTO W/SCOPE: CPT | Performed by: STUDENT IN AN ORGANIZED HEALTH CARE EDUCATION/TRAINING PROGRAM

## 2024-07-06 PROCEDURE — 94640 AIRWAY INHALATION TREATMENT: CPT

## 2024-07-06 PROCEDURE — 93005 ELECTROCARDIOGRAM TRACING: CPT

## 2024-07-06 PROCEDURE — 71250 CT THORAX DX C-: CPT

## 2024-07-06 PROCEDURE — 99223 1ST HOSP IP/OBS HIGH 75: CPT | Performed by: STUDENT IN AN ORGANIZED HEALTH CARE EDUCATION/TRAINING PROGRAM

## 2024-07-06 PROCEDURE — 82570 ASSAY OF URINE CREATININE: CPT | Performed by: STUDENT IN AN ORGANIZED HEALTH CARE EDUCATION/TRAINING PROGRAM

## 2024-07-06 PROCEDURE — 82550 ASSAY OF CK (CPK): CPT | Performed by: STUDENT IN AN ORGANIZED HEALTH CARE EDUCATION/TRAINING PROGRAM

## 2024-07-06 PROCEDURE — 85049 AUTOMATED PLATELET COUNT: CPT | Performed by: STUDENT IN AN ORGANIZED HEALTH CARE EDUCATION/TRAINING PROGRAM

## 2024-07-06 PROCEDURE — 84484 ASSAY OF TROPONIN QUANT: CPT

## 2024-07-06 PROCEDURE — 83880 ASSAY OF NATRIURETIC PEPTIDE: CPT

## 2024-07-06 PROCEDURE — 80053 COMPREHEN METABOLIC PANEL: CPT

## 2024-07-06 PROCEDURE — 84156 ASSAY OF PROTEIN URINE: CPT | Performed by: STUDENT IN AN ORGANIZED HEALTH CARE EDUCATION/TRAINING PROGRAM

## 2024-07-06 RX ORDER — IPRATROPIUM BROMIDE AND ALBUTEROL SULFATE 2.5; .5 MG/3ML; MG/3ML
3 SOLUTION RESPIRATORY (INHALATION) ONCE
Status: COMPLETED | OUTPATIENT
Start: 2024-07-06 | End: 2024-07-06

## 2024-07-06 RX ORDER — PROPRANOLOL HCL 60 MG
60 CAPSULE, EXTENDED RELEASE 24HR ORAL DAILY
Status: DISCONTINUED | OUTPATIENT
Start: 2024-07-07 | End: 2024-07-10

## 2024-07-06 RX ORDER — AMLODIPINE BESYLATE 10 MG/1
10 TABLET ORAL DAILY
Status: DISCONTINUED | OUTPATIENT
Start: 2024-07-07 | End: 2024-07-10

## 2024-07-06 RX ORDER — LEVOTHYROXINE SODIUM 0.05 MG/1
50 TABLET ORAL DAILY
Status: DISCONTINUED | OUTPATIENT
Start: 2024-07-06 | End: 2024-07-14 | Stop reason: HOSPADM

## 2024-07-06 RX ORDER — HEPARIN SODIUM 5000 [USP'U]/ML
7500 INJECTION, SOLUTION INTRAVENOUS; SUBCUTANEOUS EVERY 8 HOURS SCHEDULED
Status: DISCONTINUED | OUTPATIENT
Start: 2024-07-06 | End: 2024-07-14 | Stop reason: HOSPADM

## 2024-07-06 RX ADMIN — LEVOTHYROXINE SODIUM 50 MCG: 0.05 TABLET ORAL at 13:48

## 2024-07-06 RX ADMIN — SODIUM CHLORIDE 500 ML: 0.9 INJECTION, SOLUTION INTRAVENOUS at 13:48

## 2024-07-06 RX ADMIN — IPRATROPIUM BROMIDE AND ALBUTEROL SULFATE 3 ML: 2.5; .5 SOLUTION RESPIRATORY (INHALATION) at 11:16

## 2024-07-06 RX ADMIN — HEPARIN SODIUM 7500 UNITS: 5000 INJECTION INTRAVENOUS; SUBCUTANEOUS at 13:48

## 2024-07-06 RX ADMIN — HEPARIN SODIUM 7500 UNITS: 5000 INJECTION INTRAVENOUS; SUBCUTANEOUS at 21:32

## 2024-07-06 NOTE — ED PROVIDER NOTES
History  Chief Complaint   Patient presents with    Shortness of Breath     Pt c/o SOB x 2 days, chest pressure began this morning     The patient is a 29 y.o. male with a history of anxiety and hypertension who presents to [facility] with a chief complaint of shortness of breath. Symptoms began 2 days ago and have been constant since onset. He currently reports no pain but endorses some chest tightness. Symptoms are aggravated with exertion and alleviating factors include none noted. The patient denies fever, chills, night sweats, chest pain, palpitations, hemoptysis, hematemesis, dysuria, frequency, urgency, hematuria, nausea, vomiting, dizziness, lightheadedness, syncope, falls, trauma. No other reported symptoms at this time.  Patient denies allergies to anything          History provided by:  Patient   used: No    Shortness of Breath  Associated symptoms: no abdominal pain, no chest pain, no cough, no ear pain, no fever, no rash, no sore throat and no vomiting        Prior to Admission Medications   Prescriptions Last Dose Informant Patient Reported? Taking?   amLODIPine-benazepril (LOTREL) 10-40 MG per capsule   No No   Sig: Take 1 capsule by mouth once daily   ergocalciferol (VITAMIN D2) 50,000 units Not Taking  No No   Sig: Take 1 capsule (50,000 Units total) by mouth once a week   Patient not taking: Reported on 7/6/2024   hydrochlorothiazide (HYDRODIURIL) 25 mg tablet   No No   Sig: Take 1 tablet (25 mg total) by mouth daily   levothyroxine 50 mcg tablet   No No   Sig: Take 1 tablet by mouth once daily   propranolol (INDERAL LA) 60 mg 24 hr capsule   No No   Sig: Take 1 capsule by mouth once daily      Facility-Administered Medications: None       Past Medical History:   Diagnosis Date    Anxiety     Hypertension        Past Surgical History:   Procedure Laterality Date    ANKLE SURGERY      FETAL SURGERY FOR CONGENITAL HERNIA         Family History   Problem Relation  Age of Onset    Heart failure Maternal Grandmother     Heart failure Maternal Grandfather      I have reviewed and agree with the history as documented.    E-Cigarette/Vaping    E-Cigarette Use Never User      E-Cigarette/Vaping Substances     Social History     Tobacco Use    Smoking status: Never    Smokeless tobacco: Never   Vaping Use    Vaping status: Never Used   Substance Use Topics    Alcohol use: Never     Alcohol/week: 1.0 standard drink of alcohol     Types: 1 Cans of beer per week    Drug use: Never       Review of Systems   Constitutional:  Negative for chills and fever.   HENT:  Negative for ear pain and sore throat.    Eyes:  Negative for pain and visual disturbance.   Respiratory:  Positive for shortness of breath. Negative for cough.    Cardiovascular:  Negative for chest pain and palpitations.   Gastrointestinal:  Negative for abdominal pain and vomiting.   Genitourinary:  Negative for dysuria and hematuria.   Musculoskeletal:  Negative for arthralgias and back pain.   Skin:  Negative for color change and rash.   Neurological:  Negative for seizures and syncope.   All other systems reviewed and are negative.      Physical Exam  Physical Exam  Vitals reviewed.   Constitutional:       Appearance: He is well-developed.   HENT:      Head: Normocephalic.      Mouth/Throat:      Mouth: Mucous membranes are moist.   Eyes:      Extraocular Movements: Extraocular movements intact.      Pupils: Pupils are equal, round, and reactive to light.   Cardiovascular:      Rate and Rhythm: Normal rate.   Pulmonary:      Effort: Pulmonary effort is normal. No tachypnea, bradypnea, accessory muscle usage or respiratory distress.      Breath sounds: Normal breath sounds. No stridor. No decreased breath sounds, wheezing, rhonchi or rales.   Musculoskeletal:         General: Normal range of motion.      Cervical back: Normal range of motion.      Right lower leg: No tenderness. No edema.      Left lower leg: No  tenderness. No edema.   Skin:     General: Skin is warm and dry.      Capillary Refill: Capillary refill takes less than 2 seconds.      Coloration: Skin is not cyanotic.      Findings: No ecchymosis or rash.   Neurological:      General: No focal deficit present.      Mental Status: He is alert and oriented to person, place, and time.         Vital Signs  ED Triage Vitals   Temperature Pulse Respirations Blood Pressure SpO2   07/06/24 1003 07/06/24 1003 07/06/24 1003 07/06/24 1003 07/06/24 1003   98.6 °F (37 °C) 84 18 157/92 100 %      Temp Source Heart Rate Source Patient Position - Orthostatic VS BP Location FiO2 (%)   07/06/24 1003 07/06/24 1003 07/06/24 1245 07/06/24 1245 --   Oral Monitor Lying Left arm       Pain Score       07/06/24 1553       No Pain           Vitals:    07/06/24 1300 07/06/24 1400 07/06/24 1553 07/06/24 1553   BP: 131/74 146/76 136/87 136/87   Pulse: 80 74 74 78   Patient Position - Orthostatic VS: Lying Lying           Visual Acuity      ED Medications  Medications   heparin (porcine) subcutaneous injection 7,500 Units (7,500 Units Subcutaneous Given 7/6/24 1348)   levothyroxine tablet 50 mcg (50 mcg Oral Given 7/6/24 1348)   propranolol (INDERAL LA) 24 hr capsule 60 mg (has no administration in time range)   amLODIPine (NORVASC) tablet 10 mg (has no administration in time range)   ipratropium-albuterol (DUO-NEB) 0.5-2.5 mg/3 mL inhalation solution 3 mL (3 mL Nebulization Given 7/6/24 1116)   sodium chloride 0.9 % bolus 500 mL (0 mL Intravenous Stopped 7/6/24 1527)       Diagnostic Studies  Results Reviewed       Procedure Component Value Units Date/Time    Basic metabolic panel [[id number]]     Lab Status: No result Specimen: Blood     HS Troponin I 4hr [[id number]]  (Normal) Collected: 07/06/24 1527    Lab Status: Final result Specimen: Blood from Arm, Right Updated: 07/06/24 1559     hs TnI 4hr <2 ng/L      Delta 4hr hsTnI <-1 ng/L     HS Troponin I 2hr [[id number]]  (Normal) Collected:  07/06/24 1408    Lab Status: Final result Specimen: Blood from Arm, Right Updated: 07/06/24 1441     hs TnI 2hr <2 ng/L      Delta 2hr hsTnI <-1 ng/L     Urinalysis with microscopic [[id number]]  (Abnormal) Collected: 07/06/24 1408    Lab Status: Final result Specimen: Urine, Clean Catch Updated: 07/06/24 1428     Color, UA Light Yellow     Clarity, UA Clear     Specific Gravity, UA 1.018     pH, UA 6.0     Leukocytes, UA Negative     Nitrite, UA Negative     Protein,  (3+) mg/dl      Glucose, UA Negative mg/dl      Ketones, UA Negative mg/dl      Urobilinogen, UA <2.0 mg/dl      Bilirubin, UA Negative     Occult Blood, UA Moderate     RBC, UA 10-20 /hpf      WBC, UA 4-10 /hpf      Epithelial Cells Occasional /hpf      Bacteria, UA None Seen /hpf      Hyaline Casts, UA 5-10 /lpf     Platelet count [[id number]]  (Abnormal) Collected: 07/06/24 1408    Lab Status: Final result Specimen: Blood from Arm, Right Updated: 07/06/24 1419     Platelets 188 Thousands/uL      MPV 8.8 fL     HS Troponin 0hr (reflex protocol) [[id number]]  (Normal) Collected: 07/06/24 1115    Lab Status: Final result Specimen: Blood from Arm, Right Updated: 07/06/24 1150     hs TnI 0hr 3 ng/L     B-Type Natriuretic Peptide(BNP) [[id number]]  (Abnormal) Collected: 07/06/24 1115    Lab Status: Final result Specimen: Blood from Arm, Right Updated: 07/06/24 1149      pg/mL     Comprehensive metabolic panel [[id number]]  (Abnormal) Collected: 07/06/24 1115    Lab Status: Final result Specimen: Blood from Arm, Right Updated: 07/06/24 1140     Sodium 137 mmol/L      Potassium 5.2 mmol/L      Chloride 107 mmol/L      CO2 23 mmol/L      ANION GAP 7 mmol/L      BUN 48 mg/dL      Creatinine 1.76 mg/dL      Glucose 94 mg/dL      Calcium 8.8 mg/dL      Corrected Calcium 9.3 mg/dL      AST 16 U/L      ALT 22 U/L      Alkaline Phosphatase 68 U/L      Total Protein 7.7 g/dL      Albumin 3.4 g/dL      Total Bilirubin 0.50 mg/dL      eGFR 51 ml/min/1.73sq m      Narrative:      National Kidney Disease Foundation guidelines for Chronic Kidney Disease (CKD):     Stage 1 with normal or high GFR (GFR > 90 mL/min/1.73 square meters)    Stage 2 Mild CKD (GFR = 60-89 mL/min/1.73 square meters)    Stage 3A Moderate CKD (GFR = 45-59 mL/min/1.73 square meters)    Stage 3B Moderate CKD (GFR = 30-44 mL/min/1.73 square meters)    Stage 4 Severe CKD (GFR = 15-29 mL/min/1.73 square meters)    Stage 5 End Stage CKD (GFR <15 mL/min/1.73 square meters)  Note: GFR calculation is accurate only with a steady state creatinine    CBC and differential [[id number]]  (Abnormal) Collected: 07/06/24 1115    Lab Status: Final result Specimen: Blood from Arm, Right Updated: 07/06/24 1126     WBC 4.05 Thousand/uL      RBC 4.46 Million/uL      Hemoglobin 12.2 g/dL      Hematocrit 36.1 %      MCV 81 fL      MCH 27.4 pg      MCHC 33.8 g/dL      RDW 11.9 %      MPV 8.8 fL      Platelets 239 Thousands/uL      nRBC 0 /100 WBCs      Segmented % 55 %      Immature Grans % 0 %      Lymphocytes % 24 %      Monocytes % 17 %      Eosinophils Relative 3 %      Basophils Relative 1 %      Absolute Neutrophils 2.22 Thousands/µL      Absolute Immature Grans 0.01 Thousand/uL      Absolute Lymphocytes 0.99 Thousands/µL      Absolute Monocytes 0.70 Thousand/µL      Eosinophils Absolute 0.11 Thousand/µL      Basophils Absolute 0.02 Thousands/µL     FLU/RSV/COVID - if FLU/RSV clinically relevant [[id number]]  (Normal) Collected: 07/06/24 1009    Lab Status: Final result Specimen: Nares from Nose Updated: 07/06/24 1111     SARS-CoV-2 Negative     INFLUENZA A PCR Negative     INFLUENZA B PCR Negative     RSV PCR Negative    Narrative:      FOR PEDIATRIC PATIENTS - copy/paste COVID Guidelines URL to browser: [email or web address]    SARS-CoV-2 assay is a Nucleic Acid Amplification assay intended for the  qualitative detection of nucleic acid from SARS-CoV-2 in  nasopharyngeal  swabs. Results are for the presumptive identification of SARS-CoV-2 RNA.    Positive results are indicative of infection with SARS-CoV-2, the virus  causing COVID-19, but do not rule out bacterial infection or co-infection  with other viruses. Laboratories within the [place] and its  territories are required to report all positive results to the appropriate  public health authorities. Negative results do not preclude SARS-CoV-2  infection and should not be used as the sole basis for treatment or other  patient management decisions. Negative results must be combined with  clinical observations, patient history, and epidemiological information.  This test has not been FDA cleared or approved.    This test has been authorized by FDA under an Emergency Use Authorization  (EUA). This test is only authorized for the duration of time the  declaration that circumstances exist justifying the authorization of the  emergency use of an in vitro diagnostic tests for detection of SARS-CoV-2  virus and/or diagnosis of COVID-19 infection under section 564(b)(1) of  the Act, 21 U.S.C. 360bbb-3(b)(1), unless the authorization is terminated  or revoked sooner. The test has been validated but independent review by FDA  and CLIA is pending.    Test performed using [employer] GeneXpert: This RT-PCR assay targets N2,  a region unique to SARS-CoV-2. A conserved region in the E-gene was chosen  for pan-Sarbecovirus detection which includes SARS-CoV-2.    According to CMS-2020-01-R, this platform meets the definition of high-throughput technology.                   CT chest wo contrast   Final Result by Lopez Shelton MD (07/06 7582)      Mild pulmonary edema and trace bilateral pleural effusions.      Numerous 1 to 5 mm solid nodules in a similar distribution to the pulmonary edema, which can rarely be seen as a manifestation of pulmonary edema. Findings could also be infectious/inflammatory. Metastatic disease is  unlikely given the patient's age and    lack of known malignancy. Follow-up chest CT in 3 months is recommended.      The study was marked in EPIC for immediate notification.               Workstation performed: [id number]         XR chest 2 views   Final Result by Betsy Clarke MD (07/06 1115)      Low lung volumes producing vascular crowding with no acute disease.            Workstation performed: [id number]                    Procedures  Procedures         ED Course  ED Course as of 07/06/24 1752   Sat Jul 06, 2024   1118 XR chest 2 views  Low lung volumes producing vascular crowding with no acute disease.   1151 hs TnI 0hr: 3             HEART Risk Score      Flowsheet Row Most Recent Value   Heart Score Risk Calculator    History 0 Filed at: 07/06/2024 1752   ECG 1 Filed at: 07/06/2024 1752   Age 0 Filed at: 07/06/2024 1752   Risk Factors 1 Filed at: 07/06/2024 1752   Troponin 0 Filed at: 07/06/2024 1752   HEART Score 2 Filed at: 07/06/2024 1752                          SBIRT 22yo+      Flowsheet Row Most Recent Value   Initial Alcohol Screen: US AUDIT-C     1. How often do you have a drink containing alcohol? 0 Filed at: 07/06/2024 1103   2. How many drinks containing alcohol do you have on a typical day you are drinking?  0 Filed at: 07/06/2024 1103   3a. Male UNDER 65: How often do you have five or more drinks on one occasion? 0 Filed at: 07/06/2024 1103   Audit-C Score 0 Filed at: 07/06/2024 1103   JAD: How many times in the past year have you...    Used an illegal drug or used a prescription medication for non-medical reasons? Never Filed at: 07/06/2024 1103                      Medical Decision Making  The patient is a 29 y.o. male with a history of anxiety and hypertension who presents to [facility] with a chief complaint of shortness of breath.   Patient presents with a week of shortness of breath.  Patient calls a viral illness 2 weeks ago.  No crackles or lower extremity edema  or other signs of volume overload on exam.  Patient creatinine up from 0.8 to 1.76.  Could be due to diuresis with decreased fluid intake as well as being on an ACE inhibitor.  BNP slightly elevated.  Chest x-ray shows no acute cardiopulmonary disease.  Discussed with the patient and recommended that he stay in the hospital for further evaluation and treatment.  Patient understands and agrees treatment plan.  Discussed with son and will knobs for further evaluation and treatment of his ROSI, as well as medication adjustments.    Problems Addressed:  ROSI (acute kidney injury) (HCC): acute illness or injury  SOB (shortness of breath): acute illness or injury    Amount and/or Complexity of Data Reviewed  Labs: ordered. Decision-making details documented in ED Course.  Radiology: ordered. Decision-making details documented in ED Course.    Risk  Prescription drug management.  Decision regarding hospitalization.             Disposition  Final diagnoses:   ROSI (acute kidney injury) (HCC)   SOB (shortness of breath)     Time reflects when diagnosis was documented in both MDM as applicable and the Disposition within this note       Time User Action Codes Description Comment    7/6/2024  1:23 PM Cam Whitaker Add [N17.9] ROSI (acute kidney injury) (HCC)     7/6/2024  1:23 PM Cam Whitaker Add [R06.02] SOB (shortness of breath)           ED Disposition       ED Disposition   Admit    Condition   Stable    Date/Time   Sat Jul 6, 2024 1324    Comment   Case was discussed with JOSSUE to the service of Dr. Kline .               Follow-up Information    None         Current Discharge Medication List        CONTINUE these medications which have NOT CHANGED    Details   amLODIPine-benazepril (LOTREL) 10-40 MG per capsule Take 1 capsule by mouth once daily  Qty: 90 capsule, Refills: 1    Associated Diagnoses: Essential hypertension      ergocalciferol (VITAMIN D2) 50,000 units Take 1 capsule (50,000 Units total) by mouth once a  week  Qty: 16 capsule, Refills: 0    Associated Diagnoses: Vitamin D deficiency      hydrochlorothiazide (HYDRODIURIL) 25 mg tablet Take 1 tablet (25 mg total) by mouth daily  Qty: 90 tablet, Refills: 3    Associated Diagnoses: Hypertension, uncontrolled      levothyroxine 50 mcg tablet Take 1 tablet by mouth once daily  Qty: 30 tablet, Refills: 5    Associated Diagnoses: Hypothyroidism, unspecified type      propranolol (INDERAL LA) 60 mg 24 hr capsule Take 1 capsule by mouth once daily  Qty: 90 capsule, Refills: 0    Associated Diagnoses: Essential hypertension             No discharge procedures on file.    PDMP Review       None            ED Provider  Electronically Signed by             Cam Whitaker PA-C  07/06/24 9690

## 2024-07-06 NOTE — ASSESSMENT & PLAN NOTE
Patient has not been compliant to levothyroxine  Counseling has been done at length  Restart levothyroxine 50 mcg

## 2024-07-06 NOTE — ASSESSMENT & PLAN NOTE
Baseline creatinine is 0.8  Current is 1.76  During my encounter, patient is at room air with respiratory rate around 15 to 20/min without any cardiorespiratory distress  He reported he had flulike illness 2 weeks ago took Tylenol.  Has not been doing much until 3 days ago when he started having some shortness of breath on climbing up stairs  On examination, no signs of fluid overload no JVD, and inspiratory crackles and pedal edema  Chest x-ray looks similar to prior when  BNP elevated in the setting of ROSI  Possibly patient had ROSI due to decreased oral intake of fluids compounded by concomitant ankle hydrochlorothiazide and ACE inhibitor's  Will get CT chest without contrast to look for any signs of fluid overload  If CT negative will give bolus of normal saline followed by maintenance fluids  Avoid nephrotoxic medications, I's and O's, daily weight

## 2024-07-06 NOTE — PLAN OF CARE
Problem: SAFETY ADULT  Goal: Patient will remain free of falls  Description: INTERVENTIONS:  - Educate patient/family on patient safety including physical limitations  - Instruct patient to call for assistance with activity   - Consult OT/PT to assist with strengthening/mobility   - Keep Call bell within reach  - Keep bed low and locked with side rails adjusted as appropriate  - Keep care items and personal belongings within reach  - Initiate and maintain comfort rounds  - Make Fall Risk Sign visible to staff  - Offer Toileting every 2 Hours, in advance of need  - Obtain necessary fall risk management equipment  - Apply yellow socks and bracelet for high fall risk patients  - Consider moving patient to room near nurses station  Outcome: Progressing

## 2024-07-06 NOTE — H&P
Formerly [facility]  H&P  Name: Ben Daniel 29 y.o. male I MRN: [id number]  Unit/Bed#: ED 15 I Date of Admission: 7/6/2024   Date of Service: 7/6/2024 I Hospital Day: 0      Assessment & Plan   ROSI (acute kidney injury) (HCC)  Assessment & Plan  Baseline creatinine is 0.8  Current is 1.76  During my encounter, patient is at room air with respiratory rate around 15 to 20/min without any cardiorespiratory distress  He reported he had flulike illness 2 weeks ago took Tylenol.  Has not been doing much until 3 days ago when he started having some shortness of breath on climbing up stairs  On examination, no signs of fluid overload no JVD, and inspiratory crackles and pedal edema  Chest x-ray looks similar to prior when  BNP elevated in the setting of ROSI  Possibly patient had ROSI due to decreased oral intake of fluids compounded by concomitant ankle hydrochlorothiazide and ACE inhibitor's  Will get CT chest without contrast to look for any signs of fluid overload  If CT negative will give bolus of normal saline followed by maintenance fluids  Avoid nephrotoxic medications, I's and O's, daily weight    Hypothyroidism  Assessment & Plan  Patient has not been compliant to levothyroxine  Counseling has been done at length  Restart levothyroxine 50 mcg    Palpitations  Assessment & Plan  Continue with Inderal    Essential hypertension  Assessment & Plan  Continue with amlodipine, Inderal hold hydrochlorothiazide and lisinopril           VTE Pharmacologic Prophylaxis:   Moderate Risk (Score 3-4) - Pharmacological DVT Prophylaxis Ordered: heparin.  Code Status: Level 1 - Full Code   Discussion with family: Updated  (friend) at bedside.    Anticipated Length of Stay: Patient will be admitted on an observation basis with an anticipated length of stay of less than 2 midnights secondary to ROSI.    Total Time Spent on Date of Encounter in care of patient: 83 mins. This time was spent on one or more of  the following: performing physical exam; counseling and coordination of care; obtaining or reviewing history; documenting in the medical record; reviewing/ordering tests, medications or procedures; communicating with other healthcare professionals and discussing with patient's family/caregivers.    Chief Complaint: Shortness of breath on exertion for 3 days    History of Present Illness:  Ben Daniel is a 29 y.o. male with a PMH of hypothyroidism, obesity, hypertension who presents with shortness of breath going on for 3 days.  He reported he had flulike illness 2 weeks ago he took Tylenol.  He has not been doing much exertion until 3 days ago when he got short of breath on climbing up stairs he denied any chest pain.  He has been taking all of his medication.  He denied complaints of nausea vomiting and diarrhea    Review of Systems:  Review of Systems all negative examination above    Past Medical and Surgical History:   Past Medical History:   Diagnosis Date    Anxiety     Hypertension        Past Surgical History:   Procedure Laterality Date    ANKLE SURGERY      FETAL SURGERY FOR CONGENITAL HERNIA         Meds/Allergies:  Prior to Admission medications    Medication Sig Start Date End Date Taking? Authorizing Provider   amLODIPine-benazepril (LOTREL) 10-40 MG per capsule Take 1 capsule by mouth once daily 5/24/24   MARTIN Shearer   ergocalciferol (VITAMIN D2) 50,000 units Take 1 capsule (50,000 Units total) by mouth once a week 12/5/23   MARTIN Shearer   hydrochlorothiazide (HYDRODIURIL) 25 mg tablet Take 1 tablet (25 mg total) by mouth daily 12/5/23   MARTIN Shearer   levothyroxine 50 mcg tablet Take 1 tablet by mouth once daily 6/18/24   MARTIN Shearer   propranolol (INDERAL LA) 60 mg 24 hr capsule Take 1 capsule by mouth once daily 3/26/24   MARTIN Shearer     I have reviewed home medications with patient personally.    Allergies: No Known Allergies    Social  History:  Marital Status: Single   Patient Pre-hospital Level of Mobility: walks  Patient Pre-hospital Diet Restrictions: none  Substance Use History:   Social History     Substance and Sexual Activity   Alcohol Use No     Social History     Tobacco Use   Smoking Status Never   Smokeless Tobacco Never     Social History     Substance and Sexual Activity   Drug Use No       Family History:  Family History   Problem Relation Age of Onset    Heart failure Maternal Grandmother     Heart failure Maternal Grandfather        Physical Exam:     Vitals:   Blood Pressure: 131/74 (07/06/24 1300)  Pulse: 80 (07/06/24 1300)  Temperature: 98.6 °F (37 °C) (07/06/24 1003)  Temp Source: Oral (07/06/24 1003)  Respirations: 18 (07/06/24 1300)  SpO2: 99 % (07/06/24 1300)    Constitutional: No acute distress  HEENT: Pallor or icterus negative  CVS: S1 plus S2  Respiratory: Normal vascular breathe without crackles and wheeze  Gastroenterology: Soft nontender without any palpable mass  Skin: No bruises or ecchymosis  Neurology: No focal logical deficit      Additional Data:     Lab Results:  Results from last 7 days   Lab Units 07/06/24  1115   WBC Thousand/uL 4.05*   HEMOGLOBIN g/dL 12.2   HEMATOCRIT % 36.1*   PLATELETS Thousands/uL 239   SEGS PCT % 55   LYMPHO PCT % 24   MONO PCT % 17*   EOS PCT % 3     Results from last 7 days   Lab Units 07/06/24  1115   SODIUM mmol/L 137   POTASSIUM mmol/L 5.2   CHLORIDE mmol/L 107   CO2 mmol/L 23   BUN mg/dL 48*   CREATININE mg/dL 1.76*   ANION GAP mmol/L 7   CALCIUM mg/dL 8.8   ALBUMIN g/dL 3.4*   TOTAL BILIRUBIN mg/dL 0.50   ALK PHOS U/L 68   ALT U/L 22   AST U/L 16   GLUCOSE RANDOM mg/dL 94                       Lines/Drains:  Invasive Devices       None                       Imaging: Reviewed radiology reports from this admission including: chest xray  XR chest 2 views   Final Result by Betsy Clarke MD (07/06 1115)      Low lung volumes producing vascular crowding with no acute disease.             Workstation performed: [id number]         CT chest wo contrast    (Results Pending)       EKG and Other Studies Reviewed on Admission:   EKG: NSR. HR 81/min with right axis deviation which was present on the prior ones.    ** Please Note: This note has been constructed using a voice recognition system. **

## 2024-07-06 NOTE — PLAN OF CARE
Problem: PAIN - ADULT  Goal: Verbalizes/displays adequate comfort level or baseline comfort level  Description: Interventions:  - Encourage patient to monitor pain and request assistance  - Assess pain using appropriate pain scale  - Administer analgesics based on type and severity of pain and evaluate response  - Implement non-pharmacological measures as appropriate and evaluate response  - Consider cultural and social influences on pain and pain management  - Notify physician/advanced practitioner if interventions unsuccessful or patient reports new pain  Outcome: Progressing     Problem: INFECTION - ADULT  Goal: Absence or prevention of progression during hospitalization  Description: INTERVENTIONS:  - Assess and monitor for signs and symptoms of infection  - Monitor lab/diagnostic results  - Monitor all insertion sites, i.e. indwelling lines, tubes, and drains  - Monitor endotracheal if appropriate and nasal secretions for changes in amount and color  - [place] appropriate cooling/warming therapies per order  - Administer medications as ordered  - Instruct and encourage patient and family to use good hand hygiene technique  - Identify and instruct in appropriate isolation precautions for identified infection/condition  Outcome: Progressing  Goal: Absence of fever/infection during neutropenic period  Description: INTERVENTIONS:  - Monitor WBC    Outcome: Progressing     Problem: SAFETY ADULT  Goal: Patient will remain free of falls  Description: INTERVENTIONS:  - Educate patient/family on patient safety including physical limitations  - Instruct patient to call for assistance with activity   - Consult OT/PT to assist with strengthening/mobility   - Keep Call bell within reach  - Keep bed low and locked with side rails adjusted as appropriate  - Keep care items and personal belongings within reach  - Initiate and maintain comfort rounds  - Make Fall Risk Sign visible to staff  - Offer Toileting every  Hours,  in advance of need  - Initiate/Maintain alarm  - Obtain necessary fall risk management equipment:   - Apply yellow socks and bracelet for high fall risk patients  - Consider moving patient to room near nurses station  Outcome: Progressing  Goal: Maintain or return to baseline ADL function  Description: INTERVENTIONS:  -  Assess patient's ability to carry out ADLs; assess patient's baseline for ADL function and identify physical deficits which impact ability to perform ADLs (bathing, care of mouth/teeth, toileting, grooming, dressing, etc.)  - Assess/evaluate cause of self-care deficits   - Assess range of motion  - Assess patient's mobility; develop plan if impaired  - Assess patient's need for assistive devices and provide as appropriate  - Encourage maximum independence but intervene and supervise when necessary  - Involve family in performance of ADLs  - Assess for home care needs following discharge   - Consider OT consult to assist with ADL evaluation and planning for discharge  - Provide patient education as appropriate  Outcome: Progressing  Goal: Maintains/Returns to pre admission functional level  Description: INTERVENTIONS:  - Perform AM-PAC 6 Click Basic Mobility/ Daily Activity assessment daily.  - Set and communicate daily mobility goal to care team and patient/family/caregiver.   - Collaborate with rehabilitation services on mobility goals if consulted  - Perform Range of Motion  times a day.  - Reposition patient every  hours.  - Dangle patient  times a day  - Stand patient  times a day  - Ambulate patient  times a day  - Out of bed to chair  times a day   - Out of bed for meals  times a day  - Out of bed for toileting  - Record patient progress and toleration of activity level   Outcome: Progressing     Problem: DISCHARGE PLANNING  Goal: Discharge to home or other facility with appropriate resources  Description: INTERVENTIONS:  - Identify barriers to discharge w/patient and caregiver  - Arrange for  needed discharge resources and transportation as appropriate  - Identify discharge learning needs (meds, wound care, etc.)  - Arrange for interpretive services to assist at discharge as needed  - Refer to Case Management Department for coordinating discharge planning if the patient needs post-hospital services based on physician/advanced practitioner order or complex needs related to functional status, cognitive ability, or social support system  Outcome: Progressing     Problem: Knowledge Deficit  Goal: Patient/family/caregiver demonstrates understanding of disease process, treatment plan, medications, and discharge instructions  Description: Complete learning assessment and assess knowledge base.  Interventions:  - Provide teaching at level of understanding  - Provide teaching via preferred learning methods  Outcome: Progressing

## 2024-07-07 ENCOUNTER — APPOINTMENT (OUTPATIENT)
Dept: ULTRASOUND IMAGING | Facility: HOSPITAL | Age: 29
DRG: 133 | End: 2024-07-07
Payer: COMMERCIAL

## 2024-07-07 PROBLEM — J06.9 URI (UPPER RESPIRATORY INFECTION): Status: ACTIVE | Noted: 2024-07-07

## 2024-07-07 LAB
ALBUMIN SERPL BCG-MCNC: 3.3 G/DL (ref 3.5–5)
ALP SERPL-CCNC: 71 U/L (ref 34–104)
ALT SERPL W P-5'-P-CCNC: 24 U/L (ref 7–52)
ANION GAP SERPL CALCULATED.3IONS-SCNC: 6 MMOL/L (ref 4–13)
AST SERPL W P-5'-P-CCNC: 16 U/L (ref 13–39)
ATRIAL RATE: 81 BPM
ATRIAL RATE: 88 BPM
ATRIAL RATE: 90 BPM
BACTERIA UR QL AUTO: ABNORMAL /HPF
BASOPHILS # BLD AUTO: 0.04 THOUSANDS/ÂΜL (ref 0–0.1)
BASOPHILS NFR BLD AUTO: 1 % (ref 0–1)
BILIRUB SERPL-MCNC: 0.63 MG/DL (ref 0.2–1)
BILIRUB UR QL STRIP: NEGATIVE
BUN SERPL-MCNC: 43 MG/DL (ref 5–25)
C3 SERPL-MCNC: 27 MG/DL (ref 87–200)
C4 SERPL-MCNC: 14 MG/DL (ref 19–52)
CALCIUM ALBUM COR SERPL-MCNC: 9.2 MG/DL (ref 8.3–10.1)
CALCIUM SERPL-MCNC: 8.6 MG/DL (ref 8.4–10.2)
CARDIAC TROPONIN I PNL SERPL HS: 4 NG/L (ref 8–18)
CHLORIDE SERPL-SCNC: 107 MMOL/L (ref 96–108)
CK SERPL-CCNC: 44 U/L (ref 39–308)
CLARITY UR: CLEAR
CO2 SERPL-SCNC: 22 MMOL/L (ref 21–32)
COLOR UR: ABNORMAL
CREAT SERPL-MCNC: 1.6 MG/DL (ref 0.6–1.3)
CREAT UR-MCNC: 139.8 MG/DL
CREAT UR-MCNC: <1 MG/DL
EOSINOPHIL # BLD AUTO: 0.06 THOUSAND/ÂΜL (ref 0–0.61)
EOSINOPHIL NFR BLD AUTO: 1 % (ref 0–6)
ERYTHROCYTE [DISTWIDTH] IN BLOOD BY AUTOMATED COUNT: 11.9 % (ref 11.6–15.1)
GFR SERPL CREATININE-BSD FRML MDRD: 57 ML/MIN/1.73SQ M
GLUCOSE P FAST SERPL-MCNC: 94 MG/DL (ref 65–99)
GLUCOSE SERPL-MCNC: 94 MG/DL (ref 65–140)
GLUCOSE UR STRIP-MCNC: NEGATIVE MG/DL
HCT VFR BLD AUTO: 33.1 % (ref 36.5–49.3)
HGB BLD-MCNC: 11.4 G/DL (ref 12–17)
HGB UR QL STRIP.AUTO: ABNORMAL
HYALINE CASTS #/AREA URNS LPF: ABNORMAL /LPF
IMM GRANULOCYTES # BLD AUTO: 0.02 THOUSAND/UL (ref 0–0.2)
IMM GRANULOCYTES NFR BLD AUTO: 0 % (ref 0–2)
KETONES UR STRIP-MCNC: NEGATIVE MG/DL
LEUKOCYTE ESTERASE UR QL STRIP: NEGATIVE
LYMPHOCYTES # BLD AUTO: 1.23 THOUSANDS/ÂΜL (ref 0.6–4.47)
LYMPHOCYTES NFR BLD AUTO: 19 % (ref 14–44)
MAGNESIUM SERPL-MCNC: 2.3 MG/DL (ref 1.9–2.7)
MCH RBC QN AUTO: 27.9 PG (ref 26.8–34.3)
MCHC RBC AUTO-ENTMCNC: 34.4 G/DL (ref 31.4–37.4)
MCV RBC AUTO: 81 FL (ref 82–98)
MICROALBUMIN UR-MCNC: 3033.6 MG/L
MICROALBUMIN/CREAT 24H UR: 2170 MG/G CREATININE (ref 0–30)
MONOCYTES # BLD AUTO: 0.91 THOUSAND/ÂΜL (ref 0.17–1.22)
MONOCYTES NFR BLD AUTO: 14 % (ref 4–12)
NEUTROPHILS # BLD AUTO: 4.4 THOUSANDS/ÂΜL (ref 1.85–7.62)
NEUTS SEG NFR BLD AUTO: 65 % (ref 43–75)
NITRITE UR QL STRIP: NEGATIVE
NON-SQ EPI CELLS URNS QL MICRO: ABNORMAL /HPF
NRBC BLD AUTO-RTO: 0 /100 WBCS
P AXIS: 47 DEGREES
P AXIS: 50 DEGREES
P AXIS: 55 DEGREES
PH UR STRIP.AUTO: 6 [PH]
PHOSPHATE SERPL-MCNC: 4.9 MG/DL (ref 2.7–4.5)
PLATELET # BLD AUTO: 223 THOUSANDS/UL (ref 149–390)
PMV BLD AUTO: 9 FL (ref 8.9–12.7)
POTASSIUM SERPL-SCNC: 4.7 MMOL/L (ref 3.5–5.3)
PR INTERVAL: 136 MS
PR INTERVAL: 136 MS
PR INTERVAL: 140 MS
PROCALCITONIN SERPL-MCNC: 0.11 NG/ML
PROT SERPL-MCNC: 7.5 G/DL (ref 6.4–8.4)
PROT UR STRIP-MCNC: ABNORMAL MG/DL
PROT UR-MCNC: <4 MG/DL
QRS AXIS: 85 DEGREES
QRS AXIS: 85 DEGREES
QRS AXIS: 93 DEGREES
QRSD INTERVAL: 76 MS
QRSD INTERVAL: 78 MS
QRSD INTERVAL: 84 MS
QT INTERVAL: 326 MS
QT INTERVAL: 328 MS
QT INTERVAL: 346 MS
QTC INTERVAL: 396 MS
QTC INTERVAL: 398 MS
QTC INTERVAL: 401 MS
RBC # BLD AUTO: 4.08 MILLION/UL (ref 3.88–5.62)
RBC #/AREA URNS AUTO: ABNORMAL /HPF
SODIUM 24H UR-SCNC: 17 MOL/L
SODIUM SERPL-SCNC: 135 MMOL/L (ref 135–147)
SP GR UR STRIP.AUTO: 1.02 (ref 1–1.03)
T WAVE AXIS: 29 DEGREES
T WAVE AXIS: 33 DEGREES
T WAVE AXIS: 35 DEGREES
UROBILINOGEN UR STRIP-ACNC: <2 MG/DL
UUN 24H UR-MCNC: 1097 MG/DL
VENTRICULAR RATE: 81 BPM
VENTRICULAR RATE: 88 BPM
VENTRICULAR RATE: 90 BPM
WBC # BLD AUTO: 6.66 THOUSAND/UL (ref 4.31–10.16)
WBC #/AREA URNS AUTO: ABNORMAL /HPF

## 2024-07-07 PROCEDURE — 76775 US EXAM ABDO BACK WALL LIM: CPT

## 2024-07-07 PROCEDURE — 86037 ANCA TITER EACH ANTIBODY: CPT | Performed by: STUDENT IN AN ORGANIZED HEALTH CARE EDUCATION/TRAINING PROGRAM

## 2024-07-07 PROCEDURE — 86215 DEOXYRIBONUCLEASE ANTIBODY: CPT | Performed by: STUDENT IN AN ORGANIZED HEALTH CARE EDUCATION/TRAINING PROGRAM

## 2024-07-07 PROCEDURE — 99232 SBSQ HOSP IP/OBS MODERATE 35: CPT

## 2024-07-07 PROCEDURE — 86160 COMPLEMENT ANTIGEN: CPT | Performed by: STUDENT IN AN ORGANIZED HEALTH CARE EDUCATION/TRAINING PROGRAM

## 2024-07-07 PROCEDURE — 83520 IMMUNOASSAY QUANT NOS NONAB: CPT | Performed by: STUDENT IN AN ORGANIZED HEALTH CARE EDUCATION/TRAINING PROGRAM

## 2024-07-07 PROCEDURE — 81001 URINALYSIS AUTO W/SCOPE: CPT | Performed by: INTERNAL MEDICINE

## 2024-07-07 PROCEDURE — 85025 COMPLETE CBC W/AUTO DIFF WBC: CPT | Performed by: STUDENT IN AN ORGANIZED HEALTH CARE EDUCATION/TRAINING PROGRAM

## 2024-07-07 PROCEDURE — 84100 ASSAY OF PHOSPHORUS: CPT | Performed by: STUDENT IN AN ORGANIZED HEALTH CARE EDUCATION/TRAINING PROGRAM

## 2024-07-07 PROCEDURE — 93010 ELECTROCARDIOGRAM REPORT: CPT | Performed by: INTERNAL MEDICINE

## 2024-07-07 PROCEDURE — 86803 HEPATITIS C AB TEST: CPT | Performed by: STUDENT IN AN ORGANIZED HEALTH CARE EDUCATION/TRAINING PROGRAM

## 2024-07-07 PROCEDURE — 82043 UR ALBUMIN QUANTITATIVE: CPT | Performed by: INTERNAL MEDICINE

## 2024-07-07 PROCEDURE — 84145 PROCALCITONIN (PCT): CPT

## 2024-07-07 PROCEDURE — 82570 ASSAY OF URINE CREATININE: CPT | Performed by: INTERNAL MEDICINE

## 2024-07-07 PROCEDURE — 83735 ASSAY OF MAGNESIUM: CPT | Performed by: STUDENT IN AN ORGANIZED HEALTH CARE EDUCATION/TRAINING PROGRAM

## 2024-07-07 PROCEDURE — 87340 HEPATITIS B SURFACE AG IA: CPT | Performed by: STUDENT IN AN ORGANIZED HEALTH CARE EDUCATION/TRAINING PROGRAM

## 2024-07-07 PROCEDURE — 84300 ASSAY OF URINE SODIUM: CPT | Performed by: INTERNAL MEDICINE

## 2024-07-07 PROCEDURE — 84540 ASSAY OF URINE/UREA-N: CPT | Performed by: INTERNAL MEDICINE

## 2024-07-07 PROCEDURE — 93005 ELECTROCARDIOGRAM TRACING: CPT

## 2024-07-07 PROCEDURE — 84484 ASSAY OF TROPONIN QUANT: CPT | Performed by: PHYSICIAN ASSISTANT

## 2024-07-07 PROCEDURE — 80053 COMPREHEN METABOLIC PANEL: CPT | Performed by: STUDENT IN AN ORGANIZED HEALTH CARE EDUCATION/TRAINING PROGRAM

## 2024-07-07 PROCEDURE — 99245 OFF/OP CONSLTJ NEW/EST HI 55: CPT | Performed by: INTERNAL MEDICINE

## 2024-07-07 RX ORDER — ONDANSETRON 2 MG/ML
4 INJECTION INTRAMUSCULAR; INTRAVENOUS EVERY 8 HOURS PRN
Status: DISCONTINUED | OUTPATIENT
Start: 2024-07-07 | End: 2024-07-14 | Stop reason: HOSPADM

## 2024-07-07 RX ORDER — ALBUTEROL SULFATE 90 UG/1
2 AEROSOL, METERED RESPIRATORY (INHALATION) EVERY 4 HOURS PRN
Status: DISCONTINUED | OUTPATIENT
Start: 2024-07-07 | End: 2024-07-14 | Stop reason: HOSPADM

## 2024-07-07 RX ORDER — ACETAMINOPHEN 325 MG/1
650 TABLET ORAL EVERY 6 HOURS PRN
Status: DISCONTINUED | OUTPATIENT
Start: 2024-07-07 | End: 2024-07-10

## 2024-07-07 RX ORDER — LIDOCAINE 50 MG/G
2 PATCH TOPICAL DAILY PRN
Status: DISCONTINUED | OUTPATIENT
Start: 2024-07-07 | End: 2024-07-10

## 2024-07-07 RX ADMIN — HEPARIN SODIUM 7500 UNITS: 5000 INJECTION INTRAVENOUS; SUBCUTANEOUS at 14:14

## 2024-07-07 RX ADMIN — HEPARIN SODIUM 7500 UNITS: 5000 INJECTION INTRAVENOUS; SUBCUTANEOUS at 05:38

## 2024-07-07 RX ADMIN — ACETAMINOPHEN 650 MG: 325 TABLET, FILM COATED ORAL at 01:30

## 2024-07-07 RX ADMIN — HEPARIN SODIUM 7500 UNITS: 5000 INJECTION INTRAVENOUS; SUBCUTANEOUS at 22:00

## 2024-07-07 RX ADMIN — ACETAMINOPHEN 650 MG: 325 TABLET, FILM COATED ORAL at 16:05

## 2024-07-07 RX ADMIN — LEVOTHYROXINE SODIUM 50 MCG: 0.05 TABLET ORAL at 09:15

## 2024-07-07 RX ADMIN — PROPRANOLOL HYDROCHLORIDE 60 MG: 60 CAPSULE, EXTENDED RELEASE ORAL at 09:15

## 2024-07-07 RX ADMIN — LIDOCAINE 2 PATCH: 50 PATCH CUTANEOUS at 01:30

## 2024-07-07 RX ADMIN — AMLODIPINE BESYLATE 10 MG: 10 TABLET ORAL at 09:15

## 2024-07-07 RX ADMIN — ONDANSETRON 4 MG: 2 INJECTION INTRAMUSCULAR; INTRAVENOUS at 07:49

## 2024-07-07 NOTE — NURSING NOTE
Notified Jose Maria Prator regarding oxygen saturation goes down when pt sleeping only 82-86%, when awake 88-91% with 3 liters via nc. Jose Maria will order IS.

## 2024-07-07 NOTE — QUICK NOTE
Notified by RN patient complaining of shortness of breath still but is now noted to be 80% on room air and patient did feel some chest tightness when he was moving between the chair and bed in his room.  Aside from 88% on room air all other vital signs stable.  Review of CT chest from this evening revealing mild pulmonary edema, pulmonary nodules, unsure if infectious versus inflammatory.  Unsure of hypoxia at this time could be secondary to undiagnosed MIRIAM or even obesity hypoventilation syndrome?  Patient has been placed on 3 L, will continue for now and add as needed albuterol inhaler for shortness of breath.  Chest tightness most likely related to shortness of breath, but will recheck EKG and troponin.

## 2024-07-07 NOTE — PROGRESS NOTES
[facility]  Progress Note  Name: Ben Daniel I  MRN: [id number]  Unit/Bed#: -01 I Date of Admission: 7/6/2024   Date of Service: 7/7/2024 I Hospital Day: 0    Assessment & Plan   * ROSI (acute kidney injury) (HCC)  Assessment & Plan  Baseline creatinine is 0.8  Creatinine on admission 1.76, down trended to 1.6  He reported he has flulike illness starting approximately 2 weeks ago took Tylenol.  Has not been doing much until 3 days ago when he started having some shortness of breath on climbing up stairs  On examination, no signs of fluid overload no JVD, and inspiratory crackles and pedal edema  Chest x-ray looks similar to prior when  BNP elevated in the setting of ROSI  Possibly patient had ROSI due to decreased oral intake of fluids compounded by concomitant ankle hydrochlorothiazide and ACE inhibitor's  CT chest showed mild pulmonary edema with significant increase in pulmonary nodules, pulmonary nodules may be infectious/inflammatory origin, recommending follow-up in 3 months  Avoid nephrotoxic medications, I's and O's, daily weight  Nephrology following-order urine lites, urine microalbumin to creatinine ratio, renal ultrasound, complements, antidouble-stranded DNA, ANCA panel, anti-GBM panel, hep panel    URI (upper respiratory infection)  Assessment & Plan  Flu/RSV/COVID-negative  Currently requiring supplemental O2, wean as tolerated  Patient reports significant mucus production with occasional red tinted phlegm  Obtain procalcitonin  Chest x-ray shows pulmonary nodules likely secondary to inflammation versus infectious etiology, mild pulmonary edema    Hypothyroidism  Assessment & Plan  Patient has not been compliant to levothyroxine  Counseling has been done at length  Restart levothyroxine 50 mcg    Palpitations  Assessment & Plan  Continue with Inderal    Essential hypertension  Assessment & Plan  Continue with amlodipine, Inderal hold hydrochlorothiazide and  lisinopril             VTE Pharmacologic Prophylaxis:   Moderate Risk (Score 3-4) - Pharmacological DVT Prophylaxis Ordered: heparin.    Mobility:   Basic Mobility Inpatient Raw Score: 23  JH-HLM Goal: 7: Walk 25 feet or more  JH-HLM Achieved: 7: Walk 25 feet or more  JH-HLM Goal achieved. Continue to encourage appropriate mobility.    Patient Centered Rounds: I performed bedside rounds with nursing staff today.   Discussions with Specialists or Other Care Team Provider: CM, nephrology    Education and Discussions with Family / Patient: Updated  (wife) at bedside.    Total Time Spent on Date of Encounter in care of patient: 35 mins. This time was spent on one or more of the following: performing physical exam; counseling and coordination of care; obtaining or reviewing history; documenting in the medical record; reviewing/ordering tests, medications or procedures; communicating with other healthcare professionals and discussing with patient's family/caregivers.    Current Length of Stay: 0 day(s)  Current Patient Status: Observation   Certification Statement: The patient will continue to require additional inpatient hospital stay due to continued treatment for upper respiratory infection as well as ROSI  Discharge Plan: Anticipate discharge in 24-48 hrs to home.    Code Status: Level 1 - Full Code    Subjective:   Patient reports still having significant shortness of breath and coughing.  Currently denies any chest pain/pressure, palpitations, lightheadedness, or chills.    Objective:     Vitals:   Temp (24hrs), Av.7 °F (37.1 °C), Min:97.8 °F (36.6 °C), Max:100 °F (37.8 °C)    Temp:  [97.8 °F (36.6 °C)-100 °F (37.8 °C)] 98.5 °F (36.9 °C)  HR:  [74-90] 90  Resp:  [18-20] 20  BP: (126-154)/(58-87) 149/86  SpO2:  [89 %-99 %] 91 %  Body mass index is 42.76 kg/m².     Input and Output Summary (last 24 hours):     Intake/Output Summary (Last 24 hours) at 2024 1210  Last data filed at 2024  1701  Gross per 24 hour   Intake --   Output 175 ml   Net -175 ml       Physical Exam:   Physical Exam  Vitals and nursing note reviewed.   Constitutional:       Appearance: He is obese.   HENT:      Head: Normocephalic.      Nose: Nose normal.      Mouth/Throat:      Mouth: Mucous membranes are moist.      Pharynx: Oropharynx is clear.   Eyes:      General: No scleral icterus.     Conjunctiva/sclera: Conjunctivae normal.      Pupils: Pupils are equal, round, and reactive to light.   Cardiovascular:      Rate and Rhythm: Normal rate and regular rhythm.      Heart sounds: No murmur heard.     No friction rub. No gallop.   Pulmonary:      Effort: Pulmonary effort is normal. No respiratory distress.      Breath sounds: No stridor. No wheezing, rhonchi or rales.      Comments: Diminished breath sounds bilateral lower bases  Abdominal:      General: Abdomen is flat.      Palpations: Abdomen is soft.   Musculoskeletal:         General: Normal range of motion.      Cervical back: Normal range of motion and neck supple.      Right lower leg: No edema.      Left lower leg: No edema.   Lymphadenopathy:      Cervical: No cervical adenopathy.   Skin:     General: Skin is warm.      Coloration: Skin is not jaundiced or pale.      Findings: No bruising, erythema or lesion.   Neurological:      General: No focal deficit present.      Mental Status: He is alert and oriented to person, place, and time. Mental status is at baseline.      Cranial Nerves: No cranial nerve deficit.      Motor: No weakness.   Psychiatric:         Mood and Affect: Mood normal.         Behavior: Behavior normal.         Thought Content: Thought content normal.          Additional Data:     Labs:  Results from last 7 days   Lab Units 07/07/24  0607   WBC Thousand/uL 6.66   HEMOGLOBIN g/dL 11.4*   HEMATOCRIT % 33.1*   PLATELETS Thousands/uL 223   SEGS PCT % 65   LYMPHO PCT % 19   MONO PCT % 14*   EOS PCT % 1     Results from last 7 days   Lab Units  07/07/24  0607   SODIUM mmol/L 135   POTASSIUM mmol/L 4.7   CHLORIDE mmol/L 107   CO2 mmol/L 22   BUN mg/dL 43*   CREATININE mg/dL 1.60*   ANION GAP mmol/L 6   CALCIUM mg/dL 8.6   ALBUMIN g/dL 3.3*   TOTAL BILIRUBIN mg/dL 0.63   ALK PHOS U/L 71   ALT U/L 24   AST U/L 16   GLUCOSE RANDOM mg/dL 94                       Lines/Drains:  Invasive Devices       Peripheral Intravenous Line  Duration             Peripheral IV 07/06/24 Distal;Right;Upper;Ventral (anterior) Arm 1 day                          Imaging: Reviewed radiology reports from this admission including: chest xray and chest CT scan    Recent Cultures (last 7 days):         Last 24 Hours Medication List:   Current Facility-Administered Medications   Medication Dose Route Frequency Provider Last Rate    acetaminophen  650 mg Oral Q6H PRN Pauly Mathis PA-C      albuterol  2 puff Inhalation Q4H PRN Pauly Mathis PA-C      amLODIPine  10 mg Oral Daily Uriel Kline MD      heparin (porcine)  7,500 Units Subcutaneous Q8H [facility] Uriel Kline MD      levothyroxine  50 mcg Oral Daily Uriel Kline MD      lidocaine  2 patch Topical Daily PRN Pauly Mathis PA-C      ondansetron  4 mg Intravenous Q8H PRN Jose Maria Nobles PA-C      propranolol  60 mg Oral Daily Uriel Kline MD          Today, Patient Was Seen By: Jose Maria Nobles PA-C    **Please Note: This note may have been constructed using a voice recognition system.**

## 2024-07-07 NOTE — ASSESSMENT & PLAN NOTE
Baseline creatinine is 0.8  Creatinine on admission 1.76, down trended to 1.6  He reported he has flulike illness starting approximately 2 weeks ago took Tylenol.  Has not been doing much until 3 days ago when he started having some shortness of breath on climbing up stairs  On examination, no signs of fluid overload no JVD, and inspiratory crackles and pedal edema  Chest x-ray looks similar to prior when  BNP elevated in the setting of ROSI  Possibly patient had ROSI due to decreased oral intake of fluids compounded by concomitant ankle hydrochlorothiazide and ACE inhibitor's  CT chest showed mild pulmonary edema with significant increase in pulmonary nodules, pulmonary nodules may be infectious/inflammatory origin, recommending follow-up in 3 months  Avoid nephrotoxic medications, I's and O's, daily weight  Nephrology following-order urine lites, urine microalbumin to creatinine ratio, renal ultrasound, complements, antidouble-stranded DNA, ANCA panel, anti-GBM panel, hep panel

## 2024-07-07 NOTE — CONSULTS
NEPHROLOGY CONSULTATION NOTE    Patient: Ben Daniel               Sex: male          DOA: 7/6/2024 10:38 AM   YOB: 1995         Age: 29 y.o.         LOS:  LOS: 0 days   Encounter Date: 7/7/2024    REFERRING PHYSICIAN: Uriel Kline MD     REASON FOR THE REFERRAL / CONSULTATION: Further management of ROSI    DATE OF CONSULTATION / SERVICE: 7/7/2024    ADMISSION DIAGNOSIS: <principal problem not specified>     Chief Complaint   Patient presents with    Shortness of Breath     Pt c/o SOB x 2 days, chest pressure began this morning        HPI     This is a 29-year-old male with past medical history significant for hypertension, presented to ER with chief complaint of shortness of breath.  Patient was found to have elevated creatinine and nephrology were consulted for further management of ROSI.    Upon review of old medical record from [facility] chart review, patient has underlying hypertension and taking antihypertensive medication as outpatient.  Blood pressure is under good control with current antihypertensive medications.    According to patient he developed shortness of breath almost 2 days prior to arrival.  Last night patient was also found to be hypoxic and currently requiring nasal oxygen at 3 L/min.  Patient had underwent CT scan of chest without contrast yesterday which showed mild pulmonary edema with trace bilateral pleural effusion.  Numerous 1 to 5 mm solid nodule in similar distribution to pulmonary edema, which can rarely be seen as a manifestation of pulmonary edema although this could be infectious/inflammatory.     Currently patient denies nausea, vomiting, headache, dizziness, abdominal pain, constipation or rash.    PAST MEDICAL HISTORY     Past Medical History:   Diagnosis Date    Anxiety     Hypertension        PAST SURGICAL HISTORY     Past Surgical History:   Procedure Laterality Date    ANKLE SURGERY      FETAL SURGERY FOR CONGENITAL HERNIA         ALLERGIES     No Known  "Allergies    SOCIAL HISTORY     Social History     Substance and Sexual Activity   Alcohol Use Never    Alcohol/week: 1.0 standard drink of alcohol    Types: 1 Cans of beer per week     Social History     Substance and Sexual Activity   Drug Use Never     Social History     Tobacco Use   Smoking Status Never   Smokeless Tobacco Never       FAMILY HISTORY     Family History   Problem Relation Age of Onset    Heart failure Maternal Grandmother     Heart failure Maternal Grandfather        CURRENT MEDICATIONS       Current Facility-Administered Medications:     acetaminophen (TYLENOL) tablet 650 mg, 650 mg, Oral, Q6H PRN, Pauly Mathis PA-C, 650 mg at 07/07/24 0130    albuterol (PROVENTIL HFA,VENTOLIN HFA) inhaler 2 puff, 2 puff, Inhalation, Q4H PRN, Pauly Mathis PA-C    amLODIPine (NORVASC) tablet 10 mg, 10 mg, Oral, Daily, Uriel Kline MD    heparin (porcine) subcutaneous injection 7,500 Units, 7,500 Units, Subcutaneous, Q8H YADIRA, 7,500 Units at 07/07/24 0538 **AND** [COMPLETED] Platelet count, , , Once, Uriel Kline MD    levothyroxine tablet 50 mcg, 50 mcg, Oral, Daily, Uriel Kline MD, 50 mcg at 07/06/24 1348    lidocaine (LIDODERM) 5 % patch 2 patch, 2 patch, Topical, Daily PRN, Pauly aMthis PA-C, 2 patch at 07/07/24 0130    ondansetron (ZOFRAN) injection 4 mg, 4 mg, Intravenous, Q8H PRN, Jose Maria Nobles PA-C, 4 mg at 07/07/24 0749    propranolol (INDERAL LA) 24 hr capsule 60 mg, 60 mg, Oral, Daily, Uriel Kline MD    REVIEW OF SYSTEMS     Complete 10 points of review of systems were obtained and discussed in length with patient today.  Complete 10 points of review of systems were negative/unremarkable except mentioned in the HPI section.      OBJECTIVE     Current Weight: Weight - Scale: 122 kg (268 lb 15.4 oz)  /86 (BP Location: Left arm)   Pulse 78   Temp 100 °F (37.8 °C) (Oral)   Resp 20   Ht 5' 6.5\" (1.689 m)   Wt 122 kg (268 lb 15.4 oz)   SpO2 96%   BMI 42.76 kg/m²   Vitals:    " 07/07/24 0120   BP: 152/86   Pulse:    Resp: 20   Temp: 100 °F (37.8 °C)   SpO2:      Body mass index is 42.76 kg/m².    Intake/Output Summary (Last 24 hours) at 7/7/2024 0846  Last data filed at 7/6/2024 1701  Gross per 24 hour   Intake --   Output 175 ml   Net -175 ml       PHYSICAL EXAMINATION     Physical Exam  Constitutional:       General: He is not in acute distress.  HENT:      Right Ear: External ear normal.   Eyes:      Conjunctiva/sclera:      Right eye: No hemorrhage.  Neck:      Thyroid: No thyromegaly.   Pulmonary:      Effort: No accessory muscle usage or respiratory distress.   Abdominal:      General: There is no distension.   Skin:     Coloration: Skin is not jaundiced.   Psychiatric:         Behavior: Behavior is not combative.           LAB RESULTS        Results from last 7 days   Lab Units 07/07/24  0607 07/06/24  1917 07/06/24  1408 07/06/24  1115   WBC Thousand/uL 6.66  --   --  4.05*   HEMOGLOBIN g/dL 11.4*  --   --  12.2   HEMATOCRIT % 33.1*  --   --  36.1*   PLATELETS Thousands/uL 223  --  188 239   SODIUM mmol/L 135 139  --  137   POTASSIUM mmol/L 4.7 4.6  --  5.2   CHLORIDE mmol/L 107 108  --  107   CO2 mmol/L 22 24  --  23   BUN mg/dL 43* 44*  --  48*   CREATININE mg/dL 1.60* 1.67*  --  1.76*   EGFR ml/min/1.73sq m 57 54  --  51   CALCIUM mg/dL 8.6 8.9  --  8.8   MAGNESIUM mg/dL 2.3  --   --   --    PHOSPHORUS mg/dL 4.9*  --   --   --        I have personally reviewed the old medical records and patient's previously known baseline creatinine level is ~0.8    RADIOLOGY RESULTS       CT chest wo contrast   Final Result by Lopez Shelton MD (07/06 1522)      Mild pulmonary edema and trace bilateral pleural effusions.      Numerous 1 to 5 mm solid nodules in a similar distribution to the pulmonary edema, which can rarely be seen as a manifestation of pulmonary edema. Findings could also be infectious/inflammatory. Metastatic disease is unlikely given the patient's age and    lack of  known malignancy. Follow-up chest CT in 3 months is recommended.      The study was marked in EPIC for immediate notification.               Workstation performed: [id number]         XR chest 2 views   Final Result by Betsy Clarke MD (07/06 1115)      Low lung volumes producing vascular crowding with no acute disease.            Workstation performed: [id number]             PLAN / RECOMMENDATIONS      1.  ROSI.  Present on admission, exact etiology is currently unknown    Upon review of old medical record from [facility] chart review, previously known baseline creatinine is around 0.8.  Her admission creatinine was 1.76.  Admission urine analysis also showed some microscopic hematuria with urine RBC 10-20 / hpf.  Urine protein to creatinine ratio was unable to calculate since patient's urine creatinine was less than 1 and urine random protein was less than 4 which I think it is inaccurate and on urine analysis patient was found to have 3+ proteinuria.  Patient was admitted with shortness of breath and underwent CT scan of chest on admission which showed mild pulmonary edema but patient was also found to have numerous 1 to 5 mm solid nodules which can be from infection versus inflammation versus pulmonary edema.    Plan to recheck urine analysis to reassess microscopic hematuria today.  Will also plan to check urine lites and urine microalbumin to creatinine ratio today.  Plan to check renal ultrasound to rule out any structural lesion.    Agree with checking complements, anti-double-stranded DNA, ANCA panel, anti-GBM and hepatitis panel.    Patient was admitted with creatinine of 1.76 which is improved overnight to current creatinine of 1.6 and in the light of improving ROSI, suspicious for underlying RPGN is low and we will follow-up on serological workup.    Patient has difficulty urination yesterday and bladder scan showed no urinary retention.  According to patient his urinary problem has resolved today.  Plan to  "recheck renal function with a.m. lab    Patient was admitted with shortness of breath and currently requiring nasal oxygen at 3 L/min.  Admission BNP was 173.  Pending 2D echocardiogram    2.  Hypertension.  Essential, blood pressures currently acceptable and benazepril along with hydrochlorothiazide is currently on hold in the light of ROSI.  Plan to monitor hypertension with propranolol 60 mg p.o. daily and amlodipine 10 mg p.o. daily today    Thank you for the consultation to participate in patient's care. I have personally discussed my overall above mentioned plan and recommendation with the current [facility] physician and they agreed with my plan of workup for ROSI as mentioned earlier    Jonathan Saldivar MD  Nephrology  7/7/2024        Portions of the record may have been created with voice recognition software. Occasional wrong word or \"sound a like\" substitutions may have occurred due to the inherent limitations of voice recognition software. Read the chart carefully and recognize, using context, where substitutions have occurred.    "

## 2024-07-07 NOTE — ASSESSMENT & PLAN NOTE
Flu/RSV/COVID-negative  Currently requiring supplemental O2, wean as tolerated  Patient reports significant mucus production with occasional red tinted phlegm  Obtain procalcitonin  Chest x-ray shows pulmonary nodules likely secondary to inflammation versus infectious etiology, mild pulmonary edema

## 2024-07-08 ENCOUNTER — APPOINTMENT (OUTPATIENT)
Dept: NON INVASIVE DIAGNOSTICS | Facility: HOSPITAL | Age: 29
DRG: 133 | End: 2024-07-08
Payer: COMMERCIAL

## 2024-07-08 PROBLEM — R06.02 SOB (SHORTNESS OF BREATH): Status: ACTIVE | Noted: 2024-07-07

## 2024-07-08 LAB
ANA SER QL IA: NEGATIVE
ANION GAP SERPL CALCULATED.3IONS-SCNC: 8 MMOL/L (ref 4–13)
AORTIC ROOT: 3.1 CM
APICAL FOUR CHAMBER EJECTION FRACTION: 64 %
ASCENDING AORTA: 3.1 CM
B BURGDOR IGG+IGM SER QL IA: NEGATIVE
BASOPHILS # BLD AUTO: 0.03 THOUSANDS/ÂΜL (ref 0–0.1)
BASOPHILS NFR BLD AUTO: 1 % (ref 0–1)
BSA FOR ECHO PROCEDURE: 2.3 M2
BUN SERPL-MCNC: 38 MG/DL (ref 5–25)
CALCIUM SERPL-MCNC: 8.7 MG/DL (ref 8.4–10.2)
CHLORIDE SERPL-SCNC: 101 MMOL/L (ref 96–108)
CO2 SERPL-SCNC: 22 MMOL/L (ref 21–32)
CREAT SERPL-MCNC: 1.51 MG/DL (ref 0.6–1.3)
E WAVE DECELERATION TIME: 166 MS
E/A RATIO: 1.56
EOSINOPHIL # BLD AUTO: 0.02 THOUSAND/ÂΜL (ref 0–0.61)
EOSINOPHIL NFR BLD AUTO: 0 % (ref 0–6)
ERYTHROCYTE [DISTWIDTH] IN BLOOD BY AUTOMATED COUNT: 11.9 % (ref 11.6–15.1)
FRACTIONAL SHORTENING: 35 (ref 28–44)
GFR SERPL CREATININE-BSD FRML MDRD: 61 ML/MIN/1.73SQ M
GLUCOSE P FAST SERPL-MCNC: 95 MG/DL (ref 65–99)
GLUCOSE SERPL-MCNC: 95 MG/DL (ref 65–140)
HBV SURFACE AG SER QL: NORMAL
HCT VFR BLD AUTO: 35.8 % (ref 36.5–49.3)
HCV AB SER QL: NORMAL
HGB BLD-MCNC: 12 G/DL (ref 12–17)
IMM GRANULOCYTES # BLD AUTO: 0.03 THOUSAND/UL (ref 0–0.2)
IMM GRANULOCYTES NFR BLD AUTO: 1 % (ref 0–2)
INR PPP: 1.14 (ref 0.84–1.19)
INTERVENTRICULAR SEPTUM IN DIASTOLE (PARASTERNAL SHORT AXIS VIEW): 0.9 CM
INTERVENTRICULAR SEPTUM: 0.9 CM (ref 0.6–1.1)
LA/AORTA RATIO 2D: 1.39
LAAS-AP2: 16 CM2
LAAS-AP4: 19.7 CM2
LEFT ATRIUM SIZE: 4.3 CM
LEFT ATRIUM VOLUME (MOD BIPLANE): 49 ML
LEFT ATRIUM VOLUME INDEX (MOD BIPLANE): 21.3 ML/M2
LEFT INTERNAL DIMENSION IN SYSTOLE: 3.4 CM (ref 2.1–4)
LEFT VENTRICULAR INTERNAL DIMENSION IN DIASTOLE: 5.2 CM (ref 3.5–6)
LEFT VENTRICULAR POSTERIOR WALL IN END DIASTOLE: 1.1 CM
LEFT VENTRICULAR STROKE VOLUME: 84 ML
LVSV (TEICH): 84 ML
LYMPHOCYTES # BLD AUTO: 1.09 THOUSANDS/ÂΜL (ref 0.6–4.47)
LYMPHOCYTES NFR BLD AUTO: 17 % (ref 14–44)
MCH RBC QN AUTO: 27.6 PG (ref 26.8–34.3)
MCHC RBC AUTO-ENTMCNC: 33.5 G/DL (ref 31.4–37.4)
MCV RBC AUTO: 82 FL (ref 82–98)
MONOCYTES # BLD AUTO: 1.04 THOUSAND/ÂΜL (ref 0.17–1.22)
MONOCYTES NFR BLD AUTO: 16 % (ref 4–12)
MV E'TISSUE VEL-SEP: 11 CM/S
MV PEAK A VEL: 0.66 M/S
MV PEAK E VEL: 103 CM/S
MV STENOSIS PRESSURE HALF TIME: 49 MS
MV VALVE AREA P 1/2 METHOD: 4.49
NEUTROPHILS # BLD AUTO: 4.37 THOUSANDS/ÂΜL (ref 1.85–7.62)
NEUTS SEG NFR BLD AUTO: 65 % (ref 43–75)
NRBC BLD AUTO-RTO: 0 /100 WBCS
PLATELET # BLD AUTO: 226 THOUSANDS/UL (ref 149–390)
PMV BLD AUTO: 8.8 FL (ref 8.9–12.7)
POTASSIUM SERPL-SCNC: 4.9 MMOL/L (ref 3.5–5.3)
PROTHROMBIN TIME: 15.3 SECONDS (ref 11.6–14.5)
RBC # BLD AUTO: 4.35 MILLION/UL (ref 3.88–5.62)
RIGHT VENTRICLE ID DIMENSION: 3.4 CM
SL CV LV EF: 60
SL CV PED ECHO LEFT VENTRICLE DIASTOLIC VOLUME (MOD BIPLANE) 2D: 130 ML
SL CV PED ECHO LEFT VENTRICLE SYSTOLIC VOLUME (MOD BIPLANE) 2D: 46 ML
SODIUM SERPL-SCNC: 131 MMOL/L (ref 135–147)
TRICUSPID ANNULAR PLANE SYSTOLIC EXCURSION: 2.3 CM
WBC # BLD AUTO: 6.58 THOUSAND/UL (ref 4.31–10.16)

## 2024-07-08 PROCEDURE — 86753 PROTOZOA ANTIBODY NOS: CPT | Performed by: PHYSICIAN ASSISTANT

## 2024-07-08 PROCEDURE — 99223 1ST HOSP IP/OBS HIGH 75: CPT | Performed by: INTERNAL MEDICINE

## 2024-07-08 PROCEDURE — 86430 RHEUMATOID FACTOR TEST QUAL: CPT | Performed by: INTERNAL MEDICINE

## 2024-07-08 PROCEDURE — 86334 IMMUNOFIX E-PHORESIS SERUM: CPT | Performed by: INTERNAL MEDICINE

## 2024-07-08 PROCEDURE — 80048 BASIC METABOLIC PNL TOTAL CA: CPT | Performed by: STUDENT IN AN ORGANIZED HEALTH CARE EDUCATION/TRAINING PROGRAM

## 2024-07-08 PROCEDURE — 99232 SBSQ HOSP IP/OBS MODERATE 35: CPT

## 2024-07-08 PROCEDURE — 85025 COMPLETE CBC W/AUTO DIFF WBC: CPT

## 2024-07-08 PROCEDURE — NC001 PR NO CHARGE: Performed by: NURSE PRACTITIONER

## 2024-07-08 PROCEDURE — 85610 PROTHROMBIN TIME: CPT | Performed by: NURSE PRACTITIONER

## 2024-07-08 PROCEDURE — 93306 TTE W/DOPPLER COMPLETE: CPT | Performed by: INTERNAL MEDICINE

## 2024-07-08 PROCEDURE — 99232 SBSQ HOSP IP/OBS MODERATE 35: CPT | Performed by: INTERNAL MEDICINE

## 2024-07-08 PROCEDURE — 86225 DNA ANTIBODY NATIVE: CPT

## 2024-07-08 PROCEDURE — 86038 ANTINUCLEAR ANTIBODIES: CPT | Performed by: INTERNAL MEDICINE

## 2024-07-08 PROCEDURE — 93306 TTE W/DOPPLER COMPLETE: CPT

## 2024-07-08 PROCEDURE — 86618 LYME DISEASE ANTIBODY: CPT | Performed by: PHYSICIAN ASSISTANT

## 2024-07-08 PROCEDURE — 84165 PROTEIN E-PHORESIS SERUM: CPT | Performed by: INTERNAL MEDICINE

## 2024-07-08 RX ADMIN — HEPARIN SODIUM 7500 UNITS: 5000 INJECTION INTRAVENOUS; SUBCUTANEOUS at 05:57

## 2024-07-08 RX ADMIN — ALBUTEROL SULFATE 2 PUFF: 90 AEROSOL, METERED RESPIRATORY (INHALATION) at 15:48

## 2024-07-08 RX ADMIN — PROPRANOLOL HYDROCHLORIDE 60 MG: 60 CAPSULE, EXTENDED RELEASE ORAL at 11:07

## 2024-07-08 RX ADMIN — ACETAMINOPHEN 650 MG: 325 TABLET, FILM COATED ORAL at 16:51

## 2024-07-08 RX ADMIN — ALBUTEROL SULFATE 2 PUFF: 90 AEROSOL, METERED RESPIRATORY (INHALATION) at 11:08

## 2024-07-08 RX ADMIN — HEPARIN SODIUM 7500 UNITS: 5000 INJECTION INTRAVENOUS; SUBCUTANEOUS at 21:31

## 2024-07-08 RX ADMIN — LEVOTHYROXINE SODIUM 50 MCG: 0.05 TABLET ORAL at 11:06

## 2024-07-08 RX ADMIN — AMLODIPINE BESYLATE 10 MG: 10 TABLET ORAL at 11:06

## 2024-07-08 RX ADMIN — ALBUTEROL SULFATE 2 PUFF: 90 AEROSOL, METERED RESPIRATORY (INHALATION) at 22:43

## 2024-07-08 RX ADMIN — HEPARIN SODIUM 7500 UNITS: 5000 INJECTION INTRAVENOUS; SUBCUTANEOUS at 13:30

## 2024-07-08 NOTE — PROGRESS NOTES
NEPHROLOGY PROGRESS NOTE    Patient: Ben Daniel               Sex: male          DOA: 7/6/2024 10:38 AM   YOB: 1995        Age:  29 y.o.        LOS:  LOS: 0 days       HPI     Patient came with acute kidney injury and shortness of breath    SUBJECTIVE     Patient remains short of breath requiring oxygen    Overall feeling well    Patient claims he was sick about 3 weeks ago with some sort of viral syndrome with sore throat    Denies take an nostra painkiller    Denies taking antibiotic    Does a leg swelling on her right side    Denies seeing blood in the urine    No chest pain still has a cough    CURRENT MEDICATIONS       Current Facility-Administered Medications:     acetaminophen (TYLENOL) tablet 650 mg, 650 mg, Oral, Q6H PRN, Pauly Mathis PA-C, 650 mg at 07/07/24 1605    albuterol (PROVENTIL HFA,VENTOLIN HFA) inhaler 2 puff, 2 puff, Inhalation, Q4H PRN, Pauly Mathis PA-C, 2 puff at 07/08/24 1108    amLODIPine (NORVASC) tablet 10 mg, 10 mg, Oral, Daily, Uriel Kline MD, 10 mg at 07/08/24 1106    heparin (porcine) subcutaneous injection 7,500 Units, 7,500 Units, Subcutaneous, Q8H YADIRA, 7,500 Units at 07/08/24 0557 **AND** [COMPLETED] Platelet count, , , Once, Uriel Kline MD    levothyroxine tablet 50 mcg, 50 mcg, Oral, Daily, Uriel Kline MD, 50 mcg at 07/08/24 1106    lidocaine (LIDODERM) 5 % patch 2 patch, 2 patch, Topical, Daily PRN, Pauly Mathis PA-C, 2 patch at 07/07/24 0130    ondansetron (ZOFRAN) injection 4 mg, 4 mg, Intravenous, Q8H PRN, Jose Maria Nobles PA-C, 4 mg at 07/07/24 0749    propranolol (INDERAL LA) 24 hr capsule 60 mg, 60 mg, Oral, Daily, Uriel Kline MD, 60 mg at 07/08/24 1107    OBJECTIVE     Current Weight: Weight - Scale: 125 kg (275 lb 9.2 oz)  Vitals:    07/08/24 0820   BP: 161/96   Pulse: 103   Resp:    Temp:    SpO2: 90%       Intake/Output Summary (Last 24 hours) at 7/8/2024 1113  Last data filed at 7/8/2024 0900  Gross per 24 hour   Intake 720 ml    Output 0 ml   Net 720 ml       PHYSICAL EXAMINATION     Physical Exam  Constitutional:       General: He is not in acute distress.     Appearance: He is well-developed.   HENT:      Head: Normocephalic.      Mouth/Throat:      Mouth: Mucous membranes are moist.   Eyes:      General: No scleral icterus.     Conjunctiva/sclera: Conjunctivae normal.   Neck:      Vascular: No JVD.   Cardiovascular:      Rate and Rhythm: Normal rate.      Heart sounds: Normal heart sounds.   Pulmonary:      Effort: Pulmonary effort is normal.      Breath sounds: No wheezing.   Abdominal:      Palpations: Abdomen is soft.      Tenderness: There is no abdominal tenderness.   Musculoskeletal:         General: Normal range of motion.      Cervical back: Neck supple.   Skin:     General: Skin is warm.      Findings: No rash.   Neurological:      Mental Status: He is alert and oriented to person, place, and time.   Psychiatric:         Behavior: Behavior normal.          LAB RESULTS     Results from last 7 days   Lab Units 07/08/24  0510 07/07/24  0607 07/06/24  1917 07/06/24  1408 07/06/24  1115   WBC Thousand/uL 6.58 6.66  --   --  4.05*   HEMOGLOBIN g/dL 12.0 11.4*  --   --  12.2   HEMATOCRIT % 35.8* 33.1*  --   --  36.1*   PLATELETS Thousands/uL 226 223  --  188 239   POTASSIUM mmol/L 4.9 4.7 4.6  --  5.2   CHLORIDE mmol/L 101 107 108  --  107   CO2 mmol/L 22 22 24  --  23   BUN mg/dL 38* 43* 44*  --  48*   CREATININE mg/dL 1.51* 1.60* 1.67*  --  1.76*   EGFR ml/min/1.73sq m 61 57 54  --  51   CALCIUM mg/dL 8.7 8.6 8.9  --  8.8   MAGNESIUM mg/dL  --  2.3  --   --   --    PHOSPHORUS mg/dL  --  4.9*  --   --   --        RADIOLOGY RESULTS      No results found for this or any previous visit.    Results for orders placed during the hospital encounter of 07/06/24    XR chest 2 views    Narrative  XR CHEST PA & LATERAL    INDICATION: sob. Shortness of breath for 2 days, chest pressure began this morning.    COMPARISON: CXR  2/8/2019.    FINDINGS:    Low lung volumes producing vascular crowding. No acute disease. No pneumothorax or pleural effusion.    Normal cardiomediastinal silhouette.    Bones are unremarkable for age.    Normal upper abdomen.    Impression  Low lung volumes producing vascular crowding with no acute disease.        Workstation performed: [id number]    Results for orders placed during the hospital encounter of 07/06/24    CT chest wo contrast    Narrative  CT CHEST WITHOUT IV CONTRAST    INDICATION: infilterate.    COMPARISON: None.    TECHNIQUE: CT examination of the chest was performed without intravenous contrast. Multiplanar 2D reformatted images were created from the source data.    This examination, like all CT scans performed in the [facility], was performed utilizing techniques to minimize radiation dose exposure, including the use of iterative reconstruction and automated exposure control. Radiation dose length  product (DLP) for this visit: 712 mGy-cm    FINDINGS:    LUNGS: Mild interlobular septal thickening and groundglass opacification in the bilateral lower greater than upper lobes. Numerous 1 to 5 mm solid nodules in a similar distribution. There is no tracheal or endobronchial lesion.    PLEURA: Trace bilateral pleural effusions.    HEART/GREAT VESSELS: Heart is unremarkable for patient's age. No thoracic aortic aneurysm.    MEDIASTINUM AND BARTOLOME: Unremarkable.    CHEST WALL AND LOWER NECK: Unremarkable.    VISUALIZED STRUCTURES IN THE UPPER ABDOMEN: Unremarkable.    OSSEOUS STRUCTURES: No acute fracture or destructive osseous lesion.    Impression  Mild pulmonary edema and trace bilateral pleural effusions.    Numerous 1 to 5 mm solid nodules in a similar distribution to the pulmonary edema, which can rarely be seen as a manifestation of pulmonary edema. Findings could also be infectious/inflammatory. Metastatic disease is unlikely given the patient's age and  lack of known  "malignancy. Follow-up chest CT in 3 months is recommended.    The study was marked in EPIC for immediate notification.          Workstation performed: [id number]    No results found for this or any previous visit.    No results found for this or any previous visit.    No results found for this or any previous visit.      PLAN / RECOMMENDATIONS      Acute kidney injury: Seems to be stable for now.  Etiology unclear.  Patient does a mention of proteinuria.  Also low complements suggesting possibility of glomerulonephritis.  Discussed with the patient at length about possible kidney biopsy.  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  Further workup of pulmonary nephritis still pending    Proteinuria: Nonnephrotic range.    Pulmonary nodule: Patient also short of breath may require steroid as a part of the treatment.  Will prefer to hold it until biopsy    Hypertension: Reasonable well-controlled.  Will monitor closely.  Once kidney function stable may start ACE inhibitor    Everything discussed at length with the patient as well as slim.  Will monitor closely    Randy Wells MD  Nephrology  7/8/2024        Portions of the record may have been created with voice recognition software. Occasional wrong word or \"sound a like\" substitutions may have occurred due to the inherent limitations of voice recognition software. Read the chart carefully and recognize, using context, where substitutions have occurred.  "

## 2024-07-08 NOTE — UTILIZATION REVIEW
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        Admission: Date/Time/Statement:   Admission Orders (From admission, onward)       Ordered        07/08/24 1502  INPATIENT ADMISSION  Once            07/06/24 1322  Place in Observation  Once                          Orders Placed This Encounter   Procedures    INPATIENT ADMISSION     Standing Status:   Standing     Number of Occurrences:   1     Order Specific Question:   Level of Care     Answer:   Med Surg [16]     Order Specific Question:   Estimated length of stay     Answer:   More than 2 Midnights     Order Specific Question:   Certification     Answer:   I certify that inpatient services are medically necessary for this patient for a duration of greater than two midnights. See H&P and MD Progress Notes for additional information about the patient's course of treatment.     ED Arrival Information       Expected   -    Arrival   7/6/2024 09:55    Acuity   Urgent              Means of arrival   Walk-In    Escorted by   Family Member    Service   Hospitalist    Admission type   Emergency              Arrival complaint   SOB & fatigue             Chief Complaint   Patient presents with    Shortness of Breath     Pt c/o SOB x 2 days, chest pressure began this morning       Initial Presentation: 29 y.o. male with a PMH of hypothyroidism, palpitations, obesity and HTN who presents to the ED from home with complaint of shortness of breath for 3 days.  He reported he had flulike illness 2 weeks ago.   He has not been doing much exertion until 3 days ago when he got short of breath on climbing up stairs.  He has been taking all of his medication.  ED labs revealed elevated creatinine of 1.76 (baseline 0.8), BNP elevated at 173.  Exam:  Normal breath sounds.    7/6 Admit to Observation for evaluation and treatment of ROSI:  Check  CT for signs of fluid overload. If CT negative, will give bolus of NSS followed by maintenance fluids. Daily weight, I/O.      7/7 1:28 AM Internal Medicine:  Notified by RN patient complaining of shortness of breath, now 89% on room air, patient did feel some chest tightness when he was moving between the chair and bed in his room. VSS. Review of CT chest from this evening revealing mild pulmonary edema, pulmonary nodules, unsure if infectious versus inflammatory. Unsure of hypoxia at this time could be secondary to undiagnosed MIRIAM or even obesity hypoventilation syndrome. Patient has been placed on 3 L, will continue for now and add as needed albuterol inhaler for shortness of breath.  Recheck EKG and troponin.  Nephrology consult:  ROSI. Previously known baseline creatinine is around 0.8.  Her admission creatinine was 1.76.  Admission urine analysis also showed some microscopic hematuria with urine RBC 10-20 / hpf.  Urine protein to creatinine ratio was unable to calculate since patient's urine creatinine was less than 1 and urine random protein was less than 4 which I think it is inaccurate and on urine analysis patient was found to have 3+ proteinuria.  Patient was admitted with shortness of breath and underwent CT scan of chest on admission which showed mild pulmonary edema but patient was also found to have numerous 1 to 5 mm solid nodules which can be from infection versus inflammation versus pulmonary edema.   Plan to recheck urine analysis to reassess microscopic hematuria today.  Will also plan to check urine lites and urine microalbumin to creatinine ratio today.  Plan to check renal ultrasound to rule out any structural lesion. Agree with checking complements, anti-double-stranded DNA, ANCA panel, anti-GBM and hepatitis panel.  Recheck renal function with a.m. lab. Blood pressures currently acceptable and benazepril along with hydrochlorothiazide is currently on hold in the light of ORSI.  Plan to monitor  hypertension with propranolol 60 mg p.o. daily and amlodipine 10 mg p.o. daily today. Internal Medicine:  Creatinine down trended to 1.6. Patient reports till having significant shortness of breath and cough, significant mucous production with occasional red tinted phlegm. Currently requiring supplemental O2, wean as tolerated. Check procalcitonin.     7/8 Nephrology: Patient remains short of breath requiring oxygen.  Acute kidney injury seems to be stable for now.  Etiology unclear.  Proteinuria, also low complements suggesting possibility of glomerulonephritis.  Discussed with the patient at length about possible kidney biopsy.  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  Further workup of pulmonary nephritis still pending. Patient also short of breath may require steroid as a part of the treatment.  Will prefer to hold it until biopsy. Hypertension: Reasonable well-controlled.  Will monitor closely.  Once kidney function stable may start ACE inhibitor,  Pulmonology consult:  Acute hypoxemic respiratory failure in the setting of abnormal chest CT suspect inflammatory/auto-immune process vs less likely infectious process. Currently requiring 3 L nasal cannula, no O2 requirement at baseline. Also with ROSI, procalcitonin has been normal, no leukocytosis and he is afebrile. CT scan shows numerous 1 to 5 mm solid nodules. Recently had flulike symptoms with bodyaches and then developed significant shortness of breath about 3 days ago with some streaky hemoptysis. Plan for renal biopsy tomorrow and then initiation of steroids. Autoimmune serology is ordered and most labs are still pending, complements are low. Will add on tick born illness testing as well. Would benefit from bronchoscopy to rule out infectious source especially if he will need prolonged steroids. Will plan to do this on Wednesday given the kidney biopsy will be tomorrow. Would hold on any antibiotics for now.   Exam:   AOX3. Normal breath sounds. Interventional Radiology consult:  Plan for IR CT-guided renal biopsy.  Date/time to be determined per IR availability and patient status (tentative 7/9/2024). PT/INR ordered- (Target INR less than 1.5). NPO after midnight. Hold AM dose SQ Heparin.           ED Triage Vitals   Temperature Pulse Respirations Blood Pressure SpO2 Pain Score   07/06/24 1003 07/06/24 1003 07/06/24 1003 07/06/24 1003 07/06/24 1003 07/06/24 1553   98.6 °F (37 °C) 84 18 157/92 100 % No Pain     Weight (last 2 days)       Date/Time Weight    07/08/24 1454 125 (275)    07/08/24 0600 125 (275.58)    07/07/24 0812 122 (268.96)    07/07/24 0600 123 (270.73)    07/06/24 1544 121 (265.88)            Vital Signs (last 3 days)       Date/Time Temp Pulse Resp BP MAP (mmHg) SpO2 Calculated FIO2 (%) - Nasal Cannula Nasal Cannula O2 Flow Rate (L/min) O2 Device    07/08/24 1515 -- -- -- 164/82 96 -- -- -- --    07/08/24 1454 -- 103 -- 161/96 -- -- -- -- --    07/08/24 08:20:59 -- 103 -- 161/96 118 90 % -- -- --    07/08/24 0100 -- -- -- -- -- 100 % 32 3 L/min Nasal cannula    07/07/24 23:45:16 99.6 °F (37.6 °C) 90 18 130/75 93 94 % -- -- --    07/07/24 16:41:29 99.1 °F (37.3 °C) 86 -- 130/75 93 93 % -- -- --    07/07/24 1605 -- -- -- -- -- -- -- -- --    07/07/24 0900 -- -- -- -- -- 91 % 32 3 L/min Nasal cannula    07/07/24 08:50:13 98.5 °F (36.9 °C) 90 -- 149/86 107 89 % -- -- --    07/07/24 0740 -- -- -- -- -- -- -- -- None (Room air)    07/07/24 0130 -- -- -- -- -- -- -- -- --    07/07/24 0120 100 °F (37.8 °C) -- 20 152/86 108 -- 32 3 L/min Nasal cannula    07/06/24 22:24:41 99.3 °F (37.4 °C) -- -- 154/85 108 -- -- -- --    07/06/24 1930 -- -- -- -- -- -- -- -- --    07/06/24 15:53:30 97.8 °F (36.6 °C) 78 -- 136/87 103 96 % -- -- None (Room air)    07/06/24 15:53:03 97.8 °F (36.6 °C) 74 -- 136/87 103 97 % -- -- --    07/06/24 1400 -- 74 18 146/76 107 98 % -- -- None (Room air)    07/06/24 1300 -- 80 18 131/74 96 99 % -- --  None (Room air)    07/06/24 1245 -- 83 18 126/58 83 99 % -- -- None (Room air)    07/06/24 1104 -- -- -- -- -- -- -- -- None (Room air)    07/06/24 1003 98.6 °F (37 °C) 84 18 157/92 -- 100 % -- -- None (Room air)              Pertinent Labs/Diagnostic Test Results:       Radiology:    US kidney and bladder   Final Interpretation by Estelle Talavera MD (07/07 1545)   Unremarkable renal sonogram   No hydronephrosis   Additional imaging to be based on clinical evaluation      Workstation performed: [id number]         CT chest wo contrast   Final Interpretation by Lopez Shelton MD (07/06 1522)      Mild pulmonary edema and trace bilateral pleural effusions.      Numerous 1 to 5 mm solid nodules in a similar distribution to the pulmonary edema, which can rarely be seen as a manifestation of pulmonary edema. Findings could also be infectious/inflammatory. Metastatic disease is unlikely given the patient's age and    lack of known malignancy. Follow-up chest CT in 3 months is recommended.      The study was marked in EPIC for immediate notification.               Workstation performed: [id number]         XR chest 2 views   Final Interpretation by Betsy Clarke MD (07/06 1115)      Low lung volumes producing vascular crowding with no acute disease.            Workstation performed: [id number]         IR biopsy kidney random    (Results Pending)     Cardiology:      ECG 12 lead   Final Result by Bart Gregory MD (07/07 1036)   Normal sinus rhythm   When compared with ECG of 07-JUL-2024 01:33, (unconfirmed)   No significant change was found   Confirmed by Bart Gregory ([id number]) on 7/7/2024 10:36:38 AM      ECG 12 lead   Final Result by Bart Gregory MD (07/07 1036)   Normal sinus rhythm   When compared with ECG of 06-JUL-2024 10:10, (unconfirmed)   No significant change was found   Confirmed by Bart Gregory ([id number]) on 7/7/2024 10:36:42 AM      7/6 ECG 12 lead   Final Result by Bart Gregory MD (07/07 1046)    Normal sinus rhythm   Rightward axis   Borderline ECG   When compared with ECG of 23-JUN-2022 17:00,   No significant change was found   Confirmed by Bart Gregory ([id number]) on 7/7/2024 10:46:39 AM            Results from last 7 days   Lab Units 07/06/24  1009   SARS-COV-2  Negative     Results from last 7 days   Lab Units 07/08/24  0510 07/07/24  0607 07/06/24  1408 07/06/24  1115   WBC Thousand/uL 6.58 6.66  --  4.05*   HEMOGLOBIN g/dL 12.0 11.4*  --  12.2   HEMATOCRIT % 35.8* 33.1*  --  36.1*   PLATELETS Thousands/uL 226 223 188 239   TOTAL NEUT ABS Thousands/µL 4.37 4.40  --  2.22         Results from last 7 days   Lab Units 07/08/24 0510 07/07/24 0607 07/06/24 1917 07/06/24  1115   SODIUM mmol/L 131* 135 139 137   POTASSIUM mmol/L 4.9 4.7 4.6 5.2   CHLORIDE mmol/L 101 107 108 107   CO2 mmol/L 22 22 24 23   ANION GAP mmol/L 8 6 7 7   BUN mg/dL 38* 43* 44* 48*   CREATININE mg/dL 1.51* 1.60* 1.67* 1.76*   EGFR ml/min/1.73sq m 61 57 54 51   CALCIUM mg/dL 8.7 8.6 8.9 8.8   MAGNESIUM mg/dL  --  2.3  --   --    PHOSPHORUS mg/dL  --  4.9*  --   --      Results from last 7 days   Lab Units 07/07/24 0607 07/06/24  1115   AST U/L 16 16   ALT U/L 24 22   ALK PHOS U/L 71 68   TOTAL PROTEIN g/dL 7.5 7.7   ALBUMIN g/dL 3.3* 3.4*   TOTAL BILIRUBIN mg/dL 0.63 0.50         Results from last 7 days   Lab Units 07/08/24 0510 07/07/24  0607 07/06/24 1917 07/06/24  1115   GLUCOSE RANDOM mg/dL 95 94 104 94         Results from last 7 days   Lab Units 07/06/24  1917   CK TOTAL U/L 44     Results from last 7 days   Lab Units 07/06/24  1527 07/06/24  1408 07/06/24  1115   HS TNI 0HR ng/L  --   --  3   HS TNI 2HR ng/L  --  <2  --    HSTNI D2 ng/L  --  <-1  --    HS TNI 4HR ng/L <2  --   --    HSTNI D4 ng/L <-1  --   --            Results from last 7 days   Lab Units 07/07/24  0607   PROCALCITONIN ng/ml 0.11                 Results from last 7 days   Lab Units 07/06/24  1115   BNP pg/mL 173*              Latest Reference Range &  Units 07/07/24 11:18   EXT Creatinine Urine Reference range not established. mg/dL 139.8   Albumin Creat Ratio 0 - 30 mg/g creatinine 2,170 (H)   Albumin,U,Random <20.0 mg/L 3,033.6 (H)   (H): Data is abnormally high      Results from last 7 days   Lab Units 07/07/24  1118 07/06/24  1408   CLARITY UA  Clear Clear   COLOR UA  Light Yellow Light Yellow   SPEC GRAV UA  1.019 1.018   PH UA  6.0 6.0   GLUCOSE UA mg/dl Negative Negative   KETONES UA mg/dl Negative Negative   BLOOD UA  Large* Moderate*   PROTEIN UA mg/dl 300 (3+)* 300 (3+)*   NITRITE UA  Negative Negative   BILIRUBIN UA  Negative Negative   UROBILINOGEN UA (BE) mg/dl <2.0 <2.0   LEUKOCYTES UA  Negative Negative   WBC UA /hpf 10-20* 4-10*   RBC UA /hpf 10-20* 10-20*   BACTERIA UA /hpf None Seen None Seen   EPITHELIAL CELLS WET PREP /hpf None Seen Occasional   SODIUM UR  17  --    CREATININE UR mg/dL 139.8 <1.0   PROTEIN UR mg/dL  --  <4     Results from last 7 days   Lab Units 07/06/24  1009   INFLUENZA A PCR  Negative   INFLUENZA B PCR  Negative   RSV PCR  Negative             ED Treatment-Medication Administration from 07/06/2024 0954 to 07/06/2024 1540         Date/Time Order Dose Route Action     07/06/2024 1116 ipratropium-albuterol (DUO-NEB) 0.5-2.5 mg/3 mL inhalation solution 3 mL 3 mL Nebulization Given     07/06/2024 1348 heparin (porcine) subcutaneous injection 7,500 Units 7,500 Units Subcutaneous Given     07/06/2024 1348 levothyroxine tablet 50 mcg 50 mcg Oral Given     07/06/2024 1348 sodium chloride 0.9 % bolus 500 mL 500 mL Intravenous New Bag            Past Medical History:   Diagnosis Date    Anxiety     Hypertension      Present on Admission:   Essential hypertension   Hypothyroidism   ROSI (acute kidney injury) (HCC)   Palpitations      Admitting Diagnosis: SOB (shortness of breath) [R06.02]  ROSI (acute kidney injury) (HCC) [N17.9]  Age/Sex: 29 y.o. male      Admission Orders:        Scheduled Medications:      amLODIPine, 10 mg, Oral,  Daily  heparin (porcine), 7,500 Units, Subcutaneous, Q8H YADIRA  levothyroxine, 50 mcg, Oral, Daily  propranolol, 60 mg, Oral, Daily      Continuous IV Infusions: None.      PRN Meds:    acetaminophen, 650 mg, Oral, Q6H PRN  albuterol, 2 puff, Inhalation, Q4H PRN x 1 dose 7/8 thus far  lidocaine, 2 patch, Topical, Daily PRN  ondansetron, 4 mg, Intravenous, Q8H PRN x 1 dose 7/7         IP CONSULT TO NEPHROLOGY  IP CONSULT TO PULMONOLOGY  INPATIENT CONSULT TO IR    Network Utilization Review Department  ATTENTION: Please call with any questions or concerns to [phone] and carefully listen to the prompts so that you are directed to the right person. All voicemails are confidential.   For Discharge needs, contact Care Management DC Support Team at [phone] opt. [phone]  Send all requests for admission clinical reviews, approved or denied determinations and any other requests to dedicated fax number below belonging to the campus where the patient is receiving treatment. List of dedicated fax numbers for the Facilities:  FACILITY NAME UR FAX NUMBER   ADMISSION DENIALS (Administrative/Medical Necessity) [phone]   DISCHARGE SUPPORT TEAM (NETWORK) [phone]   PARENT CHILD HEALTH (Maternity/NICU/Pediatrics) [phone]   [facility] [phone]   [facility] [phone]   [facility] [phone]   [facility] [phone]   [facility] [phone]   [facility] [phone]   [facility] [phone]   [facility] [phone]   [facility] [phone]   [facility] [phone]   [facility] [phone]   [facility] [place] [phone]

## 2024-07-08 NOTE — PROGRESS NOTES
[facility]  Progress Note  Name: Ben Daniel I  MRN: [id number]  Unit/Bed#: -01 I Date of Admission: 7/6/2024   Date of Service: 7/8/2024 I Hospital Day: 0    Assessment & Plan   * ROSI (acute kidney injury) (HCC)  Assessment & Plan  Baseline creatinine is 0.8  Creatinine on admission 1.76, down trended to 1.51  He reported he has flulike illness starting approximately 2 weeks ago took Tylenol.  Has not been doing much until 3 days ago when he started having some shortness of breath on climbing up stairs  On examination, no signs of fluid overload no JVD, and inspiratory crackles and pedal edema  Chest x-ray looks similar to prior when  BNP elevated in the setting of ROSI  Possibly patient had ROSI due to decreased oral intake of fluids compounded by concomitant ankle hydrochlorothiazide and ACE inhibitor's  CT chest showed mild pulmonary edema with significant increase in pulmonary nodules, pulmonary nodules may be infectious/inflammatory origin  Avoid nephrotoxic medications, I's and O's, daily weight  Nephrology following-renal ultrasound showed no acute findings, complements normal 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    SOB (shortness of breath)  Assessment & Plan  Flu/RSV/COVID-negative  Currently requiring supplemental O2, wean as tolerated  Patient reports significant mucus production with occasional red tinted phlegm  Procalcitonin 0.11  Chest x-ray shows pulmonary nodules likely secondary to inflammation versus infectious etiology, mild pulmonary edema  Possible pulmonary renal involvement, likely inflammatory/autoimmune process versus less likely infectious based  Consulted pulm-will likely need steroids, may elect to start after renal biopsy, will benefit from bronchoscopy which is scheduled Wednesday    Hypothyroidism  Assessment & Plan  Patient has not been  compliant to levothyroxine  Counseling has been done at length  Restart levothyroxine 50 mcg    Palpitations  Assessment & Plan  Continue with Inderal    Essential hypertension  Assessment & Plan  Continue with amlodipine, Inderal hold hydrochlorothiazide and lisinopril             VTE Pharmacologic Prophylaxis:   High Risk (Score >/= 5) - Pharmacological DVT Prophylaxis Ordered: enoxaparin (Lovenox). Sequential Compression Devices Ordered.    Mobility:   Basic Mobility Inpatient Raw Score: 23  JH-HLM Goal: 7: Walk 25 feet or more  JH-HLM Achieved: 7: Walk 25 feet or more  JH-HLM Goal achieved. Continue to encourage appropriate mobility.    Patient Centered Rounds: I performed bedside rounds with nursing staff today.   Discussions with Specialists or Other Care Team Provider: As CM, pulm, nephrology    Education and Discussions with Family / Patient: Patient declined call to .     Total Time Spent on Date of Encounter in care of patient: 35 mins. This time was spent on one or more of the following: performing physical exam; counseling and coordination of care; obtaining or reviewing history; documenting in the medical record; reviewing/ordering tests, medications or procedures; communicating with other healthcare professionals and discussing with patient's family/caregivers.    Current Length of Stay: 0 day(s)  Current Patient Status: Inpatient   Certification Statement: The patient will continue to require additional inpatient hospital stay due to continue to evaluate for pulmonary renal syndrome, renal biopsy tomorrow, [facility] Wednesday  Discharge Plan: Anticipate discharge in >72 hrs to home.    Code Status: Level 1 - Full Code    Subjective:   Patient reports having significant shortness of breath.  Currently denies any chest pain/pressure, palpitations, lightheadedness, nausea, or chills.    Objective:     Vitals:   Temp (24hrs), Av.4 °F (37.4 °C), Min:99.1 °F (37.3 °C), Max:99.6 °F (37.6  °C)    Temp:  [99.1 °F (37.3 °C)-99.6 °F (37.6 °C)] 99.6 °F (37.6 °C)  HR:  [] 103  Resp:  [18] 18  BP: (130-161)/(75-96) 161/96  SpO2:  [90 %-100 %] 90 %  Body mass index is 43.72 kg/m².     Input and Output Summary (last 24 hours):     Intake/Output Summary (Last 24 hours) at 7/8/2024 1509  Last data filed at 7/8/2024 1300  Gross per 24 hour   Intake 1200 ml   Output 0 ml   Net 1200 ml       Physical Exam:   Physical Exam  Vitals and nursing note reviewed.   Constitutional:       Appearance: He is obese.   HENT:      Head: Normocephalic.      Nose: Nose normal.      Mouth/Throat:      Mouth: Mucous membranes are moist.      Pharynx: Oropharynx is clear.   Eyes:      General: No scleral icterus.     Conjunctiva/sclera: Conjunctivae normal.      Pupils: Pupils are equal, round, and reactive to light.   Cardiovascular:      Rate and Rhythm: Normal rate and regular rhythm.      Heart sounds: No murmur heard.     No friction rub. No gallop.   Pulmonary:      Effort: Pulmonary effort is normal. No respiratory distress.      Breath sounds: Normal breath sounds. No stridor. No wheezing, rhonchi or rales.   Abdominal:      General: Abdomen is flat.      Palpations: Abdomen is soft.   Musculoskeletal:         General: Normal range of motion.      Cervical back: Normal range of motion and neck supple.      Right lower leg: No edema.      Left lower leg: No edema.   Lymphadenopathy:      Cervical: No cervical adenopathy.   Skin:     General: Skin is warm.      Coloration: Skin is not jaundiced or pale.      Findings: No bruising, erythema or lesion.   Neurological:      General: No focal deficit present.      Mental Status: He is alert and oriented to person, place, and time. Mental status is at baseline.      Cranial Nerves: No cranial nerve deficit.      Motor: No weakness.   Psychiatric:         Mood and Affect: Mood normal.         Behavior: Behavior normal.         Thought Content: Thought content normal.           Additional Data:     Labs:  Results from last 7 days   Lab Units 07/08/24  0510   WBC Thousand/uL 6.58   HEMOGLOBIN g/dL 12.0   HEMATOCRIT % 35.8*   PLATELETS Thousands/uL 226   SEGS PCT % 65   LYMPHO PCT % 17   MONO PCT % 16*   EOS PCT % 0     Results from last 7 days   Lab Units 07/08/24  0510 07/07/24  0607   SODIUM mmol/L 131* 135   POTASSIUM mmol/L 4.9 4.7   CHLORIDE mmol/L 101 107   CO2 mmol/L 22 22   BUN mg/dL 38* 43*   CREATININE mg/dL 1.51* 1.60*   ANION GAP mmol/L 8 6   CALCIUM mg/dL 8.7 8.6   ALBUMIN g/dL  --  3.3*   TOTAL BILIRUBIN mg/dL  --  0.63   ALK PHOS U/L  --  71   ALT U/L  --  24   AST U/L  --  16   GLUCOSE RANDOM mg/dL 95 94     Results from last 7 days   Lab Units 07/08/24  1420   INR  1.14             Results from last 7 days   Lab Units 07/07/24  0607   PROCALCITONIN ng/ml 0.11       Lines/Drains:  Invasive Devices       Peripheral Intravenous Line  Duration             Peripheral IV 07/06/24 Distal;Right;Upper;Ventral (anterior) Arm 2 days    Long-Dwell Peripheral IV (Midline) 07/08/24 Left Cephalic Vein <1 day                          Imaging: Reviewed radiology reports from this admission including: chest CT scan    Recent Cultures (last 7 days):         Last 24 Hours Medication List:   Current Facility-Administered Medications   Medication Dose Route Frequency Provider Last Rate    acetaminophen  650 mg Oral Q6H PRN Pauly Mathis PA-C      albuterol  2 puff Inhalation Q4H PRN Pauly Mathis PA-C      amLODIPine  10 mg Oral Daily Uriel Kline MD      heparin (porcine)  7,500 Units Subcutaneous Q8H [facility] Uriel Kline MD      levothyroxine  50 mcg Oral Daily Uriel Kline MD      lidocaine  2 patch Topical Daily PRN Pauly Mathis PA-C      ondansetron  4 mg Intravenous Q8H PRN Jose Maria Nobles PA-C      propranolol  60 mg Oral Daily Uriel Kline MD          Today, Patient Was Seen By: Jose Maria Nobles PA-C    **Please Note: This note may have been constructed using a voice  recognition system.**

## 2024-07-08 NOTE — ASSESSMENT & PLAN NOTE
Flu/RSV/COVID-negative  Currently requiring supplemental O2, wean as tolerated  Patient reports significant mucus production with occasional red tinted phlegm  Procalcitonin 0.11  Chest x-ray shows pulmonary nodules likely secondary to inflammation versus infectious etiology, mild pulmonary edema  Possible pulmonary renal involvement, likely inflammatory/autoimmune process versus less likely infectious based  Consulted pulm-will likely need steroids, may elect to start after renal biopsy, will benefit from bronchoscopy which is scheduled Wednesday

## 2024-07-08 NOTE — CONSULTS
Consultation - Pulmonary Medicine   Ben Daniel 29 y.o. male MRN: [id number]  Unit/Bed#: -01 Encounter: [id number]      Assessment:  Acute hypoxemic respiratory failure  Abnormal chest CT with multiple lung nodules  ROSI    Plan:   Acute hypoxemic respiratory failure in the setting of abnormal chest CT suspect inflammatory/auto-immune process vs less likely infectious process  Currently requiring 3 L nasal cannula, no O2 requirement at baseline  Also with ROSI, procalcitonin has been normal, no leukocytosis and he is afebrile  CT scan shows numerous 1 to 5 mm solid nodules  Recently had flulike symptoms with bodyaches and then developed significant shortness of breath about 3 days ago with some streaky hemoptysis  Plan for renal biopsy tomorrow and then initiation of steroids  Autoimmune serology is ordered and most labs are still pending, complements are low  Will add on tick born illness testing as well  Would benefit from bronchoscopy to rule out infectious source especially if he will need prolonged steroids  Will plan to do this on Wednesday given the kidney biopsy will be tomorrow  Would hold on any antibiotics for now  Will continue to follow    History of Present Illness   Physician Requesting Consult: Valdemar Reddy MD  Reason for Consult / Principal Problem: Hypoxia  Hx and PE limited by: none  HPI: Ben Daniel is a 29 y.o. year old male non-smoker with past medical history of anxiety, hypertension who presents with complaint of shortness of breath that began about 3 days ago.  He reports having had flulike illness about 3 weeks ago with bodyaches, headache, backache.  This did improve overall although he has noted some wrist pain the last several days.  About 3 days ago began to have increasing shortness of breath/dyspnea on exertion and inability to go even short distances, up the stairs.  He is also coughing up small amounts of blood.  No recent travel, no known tick bites.  He has not noted  any rashes.    He denies any prior pulmonary history.  Not a smoker, no vaping.  No previous history of asthma.  He does have a dog at home as well as a bearded dragon.    Inpatient consult to Pulmonology  Consult performed by: Charlie Castano PA-C  Consult ordered by: Jose Maria Nobles PA-C          Review of Systems   Constitutional: Negative.    HENT: Negative.     Respiratory:  Positive for cough and shortness of breath.    Cardiovascular: Negative.    Gastrointestinal: Negative.    Genitourinary: Negative.    Musculoskeletal:  Positive for arthralgias and myalgias.   Skin: Negative.    Allergic/Immunologic: Negative.    Neurological: Negative.    Psychiatric/Behavioral: Negative.         Historical Information   Past Medical History:   Diagnosis Date    Anxiety     Hypertension      Past Surgical History:   Procedure Laterality Date    ANKLE SURGERY      FETAL SURGERY FOR CONGENITAL HERNIA       Social History   Social History     Substance and Sexual Activity   Alcohol Use Never    Alcohol/week: 1.0 standard drink of alcohol    Types: 1 Cans of beer per week     Social History     Substance and Sexual Activity   Drug Use Never     E-Cigarette/Vaping    E-Cigarette Use Never User      E-Cigarette/Vaping Substances     Social History     Tobacco Use   Smoking Status Never   Smokeless Tobacco Never     Occupational History:     Family History:   Family History   Problem Relation Age of Onset    Heart failure Maternal Grandmother     Heart failure Maternal Grandfather        Meds/Allergies   all current active meds have been reviewed, pertinent pulmonary meds have been reviewed, and current meds:   Current Facility-Administered Medications   Medication Dose Route Frequency    acetaminophen (TYLENOL) tablet 650 mg  650 mg Oral Q6H PRN    albuterol (PROVENTIL HFA,VENTOLIN HFA) inhaler 2 puff  2 puff Inhalation Q4H PRN    amLODIPine (NORVASC) tablet 10 mg  10 mg Oral Daily    heparin (porcine) subcutaneous  "injection 7,500 Units  7,500 Units Subcutaneous Q8H [facility]    levothyroxine tablet 50 mcg  50 mcg Oral Daily    lidocaine (LIDODERM) 5 % patch 2 patch  2 patch Topical Daily PRN    ondansetron (ZOFRAN) injection 4 mg  4 mg Intravenous Q8H PRN    propranolol (INDERAL LA) 24 hr capsule 60 mg  60 mg Oral Daily       No Known Allergies    Objective   Vitals: Blood pressure 161/96, pulse 103, temperature 99.6 °F (37.6 °C), temperature source Oral, resp. rate 18, height 5' 6.5\" (1.689 m), weight 125 kg (275 lb 9.2 oz), SpO2 90%.,Body mass index is 43.81 kg/m².    Intake/Output Summary (Last 24 hours) at 7/8/2024 1138  Last data filed at 7/8/2024 0900  Gross per 24 hour   Intake 720 ml   Output 0 ml   Net 720 ml     Invasive Devices       Peripheral Intravenous Line  Duration             Peripheral IV 07/06/24 Distal;Right;Upper;Ventral (anterior) Arm 1 day                    Physical Exam  Vitals reviewed.   Constitutional:       General: He is not in acute distress.     Appearance: Normal appearance. He is obese. He is not ill-appearing.   HENT:      Head: Normocephalic and atraumatic.      Mouth/Throat:      Pharynx: Oropharynx is clear.   Eyes:      Conjunctiva/sclera: Conjunctivae normal.   Cardiovascular:      Rate and Rhythm: Normal rate and regular rhythm.   Pulmonary:      Effort: Pulmonary effort is normal. No respiratory distress.      Breath sounds: Normal breath sounds. No decreased breath sounds, wheezing, rhonchi or rales.   Abdominal:      General: Abdomen is flat. There is no distension.   Musculoskeletal:         General: Normal range of motion.      Cervical back: Normal range of motion.      Right lower leg: No edema.      Left lower leg: No edema.   Skin:     General: Skin is warm and dry.   Neurological:      Mental Status: He is alert and oriented to person, place, and time.   Psychiatric:         Mood and Affect: Mood normal.         Behavior: Behavior normal.         Lab Results: I have personally " reviewed pertinent lab results., CBC:   Lab Results   Component Value Date    WBC 6.58 07/08/2024    HGB 12.0 07/08/2024    HCT 35.8 (L) 07/08/2024    MCV 82 07/08/2024     07/08/2024    RBC 4.35 07/08/2024    MCH 27.6 07/08/2024    MCHC 33.5 07/08/2024    RDW 11.9 07/08/2024    MPV 8.8 (L) 07/08/2024    NRBC 0 07/08/2024   , CMP:   Lab Results   Component Value Date    SODIUM 131 (L) 07/08/2024    K 4.9 07/08/2024     07/08/2024    CO2 22 07/08/2024    BUN 38 (H) 07/08/2024    CREATININE 1.51 (H) 07/08/2024    CALCIUM 8.7 07/08/2024    EGFR 61 07/08/2024     Imaging Studies: I have personally reviewed pertinent reports.   and I have personally reviewed pertinent films in PACS  EKG, Pathology, and Other Studies: I have personally reviewed pertinent reports.    VTE Prophylaxis: Sequential compression device (Venodyne)  and Heparin    Code Status: Level 1 - Full Code  Advance Directive and Living Will:      Power of :    POLST:

## 2024-07-08 NOTE — PLAN OF CARE
Problem: PAIN - ADULT  Goal: Verbalizes/displays adequate comfort level or baseline comfort level  Description: Interventions:  - Encourage patient to monitor pain and request assistance  - Assess pain using appropriate pain scale  - Administer analgesics based on type and severity of pain and evaluate response  - Implement non-pharmacological measures as appropriate and evaluate response  - Consider cultural and social influences on pain and pain management  - Notify physician/advanced practitioner if interventions unsuccessful or patient reports new pain  Outcome: Progressing     Problem: INFECTION - ADULT  Goal: Absence or prevention of progression during hospitalization  Description: INTERVENTIONS:  - Assess and monitor for signs and symptoms of infection  - Monitor lab/diagnostic results  - Monitor all insertion sites, i.e. indwelling lines, tubes, and drains  - Monitor endotracheal if appropriate and nasal secretions for changes in amount and color  - [place] appropriate cooling/warming therapies per order  - Administer medications as ordered  - Instruct and encourage patient and family to use good hand hygiene technique  - Identify and instruct in appropriate isolation precautions for identified infection/condition  Outcome: Progressing  Goal: Absence of fever/infection during neutropenic period  Description: INTERVENTIONS:  - Monitor WBC    Outcome: Progressing     Problem: SAFETY ADULT  Goal: Patient will remain free of falls  Description: INTERVENTIONS:  - Educate patient/family on patient safety including physical limitations  - Instruct patient to call for assistance with activity   - Consult OT/PT to assist with strengthening/mobility   - Keep Call bell within reach  - Keep bed low and locked with side rails adjusted as appropriate  - Keep care items and personal belongings within reach  - Initiate and maintain comfort rounds  - Make Fall Risk Sign visible to staff  - Offer Toileting every 2 Hours,  in advance of need  - Apply yellow socks and bracelet for high fall risk patients  - Consider moving patient to room near nurses station  Outcome: Progressing  Goal: Maintain or return to baseline ADL function  Description: INTERVENTIONS:  -  Assess patient's ability to carry out ADLs; assess patient's baseline for ADL function and identify physical deficits which impact ability to perform ADLs (bathing, care of mouth/teeth, toileting, grooming, dressing, etc.)  - Assess/evaluate cause of self-care deficits   - Assess range of motion  - Assess patient's mobility; develop plan if impaired  - Assess patient's need for assistive devices and provide as appropriate  - Encourage maximum independence but intervene and supervise when necessary  - Involve family in performance of ADLs  - Assess for home care needs following discharge   - Consider OT consult to assist with ADL evaluation and planning for discharge  - Provide patient education as appropriate  Outcome: Progressing  Goal: Maintains/Returns to pre admission functional level  Description: INTERVENTIONS:  - Perform AM-PAC 6 Click Basic Mobility/ Daily Activity assessment daily.  - Set and communicate daily mobility goal to care team and patient/family/caregiver.   - Collaborate with rehabilitation services on mobility goals if consulted  - Perform Range of Motion 2 times a day.  - Reposition patient every 2 hours.  - Dangle patient 2 times a day  - Stand patient 2 times a day  - Ambulate patient 2 times a day  - Out of bed to chair 2 times a day   - Out of bed for meals 2 times a day  - Out of bed for toileting  - Record patient progress and toleration of activity level   Outcome: Progressing     Problem: DISCHARGE PLANNING  Goal: Discharge to home or other facility with appropriate resources  Description: INTERVENTIONS:  - Identify barriers to discharge w/patient and caregiver  - Arrange for needed discharge resources and transportation as appropriate  - Identify  discharge learning needs (meds, wound care, etc.)  - Arrange for interpretive services to assist at discharge as needed  - Refer to Case Management Department for coordinating discharge planning if the patient needs post-hospital services based on physician/advanced practitioner order or complex needs related to functional status, cognitive ability, or social support system  Outcome: Progressing     Problem: Knowledge Deficit  Goal: Patient/family/caregiver demonstrates understanding of disease process, treatment plan, medications, and discharge instructions  Description: Complete learning assessment and assess knowledge base.  Interventions:  - Provide teaching at level of understanding  - Provide teaching via preferred learning methods  Outcome: Progressing

## 2024-07-08 NOTE — MALNUTRITION/BMI
This medical record reflects one or more clinical indicators suggestive of morbid obesity.        BMI Findings:  Adult BMI Classifications: Morbid Obesity 40-44.9        Body mass index is 43.81 kg/m².     See Nutrition note dated 7/8/24 for additional details.  Completed nutrition assessment is viewable in the nutrition documentation.

## 2024-07-08 NOTE — CONSULTS
e-Consult (IPC)  - Interventional Radiology  Ben Daniel 29 y.o. male MRN: [id number]  Unit/Bed#: -01 Encounter: [id number]      Interventional Radiology has been consulted to evaluate Ben Daniel    We were consulted by Nephrology concerning this patient with ROSI and proteinuria.    Inpatient Consult to IR  Consult performed by: MARTIN Christine  Consult ordered by: Randy Wells MD      07/08/24    Assessment/Recommendation:     29 year old male presenting to the emergency department with complaints of shortness of breath.  Workup in the emergency department reveals acute kidney injury with elevated creatinine level of 1.76, GFR 51.    Past medical history includes hypertension.    Interventional radiology has been consulted for renal biopsy.    Reviewed laboratory results and diagnostic findings  Plan for IR CT-guided renal biopsy.  Date/time to be determined per IR availability and patient status (tentative 7/9/2024)  PT/INR ordered- (Target INR less than 1.5)  N.p.o. after midnight  Hold AM dose SQ Heparin   Remainder of care per nephrology and primary care service teams  Please do not hesitate to contact interventional radiology for questions/concerns.    11-20 minutes, >50% of the total time devoted to medical consultative verbal/EMR discussion between providers. Written report will be generated in the EMR.     Thank you for allowing Interventional Radiology to participate in the care of Ben Daniel.     MARTIN Christine

## 2024-07-08 NOTE — CASE MANAGEMENT
Case Management Assessment & Discharge Planning Note    Patient name Ben Daniel  Location /-01 MRN [id number]  : 1995 Date 2024       Current Admission Date: 2024  Current Admission Diagnosis:ROSI (acute kidney injury) (HCC)   Patient Active Problem List    Diagnosis Date Noted Date Diagnosed    SOB (shortness of breath) 2024     ROSI (acute kidney injury) (HCC) 2024     Other neutropenia (HCC) 2024     Nutritional anemia 2024     Abnormal white blood cell (WBC) 2024     Hypergammaglobulinemia 2024     Vitamin D deficiency 2023     Hypothyroidism 2023     Family history of cancer 2023     Insomnia 2023     Anxiety 2022     Morbid obesity with BMI of 40.0-44.9, adult (HCC) 2022     Palpitations 2022     Eczema 2022     Scrotal pain 2022     Acute right-sided low back pain without sciatica 2022     Angiokeratoma of scrotum 2022     Cervical strain 2021     Essential hypertension 2018     Fatigue 2018       LOS (days): 0  Geometric Mean LOS (GMLOS) (days):   Days to GMLOS:     OBJECTIVE:    Risk of Unplanned Readmission Score: 10.35         Current admission status: Inpatient       Preferred Pharmacy:   [facility] [place], [place] - [address]  [address] [facility] [place] [address]  Phone: [phone] Fax: [phone]    [facility] [address] - [address]  [facility],  [address]  [facility],  First Floor [facility] [place] [address]  Phone: [phone] Fax: [phone]    [facility] - [place], [place] - [address] [facility]  [address]  [place] [address]  Phone: [phone] Fax: [phone]    [facility] A[facility] [address] MAYO - [address]  [address]  [facility] [place] [address]  Phone: [phone] Fax: [phone]    Primary Care Provider: Marisela Oates  MARTIN    Primary Insurance: [address]  Secondary Insurance:     ASSESSMENT:  Active Health Care Proxies       Nevaeh Orozco Health Care Representative - Mother   Primary Phone: [phone] (Mobile)                 Advance Directives  Does patient have a Health Care POA?: No  Was patient offered paperwork?: Yes  Does patient currently have a Health Care decision maker?: Yes, please see Health Care Proxy section  Does patient have Advance Directives?: No  Was patient offered paperwork?: Yes  Primary Contact: Nevaeh (Mother)  [phone]    Readmission Root Cause  30 Day Readmission: No    Patient Information  Admitted from:: Home  Mental Status: Alert  During Assessment patient was accompanied by: Not accompanied during assessment  Assessment information provided by:: Patient  Primary Caregiver: Self  Support Systems: Self, Spouse/significant other, Son, Parent  County [place] Residence: [place]  What city do you live in?: [place]  Home entry access options. Select all that apply.: Stairs  Number of steps to enter home.: One Flight  Do the steps have railings?: Yes  Type of Current Residence: 2 story home  Upon entering residence, is there a bedroom on the main floor (no further steps)?: No  A bedroom is located on the following floor levels of residence (select all that apply):: Basement  Upon entering residence, is there a bathroom on the main floor (no further steps)?: Yes  Number of steps to basement from main floor: One Flight  Living Arrangements: Lives w/ Spouse/significant other, Lives w/ Parent(s), Lives w/ Son  Is patient a ?: No    Activities of Daily Living Prior to Admission  Functional Status: Independent  Completes ADLs independently?: Yes  Ambulates independently?: Yes  Does patient use assisted devices?: No  Does patient currently own DME?: No  Does patient have a history of Outpatient Therapy (PT/OT)?: No  Does the patient have a history of Short-Term Rehab?: No  Does patient  have a history of HHC?: No  Does patient currently have HHC?: No    Patient Information Continued  Income Source: Employed  Does patient have prescription coverage?: Yes  Does patient receive dialysis treatments?: No  Does patient have a history of substance abuse?: No  Does patient have a history of Mental Health Diagnosis?: No    Means of Transportation  Means of Transport to Appts:: Drives Self      Social Determinants of Health (SDOH)      Flowsheet Row Most Recent Value   Housing Stability    In the last 12 months, was there a time when you were not able to pay the mortgage or rent on time? N   In the past 12 months, how many times have you moved where you were living? 0   At any time in the past 12 months, were you homeless or living in a shelter (including now)? N   Transportation Needs    In the past 12 months, has lack of transportation kept you from medical appointments or from getting medications? no   In the past 12 months, has lack of transportation kept you from meetings, work, or from getting things needed for daily living? No   Food Insecurity    Within the past 12 months, you worried that your food would run out before you got the money to buy more. Never true   Within the past 12 months, the food you bought just didn't last and you didn't have money to get more. Never true   Utilities    In the past 12 months has the electric, gas, oil, or water company threatened to shut off services in your home? No            DISCHARGE DETAILS:    Discharge planning discussed with:: patient at bedside  Freedom of Choice: Yes  Comments - Freedom of Choice: CM maintained freedom of choice as it pertains to discharge planning. Patient reports plan to return home at discharge. No CM needs anticipated.  CM contacted family/caregiver?: No- see comments (pt independent)  Were Treatment Team discharge recommendations reviewed with patient/caregiver?: Yes  Did patient/caregiver verbalize understanding of patient care  needs?: Yes  Were patient/caregiver advised of the risks associated with not following Treatment Team discharge recommendations?: Yes    Requested Home Health Care         Is the patient interested in HHC at discharge?: No    DME Referral Provided  Referral made for DME?: No    Other Referral/Resources/Interventions Provided:  Interventions: Declines resources, None Indicated  Referral Comments: Patient is independent at baseline, works and drives. [facility] [address]. No CM needs anticipated other than potential need for home O2 if unable to wean, CM to continue to follow.    Would you like to participate in our [facility] service program?  : No - Declined    Treatment Team Recommendation: Home  Discharge Destination Plan:: Home  Transport at Discharge : Family

## 2024-07-08 NOTE — PLAN OF CARE
Problem: PAIN - ADULT  Goal: Verbalizes/displays adequate comfort level or baseline comfort level  Description: Interventions:  - Encourage patient to monitor pain and request assistance  - Assess pain using appropriate pain scale  - Administer analgesics based on type and severity of pain and evaluate response  - Implement non-pharmacological measures as appropriate and evaluate response  - Consider cultural and social influences on pain and pain management  - Notify physician/advanced practitioner if interventions unsuccessful or patient reports new pain  7/8/2024 0822 by Susana Jane RN  Outcome: Progressing  7/8/2024 0438 by Susana Jane RN  Outcome: Progressing     Problem: INFECTION - ADULT  Goal: Absence or prevention of progression during hospitalization  Description: INTERVENTIONS:  - Assess and monitor for signs and symptoms of infection  - Monitor lab/diagnostic results  - Monitor all insertion sites, i.e. indwelling lines, tubes, and drains  - Monitor endotracheal if appropriate and nasal secretions for changes in amount and color  - [place] appropriate cooling/warming therapies per order  - Administer medications as ordered  - Instruct and encourage patient and family to use good hand hygiene technique  - Identify and instruct in appropriate isolation precautions for identified infection/condition  7/8/2024 0822 by Susana Jane RN  Outcome: Progressing  7/8/2024 0438 by Susana Jane RN  Outcome: Progressing  Goal: Absence of fever/infection during neutropenic period  Description: INTERVENTIONS:  - Monitor WBC    7/8/2024 0822 by Susana Jane RN  Outcome: Progressing  7/8/2024 0438 by Susana Jane RN  Outcome: Progressing     Problem: SAFETY ADULT  Goal: Patient will remain free of falls  Description: INTERVENTIONS:  - Educate patient/family on patient safety including physical limitations  - Instruct patient to call for assistance with activity   - Consult  OT/PT to assist with strengthening/mobility   - Keep Call bell within reach  - Keep bed low and locked with side rails adjusted as appropriate  - Keep care items and personal belongings within reach  - Initiate and maintain comfort rounds  - Make Fall Risk Sign visible to staff  - Offer Toileting every 2 Hours, in advance of need  - Apply yellow socks and bracelet for high fall risk patients  - Consider moving patient to room near nurses station  7/8/2024 0822 by Susana Jane RN  Outcome: Progressing  7/8/2024 0438 by Susana Jane RN  Outcome: Progressing  Goal: Maintain or return to baseline ADL function  Description: INTERVENTIONS:  -  Assess patient's ability to carry out ADLs; assess patient's baseline for ADL function and identify physical deficits which impact ability to perform ADLs (bathing, care of mouth/teeth, toileting, grooming, dressing, etc.)  - Assess/evaluate cause of self-care deficits   - Assess range of motion  - Assess patient's mobility; develop plan if impaired  - Assess patient's need for assistive devices and provide as appropriate  - Encourage maximum independence but intervene and supervise when necessary  - Involve family in performance of ADLs  - Assess for home care needs following discharge   - Consider OT consult to assist with ADL evaluation and planning for discharge  - Provide patient education as appropriate  7/8/2024 0822 by Susana Jane RN  Outcome: Progressing  7/8/2024 0438 by Susana Jane RN  Outcome: Progressing  Goal: Maintains/Returns to pre admission functional level  Description: INTERVENTIONS:  - Perform AM-PAC 6 Click Basic Mobility/ Daily Activity assessment daily.  - Set and communicate daily mobility goal to care team and patient/family/caregiver.   - Collaborate with rehabilitation services on mobility goals if consulted  - Perform Range of Motion 2 times a day.  - Reposition patient every 2 hours.  - Dangle patient 2 times a day  - Stand  patient 2 times a day  - Ambulate patient 2 times a day  - Out of bed to chair 2 times a day   - Out of bed for meals 2 times a day  - Out of bed for toileting  - Record patient progress and toleration of activity level   7/8/2024 0822 by Susana Jane RN  Outcome: Progressing  7/8/2024 0438 by Susana Jane RN  Outcome: Progressing     Problem: DISCHARGE PLANNING  Goal: Discharge to home or other facility with appropriate resources  Description: INTERVENTIONS:  - Identify barriers to discharge w/patient and caregiver  - Arrange for needed discharge resources and transportation as appropriate  - Identify discharge learning needs (meds, wound care, etc.)  - Arrange for interpretive services to assist at discharge as needed  - Refer to Case Management Department for coordinating discharge planning if the patient needs post-hospital services based on physician/advanced practitioner order or complex needs related to functional status, cognitive ability, or social support system  7/8/2024 0822 by Susana Jane RN  Outcome: Progressing  7/8/2024 0438 by Susana Jane RN  Outcome: Progressing     Problem: Knowledge Deficit  Goal: Patient/family/caregiver demonstrates understanding of disease process, treatment plan, medications, and discharge instructions  Description: Complete learning assessment and assess knowledge base.  Interventions:  - Provide teaching at level of understanding  - Provide teaching via preferred learning methods  7/8/2024 0822 by Susana Jane RN  Outcome: Progressing  7/8/2024 0438 by Susana Jane RN  Outcome: Progressing

## 2024-07-08 NOTE — ASSESSMENT & PLAN NOTE
Baseline creatinine is 0.8  Creatinine on admission 1.76, down trended to 1.51  He reported he has flulike illness starting approximately 2 weeks ago took Tylenol.  Has not been doing much until 3 days ago when he started having some shortness of breath on climbing up stairs  On examination, no signs of fluid overload no JVD, and inspiratory crackles and pedal edema  Chest x-ray looks similar to prior when  BNP elevated in the setting of ROSI  Possibly patient had ROSI due to decreased oral intake of fluids compounded by concomitant ankle hydrochlorothiazide and ACE inhibitor's  CT chest showed mild pulmonary edema with significant increase in pulmonary nodules, pulmonary nodules may be infectious/inflammatory origin  Avoid nephrotoxic medications, I's and O's, daily weight  Nephrology following-renal ultrasound showed no acute findings, complements normal 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

## 2024-07-09 ENCOUNTER — APPOINTMENT (INPATIENT)
Dept: CT IMAGING | Facility: HOSPITAL | Age: 29
DRG: 133 | End: 2024-07-09
Payer: COMMERCIAL

## 2024-07-09 ENCOUNTER — APPOINTMENT (INPATIENT)
Dept: RADIOLOGY | Facility: HOSPITAL | Age: 29
DRG: 133 | End: 2024-07-09
Payer: COMMERCIAL

## 2024-07-09 PROBLEM — J96.01 ACUTE RESPIRATORY FAILURE WITH HYPOXIA (HCC): Status: ACTIVE | Noted: 2024-07-09

## 2024-07-09 LAB
ANION GAP SERPL CALCULATED.3IONS-SCNC: 8 MMOL/L (ref 4–13)
BUN SERPL-MCNC: 32 MG/DL (ref 5–25)
CALCIUM SERPL-MCNC: 8 MG/DL (ref 8.4–10.2)
CHLORIDE SERPL-SCNC: 98 MMOL/L (ref 96–108)
CO2 SERPL-SCNC: 22 MMOL/L (ref 21–32)
CREAT SERPL-MCNC: 1.19 MG/DL (ref 0.6–1.3)
CREAT UR-MCNC: 14.4 MG/DL
CRP SERPL QL: 180.3 MG/L
DSDNA AB SER-ACNC: 1 IU/ML (ref 0–9)
ERYTHROCYTE [DISTWIDTH] IN BLOOD BY AUTOMATED COUNT: 11.9 % (ref 11.6–15.1)
ERYTHROCYTE [DISTWIDTH] IN BLOOD BY AUTOMATED COUNT: 11.9 % (ref 11.6–15.1)
GBM AB SER IA-ACNC: <0.2 UNITS (ref 0–0.9)
GFR SERPL CREATININE-BSD FRML MDRD: 82 ML/MIN/1.73SQ M
GLUCOSE SERPL-MCNC: 101 MG/DL (ref 65–140)
HCT VFR BLD AUTO: 34.6 % (ref 36.5–49.3)
HCT VFR BLD AUTO: 35.3 % (ref 36.5–49.3)
HGB BLD-MCNC: 11.9 G/DL (ref 12–17)
HGB BLD-MCNC: 12 G/DL (ref 12–17)
L PNEUMO1 AG UR QL IA.RAPID: NEGATIVE
MCH RBC QN AUTO: 27.1 PG (ref 26.8–34.3)
MCH RBC QN AUTO: 27.5 PG (ref 26.8–34.3)
MCHC RBC AUTO-ENTMCNC: 33.7 G/DL (ref 31.4–37.4)
MCHC RBC AUTO-ENTMCNC: 34.7 G/DL (ref 31.4–37.4)
MCV RBC AUTO: 79 FL (ref 82–98)
MCV RBC AUTO: 80 FL (ref 82–98)
PLATELET # BLD AUTO: 256 THOUSANDS/UL (ref 149–390)
PLATELET # BLD AUTO: 278 THOUSANDS/UL (ref 149–390)
PMV BLD AUTO: 8.6 FL (ref 8.9–12.7)
PMV BLD AUTO: 8.8 FL (ref 8.9–12.7)
POTASSIUM SERPL-SCNC: 4.5 MMOL/L (ref 3.5–5.3)
PROT UR-MCNC: 68 MG/DL
PROT/CREAT UR: 4.72 MG/G{CREAT} (ref 0–0.1)
RBC # BLD AUTO: 4.37 MILLION/UL (ref 3.88–5.62)
RBC # BLD AUTO: 4.39 MILLION/UL (ref 3.88–5.62)
RHEUMATOID FACT SER QL LA: NEGATIVE
S PNEUM AG UR QL: NEGATIVE
SODIUM SERPL-SCNC: 128 MMOL/L (ref 135–147)
WBC # BLD AUTO: 8.86 THOUSAND/UL (ref 4.31–10.16)
WBC # BLD AUTO: 9.72 THOUSAND/UL (ref 4.31–10.16)

## 2024-07-09 PROCEDURE — 87449 NOS EACH ORGANISM AG IA: CPT | Performed by: NURSE PRACTITIONER

## 2024-07-09 PROCEDURE — 77012 CT SCAN FOR NEEDLE BIOPSY: CPT

## 2024-07-09 PROCEDURE — 94002 VENT MGMT INPAT INIT DAY: CPT

## 2024-07-09 PROCEDURE — 80048 BASIC METABOLIC PNL TOTAL CA: CPT | Performed by: STUDENT IN AN ORGANIZED HEALTH CARE EDUCATION/TRAINING PROGRAM

## 2024-07-09 PROCEDURE — 99223 1ST HOSP IP/OBS HIGH 75: CPT | Performed by: ANESTHESIOLOGY

## 2024-07-09 PROCEDURE — 88305 TISSUE EXAM BY PATHOLOGIST: CPT | Performed by: RADIOLOGY

## 2024-07-09 PROCEDURE — 99152 MOD SED SAME PHYS/QHP 5/>YRS: CPT | Performed by: RADIOLOGY

## 2024-07-09 PROCEDURE — 86335 IMMUNFIX E-PHORSIS/URINE/CSF: CPT | Performed by: INTERNAL MEDICINE

## 2024-07-09 PROCEDURE — 71045 X-RAY EXAM CHEST 1 VIEW: CPT

## 2024-07-09 PROCEDURE — 84166 PROTEIN E-PHORESIS/URINE/CSF: CPT | Performed by: INTERNAL MEDICINE

## 2024-07-09 PROCEDURE — 50200 RENAL BIOPSY PERQ: CPT

## 2024-07-09 PROCEDURE — 77012 CT SCAN FOR NEEDLE BIOPSY: CPT | Performed by: RADIOLOGY

## 2024-07-09 PROCEDURE — 50200 RENAL BIOPSY PERQ: CPT | Performed by: RADIOLOGY

## 2024-07-09 PROCEDURE — 94760 N-INVAS EAR/PLS OXIMETRY 1: CPT

## 2024-07-09 PROCEDURE — 85027 COMPLETE CBC AUTOMATED: CPT | Performed by: INTERNAL MEDICINE

## 2024-07-09 PROCEDURE — 85027 COMPLETE CBC AUTOMATED: CPT

## 2024-07-09 PROCEDURE — 84156 ASSAY OF PROTEIN URINE: CPT | Performed by: INTERNAL MEDICINE

## 2024-07-09 PROCEDURE — 99152 MOD SED SAME PHYS/QHP 5/>YRS: CPT

## 2024-07-09 PROCEDURE — 88350 IMFLUOR EA ADDL 1ANTB STN PX: CPT | Performed by: RADIOLOGY

## 2024-07-09 PROCEDURE — 86140 C-REACTIVE PROTEIN: CPT | Performed by: PHYSICIAN ASSISTANT

## 2024-07-09 PROCEDURE — 94664 DEMO&/EVAL PT USE INHALER: CPT

## 2024-07-09 PROCEDURE — 88348 ELECTRON MICROSCOPY DX: CPT | Performed by: RADIOLOGY

## 2024-07-09 PROCEDURE — 82570 ASSAY OF URINE CREATININE: CPT | Performed by: INTERNAL MEDICINE

## 2024-07-09 PROCEDURE — 88313 SPECIAL STAINS GROUP 2: CPT | Performed by: RADIOLOGY

## 2024-07-09 PROCEDURE — 99232 SBSQ HOSP IP/OBS MODERATE 35: CPT | Performed by: INTERNAL MEDICINE

## 2024-07-09 PROCEDURE — 82595 ASSAY OF CRYOGLOBULIN: CPT | Performed by: INTERNAL MEDICINE

## 2024-07-09 PROCEDURE — 88346 IMFLUOR 1ST 1ANTB STAIN PX: CPT | Performed by: RADIOLOGY

## 2024-07-09 PROCEDURE — 88329 PATH CONSLTJ DRG SURG: CPT | Performed by: STUDENT IN AN ORGANIZED HEALTH CARE EDUCATION/TRAINING PROGRAM

## 2024-07-09 PROCEDURE — 0TB13ZX EXCISION OF LEFT KIDNEY, PERCUTANEOUS APPROACH, DIAGNOSTIC: ICD-10-PCS | Performed by: RADIOLOGY

## 2024-07-09 RX ORDER — GUAIFENESIN 600 MG/1
600 TABLET, EXTENDED RELEASE ORAL EVERY 12 HOURS SCHEDULED
Status: DISCONTINUED | OUTPATIENT
Start: 2024-07-09 | End: 2024-07-10

## 2024-07-09 RX ORDER — LEVALBUTEROL INHALATION SOLUTION 1.25 MG/3ML
1.25 SOLUTION RESPIRATORY (INHALATION)
Status: DISCONTINUED | OUTPATIENT
Start: 2024-07-09 | End: 2024-07-09

## 2024-07-09 RX ORDER — MIDAZOLAM HYDROCHLORIDE 2 MG/2ML
INJECTION, SOLUTION INTRAMUSCULAR; INTRAVENOUS AS NEEDED
Status: COMPLETED | OUTPATIENT
Start: 2024-07-09 | End: 2024-07-09

## 2024-07-09 RX ORDER — FUROSEMIDE 40 MG/1
40 TABLET ORAL
Status: DISCONTINUED | OUTPATIENT
Start: 2024-07-09 | End: 2024-07-09

## 2024-07-09 RX ORDER — FENTANYL CITRATE 50 UG/ML
INJECTION, SOLUTION INTRAMUSCULAR; INTRAVENOUS AS NEEDED
Status: COMPLETED | OUTPATIENT
Start: 2024-07-09 | End: 2024-07-09

## 2024-07-09 RX ORDER — LIDOCAINE WITH 8.4% SOD BICARB 0.9%(10ML)
SYRINGE (ML) INJECTION AS NEEDED
Status: COMPLETED | OUTPATIENT
Start: 2024-07-09 | End: 2024-07-09

## 2024-07-09 RX ORDER — FUROSEMIDE 10 MG/ML
40 INJECTION INTRAMUSCULAR; INTRAVENOUS ONCE
Status: COMPLETED | OUTPATIENT
Start: 2024-07-09 | End: 2024-07-09

## 2024-07-09 RX ADMIN — FUROSEMIDE 40 MG: 10 INJECTION, SOLUTION INTRAMUSCULAR; INTRAVENOUS at 11:20

## 2024-07-09 RX ADMIN — MIDAZOLAM HYDROCHLORIDE 1 MG: 1 INJECTION, SOLUTION INTRAMUSCULAR; INTRAVENOUS at 10:21

## 2024-07-09 RX ADMIN — ONDANSETRON 4 MG: 2 INJECTION INTRAMUSCULAR; INTRAVENOUS at 00:33

## 2024-07-09 RX ADMIN — GUAIFENESIN 600 MG: 600 TABLET ORAL at 00:45

## 2024-07-09 RX ADMIN — LEVOTHYROXINE SODIUM 50 MCG: 0.05 TABLET ORAL at 08:39

## 2024-07-09 RX ADMIN — FUROSEMIDE 40 MG: 10 INJECTION, SOLUTION INTRAMUSCULAR; INTRAVENOUS at 15:30

## 2024-07-09 RX ADMIN — FENTANYL CITRATE 50 MCG: 50 INJECTION, SOLUTION INTRAMUSCULAR; INTRAVENOUS at 10:21

## 2024-07-09 RX ADMIN — FENTANYL CITRATE 50 MCG: 50 INJECTION, SOLUTION INTRAMUSCULAR; INTRAVENOUS at 10:25

## 2024-07-09 RX ADMIN — Medication 8 ML: at 10:30

## 2024-07-09 RX ADMIN — GUAIFENESIN 600 MG: 600 TABLET ORAL at 08:39

## 2024-07-09 RX ADMIN — PROPRANOLOL HYDROCHLORIDE 60 MG: 60 CAPSULE, EXTENDED RELEASE ORAL at 08:39

## 2024-07-09 RX ADMIN — AMLODIPINE BESYLATE 10 MG: 10 TABLET ORAL at 08:39

## 2024-07-09 RX ADMIN — HEPARIN SODIUM 7500 UNITS: 5000 INJECTION INTRAVENOUS; SUBCUTANEOUS at 22:03

## 2024-07-09 RX ADMIN — GUAIFENESIN 600 MG: 600 TABLET ORAL at 20:49

## 2024-07-09 RX ADMIN — ALBUTEROL SULFATE 2 PUFF: 90 AEROSOL, METERED RESPIRATORY (INHALATION) at 05:38

## 2024-07-09 RX ADMIN — MIDAZOLAM HYDROCHLORIDE 1 MG: 1 INJECTION, SOLUTION INTRAMUSCULAR; INTRAVENOUS at 10:25

## 2024-07-09 NOTE — BRIEF OP NOTE (RAD/CATH)
INTERVENTIONAL RADIOLOGY PROCEDURE NOTE    Date: 7/9/2024    Procedure: Left kidney biopsy  Procedure Summary       Date:  Room / Location:     Anesthesia Start:  Anesthesia Stop:     Procedure:  Diagnosis:     Scheduled Providers:  Responsible Provider:     Anesthesia Type: Not recorded ASA Status: Not recorded            Preoperative diagnosis:   1. ROSI (acute kidney injury) (HCC)    2. SOB (shortness of breath)    3. Proteinuria, unspecified type    4. URI (upper respiratory infection)    5. Lung nodules         Postoperative diagnosis: Same.    Surgeon: Duane Klein MD     Assistant: None. No qualified resident was available.    Blood loss: 1 mL    Specimens:  3 core needle samples placed into [place] kit.     Findings: Successful mid pole left renal cortex biopsy.    Complications: None immediate.    Anesthesia: conscious sedation and local

## 2024-07-09 NOTE — UTILIZATION REVIEW
NOTIFICATION OF INPATIENT ADMISSION   AUTHORIZATION REQUEST   SERVICING FACILITY:   [place] [address]  Tax ID: [id number]  NPI: [id number] ATTENDING PROVIDER:  Attending Name and NPI#: Bennie Denton Md [[id number]]  Address: [address] [place] [address]  Phone: [phone]     ADMISSION INFORMATION:  Place of Service: Inpatient [facility]  Place of Service Code: 21  Inpatient Admission Date/Time: 7/8/24  3:02 PM  Discharge Date/Time: No discharge date for patient encounter.  Admitting Diagnosis Code/Description:  SOB (shortness of breath) [R06.02]  ROSI (acute kidney injury) (HCC) [N17.9]     UTILIZATION REVIEW CONTACT:  Rosalinda Elliott Utilization   Network Utilization Review Department  Phone: [phone]  Fax [phone]  Email: [email or web address]  Contact for approvals/pending authorizations, clinical reviews, and discharge.     PHYSICIAN ADVISORY SERVICES:  Medical Necessity Denial & Qfba-ch-Rlcu Review  Phone: [phone]  Fax: [phone]  Email: [email or web address]     DISCHARGE SUPPORT TEAM:  For Patients Discharge Needs & Updates  Phone: [phone] opt. 2 Fax: [phone]  Email: [email or web address]

## 2024-07-09 NOTE — ASSESSMENT & PLAN NOTE
Patient with report of SOB on arrival with hypoxia requiring 3 L NC  Now with worsening hypoxia requiring 15 L NRB followed by BIPAP  Differentials include inflammatory/autoimmune +/- pulmonary edema, less likely to be infectious  MALENA and Lyme negative, other serologies pending, follow up results  S/p renal biopsy, follow up results  Holding steroids until biopsy result  Will need bronch once respiratory status has improved to obtain BAL/cultures  Received lasix 40 mg IV earlier today  Give addition Lasix 40 mg IV now  Monitor I&O  Continue BIPAP while diuresing  Will trial off BIPAP later this evening if patient is able to tolerate

## 2024-07-09 NOTE — RESPIRATORY THERAPY NOTE
RT Ventilator Management Note  Ben Daniel 29 y.o. male MRN: [id number]  Unit/Bed#: -01 Encounter: [id number]      Daily Screen    No data found in the last 10 encounters.           Physical Exam:   Assessment Type: Assess only  General Appearance: Alert, Awake  Respiratory Pattern: Tachypneic, Labored  Chest Assessment: Chest expansion symmetrical  Bilateral Breath Sounds: Scattered, Rales  O2 Device: v60  Subjective Data: awake and alert      Resp Comments: placed pt on bipap due to increased work of breathing         07/09/24 1252   Respiratory Assessment   Assessment Type Assess only   General Appearance Alert;Awake   Respiratory Pattern Tachypneic;Labored   Chest Assessment Chest expansion symmetrical   Resp Comments placed pt on bipap due to increased work of breathing   O2 Device v60   Subjective Data awake and alert   Non-Invasive Information   O2 Interface Device Face mask   Non-Invasive Ventilation Mode BiPAP   $ Continous NIV Initial   SpO2 94 %   $ Pulse Oximetry Spot Check Charge Completed   Non-Invasive Settings   IPAP (cm) 14 cm   EPAP (cm) 6 cm   Rate (Set) 8   FiO2 (%) 40   Pressure Support (cm H2O) 8   Rise Time 2   Inspiratory Time (Set) 1   Non-Invasive Readings   Total Rate 30   Vt (mL) (Mech) 713   MV (Mech) 22.1   Peak Pressure (Obs) 16   Spontaneous Vt (mL) 669   Leak (lpm) 69   Non-Invasive Alarms   Insp Pressure High (cm H20) 25   Insp Pressure Low (cm H20) 5   MV Low (L/min) 2   Vt High (mL) 1150   Vt Low (mL) 150   High Resp Rate (BPM) 45 BPM   Low Resp Rate (BPM) 8 BPM

## 2024-07-09 NOTE — UTILIZATION REVIEW
Continued Stay Review    Date:   7/9                             Current Patient Class:  Inpatient  Current Level of Care:  med surg    HPI:29 y.o. 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      Assessment/Plan:   7/9   D  2  Remains on  O2  3L  NC with sats int he  mid  90's,  no baseline  O2  requirements.  Need sats   >  90 %.   Had some worsening  shortness of breath overnight,  sputum  red tinged.   Continue  to hold  antibiotics, procal negative and WBC  normal.  For renal biopsy today  and  starting on steroids,  likely  will need prolonged steroids.     Plan bronchoscopy  wed  7/10.  Monitor labs,  ?  ROSI.    Creatinine today  1.19.    Vital Signs (last 3 days)       Date/Time Temp Pulse Resp BP MAP (mmHg) SpO2 Calculated FIO2 (%) - Nasal Cannula Nasal Cannula O2 Flow Rate (L/min) O2 Device Patient Position - Orthostatic VS Pain    07/09/24 10:20:35 -- 90 18 153/80 -- 96 % -- -- -- -- --    07/09/24 1020 -- 82 18 148/79 -- 94 % -- -- -- -- --    07/09/24 07:00:59 -- 92 -- 153/93 113 92 % -- -- -- -- --    07/08/24 22:42:24 98.2 °F (36.8 °C) 91 -- 147/96 113 94 % -- -- -- -- --    07/08/24 2130 -- -- -- -- -- -- -- -- -- -- No Pain    07/08/24 1651 -- -- -- -- -- -- -- -- -- -- 6    07/08/24 1515 -- -- -- 164/82 96 -- -- -- -- -- --    07/08/24 1454 -- 103 -- 161/96 -- -- -- -- -- -- --    07/08/24 08:20:59 -- 103 -- 161/96 118 90 % -- -- -- -- No Pain    07/08/24 0100 -- -- -- -- -- 100 % 32 3 L/min Nasal cannula -- No Pain    07/07/24 23:45:16 99.6 °F (37.6 °C) 90 18 130/75 93 94 % -- -- -- Lying --    07/07/24 16:41:29 99.1 °F (37.3 °C) 86 -- 130/75 93 93 % -- -- -- -- --    07/07/24 1605 -- -- -- -- -- -- -- -- -- -- 8    07/07/24 0900 -- -- -- -- -- 91 % 32 3 L/min Nasal cannula -- --    07/07/24 08:50:13 98.5 °F (36.9 °C) 90 -- 149/86 107 89 % -- -- -- -- --     07/07/24 0740 -- -- -- -- -- -- -- -- None (Room air) -- No Pain    07/07/24 0130 -- -- -- -- -- -- -- -- -- -- Med Not Given for Pain - for MAR use only    07/07/24 0120 100 °F (37.8 °C) -- 20 152/86 108 -- 32 3 L/min Nasal cannula Sitting 8    07/06/24 22:24:41 99.3 °F (37.4 °C) -- -- 154/85 108 -- -- -- -- -- --    07/06/24 1930 -- -- -- -- -- -- -- -- -- -- No Pain    07/06/24 15:53:30 97.8 °F (36.6 °C) 78 -- 136/87 103 96 % -- -- None (Room air) -- No Pain    07/06/24 15:53:03 97.8 °F (36.6 °C) 74 -- 136/87 103 97 % -- -- -- -- --    07/06/24 1400 -- 74 18 146/76 107 98 % -- -- None (Room air) Lying --    07/06/24 1300 -- 80 18 131/74 96 99 % -- -- None (Room air) Lying --    07/06/24 1245 -- 83 18 126/58 83 99 % -- -- None (Room air) Lying --    07/06/24 1104 -- -- -- -- -- -- -- -- None (Room air) -- --    07/06/24 1003 98.6 °F (37 °C) 84 18 157/92 -- 100 % -- -- None (Room air) -- --          Weight (last 2 days)       Date/Time Weight    07/09/24 0555 124 (274.25)    07/08/24 1454 125 (275)    07/08/24 0600 125 (275.58)    07/07/24 0812 122 (268.96)    07/07/24 0600 123 (270.73)              Pertinent Labs/Diagnostic Results:   Radiology:  US kidney and bladder   Final Interpretation by Estelle Talavera MD (07/07 9701)   Unremarkable renal sonogram   No hydronephrosis   Additional imaging to be based on clinical evaluation      Workstation performed: [id number]         CT chest wo contrast   Final Interpretation by Lopez Shelton MD (07/06 8902)      Mild pulmonary edema and trace bilateral pleural effusions.      Numerous 1 to 5 mm solid nodules in a similar distribution to the pulmonary edema, which can rarely be seen as a manifestation of pulmonary edema. Findings could also be infectious/inflammatory. Metastatic disease is unlikely given the patient's age and    lack of known malignancy. Follow-up chest CT in 3 months is recommended.      The study was marked in EPIC for immediate notification.                Workstation performed: [id number]         XR chest 2 views   Final Interpretation by Betsy Clarke MD (07/06 1115)      Low lung volumes producing vascular crowding with no acute disease.            Workstation performed: [id number]         IR biopsy kidney random    (Results Pending)     Cardiology:  Echo complete w/ contrast if indicated   Final Result by Gutierrez June MD (07/08 1549)        Technically difficult study.     Left Ventricle: Left ventricular cavity size is normal. Wall thickness    is normal. The left ventricular ejection fraction is 60%. Systolic    function is normal. Wall motion is normal. Diastolic function is normal.     No significant change since prior echo 9/27/2021.         ECG 12 lead   Final Result by Bart Gregory MD (07/07 1036)   Normal sinus rhythm   When compared with ECG of 07-JUL-2024 01:33, (unconfirmed)   No significant change was found   Confirmed by Bart Gregory ([id number]) on 7/7/2024 10:36:38 AM      ECG 12 lead   Final Result by Bart Gregory MD (07/07 1036)   Normal sinus rhythm   When compared with ECG of 06-JUL-2024 10:10, (unconfirmed)   No significant change was found   Confirmed by Bart Gregory ([id number]) on 7/7/2024 10:36:42 AM      ECG 12 lead   Final Result by Bart Gregory MD (07/07 1046)   Normal sinus rhythm   Rightward axis   Borderline ECG   When compared with ECG of 23-JUN-2022 17:00,   No significant change was found   Confirmed by Bart Gregory ([id number]) on 7/7/2024 10:46:39 AM        Results from last 7 days   Lab Units 07/06/24  1009   SARS-COV-2  Negative     Results from last 7 days   Lab Units 07/09/24  0539 07/08/24  0510 07/07/24  0607 07/06/24  1408 07/06/24  1115   WBC Thousand/uL 9.72 6.58 6.66  --  4.05*   HEMOGLOBIN g/dL 11.9* 12.0 11.4*  --  12.2   HEMATOCRIT % 35.3* 35.8* 33.1*  --  36.1*   PLATELETS Thousands/uL 278 226 223   < > 239   TOTAL NEUT ABS Thousands/µL  --  4.37 4.40  --  2.22    < > = values in this  interval not displayed.         Results from last 7 days   Lab Units 07/09/24  0539 07/08/24  0510 07/07/24  0607 07/06/24 1917 07/06/24  1115   SODIUM mmol/L 128* 131* 135 139 137   POTASSIUM mmol/L 4.5 4.9 4.7 4.6 5.2   CHLORIDE mmol/L 98 101 107 108 107   CO2 mmol/L 22 22 22 24 23   ANION GAP mmol/L 8 8 6 7 7   BUN mg/dL 32* 38* 43* 44* 48*   CREATININE mg/dL 1.19 1.51* 1.60* 1.67* 1.76*   EGFR ml/min/1.73sq m 82 61 57 54 51   CALCIUM mg/dL 8.0* 8.7 8.6 8.9 8.8   MAGNESIUM mg/dL  --   --  2.3  --   --    PHOSPHORUS mg/dL  --   --  4.9*  --   --      Results from last 7 days   Lab Units 07/07/24 0607 07/06/24  1115   AST U/L 16 16   ALT U/L 24 22   ALK PHOS U/L 71 68   TOTAL PROTEIN g/dL 7.5 7.7   ALBUMIN g/dL 3.3* 3.4*   TOTAL BILIRUBIN mg/dL 0.63 0.50         Results from last 7 days   Lab Units 07/09/24  0539 07/08/24  0510 07/07/24  0607 07/06/24 1917 07/06/24  1115   GLUCOSE RANDOM mg/dL 101 95 94 104 94               Results from last 7 days   Lab Units 07/06/24  1917   CK TOTAL U/L 44     Results from last 7 days   Lab Units 07/06/24  1527 07/06/24  1408 07/06/24  1115   HS TNI 0HR ng/L  --   --  3   HS TNI 2HR ng/L  --  <2  --    HSTNI D2 ng/L  --  <-1  --    HS TNI 4HR ng/L <2  --   --    HSTNI D4 ng/L <-1  --   --          Results from last 7 days   Lab Units 07/08/24  1420   PROTIME seconds 15.3*   INR  1.14         Results from last 7 days   Lab Units 07/07/24  0607   PROCALCITONIN ng/ml 0.11                 Results from last 7 days   Lab Units 07/06/24  1115   BNP pg/mL 173*                 Results from last 7 days   Lab Units 07/07/24  0940   HEP B S AG  Non-reactive   HEP C AB  Non-reactive         Results from last 7 days   Lab Units 07/09/24  0539   CRP mg/L 180.3*             Results from last 7 days   Lab Units 07/07/24  1118 07/06/24  1408   CLARITY UA  Clear Clear   COLOR UA  Light Yellow Light Yellow   SPEC GRAV UA  1.019 1.018   PH UA  6.0 6.0   GLUCOSE UA mg/dl Negative Negative    KETONES UA mg/dl Negative Negative   BLOOD UA  Large* Moderate*   PROTEIN UA mg/dl 300 (3+)* 300 (3+)*   NITRITE UA  Negative Negative   BILIRUBIN UA  Negative Negative   UROBILINOGEN UA (BE) mg/dl <2.0 <2.0   LEUKOCYTES UA  Negative Negative   WBC UA /hpf 10-20* 4-10*   RBC UA /hpf 10-20* 10-20*   BACTERIA UA /hpf None Seen None Seen   EPITHELIAL CELLS WET PREP /hpf None Seen Occasional   SODIUM UR  17  --    CREATININE UR mg/dL 139.8 <1.0   PROTEIN UR mg/dL  --  <4     Results from last 7 days   Lab Units 07/06/24  1009   INFLUENZA A PCR  Negative   INFLUENZA B PCR  Negative   RSV PCR  Negative         Medications:   Scheduled Medications:  amLODIPine, 10 mg, Oral, Daily  furosemide, 40 mg, Oral, BID (diuretic)  guaiFENesin, 600 mg, Oral, Q12H YADIRA  heparin (porcine), 7,500 Units, Subcutaneous, Q8H YADIRA  levothyroxine, 50 mcg, Oral, Daily  propranolol, 60 mg, Oral, Daily      Continuous IV Infusions:     PRN Meds:  acetaminophen, 650 mg, Oral, Q6H PRN  albuterol, 2 puff, Inhalation, Q4H PRN  fentaNYL, , Intravenous, PRN  lidocaine, 2 patch, Topical, Daily PRN  midazolam, , , PRN  ondansetron, 4 mg, Intravenous, Q8H PRN        Discharge Plan:   [facility] Utilization Review Department  ATTENTION: Please call with any questions or concerns to [phone] and carefully listen to the prompts so that you are directed to the right person. All voicemails are confidential.   For Discharge needs, contact Care Management DC Support Team at [phone] opt. 2  Send all requests for admission clinical reviews, approved or denied determinations and any other requests to dedicated fax number below belonging to the campus where the patient is receiving treatment. List of dedicated fax numbers for the Facilities:  FACILITY NAME UR FAX NUMBER   ADMISSION DENIALS (Administrative/Medical Necessity) [phone]   DISCHARGE SUPPORT TEAM (NETWORK) [phone]   PARENT CHILD HEALTH (Maternity/NICU/Pediatrics) [phone]   [facility]  [facility] [phone]   [facility] [phone]   [facility] [phone]   [facility] [phone]   [facility] [phone]   [facility] [phone]   [facility] [phone]   [facility] [phone]   [facility] [phone]   [facility] [phone]   [facility] [phone]   [facility] [phone]

## 2024-07-09 NOTE — ASSESSMENT & PLAN NOTE
POA with unclear etiology, differentials include glomerulonephritis, pulmonary renal syndrome  Baseline creatinine 0.8 and initial creatinine 1.76  Trending down, most recently 1.19  S/p IR kidney biopsy  Follow up pathology results  Hold off on steroids until biopsy results  Nephrology consulted, appreciate input  Continue pulmonology workup as above  Monitor I&O  Avoid nephrotoxins  Trend serum creatinine

## 2024-07-09 NOTE — CONSULTS
Formerly [facility]  Consult  Name: Ben Daniel 29 y.o. male I MRN: [id number]  Unit/Bed#: ICU 02 I Date of Admission: 7/6/2024   Date of Service: 7/9/2024 I Hospital Day: 1    Consults    Assessment & Plan   * Acute respiratory failure with hypoxia (HCC)  Assessment & Plan  Patient with report of SOB on arrival with hypoxia requiring 3 L NC  Now with worsening hypoxia requiring 15 L NRB followed by BIPAP  Differentials include inflammatory/autoimmune +/- pulmonary edema, less likely to be infectious  MALENA and Lyme negative, other serologies pending, follow up results  S/p renal biopsy, follow up results  Holding steroids until biopsy result  Will need bronch once respiratory status has improved to obtain BAL/cultures  Received lasix 40 mg IV earlier today  Give addition Lasix 40 mg IV now  Monitor I&O  Continue BIPAP while diuresing  Will trial off BIPAP later this evening if patient is able to tolerate    ROSI (acute kidney injury) (HCC)  Assessment & Plan  POA with unclear etiology, differentials include glomerulonephritis, pulmonary renal syndrome  Baseline creatinine 0.8 and initial creatinine 1.76  Trending down, most recently 1.19  S/p IR kidney biopsy  Follow up pathology results  Hold off on steroids until biopsy results  Nephrology consulted, appreciate input  Continue pulmonology workup as above  Monitor I&O  Avoid nephrotoxins  Trend serum creatinine    Hypothyroidism  Assessment & Plan  Continue synthroid    Essential hypertension  Assessment & Plan  Continue amlodipine and inderal  HCTZ and benazepril on hold           History of Present Illness     HPI: Ben Daniel is a 29 y.o. with PMH hypertension who presented to the ED on 7/6/2024 with a two day history of shortness of breath and was requiring 3 L of oxygen due to hypoxia. He was also found to have an ROSI of unclear etiology. Patient suspected to have autoimmune/inflammatory etiology for acute respiratory failure versus less likely  to be infectious. He had been stable on 3 L of oxygen until earlier today when he developed worsening hypoxia and was placed on 15 L via NRB. He is now on BIPAP mask due to increased WOB. Susepct volume overload leading to pulmonary edema is contributing to acute worsening of hypoxia. Patient received lasix 40 mg IV earlier today and diuresed one liter. He is being transferred to the stepdown unit for management of BIPAP and closer monitoring.    History obtained from mother, chart review, and the patient.  Review of Systems: Review of Systems   Constitutional:  Negative for chills and fever.   Respiratory:  Positive for cough (hemoptysis) and shortness of breath. Negative for wheezing.    Cardiovascular:  Positive for leg swelling. Negative for chest pain.   Gastrointestinal: Negative.    Genitourinary: Negative.    Neurological: Negative.      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  7/9/2024: IR BIOPSY KIDNEY RANDOM   Current Outpatient Medications   Medication Instructions    amLODIPine-benazepril (LOTREL) 10-40 MG per capsule 1 capsule, Oral, Daily    ergocalciferol (VITAMIN D2) 50,000 Units, Oral, Weekly    hydroCHLOROthiazide 25 mg, Oral, Daily    levothyroxine 50 mcg, Oral, Daily    propranolol (INDERAL LA) 60 mg, Oral, Daily    No Known Allergies   Social History     Tobacco Use    Smoking status: Never    Smokeless tobacco: Never   Vaping Use    Vaping status: Never Used   Substance Use Topics    Alcohol use: Never     Alcohol/week: 1.0 standard drink of alcohol     Types: 1 Cans of beer per week    Drug use: Never    Family History   Problem Relation Age of Onset    Heart failure Maternal Grandmother     Heart failure Maternal Grandfather         Objective                            Vitals I/O      Most Recent Min/Max in 24hrs   Temp  100 °F (37.8 °C) Temp  Min: 98.2 °F (36.8 °C)  Max: 100 °F (37.8 °C)   Pulse 86 Pulse  Min: 77  Max: 92   Resp 16 Resp  Min: 16  Max: 18   /90 BP  Min: 132/90  Max: 153/80   O2 Sat 98 % SpO2  Min: 80 %  Max: 98 %      Intake/Output Summary (Last 24 hours) at 7/9/2024 1625  Last data filed at 7/9/2024 1219  Gross per 24 hour   Intake 480 ml   Output 1000 ml   Net -520 ml       Diet NPO; Sips with meds    Invasive Monitoring           Physical Exam   Physical Exam  Eyes:      Conjunctiva/sclera: Conjunctivae normal.   Skin:     General: Skin is warm and dry.   HENT:      Head: Normocephalic and atraumatic.      Mouth/Throat:      Mouth: Mucous membranes are moist.   Cardiovascular:      Rate and Rhythm: Normal rate and regular rhythm.      Pulses: Normal pulses.      Heart sounds: Normal heart sounds.   Musculoskeletal:         General: Normal range of motion.      Right lower leg: Trace Edema present.      Left lower leg: Trace Edema present.   Abdominal:      Palpations: Abdomen is soft.   Constitutional:       Appearance: He is morbidly obese. He is ill-appearing.   Pulmonary:      Effort: Tachypnea present.      Breath sounds: Rales present.   Neurological:      General: No focal deficit present.      Mental Status: He is alert and oriented to person, place and time. Mental status is at baseline.            Diagnostic Studies      EKG: Sinus rhythm in the 80s  Imaging:  I have personally reviewed pertinent reports.       Medications:  Scheduled PRN   amLODIPine, 10 mg, Daily  guaiFENesin, 600 mg, Q12H YADIRA  heparin (porcine), 7,500 Units, Q8H YADIRA  ipratropium, 0.5 mg, TID  levalbuterol, 1.25 mg, TID  levothyroxine, 50 mcg, Daily  propranolol, 60 mg, Daily      acetaminophen, 650 mg, Q6H PRN  albuterol, 2 puff, Q4H PRN  lidocaine, 2 patch, Daily PRN  ondansetron, 4 mg, Q8H PRN       Continuous          Labs:    CBC    Recent Labs     07/09/24  0539 07/09/24  1209   WBC 9.72 8.86   HGB 11.9* 12.0   HCT 35.3*  34.6*    256     BMP    Recent Labs     07/08/24  0510 07/09/24  0539   SODIUM 131* 128*   K 4.9 4.5    98   CO2 22 22   AGAP 8 8   BUN 38* 32*   CREATININE 1.51* 1.19   CALCIUM 8.7 8.0*       Coags    Recent Labs     07/08/24  1420   INR 1.14        Additional Electrolytes  No recent results       Blood Gas    No recent results  No recent results LFTs  No recent results    Infectious  No recent results  Glucose  Recent Labs     07/08/24  0510 07/09/24  0539   GLUC 95 101               MARTIN Calhoun

## 2024-07-09 NOTE — RESPIRATORY THERAPY NOTE
07/09/24 1124   Respiratory Assessment   Assessment Type Assess only   General Appearance Alert;Awake   Respiratory Pattern Labored   Chest Assessment Chest expansion symmetrical   Bilateral Breath Sounds Scattered;Rales   Resp Comments was called by nursing for desaturation on 6L NC, just returned from kidney biopsy, placed on 15L mfnc with spo2 86-88%, placed on non rebreather, bbs scattered rales, physician at bedside, IV lasix given by nursing, will reassess to titrate O2, spo2 89-90%   O2 Device nrb   Oxygen Therapy/Pulse Ox   O2 Device Non-rebreather mask   Nasal Cannula O2 Flow Rate (L/min) 15 L/min   Calculated FIO2 (%) - Nasal Cannula 80   SpO2 (!) 89 %   SpO2 Activity At Rest   $ Pulse Oximetry Spot Check Charge Completed

## 2024-07-09 NOTE — QUICK NOTE
Patient underwent renal biopsy this a.m. after returning from renal biopsy patient was found to needing significantly more oxygen requirements.  Patient was needing 15 L of oxygen to sat above 90%.  After procedure per patient was seen by myself, nephrology, pulmonology, and ultimately critical care shortly after experiencing worsening shortness of breath.  Stat chest x-ray was reviewed by pulmonology and critical care.  Ultimately it was determined that the patient has a high likelihood for needing a bronchoscopy and high likelihood of needing intubation.  Patient was transferred to critical care service.

## 2024-07-09 NOTE — PROGRESS NOTES
"Progress Note - Pulmonary   Ben Daniel 29 y.o. male MRN: [id number]  Unit/Bed#: -01 Encounter: [id number]    Assessment:  Acute hypoxemic respiratory failure  Abnormal CT scan of the chest  Acute kidney injury    Plan:  Acute hypoxemic respiratory failure in the setting of abnormal CT scan of the chest with multiple lung nodules  He is on 3 L nasal cannula with sats in the mid 90s, no O2 requirement at baseline.  Titrate to maintain O2 sats greater than 90%  Also with ROSI,?  Pulmonary renal syndrome  Suspect inflammatory/possibly autoimmune versus less likely infectious etiology  So far MALENA and Lyme are negative, other serologies pending  He will be undergoing renal biopsy today and will be started on steroids by nephrology  Will plan on bronchoscopy with BAL/cultures tomorrow to rule out infectious etiology especially given he would likely need to be on prolonged steroids  Continue to hold off on antibiotics as procalcitonin is negative, no leukocytosis  Will continue to follow    Subjective:   Patient sitting up in the chair.  Overnight had some worsening breathing, also still with some red-tinged sputum.    Objective:     Vitals: Blood pressure 147/96, pulse 91, temperature 98.2 °F (36.8 °C), resp. rate 18, height 5' 6.5\" (1.689 m), weight 124 kg (274 lb 4 oz), SpO2 94%.,Body mass index is 43.6 kg/m².      Intake/Output Summary (Last 24 hours) at 7/9/2024 0627  Last data filed at 7/8/2024 2130  Gross per 24 hour   Intake 1440 ml   Output --   Net 1440 ml       Invasive Devices       Peripheral Intravenous Line  Duration             Peripheral IV 07/06/24 Distal;Right;Upper;Ventral (anterior) Arm 2 days    Long-Dwell Peripheral IV (Midline) 07/08/24 Left Cephalic Vein <1 day                    Physical Exam: /96   Pulse 91   Temp 98.2 °F (36.8 °C)   Resp 18   Ht 5' 6.5\" (1.689 m)   Wt 124 kg (274 lb 4 oz)   SpO2 94%   BMI 43.60 kg/m²   General appearance: alert and oriented, in no acute " distress  Head: Normocephalic, without obvious abnormality, atraumatic  Eyes: negative findings: conjunctivae and sclerae normal  Lungs: clear to auscultation bilaterally  Heart: regular rate and rhythm  Abdomen: normal findings: soft, non-tender  Extremities:  no edema  Skin:  warm and dry  Neurologic: Mental status: Alert, oriented, thought content appropriate     Labs: I have personally reviewed pertinent lab results., CBC:   Lab Results   Component Value Date    WBC 9.72 07/09/2024    HGB 11.9 (L) 07/09/2024    HCT 35.3 (L) 07/09/2024    MCV 80 (L) 07/09/2024     07/09/2024    RBC 4.39 07/09/2024    MCH 27.1 07/09/2024    MCHC 33.7 07/09/2024    RDW 11.9 07/09/2024    MPV 8.8 (L) 07/09/2024   , CMP:   Lab Results   Component Value Date    SODIUM 128 (L) 07/09/2024    K 4.5 07/09/2024    CL 98 07/09/2024    CO2 22 07/09/2024    BUN 32 (H) 07/09/2024    CREATININE 1.19 07/09/2024    CALCIUM 8.0 (L) 07/09/2024    EGFR 82 07/09/2024     Imaging and other studies: I have personally reviewed pertinent reports.   and I have personally reviewed pertinent films in PACS

## 2024-07-09 NOTE — PLAN OF CARE
Problem: PAIN - ADULT  Goal: Verbalizes/displays adequate comfort level or baseline comfort level  Description: Interventions:  - Encourage patient to monitor pain and request assistance  - Assess pain using appropriate pain scale  - Administer analgesics based on type and severity of pain and evaluate response  - Implement non-pharmacological measures as appropriate and evaluate response  - Consider cultural and social influences on pain and pain management  - Notify physician/advanced practitioner if interventions unsuccessful or patient reports new pain  Outcome: Progressing     Problem: INFECTION - ADULT  Goal: Absence or prevention of progression during hospitalization  Description: INTERVENTIONS:  - Assess and monitor for signs and symptoms of infection  - Monitor lab/diagnostic results  - Monitor all insertion sites, i.e. indwelling lines, tubes, and drains  - Monitor endotracheal if appropriate and nasal secretions for changes in amount and color  - [place] appropriate cooling/warming therapies per order  - Administer medications as ordered  - Instruct and encourage patient and family to use good hand hygiene technique  - Identify and instruct in appropriate isolation precautions for identified infection/condition  Outcome: Progressing  Goal: Absence of fever/infection during neutropenic period  Description: INTERVENTIONS:  - Monitor WBC    Outcome: Progressing     Problem: SAFETY ADULT  Goal: Patient will remain free of falls  Description: INTERVENTIONS:  - Educate patient/family on patient safety including physical limitations  - Instruct patient to call for assistance with activity   - Consult OT/PT to assist with strengthening/mobility   - Keep Call bell within reach  - Keep bed low and locked with side rails adjusted as appropriate  - Keep care items and personal belongings within reach  - Initiate and maintain comfort rounds  - Make Fall Risk Sign visible to staff  - Apply yellow socks and bracelet  for high fall risk patients  - Consider moving patient to room near nurses station  Outcome: Progressing  Goal: Maintain or return to baseline ADL function  Description: INTERVENTIONS:  -  Assess patient's ability to carry out ADLs; assess patient's baseline for ADL function and identify physical deficits which impact ability to perform ADLs (bathing, care of mouth/teeth, toileting, grooming, dressing, etc.)  - Assess/evaluate cause of self-care deficits   - Assess range of motion  - Assess patient's mobility; develop plan if impaired  - Assess patient's need for assistive devices and provide as appropriate  - Encourage maximum independence but intervene and supervise when necessary  - Involve family in performance of ADLs  - Assess for home care needs following discharge   - Consider OT consult to assist with ADL evaluation and planning for discharge  - Provide patient education as appropriate  Outcome: Progressing  Goal: Maintains/Returns to pre admission functional level  Description: INTERVENTIONS:  - Perform AM-PAC 6 Click Basic Mobility/ Daily Activity assessment daily.  - Set and communicate daily mobility goal to care team and patient/family/caregiver.   - Collaborate with rehabilitation services on mobility goals if consulted  - Perform Range of Motion 4 times a day.  - Reposition patient every 2 hours.  - Dangle patient 3 times a day  - Stand patient 3 times a day  - Ambulate patient 3 times a day  - Out of bed to chair 3 times a day   - Out of bed for meals 3 times a day  - Out of bed for toileting  - Record patient progress and toleration of activity level   Outcome: Progressing     Problem: DISCHARGE PLANNING  Goal: Discharge to home or other facility with appropriate resources  Description: INTERVENTIONS:  - Identify barriers to discharge w/patient and caregiver  - Arrange for needed discharge resources and transportation as appropriate  - Identify discharge learning needs (meds, wound care, etc.)  -  Arrange for interpretive services to assist at discharge as needed  - Refer to Case Management Department for coordinating discharge planning if the patient needs post-hospital services based on physician/advanced practitioner order or complex needs related to functional status, cognitive ability, or social support system  Outcome: Progressing     Problem: Knowledge Deficit  Goal: Patient/family/caregiver demonstrates understanding of disease process, treatment plan, medications, and discharge instructions  Description: Complete learning assessment and assess knowledge base.  Interventions:  - Provide teaching at level of understanding  - Provide teaching via preferred learning methods  Outcome: Progressing

## 2024-07-09 NOTE — PROGRESS NOTES
NEPHROLOGY PROGRESS NOTE    Patient: Ben Daniel               Sex: male          DOA: 7/6/2024 10:38 AM   YOB: 1995        Age:  29 y.o.        LOS:  LOS: 1 day       HPI     Patient admitted acute kidney injury and pulmonary nodule along with proteinuria    SUBJECTIVE     Complaining of shortness of breath and cough    Still has hemoptysis    No chest pain no palpitation    No nausea no vomiting    For kidney biopsy today    CURRENT MEDICATIONS       Current Facility-Administered Medications:     acetaminophen (TYLENOL) tablet 650 mg, 650 mg, Oral, Q6H PRN, Pauly Mathis PA-C, 650 mg at 07/08/24 1651    albuterol (PROVENTIL HFA,VENTOLIN HFA) inhaler 2 puff, 2 puff, Inhalation, Q4H PRN, Pauly Mathis PA-C, 2 puff at 07/09/24 0538    amLODIPine (NORVASC) tablet 10 mg, 10 mg, Oral, Daily, Uriel Kline MD, 10 mg at 07/09/24 0839    furosemide (LASIX) tablet 40 mg, 40 mg, Oral, BID (diuretic), Randy Wells MD    guaiFENesin (MUCINEX) 12 hr tablet 600 mg, 600 mg, Oral, Q12H YADIRA, Batsheva Zamora MD, 600 mg at 07/09/24 0839    heparin (porcine) subcutaneous injection 7,500 Units, 7,500 Units, Subcutaneous, Q8H YADIRA, 7,500 Units at 07/08/24 2131 **AND** [COMPLETED] Platelet count, , , Once, Uriel Kline MD    levothyroxine tablet 50 mcg, 50 mcg, Oral, Daily, Uriel Kline MD, 50 mcg at 07/09/24 0839    lidocaine (LIDODERM) 5 % patch 2 patch, 2 patch, Topical, Daily PRN, Pauly Mathis PA-C, 2 patch at 07/07/24 0130    ondansetron (ZOFRAN) injection 4 mg, 4 mg, Intravenous, Q8H PRN, Jose Maria Nobles PA-C, 4 mg at 07/09/24 0033    propranolol (INDERAL LA) 24 hr capsule 60 mg, 60 mg, Oral, Daily, Uriel Kline MD, 60 mg at 07/09/24 0839    OBJECTIVE     Current Weight: Weight - Scale: 124 kg (274 lb 4 oz)  Vitals:    07/09/24 1030   BP: 144/71   Pulse: 80   Resp: 18   Temp:    SpO2: 93%       Intake/Output Summary (Last 24 hours) at 7/9/2024 1101  Last data filed at 7/8/2024 2130  Gross per 24 hour    Intake 960 ml   Output --   Net 960 ml       PHYSICAL EXAMINATION     Physical Exam  Constitutional:       General: He is not in acute distress.     Appearance: He is well-developed.   HENT:      Head: Normocephalic.      Mouth/Throat:      Mouth: Mucous membranes are moist.   Eyes:      General: No scleral icterus.     Conjunctiva/sclera: Conjunctivae normal.   Neck:      Vascular: No JVD.   Cardiovascular:      Rate and Rhythm: Normal rate.      Heart sounds: Normal heart sounds.   Pulmonary:      Effort: Pulmonary effort is normal.      Breath sounds: Wheezing present.   Abdominal:      Palpations: Abdomen is soft.      Tenderness: There is no abdominal tenderness.   Musculoskeletal:         General: Normal range of motion.      Cervical back: Neck supple.   Skin:     General: Skin is warm.      Findings: No rash.   Neurological:      Mental Status: He is alert and oriented to person, place, and time.   Psychiatric:         Behavior: Behavior normal.          LAB RESULTS     Results from last 7 days   Lab Units 07/09/24  0539 07/08/24  0510 07/07/24  0607 07/06/24  1917 07/06/24  1408 07/06/24  1115   WBC Thousand/uL 9.72 6.58 6.66  --   --  4.05*   HEMOGLOBIN g/dL 11.9* 12.0 11.4*  --   --  12.2   HEMATOCRIT % 35.3* 35.8* 33.1*  --   --  36.1*   PLATELETS Thousands/uL 278 226 223  --  188 239   POTASSIUM mmol/L 4.5 4.9 4.7 4.6  --  5.2   CHLORIDE mmol/L 98 101 107 108  --  107   CO2 mmol/L 22 22 22 24  --  23   BUN mg/dL 32* 38* 43* 44*  --  48*   CREATININE mg/dL 1.19 1.51* 1.60* 1.67*  --  1.76*   EGFR ml/min/1.73sq m 82 61 57 54  --  51   CALCIUM mg/dL 8.0* 8.7 8.6 8.9  --  8.8   MAGNESIUM mg/dL  --   --  2.3  --   --   --    PHOSPHORUS mg/dL  --   --  4.9*  --   --   --        RADIOLOGY RESULTS      No results found for this or any previous visit.    Results for orders placed during the hospital encounter of 07/06/24    XR chest 2 views    Narrative  XR CHEST PA & LATERAL    INDICATION: sob. Shortness of  breath for 2 days, chest pressure began this morning.    COMPARISON: CXR 2/8/2019.    FINDINGS:    Low lung volumes producing vascular crowding. No acute disease. No pneumothorax or pleural effusion.    Normal cardiomediastinal silhouette.    Bones are unremarkable for age.    Normal upper abdomen.    Impression  Low lung volumes producing vascular crowding with no acute disease.        Workstation performed: [id number]    Results for orders placed during the hospital encounter of 07/06/24    CT chest wo contrast    Narrative  CT CHEST WITHOUT IV CONTRAST    INDICATION: infilterate.    COMPARISON: None.    TECHNIQUE: CT examination of the chest was performed without intravenous contrast. Multiplanar 2D reformatted images were created from the source data.    This examination, like all CT scans performed in the [facility], was performed utilizing techniques to minimize radiation dose exposure, including the use of iterative reconstruction and automated exposure control. Radiation dose length  product (DLP) for this visit: 712 mGy-cm    FINDINGS:    LUNGS: Mild interlobular septal thickening and groundglass opacification in the bilateral lower greater than upper lobes. Numerous 1 to 5 mm solid nodules in a similar distribution. There is no tracheal or endobronchial lesion.    PLEURA: Trace bilateral pleural effusions.    HEART/GREAT VESSELS: Heart is unremarkable for patient's age. No thoracic aortic aneurysm.    MEDIASTINUM AND BARTOLOME: Unremarkable.    CHEST WALL AND LOWER NECK: Unremarkable.    VISUALIZED STRUCTURES IN THE UPPER ABDOMEN: Unremarkable.    OSSEOUS STRUCTURES: No acute fracture or destructive osseous lesion.    Impression  Mild pulmonary edema and trace bilateral pleural effusions.    Numerous 1 to 5 mm solid nodules in a similar distribution to the pulmonary edema, which can rarely be seen as a manifestation of pulmonary edema. Findings could also be infectious/inflammatory. Metastatic  "disease is unlikely given the patient's age and  lack of known malignancy. Follow-up chest CT in 3 months is recommended.    The study was marked in EPIC for immediate notification.          Workstation performed: [id number]    No results found for this or any previous visit.    No results found for this or any previous visit.    No results found for this or any previous visit.      PLAN / RECOMMENDATIONS      Acute kidney injury: Improving with creatinine almost normal    Proteinuria: Etiology unclear.  Workup in progress.  For kidney biopsy to rule out any glomerulonephritis    Abnormal CT scan of lung: Patient also hemoptysis.  For bronchoscopy tomorrow.  May need steroid but will like to rule out any infectious etiology before starting patient on steroid.  Will discussed with pulmonary.  Discussed with Neville    Hypertension: Remain high.  I will restart diuretic as part of the management of hypertension as well as possible pulmonary edema    Discussed at length with the patient and his mom along with neville Wells MD  Nephrology  7/9/2024        Portions of the record may have been created with voice recognition software. Occasional wrong word or \"sound a like\" substitutions may have occurred due to the inherent limitations of voice recognition software. Read the chart carefully and recognize, using context, where substitutions have occurred.  "

## 2024-07-09 NOTE — RESPIRATORY THERAPY NOTE
RT Protocol Note  Ben Daniel 29 y.o. male MRN: [id number]  Unit/Bed#: ICU 02 Encounter: [id number]    Assessment    Principal Problem:    Acute respiratory failure with hypoxia (HCC)  Active Problems:    Essential hypertension    Palpitations    Hypothyroidism    ROSI (acute kidney injury) (HCC)      Home Pulmonary Medications:  none       Past Medical History:   Diagnosis Date    Anxiety     Hypertension      Social History     Socioeconomic History    Marital status: Single     Spouse name: None    Number of children: None    Years of education: None    Highest education level: None   Occupational History    None   Tobacco Use    Smoking status: Never    Smokeless tobacco: Never   Vaping Use    Vaping status: Never Used   Substance and Sexual Activity    Alcohol use: Never     Alcohol/week: 1.0 standard drink of alcohol     Types: 1 Cans of beer per week    Drug use: Never    Sexual activity: Yes   Other Topics Concern    None   Social History Narrative    None     Social Determinants of Health     Financial Resource Strain: Not on file   Food Insecurity: No Food Insecurity (7/8/2024)    Hunger Vital Sign     Worried About Running Out of Food in the Last Year: Never true     Ran Out of Food in the Last Year: Never true   Transportation Needs: No Transportation Needs (7/8/2024)    PRAPARE - Transportation     Lack of Transportation (Medical): No     Lack of Transportation (Non-Medical): No   Physical Activity: Not on file   Stress: Not on file   Social Connections: Not on file   Intimate Partner Violence: Not on file   Housing Stability: Low Risk  (7/8/2024)    Housing Stability Vital Sign     Unable to Pay for Housing in the Last Year: No     Number of Times Moved in the Last Year: 0     Homeless in the Last Year: No       Subjective    Subjective Data: awake and alert    Objective    Physical Exam:   Assessment Type: Assess only  General Appearance: Alert, Awake  Respiratory Pattern: Assisted, Tachypneic  Chest  "Assessment: Chest expansion symmetrical  Bilateral Breath Sounds: Rales, Scattered  O2 Device: MFNC    Vitals:  Blood pressure 132/90, pulse 86, temperature 99.2 °F (37.3 °C), temperature source Oral, resp. rate 16, height 5' 6.5\" (1.689 m), weight 124 kg (274 lb 4 oz), SpO2 92%.          Imaging and other studies: I have personally reviewed pertinent reports.      O2 Device: MFNC     Plan    Respiratory Plan: Discontinue Protocol, No distress/Pulmonary history        Resp Comments: pt with no resp hx or home meds  will dc tx and protocol   "

## 2024-07-09 NOTE — RESPIRATORY THERAPY NOTE
RT Ventilator Management Note  Ben Daniel 29 y.o. male MRN: [id number]  Unit/Bed#: -01 Encounter: [id number]      Daily Screen    No data found in the last 10 encounters.           Physical Exam:   Assessment Type: Assess only  General Appearance: Awake, Alert  Respiratory Pattern: Assisted, Tachypneic  Chest Assessment: Chest expansion symmetrical  Bilateral Breath Sounds: Rales, Scattered  O2 Device: v60  Subjective Data: alert and awake      Resp Comments: pt remains on bipap at this julián       07/09/24 1457   Respiratory Assessment   Assessment Type Assess only   General Appearance Awake;Alert   Respiratory Pattern Assisted;Tachypneic   Chest Assessment Chest expansion symmetrical   Bilateral Breath Sounds Rales;Scattered   Resp Comments pt remains on bipap at this julián   O2 Device v60   Subjective Data alert and awake   Non-Invasive Information   O2 Interface Device Face mask   Non-Invasive Ventilation Mode BiPAP   SpO2 95 %   $ Pulse Oximetry Spot Check Charge Completed   Non-Invasive Settings   IPAP (cm) 14 cm   EPAP (cm) 6 cm   Rate (Set) 8   FiO2 (%) 40   Pressure Support (cm H2O) 8   Rise Time 2   Inspiratory Time (Set) 1   Non-Invasive Readings   Total Rate 34   Vt (mL) (Mech) 630   MV (Mech) 21.2   Peak Pressure (Obs) 16   Spontaneous Vt (mL) 582   Leak (lpm) 27   Non-Invasive Alarms   Insp Pressure High (cm H20) 25   Insp Pressure Low (cm H20) 5   MV Low (L/min) 2   Vt High (mL) 1150   Vt Low (mL) 150   High Resp Rate (BPM) 45 BPM   Low Resp Rate (BPM) 8 BPM

## 2024-07-10 ENCOUNTER — ANESTHESIA (INPATIENT)
Dept: GASTROENTEROLOGY | Facility: HOSPITAL | Age: 29
DRG: 133 | End: 2024-07-10
Payer: COMMERCIAL

## 2024-07-10 ENCOUNTER — APPOINTMENT (INPATIENT)
Dept: RADIOLOGY | Facility: HOSPITAL | Age: 29
DRG: 133 | End: 2024-07-10
Payer: COMMERCIAL

## 2024-07-10 ENCOUNTER — ANESTHESIA EVENT (INPATIENT)
Dept: GASTROENTEROLOGY | Facility: HOSPITAL | Age: 29
DRG: 133 | End: 2024-07-10
Payer: COMMERCIAL

## 2024-07-10 ENCOUNTER — APPOINTMENT (INPATIENT)
Dept: GASTROENTEROLOGY | Facility: HOSPITAL | Age: 29
DRG: 133 | End: 2024-07-10
Payer: COMMERCIAL

## 2024-07-10 PROBLEM — I95.9 HYPOTENSION: Status: ACTIVE | Noted: 2024-07-10

## 2024-07-10 LAB
ABO GROUP BLD: NORMAL
ABO GROUP BLD: NORMAL
ALBUMIN SERPL ELPH-MCNC: 3.03 G/DL (ref 3.2–5.1)
ALBUMIN SERPL ELPH-MCNC: 42.7 % (ref 48–70)
ALBUMIN UR ELPH-MCNC: 80 %
ALPHA1 GLOB MFR UR ELPH: 4.2 %
ALPHA1 GLOB SERPL ELPH-MCNC: 0.44 G/DL (ref 0.15–0.47)
ALPHA1 GLOB SERPL ELPH-MCNC: 6.2 % (ref 1.8–7)
ALPHA2 GLOB MFR UR ELPH: 3.2 %
ALPHA2 GLOB SERPL ELPH-MCNC: 0.82 G/DL (ref 0.42–1.04)
ALPHA2 GLOB SERPL ELPH-MCNC: 11.5 % (ref 5.9–14.9)
ANION GAP SERPL CALCULATED.3IONS-SCNC: 7 MMOL/L (ref 4–13)
ARTERIAL PATENCY WRIST A: YES
B MICROTI IGG TITR SER: NORMAL {TITER}
B MICROTI IGM TITR SER: NORMAL {TITER}
B-GLOBULIN MFR UR ELPH: 4.8 %
BASE EXCESS BLDA CALC-SCNC: -2.2 MMOL/L
BETA GLOB ABNORMAL SERPL ELPH-MCNC: 0.37 G/DL (ref 0.31–0.57)
BETA1 GLOB SERPL ELPH-MCNC: 5.2 % (ref 4.7–7.7)
BETA2 GLOB SERPL ELPH-MCNC: 4.1 % (ref 3.1–7.9)
BETA2+GAMMA GLOB SERPL ELPH-MCNC: 0.29 G/DL (ref 0.2–0.58)
BLD GP AB SCN SERPL QL: NEGATIVE
BUN SERPL-MCNC: 33 MG/DL (ref 5–25)
CA-I BLD-SCNC: 1.06 MMOL/L (ref 1.12–1.32)
CALCIUM SERPL-MCNC: 7.9 MG/DL (ref 8.4–10.2)
CHLORIDE SERPL-SCNC: 97 MMOL/L (ref 96–108)
CHLORIDE UR-SCNC: 32 MMOL/L
CO2 SERPL-SCNC: 24 MMOL/L (ref 21–32)
CREAT SERPL-MCNC: 1.14 MG/DL (ref 0.6–1.3)
ERYTHROCYTE [DISTWIDTH] IN BLOOD BY AUTOMATED COUNT: 11.9 % (ref 11.6–15.1)
GAMMA GLOB ABNORMAL SERPL ELPH-MCNC: 2.15 G/DL (ref 0.4–1.66)
GAMMA GLOB MFR UR ELPH: 7.8 %
GAMMA GLOB SERPL ELPH-MCNC: 30.3 % (ref 6.9–22.3)
GFR SERPL CREATININE-BSD FRML MDRD: 86 ML/MIN/1.73SQ M
GLUCOSE SERPL-MCNC: 110 MG/DL (ref 65–140)
GLUCOSE SERPL-MCNC: 99 MG/DL (ref 65–140)
HCO3 BLDA-SCNC: 23.1 MMOL/L (ref 22–28)
HCT VFR BLD AUTO: 31.7 % (ref 36.5–49.3)
HGB BLD-MCNC: 11.1 G/DL (ref 12–17)
HOROWITZ INDEX BLDA+IHG-RTO: 100 MM[HG]
I-TIME: 0.9
IGG/ALB SER: 0.75 {RATIO} (ref 1.1–1.8)
INTERPRETATION UR IFE-IMP: NORMAL
INTERPRETATION UR IFE-IMP: NORMAL
LYMPHOCYTES NFR BLD AUTO: 21 %
MACROPHAGES NFR FLD: 61 %
MAGNESIUM SERPL-MCNC: 2.2 MG/DL (ref 1.9–2.7)
MCH RBC QN AUTO: 27.6 PG (ref 26.8–34.3)
MCHC RBC AUTO-ENTMCNC: 35 G/DL (ref 31.4–37.4)
MCV RBC AUTO: 79 FL (ref 82–98)
NEUTS SEG NFR BLD AUTO: 18 %
O2 CT BLDA-SCNC: 17.4 ML/DL (ref 16–23)
OSMOLALITY UR: 404 MMOL/KG
OXYHGB MFR BLDA: 94.7 % (ref 94–97)
PCO2 BLDA: 41.8 MM HG (ref 36–44)
PEEP RESPIRATORY: 10 CM[H2O]
PH BLDA: 7.36 [PH] (ref 7.35–7.45)
PHOSPHATE SERPL-MCNC: 4.3 MG/DL (ref 2.7–4.5)
PLATELET # BLD AUTO: 247 THOUSANDS/UL (ref 149–390)
PMV BLD AUTO: 8.6 FL (ref 8.9–12.7)
PO2 BLDA: 90.3 MM HG (ref 75–129)
POTASSIUM SERPL-SCNC: 4.2 MMOL/L (ref 3.5–5.3)
POTASSIUM UR-SCNC: 19.9 MMOL/L
PROT PATTERN SERPL ELPH-IMP: ABNORMAL
PROT PATTERN UR ELPH-IMP: NORMAL
PROT SERPL-MCNC: 7.1 G/DL (ref 6.4–8.2)
PROT UR-MCNC: 265.5 MG/DL
RBC # BLD AUTO: 4.02 MILLION/UL (ref 3.88–5.62)
RESULT/COMMENT: NORMAL
RH BLD: POSITIVE
RH BLD: POSITIVE
SODIUM 24H UR-SCNC: 39 MOL/L
SODIUM SERPL-SCNC: 128 MMOL/L (ref 135–147)
SPECIMEN EXPIRATION DATE: NORMAL
SPECIMEN SOURCE: NORMAL
STREP DNASE B SER-ACNC: 487 U/ML (ref 0–120)
TOTAL CELLS COUNTED SPEC: 100
TSH SERPL DL<=0.05 MIU/L-ACNC: 4.62 UIU/ML (ref 0.45–4.5)
URATE SERPL-MCNC: 8.1 MG/DL (ref 3.5–8.5)
VENT AC: 16
VENT- AC: AC
VT SETTING VENT: 400 ML
WBC # BLD AUTO: 10.14 THOUSAND/UL (ref 4.31–10.16)

## 2024-07-10 PROCEDURE — 87081 CULTURE SCREEN ONLY: CPT

## 2024-07-10 PROCEDURE — 84133 ASSAY OF URINE POTASSIUM: CPT | Performed by: INTERNAL MEDICINE

## 2024-07-10 PROCEDURE — 84443 ASSAY THYROID STIM HORMONE: CPT | Performed by: INTERNAL MEDICINE

## 2024-07-10 PROCEDURE — 87252 VIRUS INOCULATION TISSUE: CPT | Performed by: INTERNAL MEDICINE

## 2024-07-10 PROCEDURE — 99232 SBSQ HOSP IP/OBS MODERATE 35: CPT | Performed by: INTERNAL MEDICINE

## 2024-07-10 PROCEDURE — 87205 SMEAR GRAM STAIN: CPT | Performed by: INTERNAL MEDICINE

## 2024-07-10 PROCEDURE — 31624 DX BRONCHOSCOPE/LAVAGE: CPT | Performed by: INTERNAL MEDICINE

## 2024-07-10 PROCEDURE — 82330 ASSAY OF CALCIUM: CPT

## 2024-07-10 PROCEDURE — 80048 BASIC METABOLIC PNL TOTAL CA: CPT

## 2024-07-10 PROCEDURE — 88184 FLOWCYTOMETRY/ TC 1 MARKER: CPT | Performed by: INTERNAL MEDICINE

## 2024-07-10 PROCEDURE — 87070 CULTURE OTHR SPECIMN AEROBIC: CPT | Performed by: INTERNAL MEDICINE

## 2024-07-10 PROCEDURE — 86901 BLOOD TYPING SEROLOGIC RH(D): CPT

## 2024-07-10 PROCEDURE — 88185 FLOWCYTOMETRY/TC ADD-ON: CPT | Performed by: INTERNAL MEDICINE

## 2024-07-10 PROCEDURE — 71045 X-RAY EXAM CHEST 1 VIEW: CPT

## 2024-07-10 PROCEDURE — 82805 BLOOD GASES W/O2 SATURATION: CPT

## 2024-07-10 PROCEDURE — 99291 CRITICAL CARE FIRST HOUR: CPT | Performed by: ANESTHESIOLOGY

## 2024-07-10 PROCEDURE — 82948 REAGENT STRIP/BLOOD GLUCOSE: CPT

## 2024-07-10 PROCEDURE — 83735 ASSAY OF MAGNESIUM: CPT

## 2024-07-10 PROCEDURE — 84165 PROTEIN E-PHORESIS SERUM: CPT | Performed by: STUDENT IN AN ORGANIZED HEALTH CARE EDUCATION/TRAINING PROGRAM

## 2024-07-10 PROCEDURE — 86900 BLOOD TYPING SEROLOGIC ABO: CPT

## 2024-07-10 PROCEDURE — 83935 ASSAY OF URINE OSMOLALITY: CPT | Performed by: INTERNAL MEDICINE

## 2024-07-10 PROCEDURE — 87798 DETECT AGENT NOS DNA AMP: CPT | Performed by: INTERNAL MEDICINE

## 2024-07-10 PROCEDURE — NC001 PR NO CHARGE: Performed by: ANESTHESIOLOGY

## 2024-07-10 PROCEDURE — 87102 FUNGUS ISOLATION CULTURE: CPT | Performed by: INTERNAL MEDICINE

## 2024-07-10 PROCEDURE — 94760 N-INVAS EAR/PLS OXIMETRY 1: CPT

## 2024-07-10 PROCEDURE — 5A1945Z RESPIRATORY VENTILATION, 24-96 CONSECUTIVE HOURS: ICD-10-PCS | Performed by: STUDENT IN AN ORGANIZED HEALTH CARE EDUCATION/TRAINING PROGRAM

## 2024-07-10 PROCEDURE — 88305 TISSUE EXAM BY PATHOLOGIST: CPT | Performed by: PATHOLOGY

## 2024-07-10 PROCEDURE — 87206 SMEAR FLUORESCENT/ACID STAI: CPT | Performed by: INTERNAL MEDICINE

## 2024-07-10 PROCEDURE — 94002 VENT MGMT INPAT INIT DAY: CPT

## 2024-07-10 PROCEDURE — 87147 CULTURE TYPE IMMUNOLOGIC: CPT | Performed by: INTERNAL MEDICINE

## 2024-07-10 PROCEDURE — 82436 ASSAY OF URINE CHLORIDE: CPT | Performed by: INTERNAL MEDICINE

## 2024-07-10 PROCEDURE — 88112 CYTOPATH CELL ENHANCE TECH: CPT | Performed by: PATHOLOGY

## 2024-07-10 PROCEDURE — 0B9C8ZX DRAINAGE OF RIGHT UPPER LUNG LOBE, VIA NATURAL OR ARTIFICIAL OPENING ENDOSCOPIC, DIAGNOSTIC: ICD-10-PCS | Performed by: INTERNAL MEDICINE

## 2024-07-10 PROCEDURE — 86335 IMMUNFIX E-PHORSIS/URINE/CSF: CPT | Performed by: STUDENT IN AN ORGANIZED HEALTH CARE EDUCATION/TRAINING PROGRAM

## 2024-07-10 PROCEDURE — 84100 ASSAY OF PHOSPHORUS: CPT

## 2024-07-10 PROCEDURE — 84166 PROTEIN E-PHORESIS/URINE/CSF: CPT | Performed by: STUDENT IN AN ORGANIZED HEALTH CARE EDUCATION/TRAINING PROGRAM

## 2024-07-10 PROCEDURE — 36600 WITHDRAWAL OF ARTERIAL BLOOD: CPT

## 2024-07-10 PROCEDURE — 84300 ASSAY OF URINE SODIUM: CPT | Performed by: INTERNAL MEDICINE

## 2024-07-10 PROCEDURE — 84550 ASSAY OF BLOOD/URIC ACID: CPT | Performed by: INTERNAL MEDICINE

## 2024-07-10 PROCEDURE — 86334 IMMUNOFIX E-PHORESIS SERUM: CPT | Performed by: STUDENT IN AN ORGANIZED HEALTH CARE EDUCATION/TRAINING PROGRAM

## 2024-07-10 PROCEDURE — 87040 BLOOD CULTURE FOR BACTERIA: CPT | Performed by: ANESTHESIOLOGY

## 2024-07-10 PROCEDURE — 85027 COMPLETE CBC AUTOMATED: CPT

## 2024-07-10 PROCEDURE — 87116 MYCOBACTERIA CULTURE: CPT | Performed by: INTERNAL MEDICINE

## 2024-07-10 PROCEDURE — 87635 SARS-COV-2 COVID-19 AMP PRB: CPT | Performed by: INTERNAL MEDICINE

## 2024-07-10 PROCEDURE — 86850 RBC ANTIBODY SCREEN: CPT

## 2024-07-10 PROCEDURE — 89051 BODY FLUID CELL COUNT: CPT | Performed by: INTERNAL MEDICINE

## 2024-07-10 RX ORDER — PROPOFOL 10 MG/ML
INJECTION, EMULSION INTRAVENOUS AS NEEDED
Status: DISCONTINUED | OUTPATIENT
Start: 2024-07-10 | End: 2024-07-10

## 2024-07-10 RX ORDER — MIDAZOLAM HYDROCHLORIDE 2 MG/2ML
2 INJECTION, SOLUTION INTRAMUSCULAR; INTRAVENOUS EVERY 4 HOURS PRN
Status: DISCONTINUED | OUTPATIENT
Start: 2024-07-10 | End: 2024-07-12

## 2024-07-10 RX ORDER — PHENYLEPHRINE HCL IN 0.9% NACL 1 MG/10 ML
SYRINGE (ML) INTRAVENOUS AS NEEDED
Status: DISCONTINUED | OUTPATIENT
Start: 2024-07-10 | End: 2024-07-10

## 2024-07-10 RX ORDER — BENZONATATE 100 MG/1
100 CAPSULE ORAL 3 TIMES DAILY PRN
Status: DISCONTINUED | OUTPATIENT
Start: 2024-07-10 | End: 2024-07-10

## 2024-07-10 RX ORDER — LIDOCAINE HYDROCHLORIDE 20 MG/ML
INJECTION, SOLUTION EPIDURAL; INFILTRATION; INTRACAUDAL; PERINEURAL AS NEEDED
Status: DISCONTINUED | OUTPATIENT
Start: 2024-07-10 | End: 2024-07-10

## 2024-07-10 RX ORDER — DEXMEDETOMIDINE HYDROCHLORIDE 4 UG/ML
.1-1.4 INJECTION, SOLUTION INTRAVENOUS
Status: DISCONTINUED | OUTPATIENT
Start: 2024-07-10 | End: 2024-07-12

## 2024-07-10 RX ORDER — FUROSEMIDE 10 MG/ML
40 INJECTION INTRAMUSCULAR; INTRAVENOUS ONCE
Status: COMPLETED | OUTPATIENT
Start: 2024-07-10 | End: 2024-07-10

## 2024-07-10 RX ORDER — ROCURONIUM BROMIDE 10 MG/ML
INJECTION, SOLUTION INTRAVENOUS AS NEEDED
Status: DISCONTINUED | OUTPATIENT
Start: 2024-07-10 | End: 2024-07-10

## 2024-07-10 RX ORDER — CALCIUM GLUCONATE 20 MG/ML
2 INJECTION, SOLUTION INTRAVENOUS ONCE
Status: COMPLETED | OUTPATIENT
Start: 2024-07-10 | End: 2024-07-10

## 2024-07-10 RX ORDER — CHLORHEXIDINE GLUCONATE ORAL RINSE 1.2 MG/ML
15 SOLUTION DENTAL EVERY 12 HOURS SCHEDULED
Status: DISCONTINUED | OUTPATIENT
Start: 2024-07-10 | End: 2024-07-12

## 2024-07-10 RX ORDER — SODIUM CHLORIDE, SODIUM LACTATE, POTASSIUM CHLORIDE, CALCIUM CHLORIDE 600; 310; 30; 20 MG/100ML; MG/100ML; MG/100ML; MG/100ML
INJECTION, SOLUTION INTRAVENOUS CONTINUOUS PRN
Status: DISCONTINUED | OUTPATIENT
Start: 2024-07-10 | End: 2024-07-10

## 2024-07-10 RX ORDER — PROPOFOL 10 MG/ML
5-50 INJECTION, EMULSION INTRAVENOUS
Status: DISCONTINUED | OUTPATIENT
Start: 2024-07-10 | End: 2024-07-12

## 2024-07-10 RX ORDER — LEVALBUTEROL INHALATION SOLUTION 1.25 MG/3ML
1.25 SOLUTION RESPIRATORY (INHALATION) EVERY 6 HOURS
Status: DISCONTINUED | OUTPATIENT
Start: 2024-07-10 | End: 2024-07-10

## 2024-07-10 RX ORDER — EPHEDRINE SULFATE 50 MG/ML
INJECTION INTRAVENOUS AS NEEDED
Status: DISCONTINUED | OUTPATIENT
Start: 2024-07-10 | End: 2024-07-10

## 2024-07-10 RX ORDER — FENTANYL CITRATE 50 UG/ML
INJECTION, SOLUTION INTRAMUSCULAR; INTRAVENOUS AS NEEDED
Status: DISCONTINUED | OUTPATIENT
Start: 2024-07-10 | End: 2024-07-10

## 2024-07-10 RX ORDER — METHYLPREDNISOLONE SODIUM SUCCINATE 40 MG/ML
40 INJECTION, POWDER, LYOPHILIZED, FOR SOLUTION INTRAMUSCULAR; INTRAVENOUS EVERY 12 HOURS SCHEDULED
Status: DISCONTINUED | OUTPATIENT
Start: 2024-07-10 | End: 2024-07-11

## 2024-07-10 RX ADMIN — Medication 200 MCG: at 13:54

## 2024-07-10 RX ADMIN — CHLORHEXIDINE GLUCONATE 0.12% ORAL RINSE 15 ML: 1.2 LIQUID ORAL at 20:19

## 2024-07-10 RX ADMIN — EPHEDRINE SULFATE 10 MG: 50 INJECTION, SOLUTION INTRAVENOUS at 14:03

## 2024-07-10 RX ADMIN — Medication 200 MCG: at 13:50

## 2024-07-10 RX ADMIN — DEXMEDETOMIDINE HYDROCHLORIDE 0.2 MCG/KG/HR: 400 INJECTION INTRAVENOUS at 14:51

## 2024-07-10 RX ADMIN — ROCURONIUM BROMIDE 30 MG: 10 INJECTION, SOLUTION INTRAVENOUS at 14:10

## 2024-07-10 RX ADMIN — PROPOFOL 40 MCG/KG/MIN: 10 INJECTION, EMULSION INTRAVENOUS at 17:06

## 2024-07-10 RX ADMIN — PROPOFOL 100 MCG/KG/MIN: 10 INJECTION, EMULSION INTRAVENOUS at 13:58

## 2024-07-10 RX ADMIN — PROPRANOLOL HYDROCHLORIDE 60 MG: 60 CAPSULE, EXTENDED RELEASE ORAL at 08:27

## 2024-07-10 RX ADMIN — Medication 200 MCG: at 14:01

## 2024-07-10 RX ADMIN — PROPOFOL 40 MCG/KG/MIN: 10 INJECTION, EMULSION INTRAVENOUS at 22:42

## 2024-07-10 RX ADMIN — DEXMEDETOMIDINE HYDROCHLORIDE 0.7 MCG/KG/HR: 400 INJECTION INTRAVENOUS at 19:11

## 2024-07-10 RX ADMIN — DEXMEDETOMIDINE HYDROCHLORIDE 0.7 MCG/KG/HR: 400 INJECTION INTRAVENOUS at 22:19

## 2024-07-10 RX ADMIN — AMLODIPINE BESYLATE 10 MG: 10 TABLET ORAL at 08:30

## 2024-07-10 RX ADMIN — NOREPINEPHRINE BITARTRATE 5 MCG/MIN: 1 INJECTION, SOLUTION, CONCENTRATE INTRAVENOUS at 14:53

## 2024-07-10 RX ADMIN — PROPOFOL 50 MCG/KG/MIN: 10 INJECTION, EMULSION INTRAVENOUS at 15:07

## 2024-07-10 RX ADMIN — PROPOFOL 100 MG: 10 INJECTION, EMULSION INTRAVENOUS at 13:40

## 2024-07-10 RX ADMIN — VANCOMYCIN HYDROCHLORIDE 2000 MG: 1 INJECTION, POWDER, LYOPHILIZED, FOR SOLUTION INTRAVENOUS at 20:14

## 2024-07-10 RX ADMIN — CEFEPIME 2000 MG: 2 INJECTION, POWDER, FOR SOLUTION INTRAVENOUS at 17:06

## 2024-07-10 RX ADMIN — FENTANYL CITRATE 50 MCG: 50 INJECTION INTRAMUSCULAR; INTRAVENOUS at 13:39

## 2024-07-10 RX ADMIN — HEPARIN SODIUM 7500 UNITS: 5000 INJECTION INTRAVENOUS; SUBCUTANEOUS at 14:54

## 2024-07-10 RX ADMIN — FENTANYL CITRATE 50 MCG: 50 INJECTION INTRAMUSCULAR; INTRAVENOUS at 13:54

## 2024-07-10 RX ADMIN — PROPOFOL 200 MG: 10 INJECTION, EMULSION INTRAVENOUS at 13:39

## 2024-07-10 RX ADMIN — FUROSEMIDE 40 MG: 10 INJECTION, SOLUTION INTRAMUSCULAR; INTRAVENOUS at 10:06

## 2024-07-10 RX ADMIN — HEPARIN SODIUM 7500 UNITS: 5000 INJECTION INTRAVENOUS; SUBCUTANEOUS at 21:01

## 2024-07-10 RX ADMIN — ROCURONIUM BROMIDE 20 MG: 10 INJECTION, SOLUTION INTRAVENOUS at 13:50

## 2024-07-10 RX ADMIN — Medication 200 MCG: at 14:15

## 2024-07-10 RX ADMIN — SUGAMMADEX 200 MG: 100 INJECTION, SOLUTION INTRAVENOUS at 14:40

## 2024-07-10 RX ADMIN — LEVOTHYROXINE SODIUM 50 MCG: 0.05 TABLET ORAL at 08:30

## 2024-07-10 RX ADMIN — CEFEPIME 2000 MG: 2 INJECTION, POWDER, FOR SOLUTION INTRAVENOUS at 22:42

## 2024-07-10 RX ADMIN — LIDOCAINE HYDROCHLORIDE 80 MG: 20 INJECTION, SOLUTION EPIDURAL; INFILTRATION; INTRACAUDAL at 13:39

## 2024-07-10 RX ADMIN — CHLORHEXIDINE GLUCONATE 0.12% ORAL RINSE 15 ML: 1.2 LIQUID ORAL at 14:54

## 2024-07-10 RX ADMIN — CALCIUM GLUCONATE 2 G: 20 INJECTION, SOLUTION INTRAVENOUS at 08:28

## 2024-07-10 RX ADMIN — SODIUM CHLORIDE, SODIUM LACTATE, POTASSIUM CHLORIDE, AND CALCIUM CHLORIDE: .6; .31; .03; .02 INJECTION, SOLUTION INTRAVENOUS at 13:31

## 2024-07-10 RX ADMIN — BENZONATATE 100 MG: 100 CAPSULE ORAL at 06:06

## 2024-07-10 RX ADMIN — GUAIFENESIN 600 MG: 600 TABLET ORAL at 08:30

## 2024-07-10 RX ADMIN — PROPOFOL 40 MCG/KG/MIN: 10 INJECTION, EMULSION INTRAVENOUS at 20:18

## 2024-07-10 RX ADMIN — AZITHROMYCIN MONOHYDRATE 500 MG: 500 INJECTION, POWDER, LYOPHILIZED, FOR SOLUTION INTRAVENOUS at 18:20

## 2024-07-10 RX ADMIN — HEPARIN SODIUM 7500 UNITS: 5000 INJECTION INTRAVENOUS; SUBCUTANEOUS at 05:46

## 2024-07-10 RX ADMIN — METHYLPREDNISOLONE SODIUM SUCCINATE 40 MG: 40 INJECTION, POWDER, FOR SOLUTION INTRAMUSCULAR; INTRAVENOUS at 20:18

## 2024-07-10 NOTE — RESPIRATORY THERAPY NOTE
07/10/24 1433   Respiratory Assessment   Assessment Type Assess only   General Appearance Awake;Alert   Respiratory Pattern Normal   Chest Assessment Chest expansion symmetrical   Bilateral Breath Sounds Rhonchi;Crackles   Resp Comments pt post bronchoscopy, remains intubated placed on ventilator   Vent Information   Vent ID nel   Vent type Drager   Drager Vent Mode AC/VC+   $ Vent Charge-INITIAL Yes   Ventilator Start Yes   $ Pulse Oximetry Spot Check Charge Completed   SpO2 94 %   AC/VC+ Settings   Resp Rate (BPM) 16 BPM   VT (mL) 400 mL   Insp Time (S) 0.9 S   FIO2 (%) 100 %   PEEP (cmH2O) 10 cmH2O   Rise Time (%) 20 %   Trigger Sensitivity Flow (LPM) 2 LPM   Humidification Heater   Heater Temp 98.6 °F (37 °C)   AC/VC+ Actuals   Resp Rate (BPM) 16 BPM   VT (mL) 385 mL   MV (Obs) 5.58   MAP (cmH2O) 15 cmH2O   Peak Pressure (cmH2O) 30 cmH2O   I:E Ratio (Obs) 1:3.2   AC/VC+ ALARMS   High Peak Pressure (cmH2O) 45 cmH2O   High Resp Rate (BPM) 40 BPM   High MV (L/min) 10.8 L/min   Low MV (L/min) 4.3 L/min   Maintenance   Alarm (pink) cable attached No   Resuscitation bag with peep valve at bedside No   Water bag changed No   Circuit changed No   Daily Screen   Patient safety screen outcome: Failed   Not Ready for Weaning due to: Underline problem not resolved   IHI Ventilator Associated Pneumonia Bundle   Daily Assessment of Readiness to Extubate Yes   Head of Bed Elevated HOB 30   ETT  Hi-Lo;Cuffed;Oral 8 mm   Placement Date/Time: 07/10/24 1340   Mask Ventilation: Difficult mask ventilation (3)  Preoxygenated: Yes  Technique: Video laryngoscopy;Stylet  Type: Hi-Lo;Cuffed;Oral  Tube Size: 8 mm  Laryngoscope: Wallace  Blade Size: 4  Location: Oral  Grade View...   Secured at (cm) 23   Measured from Teeth   Secured Location Right   Secured by Commercial tube doll   Site Condition Dry   Cuff Pressure (color) Green   HI-LO Suction  Continuous low suction   HI-LO Secretions Scant   HI-LO Intervention Patent

## 2024-07-10 NOTE — PROGRESS NOTES
Ben Daniel is a 29 y.o. male who is currently ordered Vancomycin IV with management by the Pharmacy Consult service.  Relevant clinical data and objective / subjective history reviewed.  Vancomycin Assessment:  Indication and Goal AUC/Trough: Pneumonia (goal -600, trough >10)  Clinical Status:  New Start  Micro:   Pending  Renal Function:  SCr: 1.14 mg/dL  CrCl: 118.2 mL/min  Renal replacement: Not on dialysis  Days of Therapy: 0  Current Dose: 1750mg q12h  Vancomycin Plan:  New Dosing: Loading dose 2g IV once then 1250mg IV q12h  Estimated AUC: 505 mcg*hr/mL  Estimated Trough: 14.9 mcg/mL  Next Level: 7/12 with am labs   Renal Function Monitoring: Daily BMP and UOP  Pharmacy will continue to follow closely for s/sx of nephrotoxicity, infusion reactions and appropriateness of therapy.  BMP and CBC will be ordered per protocol. We will continue to follow the patient’s culture results and clinical progress daily.    Bryan Reyes, Pharmacist

## 2024-07-10 NOTE — ASSESSMENT & PLAN NOTE
Patient with report of SOB on arrival with hypoxia requiring 3 L NC  Now with worsening hypoxia requiring 15 L NRB followed by BIPAP  Has been able to wean to 7 L midflow nasal cannula overnight   Differentials include inflammatory/autoimmune +/- pulmonary edema, less likely to be infectious  MALENA and Lyme negative, other serologies pending, follow up results  Urine antigens negative   S/p renal biopsy, follow up results  Holding steroids until biopsy result  Will need bronch once respiratory status has improved to obtain BAL/cultures  Received lasix 40 mg IV x 2   Monitor I&O

## 2024-07-10 NOTE — ANESTHESIA PREPROCEDURE EVALUATION
Procedure:  BRONCHOSCOPY    Relevant Problems   CARDIO   (+) Essential hypertension      ENDO   (+) Hypothyroidism      /RENAL   (+) ROSI (acute kidney injury) (HCC)      HEMATOLOGY   (+) Nutritional anemia      MUSCULOSKELETAL   (+) Acute right-sided low back pain without sciatica   (+) Cervical strain      NEURO/PSYCH   (+) Anxiety      PULMONARY   (+) Acute respiratory failure with hypoxia (HCC)   (+) SOB (shortness of breath)        Physical Exam    Airway    Mallampati score: III  TM Distance: >3 FB  Neck ROM: full     Dental   No notable dental hx     Cardiovascular  Rhythm: regular, Rate: normal    Pulmonary   Breath sounds clear to auscultation, Rales    Other Findings        Anesthesia Plan  ASA Score- 4     Anesthesia Type- IV sedation with anesthesia with ASA Monitors.         Additional Monitors:     Airway Plan: ETT.           Plan Factors-Exercise tolerance (METS): >4 METS.    Chart reviewed. EKG reviewed.  Existing labs reviewed. Patient summary reviewed.    Patient is not a current smoker.      Obstructive sleep apnea risk education given perioperatively.        Induction- intravenous.    Postoperative Plan- Plan for postoperative opioid use.     Perioperative Resuscitation Plan - Level 1 - Full Code.       Informed Consent- Anesthetic plan and risks discussed with patient.  I personally reviewed this patient with the CRNA. Discussed and agreed on the Anesthesia Plan with the CRNA..

## 2024-07-10 NOTE — RESPIRATORY THERAPY NOTE
07/10/24 1501   Inhalation Therapy Tx   Resp Comments ABG drawn L radial, walked to lab   Respiratory Charges    $ Arterial Puncture ABG  Yes

## 2024-07-10 NOTE — PROGRESS NOTES
NEPHROLOGY PROGRESS NOTE    Patient: Ben Daniel               Sex: male          DOA: 7/6/2024 10:38 AM   YOB: 1995        Age:  29 y.o.        LOS:  LOS: 2 days       HPI     Patient admitted with acute kidney injury and hemoptysis    SUBJECTIVE     S/p kidney biopsy    Patient was transferred to ICU because of volume overload status and pulmonary edema    Getting diuresis now with some improvement    Patient was on BiPAP now on nasal cannula    Will likely get bronchoscopy today    Still has hemoptysis    Diuresing quite well    CURRENT MEDICATIONS       Current Facility-Administered Medications:     acetaminophen (TYLENOL) tablet 650 mg, 650 mg, Oral, Q6H PRN, MARTIN Calhoun, 650 mg at 07/08/24 1651    albuterol (PROVENTIL HFA,VENTOLIN HFA) inhaler 2 puff, 2 puff, Inhalation, Q4H PRN, MARTIN Calhoun, 2 puff at 07/09/24 0538    amLODIPine (NORVASC) tablet 10 mg, 10 mg, Oral, Daily, MARTIN Calhoun, 10 mg at 07/10/24 0830    benzonatate (TESSALON PERLES) capsule 100 mg, 100 mg, Oral, TID PRN, MARTIN Hill, 100 mg at 07/10/24 0606    guaiFENesin (MUCINEX) 12 hr tablet 600 mg, 600 mg, Oral, Q12H YADIRA, MARTIN Calhoun, 600 mg at 07/10/24 0830    heparin (porcine) subcutaneous injection 7,500 Units, 7,500 Units, Subcutaneous, Q8H YADIRA, 7,500 Units at 07/10/24 0546 **AND** [COMPLETED] Platelet count, , , Once, Uriel Kline MD    levothyroxine tablet 50 mcg, 50 mcg, Oral, Daily, MARTIN Calhoun, 50 mcg at 07/10/24 0830    lidocaine (LIDODERM) 5 % patch 2 patch, 2 patch, Topical, Daily PRN, MARTIN Calhoun, 2 patch at 07/07/24 0130    ondansetron (ZOFRAN) injection 4 mg, 4 mg, Intravenous, Q8H PRN, MARTIN Calhoun, 4 mg at 07/09/24 0033    propranolol (INDERAL LA) 24 hr capsule 60 mg, 60 mg, Oral, Daily, MARTIN Calhoun, 60 mg at 07/10/24 0802    Facility-Administered Medications Ordered in Other Encounters:     fentaNYL  injection, , Intravenous, PRN, Zion Leonarczyk, CRNA, 50 mcg at 07/10/24 1354    lactated ringers infusion, , Intravenous, Continuous PRN, Zion Leonarczyk, CRNA, New Bag at 07/10/24 1331    lidocaine (PF) (XYLOCAINE-MPF) 2 % injection, , Intravenous, PRN, Zion Leonarczyk, CRNA, 80 mg at 07/10/24 1339    phenylephrine 1,000 mcg/10 mL prefilled syringe, , Intravenous, PRN, Zion Leonarczyk, CRNA, 200 mcg at 07/10/24 1354    propofol (DIPRIVAN) 1000 mg in 100 mL infusion (premix), , Intravenous, PRN, Zion Leonarczyk, CRNA, 100 mg at 07/10/24 1340    ROCuronium (ZEMURON) injection, , Intravenous, PRN, Zion Leonarczyk, CRNA, 20 mg at 07/10/24 1350    OBJECTIVE     Current Weight: Weight - Scale: 121 kg (266 lb 1.5 oz)  Vitals:    07/10/24 1240   BP: 137/73   Pulse: 85   Resp: 18   Temp: 98.1 °F (36.7 °C)   SpO2: 95%       Intake/Output Summary (Last 24 hours) at 7/10/2024 1357  Last data filed at 7/10/2024 1023  Gross per 24 hour   Intake 480 ml   Output 2450 ml   Net -1970 ml       PHYSICAL EXAMINATION     Physical Exam  Constitutional:       General: He is not in acute distress.     Appearance: He is well-developed.   HENT:      Head: Normocephalic.      Mouth/Throat:      Mouth: Mucous membranes are moist.   Eyes:      General: No scleral icterus.     Conjunctiva/sclera: Conjunctivae normal.   Neck:      Vascular: No JVD.   Cardiovascular:      Rate and Rhythm: Normal rate.      Heart sounds: Normal heart sounds.   Pulmonary:      Effort: Pulmonary effort is normal.      Breath sounds: Wheezing present.   Abdominal:      Palpations: Abdomen is soft.      Tenderness: There is no abdominal tenderness.   Musculoskeletal:         General: Normal range of motion.      Cervical back: Neck supple.   Skin:     General: Skin is warm.      Findings: No rash.   Neurological:      Mental Status: He is alert and oriented to person, place, and time.   Psychiatric:         Behavior: Behavior normal.          LAB RESULTS      Results from last 7 days   Lab Units 07/10/24  0557 07/09/24  1209 07/09/24  0539 07/08/24  0510 07/07/24  0607 07/06/24  1917 07/06/24  1408 07/06/24  1115   WBC Thousand/uL 10.14 8.86 9.72 6.58 6.66  --   --  4.05*   HEMOGLOBIN g/dL 11.1* 12.0 11.9* 12.0 11.4*  --   --  12.2   HEMATOCRIT % 31.7* 34.6* 35.3* 35.8* 33.1*  --   --  36.1*   PLATELETS Thousands/uL 247 256 278 226 223  --  188 239   POTASSIUM mmol/L 4.2  --  4.5 4.9 4.7 4.6  --  5.2   CHLORIDE mmol/L 97  --  98 101 107 108  --  107   CO2 mmol/L 24  --  22 22 22 24  --  23   BUN mg/dL 33*  --  32* 38* 43* 44*  --  48*   CREATININE mg/dL 1.14  --  1.19 1.51* 1.60* 1.67*  --  1.76*   EGFR ml/min/1.73sq m 86  --  82 61 57 54  --  51   CALCIUM mg/dL 7.9*  --  8.0* 8.7 8.6 8.9  --  8.8   MAGNESIUM mg/dL 2.2  --   --   --  2.3  --   --   --    PHOSPHORUS mg/dL 4.3  --   --   --  4.9*  --   --   --        RADIOLOGY RESULTS      Results for orders placed during the hospital encounter of 07/06/24    XR chest portable    Narrative  XR CHEST PORTABLE    INDICATION: Hypoxemia.    COMPARISON: 7/6/2024    FINDINGS:  Progressive diffuse bilateral opacity likely representing multifocal pneumonia and possible pulmonary edema    No pneumothorax or pleural effusion.    Normal cardiomediastinal silhouette.    Bones are unremarkable for age.    Normal upper abdomen.    Impression  Progressive diffuse bilateral opacities likely representing multifocal pneumonia with possible superimposed pulmonary edema        Workstation performed: [id number]    Results for orders placed during the hospital encounter of 07/06/24    XR chest 2 views    Narrative  XR CHEST PA & LATERAL    INDICATION: sob. Shortness of breath for 2 days, chest pressure began this morning.    COMPARISON: CXR 2/8/2019.    FINDINGS:    Low lung volumes producing vascular crowding. No acute disease. No pneumothorax or pleural effusion.    Normal cardiomediastinal silhouette.    Bones are unremarkable for  age.    Normal upper abdomen.    Impression  Low lung volumes producing vascular crowding with no acute disease.        Workstation performed: [id number]    Results for orders placed during the hospital encounter of 07/06/24    CT chest wo contrast    Narrative  CT CHEST WITHOUT IV CONTRAST    INDICATION: infilterate.    COMPARISON: None.    TECHNIQUE: CT examination of the chest was performed without intravenous contrast. Multiplanar 2D reformatted images were created from the source data.    This examination, like all CT scans performed in the [facility], was performed utilizing techniques to minimize radiation dose exposure, including the use of iterative reconstruction and automated exposure control. Radiation dose length  product (DLP) for this visit: 712 mGy-cm    FINDINGS:    LUNGS: Mild interlobular septal thickening and groundglass opacification in the bilateral lower greater than upper lobes. Numerous 1 to 5 mm solid nodules in a similar distribution. There is no tracheal or endobronchial lesion.    PLEURA: Trace bilateral pleural effusions.    HEART/GREAT VESSELS: Heart is unremarkable for patient's age. No thoracic aortic aneurysm.    MEDIASTINUM AND BARTOLOME: Unremarkable.    CHEST WALL AND LOWER NECK: Unremarkable.    VISUALIZED STRUCTURES IN THE UPPER ABDOMEN: Unremarkable.    OSSEOUS STRUCTURES: No acute fracture or destructive osseous lesion.    Impression  Mild pulmonary edema and trace bilateral pleural effusions.    Numerous 1 to 5 mm solid nodules in a similar distribution to the pulmonary edema, which can rarely be seen as a manifestation of pulmonary edema. Findings could also be infectious/inflammatory. Metastatic disease is unlikely given the patient's age and  lack of known malignancy. Follow-up chest CT in 3 months is recommended.    The study was marked in EPIC for immediate notification.          Workstation performed: [id number]    No results found for this or any previous  "visit.    No results found for this or any previous visit.    No results found for this or any previous visit.      PLAN / RECOMMENDATIONS      Acute kidney injury: Improved and seems to be normal at baseline    Hypoxic respiratory failure: Still require oxygen.  Getting diuresis.  Still has hemoptysis.  Chest x-ray still abnormal.  Will be getting bronchoscopy for further management    Hypertension: Better controlled with diuretic which we will continue    Will continue monitor closely    Randy Wells MD  Nephrology  7/10/2024        Portions of the record may have been created with voice recognition software. Occasional wrong word or \"sound a like\" substitutions may have occurred due to the inherent limitations of voice recognition software. Read the chart carefully and recognize, using context, where substitutions have occurred.  "

## 2024-07-10 NOTE — RESPIRATORY THERAPY NOTE
Pt had 2 x of hypoxia in which scope was removed from ETT. Pt recovered slowly but did come up to 100% after approx 5-10 minutes. Lowest witnessed sats were 76% which was only for a few minutes. Pt left intubated and taken to ICU where he will be supported on mechanical vent until stable enough to be extubated.

## 2024-07-10 NOTE — RESPIRATORY THERAPY NOTE
07/10/24 1048   Respiratory Assessment   Assessment Type Assess only   General Appearance Awake;Alert   Respiratory Pattern Normal   Chest Assessment Chest expansion symmetrical   Bilateral Breath Sounds Rhonchi   Resp Comments pt titrated to 6L MFNC, will wean to NC when appropriate   O2 Device MFNC   Oxygen Therapy/Pulse Ox   O2 Device Mid flow nasal cannula   O2 Therapy Oxygen humidified   Nasal Cannula O2 Flow Rate (L/min) 6 L/min   Calculated FIO2 (%) - Nasal Cannula 44   SpO2 93 %   SpO2 Activity At Rest   $ Pulse Oximetry Spot Check Charge Completed

## 2024-07-10 NOTE — RESPIRATORY THERAPY NOTE
RT Ventilator Management Note  Ben Daniel 29 y.o. male MRN: [id number]  Unit/Bed#: ICU 02 Encounter: [id number]      Daily Screen         7/10/2024  1433             Patient safety screen outcome:: Failed    Not Ready for Weaning due to:: Underline problem not resolved              Physical Exam:   Assessment Type: Assess only  General Appearance: Awake, Alert  Respiratory Pattern: Normal  Chest Assessment: Chest expansion symmetrical  Bilateral Breath Sounds: Rhonchi, Crackles  O2 Device: MFNC      Resp Comments: pt post bronchoscopy, remains intubated placed on ventilator       07/10/24 1433   Respiratory Assessment   Assessment Type Assess only   General Appearance Awake;Alert   Respiratory Pattern Normal   Chest Assessment Chest expansion symmetrical   Bilateral Breath Sounds Rhonchi;Crackles   Resp Comments pt post bronchoscopy, remains intubated placed on ventilator   Vent Information   Vent ID celeste   Vent type Drager   Drager Vent Mode AC/VC+   $ Vent Charge-INITIAL Yes   Ventilator Start Yes   $ Pulse Oximetry Spot Check Charge Completed   SpO2 94 %   AC/VC+ Settings   Resp Rate (BPM) 16 BPM   VT (mL) 400 mL   Insp Time (S) 0.9 S   FIO2 (%) 100 %   PEEP (cmH2O) 10 cmH2O   Rise Time (%) 20 %   Trigger Sensitivity Flow (LPM) 2 LPM   Humidification Heater   Heater Temp 98.6 °F (37 °C)   AC/VC+ Actuals   Resp Rate (BPM) 16 BPM   VT (mL) 385 mL   MV (Obs) 5.58   MAP (cmH2O) 15 cmH2O   Peak Pressure (cmH2O) 30 cmH2O   I:E Ratio (Obs) 1:3.2   AC/VC+ ALARMS   High Peak Pressure (cmH2O) 45 cmH2O   High Resp Rate (BPM) 40 BPM   High MV (L/min) 10.8 L/min   Low MV (L/min) 4.3 L/min   Maintenance   Alarm (pink) cable attached No   Resuscitation bag with peep valve at bedside No   Water bag changed No   Circuit changed No   Daily Screen   Patient safety screen outcome: Failed   Not Ready for Weaning due to: Underline problem not resolved   IHI Ventilator Associated Pneumonia Bundle   Daily Assessment of Readiness to  Extubate Yes   Head of Bed Elevated HOB 30   ETT  Hi-Lo;Cuffed;Oral 8 mm   Placement Date/Time: 07/10/24 1340   Mask Ventilation: Difficult mask ventilation (3)  Preoxygenated: Yes  Technique: Video laryngoscopy;Stylet  Type: Hi-Lo;Cuffed;Oral  Tube Size: 8 mm  Laryngoscope: [employer]  Blade Size: 4  Location: Oral  Grade View...   Secured at (cm) 23   Measured from Teeth   Secured Location Right   Secured by Commercial tube doll   Site Condition Dry   Cuff Pressure (color) Green   HI-LO Suction  Continuous low suction   HI-LO Secretions Scant   HI-LO Intervention Patent

## 2024-07-10 NOTE — PROGRESS NOTES
"Progress Note - Pulmonary   Ben Daniel 29 y.o. male MRN: [id number]  Unit/Bed#: ICU 02 Encounter: [id number]    Assessment:  Acute hypoxemic respiratory failure  Abnormal CT scan of the chest  Acute kidney injury    Plan:  Acute hypoxemic respiratory failure with abnormal CT scan, ROSI  Patient underwent renal biopsy yesterday, postprocedure he was unable to lie flat and had increasing O2 requirements requiring BiPAP  He did also have some blood-tinged sputum yesterday that has resolved, question related to pulmonary edema  Chest x-ray showed findings consistent with fluid overload, he was diuresed overnight and this morning he is on 7 L mid flow  Continue diuresis per primary service, did receive additional Lasix this morning  Will repeat chest x-ray this morning  He is tentatively planned for bronchoscopy this afternoon, will follow-up on chest x-ray and make final decision on bronchoscopy versus further diuresis before procedure  Autoimmune/inflammatory serology has been sent-CRP is elevated at 180, complements are low, Anti-DNAse B antibody is elevated  MALENA, rheumatoid factor, GBM antibody, dsDNA are negative  ANCA still pending  Will continue to follow    Subjective:   Patient lying in bed.  He is feeling much better from yesterday afternoon.  Denies any further hemoptysis.    Objective:     Vitals: Blood pressure 143/77, pulse 91, temperature 98.2 °F (36.8 °C), temperature source Oral, resp. rate (!) 26, height 5' 6.5\" (1.689 m), weight 124 kg (274 lb 4 oz), SpO2 (!) 89%.,Body mass index is 43.6 kg/m².      Intake/Output Summary (Last 24 hours) at 7/10/2024 1014  Last data filed at 7/10/2024 0608  Gross per 24 hour   Intake 480 ml   Output 3150 ml   Net -2670 ml       Invasive Devices       Peripheral Intravenous Line  Duration             Long-Dwell Peripheral IV (Midline) 07/08/24 Left Cephalic Vein 1 day    Peripheral IV 07/10/24 Distal;Right;Ventral (anterior) Forearm <1 day                    Physical " "Exam: /77   Pulse 91   Temp 98.2 °F (36.8 °C) (Oral)   Resp (!) 26   Ht 5' 6.5\" (1.689 m)   Wt 124 kg (274 lb 4 oz)   SpO2 (!) 89%   BMI 43.60 kg/m²   General appearance: alert and oriented, in no acute distress  Head: Normocephalic, without obvious abnormality, atraumatic  Eyes: negative findings: conjunctivae and sclerae normal  Lungs:  bibasilar crackles  Heart: regular rate and rhythm  Abdomen: normal findings: soft, non-tender  Extremities:  trace bilateral LE edema  Skin:  warm and dry  Neurologic: Mental status: Alert, oriented, thought content appropriate     Labs: I have personally reviewed pertinent lab results., CBC:   Lab Results   Component Value Date    WBC 10.14 07/10/2024    HGB 11.1 (L) 07/10/2024    HCT 31.7 (L) 07/10/2024    MCV 79 (L) 07/10/2024     07/10/2024    RBC 4.02 07/10/2024    MCH 27.6 07/10/2024    MCHC 35.0 07/10/2024    RDW 11.9 07/10/2024    MPV 8.6 (L) 07/10/2024   , CMP:   Lab Results   Component Value Date    SODIUM 128 (L) 07/10/2024    K 4.2 07/10/2024    CL 97 07/10/2024    CO2 24 07/10/2024    BUN 33 (H) 07/10/2024    CREATININE 1.14 07/10/2024    CALCIUM 7.9 (L) 07/10/2024    EGFR 86 07/10/2024     Imaging and other studies: I have personally reviewed pertinent reports.   and I have personally reviewed pertinent films in PACS    "

## 2024-07-10 NOTE — PROGRESS NOTES
"Critical access hospital  Interval Progress Note: Critical Care  Name: Ben Daniel I  MRN: [id number]  Unit/Bed#: ICU 02 I Date of Admission: 7/6/2024   Date of Service: 7/10/2024 I Hospital Day: 2    Interval Events:  =  Patient went for bronchoscopy under general anesthesia. Per Anesthesia team, patient had significant post induction hypoxia and hypoxia during the procedure requiring frequent pauses and time to recover. Compliance was poor.     Per pulmonologist, patient with miminal/scant blood tinged fluid. Mostly noted gross airway edema and erythema more consistent with infectious trigger. All samples sent.    Patient remained intubated post procedure. He was started on levophed for BP support.      Pertinent New Data:   blood pressure, pulse, temperature, respirations, and pulse oximetry    Sedated, intubated, very minimal aeration at right apices, diffuse rhonchi throughout, crackles at bases  CBC:   Lab Results   Component Value Date    WBC 10.14 07/10/2024    HGB 11.1 (L) 07/10/2024    HCT 31.7 (L) 07/10/2024    MCV 79 (L) 07/10/2024     07/10/2024    RBC 4.02 07/10/2024    MCH 27.6 07/10/2024    MCHC 35.0 07/10/2024    RDW 11.9 07/10/2024    MPV 8.6 (L) 07/10/2024   , CMP:   Lab Results   Component Value Date    SODIUM 128 (L) 07/10/2024    K 4.2 07/10/2024    CL 97 07/10/2024    CO2 24 07/10/2024    BUN 33 (H) 07/10/2024    CREATININE 1.14 07/10/2024    CALCIUM 7.9 (L) 07/10/2024    EGFR 86 07/10/2024   , ABG: No results found for: \"PHART\", \"MKG4CZX\", \"PO2ART\", \"JOV5VEQ\", \"T2SRHEMP\", \"BEART\", \"SOURCE\"  chest xrayI have personally reviewed pertinent reports.   and I have personally reviewed pertinent films in PACS      Assessment and Plan  Diagnosis: acute hypoxic respiratory failure, post procedure respiratory failure  Plan: continue AC/VC, optimize TV to 8 cc/kg, PEEP of 10, TID nebs  Start empiric antibiotics: vancomycin/cefepime/azithromycin  Start solumedrol 40 mg q12h  Obtain " blood cx  F/u all sputum cx and renal biopsy    Billing Level:  Critical Care Time Statement: Upon my evaluation, this patient had a high probability of imminent or life-threatening deterioration due to 7/10/24, which required my direct attention, intervention, and personal management.  I spent a total of 24 minutes directly providing critical care services, including interpretation of complex medical databases, evaluating for the presence of life-threatening injuries or illnesses, complex medical decision making (to support/prevent further life-threatening deterioration)., interpretation of hemodynamic data, titration of vasoactive medications, titration of continuous IV medications (drips), and ventilator management. This time is exclusive of procedures, teaching, family meetings, and any prior time recorded by providers other than myself.      SIGNATURE: Oly Christine MD

## 2024-07-10 NOTE — PROGRESS NOTES
[facility]  Progress Note  Name: Ben Daniel I  MRN: [id number]  Unit/Bed#: ICU 02 I Date of Admission: 7/6/2024   Date of Service: 7/10/2024 I Hospital Day: 2    Assessment & Plan   * Acute respiratory failure with hypoxia (HCC)  Assessment & Plan  Patient with report of SOB on arrival with hypoxia requiring 3 L NC  Now with worsening hypoxia requiring 15 L NRB followed by BIPAP  Has been able to wean to 7 L midflow nasal cannula overnight   Differentials include inflammatory/autoimmune +/- pulmonary edema, less likely to be infectious  MALENA and Lyme negative, other serologies pending, follow up results  Urine antigens negative   S/p renal biopsy, follow up results  Holding steroids until biopsy result  Will need bronch once respiratory status has improved to obtain BAL/cultures  Received lasix 40 mg IV x 2   Monitor I&O      ROSI (acute kidney injury) (HCC)  Assessment & Plan  POA with unclear etiology, differentials include glomerulonephritis, pulmonary renal syndrome  Baseline creatinine 0.8 and initial creatinine 1.76  Trending down, most recently 1.19  S/p IR kidney biopsy  Follow up pathology results  Hold off on steroids until biopsy results  Nephrology consulted, appreciate input  Continue pulmonology workup as above  Monitor I&O  Avoid nephrotoxins  Trend serum creatinine    Hypothyroidism  Assessment & Plan  Continue synthroid    Essential hypertension  Assessment & Plan  Continue amlodipine and inderal  HCTZ and benazepril on hold             Disposition: Stepdown Level 1    ICU Core Measures     A: Assess, Prevent, and Manage Pain Has pain been assessed? Yes  Need for changes to pain regimen? No   B: Both SAT/SAT  N/A   C: Choice of Sedation RASS Goal: 0 Alert and Calm  Need for changes to sedation or analgesia regimen? No   D: Delirium CAM-ICU: Negative   E: Early Mobility  Plan for early mobility? Yes   F: Family Engagement Plan for family engagement today? Yes          Prophylaxis:  VTE VTE covered by:  heparin (porcine), Subcutaneous, 7,500 Units at 07/09/24 2203       Stress Ulcer  not ordered         Significant 24hr Events     24hr events: Transferred to step down for need for BIPAP.  Given IV lasix X 2 doses.  Weaned off BIPAP to midflow nasal cannula overnight.  No acute events      Subjective   Review of Systems: See HPI for Review of Systems     Objective                            Vitals I/O      Most Recent Min/Max in 24hrs   Temp 98.3 °F (36.8 °C) Temp  Min: 98.3 °F (36.8 °C)  Max: 100 °F (37.8 °C)   Pulse 93 Pulse  Min: 77  Max: 94   Resp (!) 29 Resp  Min: 16  Max: 29   /71 BP  Min: 132/90  Max: 153/80   O2 Sat 94 % SpO2  Min: 80 %  Max: 98 %      Intake/Output Summary (Last 24 hours) at 7/10/2024 0200  Last data filed at 7/9/2024 2200  Gross per 24 hour   Intake --   Output 2300 ml   Net -2300 ml       Diet NPO; Sips with meds    Invasive Monitoring           Physical Exam   Physical Exam  Eyes:      General: Lids are normal.      Extraocular Movements: Extraocular movements intact.      Conjunctiva/sclera: Conjunctivae normal.   Skin:     General: Skin is warm and dry.   HENT:      Head: Normocephalic and atraumatic.   Neck:      Trachea: Trachea normal.   Cardiovascular:      Rate and Rhythm: Normal rate.      Pulses: Normal pulses.   Musculoskeletal:      Cervical back: Normal range of motion.      Right lower leg: Edema present.      Left lower leg: Edema present.   Abdominal:      Palpations: Abdomen is soft.      Tenderness: There is no abdominal tenderness.   Constitutional:       General: He is awake.      Appearance: He is obese. He is ill-appearing.      Interventions: Nasal cannula in place.   Pulmonary:      Breath sounds: Wheezing present.   Psychiatric:         Behavior: Behavior is cooperative.   Neurological:      General: No focal deficit present.      Mental Status: He is alert.      GCS: GCS eye subscore is 4. GCS verbal subscore is 5.  GCS motor subscore is 6.      Sensory: Sensation is intact.      Coordination: Coordination is intact.            Diagnostic Studies      EKG: NSR   Imaging:   IR biopsy kidney random   Final Result      CT-guided biopsy of midpole left renal cortex.      Plan:      Specimens sent for evaluation.      Workstation performed: [id number]         US kidney and bladder   Final Result   Unremarkable renal sonogram   No hydronephrosis   Additional imaging to be based on clinical evaluation      Workstation performed: [id number]         CT chest wo contrast   Final Result      Mild pulmonary edema and trace bilateral pleural effusions.      Numerous 1 to 5 mm solid nodules in a similar distribution to the pulmonary edema, which can rarely be seen as a manifestation of pulmonary edema. Findings could also be infectious/inflammatory. Metastatic disease is unlikely given the patient's age and    lack of known malignancy. Follow-up chest CT in 3 months is recommended.      The study was marked in EPIC for immediate notification.               Workstation performed: [id number]         XR chest 2 views   Final Result      Low lung volumes producing vascular crowding with no acute disease.            Workstation performed: [id number]         XR chest portable    (Results Pending)         Medications:  Scheduled PRN   amLODIPine, 10 mg, Daily  guaiFENesin, 600 mg, Q12H YADIRA  heparin (porcine), 7,500 Units, Q8H YADIRA  levothyroxine, 50 mcg, Daily  propranolol, 60 mg, Daily      acetaminophen, 650 mg, Q6H PRN  albuterol, 2 puff, Q4H PRN  lidocaine, 2 patch, Daily PRN  ondansetron, 4 mg, Q8H PRN       Continuous          Labs:    CBC    Recent Labs     07/09/24  0539 07/09/24  1209   WBC 9.72 8.86   HGB 11.9* 12.0   HCT 35.3* 34.6*    256     BMP    Recent Labs     07/08/24  0510 07/09/24  0539   SODIUM 131* 128*   K 4.9 4.5    98   CO2 22 22   AGAP 8 8   BUN 38* 32*   CREATININE 1.51* 1.19   CALCIUM 8.7 8.0*        Coags    Recent Labs     07/08/24  1420   INR 1.14        Additional Electrolytes  No recent results       Blood Gas    No recent results  No recent results LFTs  No recent results    Infectious  No recent results  Glucose  Recent Labs     07/08/24  0510 07/09/24  0539   GLUC 95 101               MARTIN Hill

## 2024-07-10 NOTE — PLAN OF CARE
Problem: PAIN - ADULT  Goal: Verbalizes/displays adequate comfort level or baseline comfort level  Description: Interventions:  - Encourage patient to monitor pain and request assistance  - Assess pain using appropriate pain scale  - Administer analgesics based on type and severity of pain and evaluate response  - Implement non-pharmacological measures as appropriate and evaluate response  - Consider cultural and social influences on pain and pain management  - Notify physician/advanced practitioner if interventions unsuccessful or patient reports new pain  Outcome: Progressing     Problem: INFECTION - ADULT  Goal: Absence or prevention of progression during hospitalization  Description: INTERVENTIONS:  - Assess and monitor for signs and symptoms of infection  - Monitor lab/diagnostic results  - Monitor all insertion sites, i.e. indwelling lines, tubes, and drains  - Monitor endotracheal if appropriate and nasal secretions for changes in amount and color  - [place]ode[place] appropriate cooling/warming therapies per order  - Administer medications as ordered  - Instruct and encourage patient and family to use good hand hygiene technique  - Identify and instruct in appropriate isolation precautions for identified infection/condition  Outcome: Progressing  Goal: Absence of fever/infection during neutropenic period  Description: INTERVENTIONS:  - Monitor WBC    Outcome: Progressing     Problem: SAFETY ADULT  Goal: Patient will remain free of falls  Description: INTERVENTIONS:  - Educate patient/family on patient safety including physical limitations  - Instruct patient to call for assistance with activity   - Consult OT/PT to assist with strengthening/mobility   - Keep Call bell within reach  - Keep bed low and locked with side rails adjusted as appropriate  - Keep care items and personal belongings within reach  - Initiate and maintain comfort rounds  - Make Fall Risk Sign visible to staff  - Offer Toileting every 2 Hours,  in advance of need  - Initiate/Maintain bed alarm  - Obtain necessary fall risk management equipment:   - Apply yellow socks and bracelet for high fall risk patients  - Consider moving patient to room near nurses station  Outcome: Progressing  Goal: Maintain or return to baseline ADL function  Description: INTERVENTIONS:  -  Assess patient's ability to carry out ADLs; assess patient's baseline for ADL function and identify physical deficits which impact ability to perform ADLs (bathing, care of mouth/teeth, toileting, grooming, dressing, etc.)  - Assess/evaluate cause of self-care deficits   - Assess range of motion  - Assess patient's mobility; develop plan if impaired  - Assess patient's need for assistive devices and provide as appropriate  - Encourage maximum independence but intervene and supervise when necessary  - Involve family in performance of ADLs  - Assess for home care needs following discharge   - Consider OT consult to assist with ADL evaluation and planning for discharge  - Provide patient education as appropriate  Outcome: Progressing  Goal: Maintains/Returns to pre admission functional level  Description: INTERVENTIONS:  - Perform AM-PAC 6 Click Basic Mobility/ Daily Activity assessment daily.  - Set and communicate daily mobility goal to care team and patient/family/caregiver.   - Collaborate with rehabilitation services on mobility goals if consulted  - Perform Range of Motion 3 times a day.  - Reposition patient every 2 hours.  - Dangle patient 3 times a day  - Stand patient 3 times a day  - Ambulate patient 3 times a day  - Out of bed to chair 3 times a day   - Out of bed for meals 3 times a day  - Out of bed for toileting  - Record patient progress and toleration of activity level   Outcome: Progressing     Problem: DISCHARGE PLANNING  Goal: Discharge to home or other facility with appropriate resources  Description: INTERVENTIONS:  - Identify barriers to discharge w/patient and caregiver  -  Arrange for needed discharge resources and transportation as appropriate  - Identify discharge learning needs (meds, wound care, etc.)  - Arrange for interpretive services to assist at discharge as needed  - Refer to Case Management Department for coordinating discharge planning if the patient needs post-hospital services based on physician/advanced practitioner order or complex needs related to functional status, cognitive ability, or social support system  Outcome: Progressing     Problem: Knowledge Deficit  Goal: Patient/family/caregiver demonstrates understanding of disease process, treatment plan, medications, and discharge instructions  Description: Complete learning assessment and assess knowledge base.  Interventions:  - Provide teaching at level of understanding  - Provide teaching via preferred learning methods  Outcome: Progressing     Problem: Prexisting or High Potential for Compromised Skin Integrity  Goal: Skin integrity is maintained or improved  Description: INTERVENTIONS:  - Identify patients at risk for skin breakdown  - Assess and monitor skin integrity  - Assess and monitor nutrition and hydration status  - Monitor labs   - Assess for incontinence   - Turn and reposition patient  - Assist with mobility/ambulation  - Relieve pressure over bony prominences  - Avoid friction and shearing  - Provide appropriate hygiene as needed including keeping skin clean and dry  - Evaluate need for skin moisturizer/barrier cream  - Collaborate with interdisciplinary team   - Patient/family teaching  - Consider wound care consult   Outcome: Progressing     Problem: RESPIRATORY - ADULT  Goal: Achieves optimal ventilation and oxygenation  Description: INTERVENTIONS:  - Assess for changes in respiratory status  - Assess for changes in mentation and behavior  - Position to facilitate oxygenation and minimize respiratory effort  - Oxygen administered by appropriate delivery if ordered  - Initiate smoking cessation  education as indicated  - Encourage broncho-pulmonary hygiene including cough, deep breathe, Incentive Spirometry  - Assess the need for suctioning and aspirate as needed  - Assess and instruct to report SOB or any respiratory difficulty  - Respiratory Therapy support as indicated  Outcome: Progressing     Problem: Potential for Falls  Goal: Patient will remain free of falls  Description: INTERVENTIONS:  - Educate patient/family on patient safety including physical limitations  - Instruct patient to call for assistance with activity   - Consult OT/PT to assist with strengthening/mobility   - Keep Call bell within reach  - Keep bed low and locked with side rails adjusted as appropriate  - Keep care items and personal belongings within reach  - Initiate and maintain comfort rounds  - Make Fall Risk Sign visible to staff  - Offer Toileting every 2 Hours, in advance of need  - Initiate/Maintain bed alarm  - Obtain necessary fall risk management equipment:   - Apply yellow socks and bracelet for high fall risk patients  - Consider moving patient to room near nurses station  Outcome: Progressing

## 2024-07-10 NOTE — PROGRESS NOTES
" met with the patient and his SO at bedside. Patient seemed \"down\" and possibly groggy.  introduced self and role of  providing spiritual care. Patient requested prayer and  responded with healing prayers.      encouraged compliance and self-care. Spiritual care remains available.        07/10/24 1000   Clinical Encounter Type   Visited With Patient and family together   Referral From Physician   Referral To    Consult Patient care   Anglican Encounters   Anglican Needs Prayer       "

## 2024-07-10 NOTE — ANESTHESIA POSTPROCEDURE EVALUATION
Post-Op Assessment Note    CV Status:  Stable    Pain management: adequate       Post-procedure mental status: intubated and sedated.   Hydration Status:  Euvolemic   PONV Controlled:  Controlled   Airway Patency:  Patent (intubated)     Post Op Vitals Reviewed: Yes    No anethesia notable event occurred.    Staff: CRNA               BP      Temp      Pulse     Resp      SpO2

## 2024-07-10 NOTE — RESPIRATORY THERAPY NOTE
RT Protocol Note  Ben Daniel 29 y.o. male MRN: [id number]  Unit/Bed#: ICU 02 Encounter: [id number]    Assessment    Principal Problem:    Acute respiratory failure with hypoxia (HCC)  Active Problems:    Essential hypertension    Palpitations    Hypothyroidism    ROSI (acute kidney injury) (HCC)      Home Pulmonary Medications:  none       Past Medical History:   Diagnosis Date    Anxiety     Hypertension      Social History     Socioeconomic History    Marital status: Single     Spouse name: None    Number of children: None    Years of education: None    Highest education level: None   Occupational History    None   Tobacco Use    Smoking status: Never    Smokeless tobacco: Never   Vaping Use    Vaping status: Never Used   Substance and Sexual Activity    Alcohol use: Never     Alcohol/week: 1.0 standard drink of alcohol     Types: 1 Cans of beer per week    Drug use: Never    Sexual activity: Yes   Other Topics Concern    None   Social History Narrative    None     Social Determinants of Health     Financial Resource Strain: Not on file   Food Insecurity: No Food Insecurity (7/8/2024)    Hunger Vital Sign     Worried About Running Out of Food in the Last Year: Never true     Ran Out of Food in the Last Year: Never true   Transportation Needs: No Transportation Needs (7/8/2024)    PRAPARE - Transportation     Lack of Transportation (Medical): No     Lack of Transportation (Non-Medical): No   Physical Activity: Not on file   Stress: Not on file   Social Connections: Not on file   Intimate Partner Violence: Not on file   Housing Stability: Low Risk  (7/8/2024)    Housing Stability Vital Sign     Unable to Pay for Housing in the Last Year: No     Number of Times Moved in the Last Year: 0     Homeless in the Last Year: No       Subjective    Subjective Data: awake and alert    Objective    Physical Exam:   Assessment Type: Assess only  General Appearance: Alert, Awake  Respiratory Pattern: Normal, Dyspnea with  "exertion  Chest Assessment: Chest expansion symmetrical  Bilateral Breath Sounds: Scattered, Rales  O2 Device: MFNC    Vitals:  Blood pressure 146/70, pulse 94, temperature 98.7 °F (37.1 °C), temperature source Oral, resp. rate 19, height 5' 6.5\" (1.689 m), weight 124 kg (274 lb 4 oz), SpO2 90%.          Imaging and other studies: I have personally reviewed pertinent reports.      O2 Device: MFNC     Plan    Respiratory Plan: Discontinue Protocol        Resp Comments: pt with no pulmonary history and no home meds, continue with MFNC will titrate as needed, BiPAP on STBY. No indication for resp intervention at this time, will continue with PRN MDI   "

## 2024-07-10 NOTE — PLAN OF CARE
Problem: PAIN - ADULT  Goal: Verbalizes/displays adequate comfort level or baseline comfort level  Description: Interventions:  - Encourage patient to monitor pain and request assistance  - Assess pain using appropriate pain scale  - Administer analgesics based on type and severity of pain and evaluate response  - Implement non-pharmacological measures as appropriate and evaluate response  - Consider cultural and social influences on pain and pain management  - Notify physician/advanced practitioner if interventions unsuccessful or patient reports new pain  Outcome: Progressing     Problem: INFECTION - ADULT  Goal: Absence or prevention of progression during hospitalization  Description: INTERVENTIONS:  - Assess and monitor for signs and symptoms of infection  - Monitor lab/diagnostic results  - Monitor all insertion sites, i.e. indwelling lines, tubes, and drains  - Monitor endotracheal if appropriate and nasal secretions for changes in amount and color  - Marshall appropriate cooling/warming therapies per order  - Administer medications as ordered  - Instruct and encourage patient and family to use good hand hygiene technique  - Identify and instruct in appropriate isolation precautions for identified infection/condition  Outcome: Progressing  Goal: Absence of fever/infection during neutropenic period  Description: INTERVENTIONS:  - Monitor WBC    Outcome: Progressing     Problem: SAFETY ADULT  Goal: Patient will remain free of falls  Description: INTERVENTIONS:  - Educate patient/family on patient safety including physical limitations  - Instruct patient to call for assistance with activity   - Consult OT/PT to assist with strengthening/mobility   - Keep Call bell within reach  - Keep bed low and locked with side rails adjusted as appropriate  - Keep care items and personal belongings within reach  - Initiate and maintain comfort rounds  - Make Fall Risk Sign visible to staff  - Offer Toileting every 2 Hours,  in advance of need  - Initiate/Maintain bed alarm  - Obtain necessary fall risk management equipment: bed alarm  - Apply yellow socks and bracelet for high fall risk patients  - Consider moving patient to room near nurses station  Outcome: Progressing  Goal: Maintain or return to baseline ADL function  Description: INTERVENTIONS:  -  Assess patient's ability to carry out ADLs; assess patient's baseline for ADL function and identify physical deficits which impact ability to perform ADLs (bathing, care of mouth/teeth, toileting, grooming, dressing, etc.)  - Assess/evaluate cause of self-care deficits   - Assess range of motion  - Assess patient's mobility; develop plan if impaired  - Assess patient's need for assistive devices and provide as appropriate  - Encourage maximum independence but intervene and supervise when necessary  - Involve family in performance of ADLs  - Assess for home care needs following discharge   - Consider OT consult to assist with ADL evaluation and planning for discharge  - Provide patient education as appropriate  Outcome: Progressing  Goal: Maintains/Returns to pre admission functional level  Description: INTERVENTIONS:  - Perform AM-PAC 6 Click Basic Mobility/ Daily Activity assessment daily.  - Set and communicate daily mobility goal to care team and patient/family/caregiver.   - Collaborate with rehabilitation services on mobility goals if consulted  - Perform Range of Motion 3 times a day.  - Reposition patient every 2 hours.  - Dangle patient 3 times a day  - Stand patient 3 times a day  - Ambulate patient 3 times a day  - Out of bed to chair 3 times a day   - Out of bed for meals 3 times a day  - Out of bed for toileting  - Record patient progress and toleration of activity level   Outcome: Progressing     Problem: DISCHARGE PLANNING  Goal: Discharge to home or other facility with appropriate resources  Description: INTERVENTIONS:  - Identify barriers to discharge w/patient and  caregiver  - Arrange for needed discharge resources and transportation as appropriate  - Identify discharge learning needs (meds, wound care, etc.)  - Arrange for interpretive services to assist at discharge as needed  - Refer to Case Management Department for coordinating discharge planning if the patient needs post-hospital services based on physician/advanced practitioner order or complex needs related to functional status, cognitive ability, or social support system  Outcome: Progressing     Problem: Knowledge Deficit  Goal: Patient/family/caregiver demonstrates understanding of disease process, treatment plan, medications, and discharge instructions  Description: Complete learning assessment and assess knowledge base.  Interventions:  - Provide teaching at level of understanding  - Provide teaching via preferred learning methods  Outcome: Progressing     Problem: Prexisting or High Potential for Compromised Skin Integrity  Goal: Skin integrity is maintained or improved  Description: INTERVENTIONS:  - Identify patients at risk for skin breakdown  - Assess and monitor skin integrity  - Assess and monitor nutrition and hydration status  - Monitor labs   - Assess for incontinence   - Turn and reposition patient  - Assist with mobility/ambulation  - Relieve pressure over bony prominences  - Avoid friction and shearing  - Provide appropriate hygiene as needed including keeping skin clean and dry  - Evaluate need for skin moisturizer/barrier cream  - Collaborate with interdisciplinary team   - Patient/family teaching  - Consider wound care consult   Outcome: Progressing     Problem: RESPIRATORY - ADULT  Goal: Achieves optimal ventilation and oxygenation  Description: INTERVENTIONS:  - Assess for changes in respiratory status  - Assess for changes in mentation and behavior  - Position to facilitate oxygenation and minimize respiratory effort  - Oxygen administered by appropriate delivery if ordered  - Initiate smoking  cessation education as indicated  - Encourage broncho-pulmonary hygiene including cough, deep breathe, Incentive Spirometry  - Assess the need for suctioning and aspirate as needed  - Assess and instruct to report SOB or any respiratory difficulty  - Respiratory Therapy support as indicated  Outcome: Progressing

## 2024-07-11 ENCOUNTER — APPOINTMENT (INPATIENT)
Dept: CT IMAGING | Facility: HOSPITAL | Age: 29
DRG: 133 | End: 2024-07-11
Payer: COMMERCIAL

## 2024-07-11 PROBLEM — J80 ARDS (ADULT RESPIRATORY DISTRESS SYNDROME) (HCC): Status: ACTIVE | Noted: 2024-07-11

## 2024-07-11 PROBLEM — J81.1 PULMONARY EDEMA: Status: ACTIVE | Noted: 2024-07-11

## 2024-07-11 PROBLEM — R04.2 HEMOPTYSIS: Status: ACTIVE | Noted: 2024-07-11

## 2024-07-11 PROBLEM — E87.1 HYPONATREMIA: Status: ACTIVE | Noted: 2024-07-11

## 2024-07-11 LAB
ANION GAP SERPL CALCULATED.3IONS-SCNC: 9 MMOL/L (ref 4–13)
ANION GAP SERPL CALCULATED.3IONS-SCNC: 9 MMOL/L (ref 4–13)
ARTERIAL PATENCY WRIST A: YES
ARTERIAL PATENCY WRIST A: YES
BASE EXCESS BLDA CALC-SCNC: -3.5 MMOL/L
BASE EXCESS BLDA CALC-SCNC: -3.7 MMOL/L
BASE EXCESS BLDA CALC-SCNC: -3.8 MMOL/L
BASE EXCESS BLDA CALC-SCNC: -4.2 MMOL/L
BASOPHILS # BLD AUTO: 0.02 THOUSANDS/ÂΜL (ref 0–0.1)
BASOPHILS NFR BLD AUTO: 0 % (ref 0–1)
BODY TEMPERATURE: 97.7 DEGREES FEHRENHEIT
BUN SERPL-MCNC: 48 MG/DL (ref 5–25)
BUN SERPL-MCNC: 56 MG/DL (ref 5–25)
C-ANCA TITR SER IF: NORMAL TITER
CA-I BLD-SCNC: 1.02 MMOL/L (ref 1.12–1.32)
CALCIUM SERPL-MCNC: 8 MG/DL (ref 8.4–10.2)
CALCIUM SERPL-MCNC: 8.3 MG/DL (ref 8.4–10.2)
CHLORIDE SERPL-SCNC: 100 MMOL/L (ref 96–108)
CHLORIDE SERPL-SCNC: 102 MMOL/L (ref 96–108)
CO2 SERPL-SCNC: 20 MMOL/L (ref 21–32)
CO2 SERPL-SCNC: 20 MMOL/L (ref 21–32)
CREAT SERPL-MCNC: 1.92 MG/DL (ref 0.6–1.3)
CREAT SERPL-MCNC: 2.07 MG/DL (ref 0.6–1.3)
EOSINOPHIL # BLD AUTO: 0 THOUSAND/ÂΜL (ref 0–0.61)
EOSINOPHIL NFR BLD AUTO: 0 % (ref 0–6)
ERYTHROCYTE [DISTWIDTH] IN BLOOD BY AUTOMATED COUNT: 11.8 % (ref 11.6–15.1)
GFR SERPL CREATININE-BSD FRML MDRD: 42 ML/MIN/1.73SQ M
GFR SERPL CREATININE-BSD FRML MDRD: 46 ML/MIN/1.73SQ M
GLUCOSE SERPL-MCNC: 133 MG/DL (ref 65–140)
GLUCOSE SERPL-MCNC: 153 MG/DL (ref 65–140)
GLUCOSE SERPL-MCNC: 157 MG/DL (ref 65–140)
HCO3 BLDA-SCNC: 19.9 MMOL/L (ref 22–28)
HCO3 BLDA-SCNC: 20.2 MMOL/L (ref 22–28)
HCO3 BLDA-SCNC: 20.7 MMOL/L (ref 22–28)
HCO3 BLDA-SCNC: 20.7 MMOL/L (ref 22–28)
HCT VFR BLD AUTO: 26.2 % (ref 36.5–49.3)
HGB BLD-MCNC: 9.1 G/DL (ref 12–17)
HOROWITZ INDEX BLDA+IHG-RTO: 40 MM[HG]
HOROWITZ INDEX BLDA+IHG-RTO: 50 MM[HG]
HOROWITZ INDEX BLDA+IHG-RTO: 60 MM[HG]
HOROWITZ INDEX BLDA+IHG-RTO: 70 MM[HG]
I-TIME: 0.9
I-TIME: 1
IMM GRANULOCYTES # BLD AUTO: 0.09 THOUSAND/UL (ref 0–0.2)
IMM GRANULOCYTES NFR BLD AUTO: 1 % (ref 0–2)
LYMPHOCYTES # BLD AUTO: 0.87 THOUSANDS/ÂΜL (ref 0.6–4.47)
LYMPHOCYTES NFR BLD AUTO: 9 % (ref 14–44)
MAGNESIUM SERPL-MCNC: 2.4 MG/DL (ref 1.9–2.7)
MAGNESIUM SERPL-MCNC: 2.6 MG/DL (ref 1.9–2.7)
MCH RBC QN AUTO: 27.7 PG (ref 26.8–34.3)
MCHC RBC AUTO-ENTMCNC: 34.7 G/DL (ref 31.4–37.4)
MCV RBC AUTO: 80 FL (ref 82–98)
MONOCYTES # BLD AUTO: 0.58 THOUSAND/ÂΜL (ref 0.17–1.22)
MONOCYTES NFR BLD AUTO: 6 % (ref 4–12)
MRSA NOSE QL CULT: NORMAL
MYELOPEROXIDASE AB SER IA-ACNC: <0.2 UNITS (ref 0–0.9)
NEUTROPHILS # BLD AUTO: 8.12 THOUSANDS/ÂΜL (ref 1.85–7.62)
NEUTS SEG NFR BLD AUTO: 84 % (ref 43–75)
NRBC BLD AUTO-RTO: 0 /100 WBCS
O2 CT BLDA-SCNC: 11.8 ML/DL (ref 16–23)
O2 CT BLDA-SCNC: 13.3 ML/DL (ref 16–23)
O2 CT BLDA-SCNC: 17 ML/DL (ref 16–23)
O2 CT BLDA-SCNC: 18.5 ML/DL (ref 16–23)
OXYHGB MFR BLDA: 93.7 % (ref 94–97)
OXYHGB MFR BLDA: 94.3 % (ref 94–97)
OXYHGB MFR BLDA: 95.1 % (ref 94–97)
OXYHGB MFR BLDA: 96 % (ref 94–97)
P-ANCA ATYPICAL TITR SER IF: NORMAL TITER
P-ANCA TITR SER IF: NORMAL TITER
PCO2 BLDA: 31.9 MM HG (ref 36–44)
PCO2 BLDA: 33.6 MM HG (ref 36–44)
PCO2 BLDA: 34.6 MM HG (ref 36–44)
PCO2 BLDA: 35.1 MM HG (ref 36–44)
PEEP RESPIRATORY: 10 CM[H2O]
PEEP RESPIRATORY: 8 CM[H2O]
PH BLDA: 7.38 [PH] (ref 7.35–7.45)
PH BLDA: 7.39 [PH] (ref 7.35–7.45)
PH BLDA: 7.41 [PH] (ref 7.35–7.45)
PH BLDA: 7.41 [PH] (ref 7.35–7.45)
PHOSPHATE SERPL-MCNC: 6.4 MG/DL (ref 2.7–4.5)
PLATELET # BLD AUTO: 238 THOUSANDS/UL (ref 149–390)
PMV BLD AUTO: 9 FL (ref 8.9–12.7)
PO2 BLDA: 113.9 MM HG (ref 75–129)
PO2 BLDA: 81.1 MM HG (ref 75–129)
PO2 BLDA: 85.7 MM HG (ref 75–129)
PO2 BLDA: 94.4 MM HG (ref 75–129)
POTASSIUM SERPL-SCNC: 4.5 MMOL/L (ref 3.5–5.3)
POTASSIUM SERPL-SCNC: 4.9 MMOL/L (ref 3.5–5.3)
PROCALCITONIN SERPL-MCNC: 0.42 NG/ML
PROTEINASE3 AB SER IA-ACNC: <0.2 UNITS (ref 0–0.9)
RBC # BLD AUTO: 3.28 MILLION/UL (ref 3.88–5.62)
RHODAMINE-AURAMINE STN SPEC: NORMAL
SARS-COV-2 RNA SPEC QL NAA+PROBE: NORMAL
SODIUM SERPL-SCNC: 129 MMOL/L (ref 135–147)
SODIUM SERPL-SCNC: 131 MMOL/L (ref 135–147)
SPECIMEN SOURCE: ABNORMAL
VANCOMYCIN SERPL-MCNC: 36.7 UG/ML (ref 10–20)
VENT AC: 16
VENT- AC: AC
VT SETTING VENT: 400 ML
VT SETTING VENT: 480 ML
WBC # BLD AUTO: 9.68 THOUSAND/UL (ref 4.31–10.16)

## 2024-07-11 PROCEDURE — 99233 SBSQ HOSP IP/OBS HIGH 50: CPT | Performed by: INTERNAL MEDICINE

## 2024-07-11 PROCEDURE — 36620 INSERTION CATHETER ARTERY: CPT | Performed by: ANESTHESIOLOGY

## 2024-07-11 PROCEDURE — 82805 BLOOD GASES W/O2 SATURATION: CPT

## 2024-07-11 PROCEDURE — 03HY32Z INSERTION OF MONITORING DEVICE INTO UPPER ARTERY, PERCUTANEOUS APPROACH: ICD-10-PCS | Performed by: STUDENT IN AN ORGANIZED HEALTH CARE EDUCATION/TRAINING PROGRAM

## 2024-07-11 PROCEDURE — 99232 SBSQ HOSP IP/OBS MODERATE 35: CPT | Performed by: INTERNAL MEDICINE

## 2024-07-11 PROCEDURE — 83735 ASSAY OF MAGNESIUM: CPT

## 2024-07-11 PROCEDURE — 94664 DEMO&/EVAL PT USE INHALER: CPT

## 2024-07-11 PROCEDURE — NC001 PR NO CHARGE: Performed by: ANESTHESIOLOGY

## 2024-07-11 PROCEDURE — 4A133B1 MONITORING OF ARTERIAL PRESSURE, PERIPHERAL, PERCUTANEOUS APPROACH: ICD-10-PCS | Performed by: STUDENT IN AN ORGANIZED HEALTH CARE EDUCATION/TRAINING PROGRAM

## 2024-07-11 PROCEDURE — 71250 CT THORAX DX C-: CPT

## 2024-07-11 PROCEDURE — 80048 BASIC METABOLIC PNL TOTAL CA: CPT

## 2024-07-11 PROCEDURE — 99291 CRITICAL CARE FIRST HOUR: CPT | Performed by: ANESTHESIOLOGY

## 2024-07-11 PROCEDURE — 94640 AIRWAY INHALATION TREATMENT: CPT

## 2024-07-11 PROCEDURE — 94760 N-INVAS EAR/PLS OXIMETRY 1: CPT

## 2024-07-11 PROCEDURE — 82805 BLOOD GASES W/O2 SATURATION: CPT | Performed by: PHYSICIAN ASSISTANT

## 2024-07-11 PROCEDURE — 84100 ASSAY OF PHOSPHORUS: CPT

## 2024-07-11 PROCEDURE — 82948 REAGENT STRIP/BLOOD GLUCOSE: CPT

## 2024-07-11 PROCEDURE — 4A133J1 MONITORING OF ARTERIAL PULSE, PERIPHERAL, PERCUTANEOUS APPROACH: ICD-10-PCS | Performed by: STUDENT IN AN ORGANIZED HEALTH CARE EDUCATION/TRAINING PROGRAM

## 2024-07-11 PROCEDURE — 36600 WITHDRAWAL OF ARTERIAL BLOOD: CPT

## 2024-07-11 PROCEDURE — 84145 PROCALCITONIN (PCT): CPT

## 2024-07-11 PROCEDURE — 80202 ASSAY OF VANCOMYCIN: CPT

## 2024-07-11 PROCEDURE — 82330 ASSAY OF CALCIUM: CPT

## 2024-07-11 PROCEDURE — 82805 BLOOD GASES W/O2 SATURATION: CPT | Performed by: ANESTHESIOLOGY

## 2024-07-11 PROCEDURE — 94003 VENT MGMT INPAT SUBQ DAY: CPT

## 2024-07-11 PROCEDURE — 85025 COMPLETE CBC W/AUTO DIFF WBC: CPT

## 2024-07-11 RX ORDER — FUROSEMIDE 10 MG/ML
20 INJECTION INTRAMUSCULAR; INTRAVENOUS
Status: DISCONTINUED | OUTPATIENT
Start: 2024-07-11 | End: 2024-07-14

## 2024-07-11 RX ORDER — VANCOMYCIN HYDROCHLORIDE 1 G/200ML
12.5 INJECTION, SOLUTION INTRAVENOUS DAILY PRN
Status: DISCONTINUED | OUTPATIENT
Start: 2024-07-12 | End: 2024-07-12

## 2024-07-11 RX ORDER — FUROSEMIDE 10 MG/ML
20 INJECTION INTRAMUSCULAR; INTRAVENOUS ONCE
Status: DISCONTINUED | OUTPATIENT
Start: 2024-07-11 | End: 2024-07-11

## 2024-07-11 RX ORDER — LIDOCAINE HYDROCHLORIDE 10 MG/ML
5 INJECTION, SOLUTION EPIDURAL; INFILTRATION; INTRACAUDAL; PERINEURAL ONCE
Status: DISCONTINUED | OUTPATIENT
Start: 2024-07-11 | End: 2024-07-11

## 2024-07-11 RX ORDER — FUROSEMIDE 10 MG/ML
40 INJECTION INTRAMUSCULAR; INTRAVENOUS
Status: CANCELLED | OUTPATIENT
Start: 2024-07-11

## 2024-07-11 RX ORDER — LEVALBUTEROL INHALATION SOLUTION 1.25 MG/3ML
1.25 SOLUTION RESPIRATORY (INHALATION) EVERY 6 HOURS
Status: DISCONTINUED | OUTPATIENT
Start: 2024-07-11 | End: 2024-07-11

## 2024-07-11 RX ORDER — CALCIUM GLUCONATE 20 MG/ML
2 INJECTION, SOLUTION INTRAVENOUS ONCE
Status: COMPLETED | OUTPATIENT
Start: 2024-07-11 | End: 2024-07-11

## 2024-07-11 RX ORDER — FUROSEMIDE 10 MG/ML
20 INJECTION INTRAMUSCULAR; INTRAVENOUS ONCE
Status: COMPLETED | OUTPATIENT
Start: 2024-07-11 | End: 2024-07-11

## 2024-07-11 RX ORDER — LEVALBUTEROL INHALATION SOLUTION 1.25 MG/3ML
1.25 SOLUTION RESPIRATORY (INHALATION)
Status: DISCONTINUED | OUTPATIENT
Start: 2024-07-11 | End: 2024-07-12

## 2024-07-11 RX ADMIN — CALCIUM GLUCONATE 2 G: 20 INJECTION, SOLUTION INTRAVENOUS at 06:41

## 2024-07-11 RX ADMIN — LEVALBUTEROL HYDROCHLORIDE 1.25 MG: 1.25 SOLUTION RESPIRATORY (INHALATION) at 19:58

## 2024-07-11 RX ADMIN — Medication 20 MG: at 12:01

## 2024-07-11 RX ADMIN — PROPOFOL 40 MCG/KG/MIN: 10 INJECTION, EMULSION INTRAVENOUS at 03:00

## 2024-07-11 RX ADMIN — IPRATROPIUM BROMIDE 0.5 MG: 0.5 SOLUTION RESPIRATORY (INHALATION) at 13:10

## 2024-07-11 RX ADMIN — PROPOFOL 40 MCG/KG/MIN: 10 INJECTION, EMULSION INTRAVENOUS at 05:11

## 2024-07-11 RX ADMIN — HEPARIN SODIUM 7500 UNITS: 5000 INJECTION INTRAVENOUS; SUBCUTANEOUS at 05:12

## 2024-07-11 RX ADMIN — FUROSEMIDE 20 MG: 10 INJECTION, SOLUTION INTRAMUSCULAR; INTRAVENOUS at 09:53

## 2024-07-11 RX ADMIN — DEXMEDETOMIDINE HYDROCHLORIDE 0.5 MCG/KG/HR: 400 INJECTION INTRAVENOUS at 09:05

## 2024-07-11 RX ADMIN — LEVALBUTEROL HYDROCHLORIDE 1.25 MG: 1.25 SOLUTION RESPIRATORY (INHALATION) at 13:11

## 2024-07-11 RX ADMIN — LEVOTHYROXINE SODIUM 50 MCG: 0.05 TABLET ORAL at 09:45

## 2024-07-11 RX ADMIN — FUROSEMIDE 20 MG: 10 INJECTION, SOLUTION INTRAMUSCULAR; INTRAVENOUS at 18:38

## 2024-07-11 RX ADMIN — CHLORHEXIDINE GLUCONATE 0.12% ORAL RINSE 15 ML: 1.2 LIQUID ORAL at 21:53

## 2024-07-11 RX ADMIN — METHYLPREDNISOLONE SODIUM SUCCINATE 40 MG: 40 INJECTION, POWDER, FOR SOLUTION INTRAMUSCULAR; INTRAVENOUS at 08:31

## 2024-07-11 RX ADMIN — SODIUM CHLORIDE 1000 MG: 0.9 INJECTION, SOLUTION INTRAVENOUS at 16:08

## 2024-07-11 RX ADMIN — PROPOFOL 30 MCG/KG/MIN: 10 INJECTION, EMULSION INTRAVENOUS at 09:43

## 2024-07-11 RX ADMIN — PROPOFOL 25 MCG/KG/MIN: 10 INJECTION, EMULSION INTRAVENOUS at 15:05

## 2024-07-11 RX ADMIN — CHLORHEXIDINE GLUCONATE 0.12% ORAL RINSE 15 ML: 1.2 LIQUID ORAL at 10:02

## 2024-07-11 RX ADMIN — IPRATROPIUM BROMIDE 0.5 MG: 0.5 SOLUTION RESPIRATORY (INHALATION) at 19:58

## 2024-07-11 RX ADMIN — CEFEPIME 2000 MG: 2 INJECTION, POWDER, FOR SOLUTION INTRAVENOUS at 16:18

## 2024-07-11 RX ADMIN — PROPOFOL 25 MCG/KG/MIN: 10 INJECTION, EMULSION INTRAVENOUS at 19:40

## 2024-07-11 RX ADMIN — AZITHROMYCIN MONOHYDRATE 500 MG: 500 INJECTION, POWDER, LYOPHILIZED, FOR SOLUTION INTRAVENOUS at 17:23

## 2024-07-11 RX ADMIN — DEXMEDETOMIDINE HYDROCHLORIDE 0.5 MCG/KG/HR: 400 INJECTION INTRAVENOUS at 22:57

## 2024-07-11 RX ADMIN — DEXMEDETOMIDINE HYDROCHLORIDE 0.5 MCG/KG/HR: 400 INJECTION INTRAVENOUS at 15:51

## 2024-07-11 RX ADMIN — CEFEPIME 2000 MG: 2 INJECTION, POWDER, FOR SOLUTION INTRAVENOUS at 08:22

## 2024-07-11 RX ADMIN — DEXMEDETOMIDINE HYDROCHLORIDE 0.9 MCG/KG/HR: 400 INJECTION INTRAVENOUS at 03:19

## 2024-07-11 RX ADMIN — VANCOMYCIN HYDROCHLORIDE 1250 MG: 5 INJECTION, POWDER, LYOPHILIZED, FOR SOLUTION INTRAVENOUS at 04:12

## 2024-07-11 RX ADMIN — HEPARIN SODIUM 7500 UNITS: 5000 INJECTION INTRAVENOUS; SUBCUTANEOUS at 14:14

## 2024-07-11 RX ADMIN — HEPARIN SODIUM 7500 UNITS: 5000 INJECTION INTRAVENOUS; SUBCUTANEOUS at 21:53

## 2024-07-11 NOTE — RESPIRATORY THERAPY NOTE
RT Ventilator Management Note  Ben Daniel 29 y.o. male MRN: [id number]  Unit/Bed#: ICU 02 Encounter: [id number]      Daily Screen         7/10/2024  1433 7/11/2024  0731          Patient safety screen outcome:: Failed Failed      Not Ready for Weaning due to:: Underline problem not resolved PEEP > 8cmH2O;FiO2 >60%                Physical Exam:   Assessment Type: Assess only  General Appearance: Sedated  Respiratory Pattern: Assisted  Chest Assessment: Chest expansion symmetrical  Bilateral Breath Sounds: Diminished, Clear  Cough: None  O2 Device: vent      Resp Comments: settings adjusted as per CC provider       07/11/24 1314   Respiratory Assessment   Resp Comments settings adjusted as per CC provider   AC/VC+ Settings   Resp Rate (BPM) 16 BPM   VT (mL) (S)  480 mL   Insp Time (S) 1 S   FIO2 (%) (S)  60 %   PEEP (cmH2O) 10 cmH2O   Rise Time (%) 20 %   Trigger Sensitivity Flow (LPM) 2 LPM   Humidification Heater   Heater Temp 98.6 °F (37 °C)   AC/VC+ Actuals   Resp Rate (BPM) 16 BPM   VT (mL) 495 mL   MV (Obs) 7.27   MAP (cmH2O) 14 cmH2O   Peak Pressure (cmH2O) 25 cmH2O   I:E Ratio (Obs) 1:2.8   Static Compliance (mL/cmH20) 33.7 mL/cmH2O   Plateau Pressure (cm H2O) 19.1 cm H2O   Heater Temperature (Obs) 98.4 °F (36.9 °C)

## 2024-07-11 NOTE — RESPIRATORY THERAPY NOTE
07/11/24 1812   Respiratory Assessment   Resp Comments titrated fio2 as per CC, as per recent abg   AC/VC+ Settings   Resp Rate (BPM) 16 BPM   VT (mL) 480 mL   Insp Time (S) 1 S   FIO2 (%) (S)  40 %   PEEP (cmH2O) 10 cmH2O   Rise Time (%) 20 %   Trigger Sensitivity Flow (LPM) 2 LPM   Humidification Heater   Heater Temp 98.6 °F (37 °C)   AC/VC+ Actuals   Resp Rate (BPM) 17 BPM   VT (mL) 446 mL   MV (Obs) 8.25   MAP (cmH2O) 8.18 cmH2O   Peak Pressure (cmH2O) 31 cmH2O   I:E Ratio (Obs) 1:2.8   Static Compliance (mL/cmH20) 44.4 mL/cmH2O   Plateau Pressure (cm H2O) 21.9 cm H2O   Heater Temperature (Obs) 98.4 °F (36.9 °C)

## 2024-07-11 NOTE — RESPIRATORY THERAPY NOTE
RT Ventilator Management Note  Ben Daniel 29 y.o. male MRN: [id number]  Unit/Bed#: ICU 02 Encounter: [id number]      Daily Screen         7/10/2024  1433 7/11/2024  0725          Patient safety screen outcome:: Failed Failed      Not Ready for Weaning due to:: Underline problem not resolved PEEP > 8cmH2O;FiO2 >60%                Physical Exam:   Assessment Type: Assess only  General Appearance: Awake  Respiratory Pattern: Spontaneous, Assisted  Chest Assessment: Chest expansion symmetrical  Bilateral Breath Sounds: Diminished, Coarse  Cough: None  O2 Device: vent      Resp Comments: pt remains on same vent settings, 1 cc air added to cuff to ensure proper cuff pressure         07/11/24 1049   Respiratory Assessment   Assessment Type Assess only   General Appearance Awake   Respiratory Pattern Spontaneous;Assisted   Chest Assessment Chest expansion symmetrical   Bilateral Breath Sounds Diminished;Coarse   Resp Comments pt remains on same vent settings, 1 cc air added to cuff to ensure proper cuff pressure   O2 Device vent   Vent Information   Vent ID nel   Vent type Drager   Drager Vent Mode AC/VC+   $ Vent Daily Charge-Subsequent Yes   $ Pulse Oximetry Spot Check Charge Completed   SpO2 93 %   AC/VC+ Settings   Resp Rate (BPM) 16 BPM   VT (mL) 400 mL   Insp Time (S) 1 S   FIO2 (%) 70 %   PEEP (cmH2O) 10 cmH2O   Rise Time (%) 20 %   Trigger Sensitivity Flow (LPM) 2 LPM   Humidification Heater   Heater Temp 98.6 °F (37 °C)   AC/VC+ Actuals   Resp Rate (BPM) 24 BPM   VT (mL) 421 mL   MV (Obs) 9.15   MAP (cmH2O) 12 cmH2O   Peak Pressure (cmH2O) 16 cmH2O   I:E Ratio (Obs) 1:2   Static Compliance (mL/cmH20) 65 mL/cmH2O   Plateau Pressure (cm H2O) 19.3 cm H2O   Heater Temperature (Obs) 99 °F (37.2 °C)   AC/VC+ ALARMS   High Peak Pressure (cmH2O) 45 cmH2O   High Resp Rate (BPM) 45 BPM   High MV (L/min) 14.5 L/min   Low MV (L/min) 4 L/min   High VT (mL) 850 mL   Maintenance   Alarm (pink) cable attached Yes    Resuscitation bag with peep valve at bedside Yes   Water bag changed No   Circuit changed No   ETT  Hi-Lo;Cuffed;Oral 8 mm   Placement Date/Time: 07/10/24 1340   Mask Ventilation: Difficult mask ventilation (3)  Preoxygenated: Yes  Technique: Video laryngoscopy;Stylet  Type: Hi-Lo;Cuffed;Oral  Tube Size: 8 mm  Laryngoscope: Comviva  Blade Size: 4  Location: Oral  Grade View...   Secured at (cm) 23   Measured from Teeth   Secured Location (S)  Left   Repositioned Center to Left   Secured by Commercial tube doll   Site Condition Dry   Cuff Pressure (color) Green   HI-LO Suction  Continuous low suction   HI-LO Secretions Scant   HI-LO Intervention Patent

## 2024-07-11 NOTE — ASSESSMENT & PLAN NOTE
Patient with report of SOB on arrival with hypoxia requiring 3 L NC  Had worsening hypoxia on 7/9 requiring 15 L NRB followed by BIPAP  He was able to be weaned to 7 L midflow nasal cannula overnight to 7/10  He underwent bronchoscopy yesterday for which he required intubation and was unable to be liberated from the ventilator  Bronchoscopy with minimal/scant blood tinged fluids with edematous and erythematous airway pulmonary thinks more consistent with infectious etiology  He required 100% FiO2 and 10 of PEEP; Pf ratio 90  He was started on broad spectrum ABX and steroids  Differentials include inflammatory/autoimmune +/- pulmonary edema, and/or infection  MALENA and Lyme negative  Overnight was able to be weaned to 80% FiO2  AM ABG pending    Plan  Continue broad spectrum ABX  other serologies pending, follow up results  S/p renal biopsy, follow up results  Continue solumedrol 40 mg Q12  CT in AM to evaluate lungs to help better drive treatment/vent strategies today  Continue diuresis  Monitor I&O  ARDSNet Lung protective ventilatory strategies

## 2024-07-11 NOTE — ASSESSMENT & PLAN NOTE
Currently with some hypotension likely sedation related    Plan:  Hold amlodipine and inderal  HCTZ and benazepril on hold

## 2024-07-11 NOTE — RESPIRATORY THERAPY NOTE
RT Ventilator Management Note  Ben Daniel 29 y.o. male MRN: [id number]  Unit/Bed#: ICU 02 Encounter: [id number]      Daily Screen         7/10/2024  1433 7/11/2024  0725          Patient safety screen outcome:: Failed Failed      Not Ready for Weaning due to:: Underline problem not resolved PEEP > 8cmH2O;FiO2 >60%                Physical Exam:   Assessment Type: Assess only  General Appearance: Sedated  Respiratory Pattern: Assisted, Spontaneous  Chest Assessment: Chest expansion symmetrical  Bilateral Breath Sounds: Diminished, Clear  Cough: None  O2 Device: vent      Resp Comments: pt remains on full support, not a candidate for SBT at this time       07/11/24 0725   Respiratory Assessment   Assessment Type Assess only   General Appearance Sedated   Respiratory Pattern Assisted;Spontaneous   Chest Assessment Chest expansion symmetrical   Bilateral Breath Sounds Diminished;Clear   Resp Comments pt remains on full support, not a candidate for SBT at this time   O2 Device vent   Vent Information   Vent ID nel   Vent type Drager   Drager Vent Mode AC/VC+   $ Vent Daily Charge-Subsequent Yes   $ Pulse Oximetry Spot Check Charge Completed   SpO2 96 %   AC/VC+ Settings   Resp Rate (BPM) 16 BPM   VT (mL) 400 mL   Insp Time (S) (S)  1 S   FIO2 (%) 70 %   PEEP (cmH2O) 10 cmH2O   Rise Time (%) 20 %   Trigger Sensitivity Flow (LPM) 2 LPM   Humidification Heater   Heater Temp 98.6 °F (37 °C)   AC/VC+ Actuals   Resp Rate (BPM) 25 BPM   VT (mL) 362 mL   MV (Obs) 8.29   MAP (cmH2O) 12 cmH2O   Peak Pressure (cmH2O) 13 cmH2O   I:E Ratio (Obs) 1:1.5   Static Compliance (mL/cmH20) 31.1 mL/cmH2O   Plateau Pressure (cm H2O) 19.9 cm H2O   Heater Temperature (Obs) 98.4 °F (36.9 °C)   AC/VC+ ALARMS   High Peak Pressure (cmH2O) 45 cmH2O   High Resp Rate (BPM) 35 BPM   High MV (L/min) 14.5 L/min   Low MV (L/min) 4 L/min   High VT (mL) 850 mL   Maintenance   Alarm (pink) cable attached Yes   Resuscitation bag with peep valve at bedside  Yes   Water bag changed No   Circuit changed No   Daily Screen   Patient safety screen outcome: Failed   Not Ready for Weaning due to: PEEP > 8cmH2O;FiO2 >60%   IHI Ventilator Associated Pneumonia Bundle   Daily Assessment of Readiness to Extubate Yes   Head of Bed Elevated HOB 30   ETT  Hi-Lo;Cuffed;Oral 8 mm   Placement Date/Time: 07/10/24 1340   Mask Ventilation: Difficult mask ventilation (3)  Preoxygenated: Yes  Technique: Video laryngoscopy;Stylet  Type: Hi-Lo;Cuffed;Oral  Tube Size: 8 mm  Laryngoscope: Carbon Salon  Blade Size: 4  Location: Oral  Grade View...   Secured at (cm) 23   Measured from Teeth   Secured Location (S)  Center   Repositioned (S)  Right to Center   Secured by Commercial tube doll   Site Condition Dry   Cuff Pressure (color) Green   HI-LO Suction  Continuous low suction   HI-LO Secretions Scant   HI-LO Intervention Patent

## 2024-07-11 NOTE — CASE MANAGEMENT
Case Management Discharge Planning Note    Patient name Ben Daniel  Location ICU 02/ICU 02 MRN [id number]  : 1995 Date 2024       Current Admission Date: 2024  Current Admission Diagnosis:Acute respiratory failure with hypoxia (HCC)   Patient Active Problem List    Diagnosis Date Noted Date Diagnosed    ARDS (adult respiratory distress syndrome) (HCC) 2024     Hemoptysis 2024     Hyponatremia 2024     Hypotension 07/10/2024     Acute respiratory failure with hypoxia ([facility]) 2024     SOB (shortness of breath) 2024     ROSI (acute kidney injury) ([facility]) 2024     Other neutropenia ([facility]) 2024     Nutritional anemia 2024     Abnormal white blood cell (WBC) 2024     Hypergammaglobulinemia 2024     Vitamin D deficiency 2023     Hypothyroidism 2023     Family history of cancer 2023     Insomnia 2023     Anxiety 2022     Morbid obesity with BMI of 40.0-44.9, adult (HCC) 2022     Palpitations 2022     Eczema 2022     Scrotal pain 2022     Acute right-sided low back pain without sciatica 2022     Angiokeratoma of scrotum 2022     Cervical strain 2021     Essential hypertension 2018     Fatigue 2018       LOS (days): 3  Geometric Mean LOS (GMLOS) (days):   Days to GMLOS:     OBJECTIVE:  Risk of Unplanned Readmission Score: 21.73         Current admission status: Inpatient   Preferred Pharmacy:   [facility] [place] - [address] [facility]  [address] [facility] [place] [address]  Phone: [phone] Fax: [phone]    [facility] [place] [place] - [address]  [facility],  [address]  [facility],  First [address] [facility] [place] [address]  Phone: [phone] Fax: [phone]    [facility] - [place], [place] - [address] [facility] [address] [facility]  [address]  [place] [address]  Phone: [phone] Fax:  [phone]    [id number] [place] - [address]  [address] [facility] [place] [address]  Phone: [phone] Fax: [phone]    Primary Care Provider: MARTIN Shearer    Primary Insurance: [employer]  Secondary Insurance:     DISCHARGE DETAILS:                           Contacts  Patient Contacts: Nevaeh Orozco (Mother)  [phone] (Mobile)  Relationship to Patient:: Family              Other Referral/Resources/Interventions Provided:  Referral Comments: Pt for bronch on 7/10;  unable to be weaned from vent following procedure.  Pt on IV diprivan gtt, IV precedex gtt, IV levo gtt, IV abxs, IV Ca gluc, IV solumedrol, nebs.  CM to continue to follow for post d/c needs.         Treatment Team Recommendation: Other (TBD)  Discharge Destination Plan:: Other (TBD)  Transport at Discharge : Family

## 2024-07-11 NOTE — PLAN OF CARE
Problem: PAIN - ADULT  Goal: Verbalizes/displays adequate comfort level or baseline comfort level  Description: Interventions:  - Encourage patient to monitor pain and request assistance  - Assess pain using appropriate pain scale  - Administer analgesics based on type and severity of pain and evaluate response  - Implement non-pharmacological measures as appropriate and evaluate response  - Consider cultural and social influences on pain and pain management  - Notify physician/advanced practitioner if interventions unsuccessful or patient reports new pain  Outcome: Progressing     Problem: INFECTION - ADULT  Goal: Absence or prevention of progression during hospitalization  Description: INTERVENTIONS:  - Assess and monitor for signs and symptoms of infection  - Monitor lab/diagnostic results  - Monitor all insertion sites, i.e. indwelling lines, tubes, and drains  - Monitor endotracheal if appropriate and nasal secretions for changes in amount and color  - [place] appropriate cooling/warming therapies per order  - Administer medications as ordered  - Instruct and encourage patient and family to use good hand hygiene technique  - Identify and instruct in appropriate isolation precautions for identified infection/condition  Outcome: Progressing  Goal: Absence of fever/infection during neutropenic period  Description: INTERVENTIONS:  - Monitor WBC    Outcome: Progressing     Problem: SAFETY ADULT  Goal: Patient will remain free of falls  Description: INTERVENTIONS:  - Educate patient/family on patient safety including physical limitations  - Instruct patient to call for assistance with activity   - Consult OT/PT to assist with strengthening/mobility   - Keep Call bell within reach  - Keep bed low and locked with side rails adjusted as appropriate  - Keep care items and personal belongings within reach  - Initiate and maintain comfort rounds  - Make Fall Risk Sign visible to staff  - Offer Toileting every 2 Hours,  in advance of need  - Initiate/Maintain bed alarm  - Apply yellow socks and bracelet for high fall risk patients  - Consider moving patient to room near nurses station  Outcome: Progressing  Goal: Maintain or return to baseline ADL function  Description: INTERVENTIONS:  -  Assess patient's ability to carry out ADLs; assess patient's baseline for ADL function and identify physical deficits which impact ability to perform ADLs (bathing, care of mouth/teeth, toileting, grooming, dressing, etc.)  - Assess/evaluate cause of self-care deficits   - Assess range of motion  - Assess patient's mobility; develop plan if impaired  - Assess patient's need for assistive devices and provide as appropriate  - Encourage maximum independence but intervene and supervise when necessary  - Involve family in performance of ADLs  - Assess for home care needs following discharge   - Consider OT consult to assist with ADL evaluation and planning for discharge  - Provide patient education as appropriate  Outcome: Progressing  Goal: Maintains/Returns to pre admission functional level  Description: INTERVENTIONS:  - Perform AM-PAC 6 Click Basic Mobility/ Daily Activity assessment daily.  - Set and communicate daily mobility goal to care team and patient/family/caregiver.   - Collaborate with rehabilitation services on mobility goals if consulted  - Perform Range of Motion 2 times a day.  - Reposition patient every 2 hours.  - Record patient progress and toleration of activity level   Outcome: Progressing     Problem: DISCHARGE PLANNING  Goal: Discharge to home or other facility with appropriate resources  Description: INTERVENTIONS:  - Identify barriers to discharge w/patient and caregiver  - Arrange for needed discharge resources and transportation as appropriate  - Identify discharge learning needs (meds, wound care, etc.)  - Arrange for interpretive services to assist at discharge as needed  - Refer to Case Management Department for  coordinating discharge planning if the patient needs post-hospital services based on physician/advanced practitioner order or complex needs related to functional status, cognitive ability, or social support system  Outcome: Progressing     Problem: Knowledge Deficit  Goal: Patient/family/caregiver demonstrates understanding of disease process, treatment plan, medications, and discharge instructions  Description: Complete learning assessment and assess knowledge base.  Interventions:  - Provide teaching at level of understanding  - Provide teaching via preferred learning methods  Outcome: Progressing     Problem: Prexisting or High Potential for Compromised Skin Integrity  Goal: Skin integrity is maintained or improved  Description: INTERVENTIONS:  - Identify patients at risk for skin breakdown  - Assess and monitor skin integrity  - Assess and monitor nutrition and hydration status  - Monitor labs   - Assess for incontinence   - Turn and reposition patient  - Assist with mobility/ambulation  - Relieve pressure over bony prominences  - Avoid friction and shearing  - Provide appropriate hygiene as needed including keeping skin clean and dry  - Evaluate need for skin moisturizer/barrier cream  - Collaborate with interdisciplinary team   - Patient/family teaching  - Consider wound care consult   Outcome: Progressing     Problem: RESPIRATORY - ADULT  Goal: Achieves optimal ventilation and oxygenation  Description: INTERVENTIONS:  - Assess for changes in respiratory status  - Assess for changes in mentation and behavior  - Position to facilitate oxygenation and minimize respiratory effort  - Oxygen administered by appropriate delivery if ordered  - Initiate smoking cessation education as indicated  - Encourage broncho-pulmonary hygiene including cough, deep breathe, Incentive Spirometry  - Assess the need for suctioning and aspirate as needed  - Assess and instruct to report SOB or any respiratory difficulty  -  Respiratory Therapy support as indicated  Outcome: Progressing     Problem: Potential for Falls  Goal: Patient will remain free of falls  Description: INTERVENTIONS:  - Educate patient/family on patient safety including physical limitations  - Instruct patient to call for assistance with activity   - Consult OT/PT to assist with strengthening/mobility   - Keep Call bell within reach  - Keep bed low and locked with side rails adjusted as appropriate  - Keep care items and personal belongings within reach  - Initiate and maintain comfort rounds  - Make Fall Risk Sign visible to staff  - Offer Toileting every 2 Hours, in advance of need  - Initiate/Maintain bed alarm  - Apply yellow socks and bracelet for high fall risk patients  - Consider moving patient to room near nurses station  Outcome: Progressing     Problem: Nutrition/Hydration-ADULT  Goal: Nutrient/Hydration intake appropriate for improving, restoring or maintaining nutritional needs  Description: Monitor and assess patient's nutrition/hydration status for malnutrition. Collaborate with interdisciplinary team and initiate plan and interventions as ordered.  Monitor patient's weight and dietary intake as ordered or per policy. Utilize nutrition screening tool and intervene as necessary. Determine patient's food preferences and provide high-protein, high-caloric foods as appropriate.     INTERVENTIONS:  - Monitor oral intake, urinary output, labs, and treatment plans  - Assess nutrition and hydration status and recommend course of action  - Evaluate amount of meals eaten  - Assist patient with eating if necessary   - Allow adequate time for meals  - Recommend/ encourage appropriate diets, oral nutritional supplements, and vitamin/mineral supplements  - Order, calculate, and assess calorie counts as needed  - Recommend, monitor, and adjust tube feedings and TPN/PPN based on assessed needs  - Assess need for intravenous fluids  - Provide specific  nutrition/hydration education as appropriate  - Include patient/family/caregiver in decisions related to nutrition  Outcome: Progressing     Problem: SAFETY,RESTRAINT: NV/NON-SELF DESTRUCTIVE BEHAVIOR  Goal: Remains free of harm/injury (restraint for non violent/non self-detsructive behavior)  Description: INTERVENTIONS:  - Instruct patient/family regarding restraint use   - Assess and monitor physiologic and psychological status   - Provide interventions and comfort measures to meet assessed patient needs   - Identify and implement measures to help patient regain control  - Assess readiness for release of restraint   Outcome: Progressing  Goal: Returns to optimal restraint-free functioning  Description: INTERVENTIONS:  - Assess the patient's behavior and symptoms that indicate continued need for restraint  - Identify and implement measures to help patient regain control  - Assess readiness for release of restraint   Outcome: Progressing

## 2024-07-11 NOTE — ASSESSMENT & PLAN NOTE
POA with unclear etiology, differentials include glomerulonephritis, pulmonary renal syndrome  Baseline creatinine 0.8 and initial creatinine 1.76  Trending down, most recently 1.14  S/p IR kidney biopsy    Plan:  Follow up pathology results  Hold off on steroids until biopsy results  Nephrology consulted, appreciate input  Continue pulmonology workup as above  Monitor I&O  Avoid nephrotoxins  Trend serum creatinine

## 2024-07-11 NOTE — RESPIRATORY THERAPY NOTE
07/11/24 0416   Respiratory Assessment   General Appearance Sedated   Respiratory Pattern Assisted;Spontaneous   Chest Assessment Chest expansion symmetrical   Bilateral Breath Sounds Clear;Diminished   Cough None   Resp Comments back from CT scan, continue to monitor and wean/titrate as tolerated   O2 Device ventilator   Vent Information   Vent ID Lasha   Vent type Drager   Drager Vent Mode AC/VC+   $ Pulse Oximetry Spot Check Charge Completed   SpO2 90 %   AC/VC+ Settings   Resp Rate (BPM) 16 BPM   VT (mL) 400 mL   Insp Time (S) 0.9 S   FIO2 (%) (S)  70 %  (found at .60, Spo2 90%)   PEEP (cmH2O) 10 cmH2O   Rise Time (%) 20 %   Trigger Sensitivity Flow (LPM) 2 LPM   Humidification Heater   Heater Temp 98.6 °F (37 °C)   AC/VC+ Actuals   Resp Rate (BPM) 22 BPM   VT (mL) 460 mL   MV (Obs) 9.03   MAP (cmH2O) 13 cmH2O   Peak Pressure (cmH2O) 15 cmH2O   I:E Ratio (Obs) 1:2.1   Static Compliance (mL/cmH20) 31.8 mL/cmH2O   Plateau Pressure (cm H2O) 24.5 cm H2O   Heater Temperature (Obs) 98.2 °F (36.8 °C)   AC/VC+ ALARMS   High Peak Pressure (cmH2O) 45 cmH2O   High Resp Rate (BPM) 35 BPM   High MV (L/min) 14.5 L/min   Low MV (L/min) 4 L/min   High VT (mL) 850 mL   Maintenance   Alarm (pink) cable attached Yes   Resuscitation bag with peep valve at bedside Yes   Water bag changed No   Circuit changed No   IHI Ventilator Associated Pneumonia Bundle   Head of Bed Elevated HOB 30   ETT  Hi-Lo;Cuffed;Oral 8 mm   Placement Date/Time: 07/10/24 1340   Mask Ventilation: Difficult mask ventilation (3)  Preoxygenated: Yes  Technique: Video laryngoscopy;Stylet  Type: Hi-Lo;Cuffed;Oral  Tube Size: 8 mm  Laryngoscope: Wallace  Blade Size: 4  Location: Oral  Grade View...   Secured at (cm) 23   Measured from Teeth   Secured Location (S)  Right   Repositioned Left to Right   Secured by Commercial tube doll   Site Condition Cool;Dry   Cuff Pressure (cm H2O)   (MLT)   Cuff Pressure (color) Green   HI-LO Suction  Continuous low suction    HI-LO Secretions Scant   HI-LO Intervention Patent

## 2024-07-11 NOTE — PROGRESS NOTES
Ben Daniel is a 29 y.o. male who is currently ordered Vancomycin IV with management by the Pharmacy Consult service.  Relevant clinical data and objective / subjective history reviewed.  Vancomycin Assessment:  Indication and Goal AUC/Trough: Pneumonia (goal -600, trough >10)  Clinical Status:  critical care  Micro: in process    Renal Function:  SCr: 2.07 mg/dL  CrCl: 65.4 mL/min  Renal replacement: Not on dialysis  Days of Therapy: 2  Current Dose: 1250mg IV q12h  Vancomycin Plan:  New Dosinmg IV daily PRN when vancomycin random level <15; plan to hold today   Next Level: 24 AM level   Renal Function Monitoring: Daily BMP and UOP  Pharmacy will continue to follow closely for s/sx of nephrotoxicity, infusion reactions and appropriateness of therapy.  BMP and CBC will be ordered per protocol. We will continue to follow the patient’s culture results and clinical progress daily.    Liliane Smith, Pharmacist

## 2024-07-11 NOTE — PROCEDURES
Arterial Line Insertion    Date/Time: 7/11/2024 12:12 PM    Performed by: Oly Christine MD  Authorized by: Oly Christine MD    Patient location:  ICU  Consent:     Consent obtained:  Written    Consent given by:  Spouse    Risks discussed:  Bleeding, infection, pain and repeat procedure  Universal protocol:     Procedure explained and questions answered to patient or proxy's satisfaction: yes      Relevant documents present and verified: yes      Test results available and properly labeled: yes      Radiology Images displayed and confirmed.  If images not available, report reviewed: yes      Required blood products, implants, devices, and special equipment available: yes      Immediately prior to procedure a time out was called: yes      Patient identity confirmed:  Arm band  Indications:     Indications: hemodynamic monitoring, multiple ABGs and frequent labs / infusion    Pre-procedure details:     Skin preparation:  Chlorhexidine    Preparation: Patient was prepped and draped in sterile fashion    Anesthesia (see MAR for exact dosages):     Anesthesia method:  Local infiltration    Local anesthetic:  Lidocaine 1% w/o epi  Procedure details:     Location / Tip of Catheter:  Radial    Laterality:  Right    Needle gauge:  20 G    Placement technique:  Seldinger    Number of attempts:  1    Successful placement: yes      Transducer: waveform confirmed    Post-procedure details:     Post-procedure:  Sutured and sterile dressing applied    CMS:  Normal and unchanged    Patient tolerance of procedure:  Tolerated well, no immediate complications

## 2024-07-11 NOTE — PROGRESS NOTES
"Progress Note - Pulmonary   Ben Daniel 29 y.o. male MRN: [id number]  Unit/Bed#: ICU 02 Encounter: [id number]    Assessment:  Acute hypoxemic respiratory failure  Abnormal CT scan of the chest  Acute kidney injury    Plan:  Acute hypoxic respiratory failure with abnormal CT scan, ROSI  Has been requiring 3 L nasal cannula earlier in admission, on 7/9/2024 he underwent renal biopsy and postprocedure had increasing respiratory distress and required BiPAP which was weaned yesterday to nasal cannula  Yesterday he underwent bronchoscopy which showed some thick secretions, no hemorrhage, edematous mucosa which looks more infectious rather than inflammatory  Bronchial culture with no polys or bacteria seen  Will follow-up cultures from bronchoscopy, COVID PCR as well was sent  He remains intubated this morning on 70% FiO2  CT scan was done overnight which shows extensive consolidations bilaterally  Procalcitonin is now slightly elevated  Autoimmune workup has essentially been negative, ANCA still pending though all others are negative.  Seems less likely to be autoimmune at this point as his renal function did improve prior to initiation of any steroids  He was started on broad-spectrum antibiotics as well as steroids -will continue the same for now until culture data is available  Will continue to follow    Subjective:   Patient intubated and sedated current.    Objective:     Vitals: Blood pressure 108/57, pulse 67, temperature 98.6 °F (37 °C), temperature source Oral, resp. rate 19, height 5' 6.5\" (1.689 m), weight 123 kg (271 lb 6.2 oz), SpO2 94%.,Body mass index is 43.15 kg/m².      Intake/Output Summary (Last 24 hours) at 7/11/2024 0715  Last data filed at 7/11/2024 0600  Gross per 24 hour   Intake 2419.07 ml   Output 910 ml   Net 1509.07 ml       Invasive Devices       Peripheral Intravenous Line  Duration             Long-Dwell Peripheral IV (Midline) 07/08/24 Left Cephalic Vein 2 days    Peripheral IV 07/10/24 " "Distal;Right;Ventral (anterior) Forearm 1 day              Drain  Duration             Urethral Catheter 16 Fr. <1 day              Airway  Duration             ETT  Hi-Lo;Cuffed;Oral 8 mm <1 day                    Physical Exam: /57   Pulse 67   Temp 98.6 °F (37 °C) (Oral)   Resp 19   Ht 5' 6.5\" (1.689 m)   Wt 123 kg (271 lb 6.2 oz)   SpO2 94%   BMI 43.15 kg/m²   General appearance:  intubated and sedated  Head: Normocephalic, without obvious abnormality, atraumatic  Eyes: negative findings: conjunctivae and sclerae normal  Lungs:  caorse breath sounds  Heart: regular rate and rhythm  Abdomen: normal findings: soft, non-tender  Extremities:  trace edema  Skin:  warm and dry  Neurologic: Mental status: intubated and sedated      Labs: I have personally reviewed pertinent lab results., ABG:   Lab Results   Component Value Date    PHART 7.377 07/11/2024    YOX9QLN 35.1 (L) 07/11/2024    PO2ART 81.1 07/11/2024    VER0JRV 20.2 (L) 07/11/2024    BEART -4.2 07/11/2024    SOURCE Radial, Left 07/11/2024   , CBC:   Lab Results   Component Value Date    WBC 9.68 07/11/2024    HGB 9.1 (L) 07/11/2024    HCT 26.2 (L) 07/11/2024    MCV 80 (L) 07/11/2024     07/11/2024    RBC 3.28 (L) 07/11/2024    MCH 27.7 07/11/2024    MCHC 34.7 07/11/2024    RDW 11.8 07/11/2024    MPV 9.0 07/11/2024    NRBC 0 07/11/2024   , CMP:   Lab Results   Component Value Date    SODIUM 129 (L) 07/11/2024    K 4.9 07/11/2024     07/11/2024    CO2 20 (L) 07/11/2024    BUN 48 (H) 07/11/2024    CREATININE 2.07 (H) 07/11/2024    CALCIUM 8.0 (L) 07/11/2024    EGFR 42 07/11/2024     Imaging and other studies: I have personally reviewed pertinent reports.   and I have personally reviewed pertinent films in PACS    "

## 2024-07-11 NOTE — RESPIRATORY THERAPY NOTE
Back from CT, pt transported to and from procedure on transport ventilator, no complications noted during transport or procedure, placed back on Drager vent upon return to ICU

## 2024-07-11 NOTE — RESPIRATORY THERAPY NOTE
07/11/24 0943   Respiratory Protocol   Protocol Initiated? No   Protocol Selection Respiratory   Language Barrier? No   Medical & Social History Reviewed? Yes   Diagnostic Studies Reviewed? Yes   Physical Assessment Performed? Yes   Respiratory Plan Vent/NIV/HFNC   Respiratory Assessment   Assessment Type Assess only   General Appearance Awake   Respiratory Pattern Spontaneous;Assisted   Chest Assessment Chest expansion symmetrical   Bilateral Breath Sounds Diminished;Clear   Resp Comments will cont current orders as per CC providers   O2 Device vent   Additional Assessments   Pulse 64   Respirations 18   SpO2 95 %

## 2024-07-11 NOTE — PROGRESS NOTES
[facility] [place]  Progress Note  Name: Ben Dainel I  MRN: [id number]  Unit/Bed#: ICU 02 I Date of Admission: 7/6/2024   Date of Service: 7/11/2024 I Hospital Day: 3    Assessment & Plan   * Acute respiratory failure with hypoxia (HCC)  Assessment & Plan  Patient with report of SOB on arrival with hypoxia requiring 3 L NC  Had worsening hypoxia on 7/9 requiring 15 L NRB followed by BIPAP  He was able to be weaned to 7 L midflow nasal cannula overnight to 7/10  He underwent bronchoscopy yesterday for which he required intubation and was unable to be liberated from the ventilator  Bronchoscopy with minimal/scant blood tinged fluids with edematous and erythematous airway pulmonary thinks more consistent with infectious etiology  He required 100% FiO2 and 10 of PEEP; Pf ratio 90  He was started on broad spectrum ABX and steroids  Differentials include inflammatory/autoimmune +/- pulmonary edema, and/or infection  MALENA and Lyme negative  Overnight was able to be weaned to 80% FiO2  AM ABG pending    Plan  Continue broad spectrum ABX  other serologies pending, follow up results  S/p renal biopsy, follow up results  Continue solumedrol 40 mg Q12  CT in AM to evaluate lungs to help better drive treatment/vent strategies today  Continue diuresis  Monitor I&O  ARDSNet Lung protective ventilatory strategies      ARDS (adult respiratory distress syndrome) ([facility])  Assessment & Plan  Pf Ratio on post intubation ABG 90 indicative of Severe ARDS  Continue care as above    ROSI (acute kidney injury) ([facility])  Assessment & Plan  POA with unclear etiology, differentials include glomerulonephritis, pulmonary renal syndrome  Baseline creatinine 0.8 and initial creatinine 1.76  Trending down, most recently 1.14  S/p IR kidney biopsy    Plan:  Follow up pathology results  Hold off on steroids until biopsy results  Nephrology consulted, appreciate input  Continue pulmonology workup as above  Monitor I&O  Avoid  nephrotoxins  Trend serum creatinine    Hemoptysis  Assessment & Plan  Scant blood seen on Bronchoscopy more indicative of infectious etiology rather than inflammatory  See plan above    Hyponatremia  Assessment & Plan  Worsening throughout hospital stay  Nephrology following; consider sending sodium studies today    Hypotension  Assessment & Plan  Sedation related  Hold antihypertensives  Levophed for MAP goal > 65    Hypothyroidism  Assessment & Plan  Continue synthroid    Essential hypertension  Assessment & Plan  Currently with some hypotension likely sedation related    Plan:  Hold amlodipine and inderal  HCTZ and benazepril on hold             Disposition: Critical care    ICU Core Measures     Vented Patient  VAP Bundle  VAP bundle ordered     A: Assess, Prevent, and Manage Pain Has pain been assessed? Yes  Need for changes to pain regimen? No   B: Both Spontaneous Awakening Trials (SATs) and Spontaneous Breathing Trials (SBTs) Plan to perform spontaneous awakening trial today? No secondary to severe hypoxia/hypocapnia   Plan to perform spontaneous breathing trial today? No secondary to severe hypoxia/hypocapnia   Obvious barriers to extubation? Yes   C: Choice of Sedation RASS Goal: -2 Light Sedation  Need for changes to sedation or analgesia regimen? No   D: Delirium CAM-ICU: Negative   E: Early Mobility  Plan for early mobility? Yes   F: Family Engagement Plan for family engagement today? Yes       Antibiotic Review: Continue broad spectrum secondary to severity of illness.       Prophylaxis:  VTE VTE covered by:  heparin (porcine), Subcutaneous, 7,500 Units at 07/10/24 2101       Stress Ulcer  not ordered         HPI:  29 year old male presented to [facility] on 7/6 with 2 day history of shortness of breath and ROSI with concern for autoimmune etiology. Patient had worsening hypoxia and underwent a renal Biopsy on 7/9 and required midflow/BiPAP and was transferred to the ICU. He was diuresed and able to be taken  off of BiPAP to [facility]. Pulmonary and nephrology following    Significant 24hr Events     24hr events:   See interval progress note by Dr. Christine  Intubated for bronchoscopy, unable to be liberated; throughout procedure patient with frequent hypoxic episodes requiring pauses to recover saturations. Bronch had areas of scant blood but mostly airway edema, erythema more consistent with infectious etiology  Started on broad spectrum ABX; pancultured; Started on steroids  Pf ratio on 100% FiO2 and 10 of PEEP 90 indicative of severe ARDS  Weaned to 70% FiO2  CT chest obtained     Subjective   Review of Systems: Review of Systems   Unable to perform ROS: Intubated        Objective                            Vitals I/O      Most Recent Min/Max in 24hrs   Temp 99.4 °F (37.4 °C) Temp  Min: 98.1 °F (36.7 °C)  Max: 99.8 °F (37.7 °C)   Pulse 75 Pulse  Min: 72  Max: 91   Resp (!) 24 Resp  Min: 16  Max: 34   /58 BP  Min: 93/53  Max: 148/79   O2 Sat 93 % SpO2  Min: 89 %  Max: 98 %      Intake/Output Summary (Last 24 hours) at 7/11/2024 0044  Last data filed at 7/11/2024 0000  Gross per 24 hour   Intake 2331.96 ml   Output 1150 ml   Net 1181.96 ml       Diet NPO    Invasive Monitoring           Physical Exam   Physical Exam  Vitals and nursing note reviewed.   Eyes:      Conjunctiva/sclera: Conjunctivae normal.   Skin:     General: Skin is warm and dry.      Capillary Refill: Capillary refill takes less than 2 seconds.   HENT:      Head: Normocephalic and atraumatic.      Mouth/Throat:      Mouth: Mucous membranes are moist.   Neck:      Vascular: No JVD.   Cardiovascular:      Rate and Rhythm: Normal rate and regular rhythm.      Pulses: Normal pulses.   Musculoskeletal:         General: Normal range of motion.   Abdominal: General: There is no distension.      Palpations: Abdomen is soft.      Tenderness: There is no abdominal tenderness.   Constitutional:       Appearance: He is well-developed and well-nourished.       Interventions: He is sedated, intubated and restrained.   Pulmonary:      Effort: He is intubated.      Breath sounds: Rhonchi present. No wheezing or rales.   Neurological:      Comments: RASS -1; GCS 10T Follows commands in all 4 extremities   Genitourinary/Anorectal:     Comments: Deferred           Diagnostic Studies      EKG:   Imaging:  I have personally reviewed pertinent films in PACS     Medications:  Scheduled PRN   azithromycin, 500 mg, Q24H  cefepime, 2,000 mg, Q8H  chlorhexidine, 15 mL, Q12H YADIRA  heparin (porcine), 7,500 Units, Q8H YADIRA  levothyroxine, 50 mcg, Daily  methylPREDNISolone sodium succinate, 40 mg, Q12H YADIRA  vancomycin, 1,250 mg, Q12H      albuterol, 2 puff, Q4H PRN  midazolam, 2 mg, Q4H PRN  ondansetron, 4 mg, Q8H PRN       Continuous    dexmedetomidine, 0.1-1.4 mcg/kg/hr, Last Rate: 0.7 mcg/kg/hr (07/11/24 0033)  norepinephrine, 1-30 mcg/min, Last Rate: 1 mcg/min (07/10/24 5159)  propofol, 5-50 mcg/kg/min, Last Rate: 40 mcg/kg/min (07/10/24 2242)         Labs:    CBC    Recent Labs     07/09/24  1209 07/10/24  0557   WBC 8.86 10.14   HGB 12.0 11.1*   HCT 34.6* 31.7*    247     BMP    Recent Labs     07/09/24  0539 07/10/24  0557   SODIUM 128* 128*   K 4.5 4.2   CL 98 97   CO2 22 24   AGAP 8 7   BUN 32* 33*   CREATININE 1.19 1.14   CALCIUM 8.0* 7.9*       Coags    No recent results     Additional Electrolytes  Recent Labs     07/10/24  0557   MG 2.2   PHOS 4.3   CAIONIZED 1.06*          Blood Gas    Recent Labs     07/10/24  1456   PHART 7.361   NPN0EZH 41.8   PO2ART 90.3   LIX1VPP 23.1   BEART -2.2   SOURCE Radial, Left     Recent Labs     07/10/24  1456   SOURCE Radial, Left    LFTs  No recent results    Infectious  No recent results  Glucose  Recent Labs     07/09/24  0539 07/10/24  0557   GLUC 101 99               Rikki Avila PA-C

## 2024-07-11 NOTE — ASSESSMENT & PLAN NOTE
Scant blood seen on Bronchoscopy more indicative of infectious etiology rather than inflammatory  See plan above

## 2024-07-11 NOTE — RESPIRATORY THERAPY NOTE
RT Ventilator Management Note  Ben Daniel 29 y.o. male MRN: [id number]  Unit/Bed#: ICU 02 Encounter: [id number]      Daily Screen         7/10/2024  1433 7/11/2024  0725          Patient safety screen outcome:: Failed Failed      Not Ready for Weaning due to:: Underline problem not resolved PEEP > 8cmH2O;FiO2 >60%                Physical Exam:   Assessment Type: Assess only  General Appearance: Awake  Respiratory Pattern: Assisted, Spontaneous  Chest Assessment: Chest expansion symmetrical  Bilateral Breath Sounds: Diminished  Cough: None  O2 Device: vent      Resp Comments: fio2 titrated to .50% as per ABG       07/11/24 1423   Respiratory Assessment   Assessment Type Assess only   General Appearance Awake   Respiratory Pattern Assisted;Spontaneous   Chest Assessment Chest expansion symmetrical   Bilateral Breath Sounds Diminished   Resp Comments fio2 titrated to .50% as per ABG   O2 Device vent   Vent Information   Vent ID nel   Vent type Drager   Drager Vent Mode AC/VC+   $ Pulse Oximetry Spot Check Charge Completed   SpO2 99 %   AC/VC+ Settings   Resp Rate (BPM) 16 BPM   VT (mL) 480 mL   Insp Time (S) 1 S   FIO2 (%) (S)  50 %   PEEP (cmH2O) 10 cmH2O   Rise Time (%) 20 %   Trigger Sensitivity Flow (LPM) 2 LPM   Humidification Heater   Heater Temp 98.6 °F (37 °C)   AC/VC+ Actuals   Resp Rate (BPM) 18 BPM   VT (mL) 502 mL   MV (Obs) 8.37   MAP (cmH2O) 15 cmH2O   Peak Pressure (cmH2O) 24 cmH2O   I:E Ratio (Obs) 1:1.9   Static Compliance (mL/cmH20) 26 mL/cmH2O   Plateau Pressure (cm H2O) 25.6 cm H2O   Heater Temperature (Obs) 98.6 °F (37 °C)   AC/VC+ ALARMS   High Peak Pressure (cmH2O) 45 cmH2O   High Resp Rate (BPM) 45 BPM   High MV (L/min) 14.5 L/min   Low MV (L/min) 4 L/min   High VT (mL) 850 mL   Maintenance   Alarm (pink) cable attached Yes   Resuscitation bag with peep valve at bedside Yes   Water bag changed No   Circuit changed No   ETT  Hi-Lo;Cuffed;Oral 8 mm   Placement Date/Time: 07/10/24 1340   Mask  Ventilation: Difficult mask ventilation (3)  Preoxygenated: Yes  Technique: Video laryngoscopy;Stylet  Type: Hi-Lo;Cuffed;Oral  Tube Size: 8 mm  Laryngoscope: Wallace  Blade Size: 4  Location: Oral  Grade View...   Secured at (cm) 23   Measured from Teeth   Secured Location (S)  Center   Repositioned (S)  Left to Center   Secured by Commercial tube doll   Site Condition Dry   Cuff Pressure (color) Green   HI-LO Suction  Continuous low suction   HI-LO Secretions Scant   HI-LO Intervention Patent

## 2024-07-11 NOTE — PROGRESS NOTES
NEPHROLOGY PROGRESS NOTE    Patient: Ben Daniel               Sex: male          DOA: 7/6/2024 10:38 AM   YOB: 1995        Age:  29 y.o.        LOS:  LOS: 3 days       HPI     Patient with acute kidney injury and pneumonia    SUBJECTIVE     Patient remained intubated and sedated in ICU    Urine output remained good    Does not appear in any acute distress    CURRENT MEDICATIONS       Current Facility-Administered Medications:     albuterol (PROVENTIL HFA,VENTOLIN HFA) inhaler 2 puff, 2 puff, Inhalation, Q4H PRN, MARTIN Calhoun, 2 puff at 07/09/24 0538    azithromycin (ZITHROMAX) 500 mg in sodium chloride 0.9 % 250 mL IVPB, 500 mg, Intravenous, Q24H, MARTIN Calhoun, Last Rate: 250 mL/hr at 07/10/24 1820, 500 mg at 07/10/24 1820    cefepime (MAXIPIME) 2 g/50 mL dextrose IVPB, 2,000 mg, Intravenous, Q8H, MARTIN Calhoun, Stopped at 07/11/24 1000    chlorhexidine (PERIDEX) 0.12 % oral rinse 15 mL, 15 mL, Mouth/Throat, Q12H YADIRA, MARTIN Calhoun, 15 mL at 07/11/24 1002    dexmedeTOMIDine (Precedex) 400 mcg in sodium chloride 0.9% 100 mL, 0.1-1.4 mcg/kg/hr, Intravenous, Titrated, MARTIN Calhoun, Last Rate: 15.1 mL/hr at 07/11/24 0905, 0.5 mcg/kg/hr at 07/11/24 0905    furosemide (LASIX) injection 20 mg, 20 mg, Intravenous, BID (diuretic), MARTIN Calhoun    heparin (porcine) subcutaneous injection 7,500 Units, 7,500 Units, Subcutaneous, Q8H YADIRA, 7,500 Units at 07/11/24 0512 **AND** [COMPLETED] Platelet count, , , Once, Uriel Kline MD    ipratropium (ATROVENT) 0.02 % inhalation solution 0.5 mg, 0.5 mg, Nebulization, Q6H, Oly Christine MD, 0.5 mg at 07/11/24 1310    levalbuterol (XOPENEX) inhalation solution 1.25 mg, 1.25 mg, Nebulization, Q6H, Oly Christine MD, 1.25 mg at 07/11/24 1311    levothyroxine tablet 50 mcg, 50 mcg, Oral, Daily, MARTIN Calhoun, 50 mcg at 07/11/24 0945    methylPREDNISolone sodium succinate (Solu-MEDROL) injection  40 mg, 40 mg, Intravenous, Q12H YADIRA, MARTIN Calhoun, 40 mg at 07/11/24 0831    midazolam (VERSED) injection 2 mg, 2 mg, Intravenous, Q4H PRN, MARTIN Calhoun    omeprazole (PRILOSEC) suspension 2 mg/mL, 20 mg, Oral, Daily, MARTIN Calhoun, 20 mg at 07/11/24 1201    ondansetron (ZOFRAN) injection 4 mg, 4 mg, Intravenous, Q8H PRN, MARTIN Calhoun, 4 mg at 07/09/24 0033    propofol (DIPRIVAN) 1000 mg in 100 mL infusion (premix), 5-50 mcg/kg/min, Intravenous, Titrated, MARTIN Calhoun, Last Rate: 18.2 mL/hr at 07/11/24 1215, 25 mcg/kg/min at 07/11/24 1215    [START ON 7/12/2024] vancomycin (VANCOCIN) IVPB (premix in dextrose) 1,000 mg 200 mL, 12.5 mg/kg (Adjusted), Intravenous, Daily PRN, MARTIN Calhoun    OBJECTIVE     Current Weight: Weight - Scale: 123 kg (271 lb 6.2 oz)  Vitals:    07/11/24 1314   BP:    Pulse:    Resp:    Temp:    SpO2: 98%       Intake/Output Summary (Last 24 hours) at 7/11/2024 1410  Last data filed at 7/11/2024 1400  Gross per 24 hour   Intake 3003.37 ml   Output 1735 ml   Net 1268.37 ml       PHYSICAL EXAMINATION     Physical Exam  Constitutional:       General: He is not in acute distress.     Appearance: He is well-developed.      Comments: Intubated and sedated   HENT:      Head: Normocephalic.      Mouth/Throat:      Mouth: Mucous membranes are moist.   Eyes:      General: No scleral icterus.     Conjunctiva/sclera: Conjunctivae normal.   Neck:      Vascular: No JVD.   Cardiovascular:      Rate and Rhythm: Normal rate.      Heart sounds: Normal heart sounds.   Pulmonary:      Effort: Pulmonary effort is normal.      Breath sounds: No wheezing.      Comments: Breathing with help of ventilator  Abdominal:      Palpations: Abdomen is soft.      Tenderness: There is no abdominal tenderness.   Musculoskeletal:         General: Normal range of motion.      Cervical back: Neck supple.   Skin:     General: Skin is warm.      Findings: No rash.    Neurological:      Comments: Sedated          LAB RESULTS     Results from last 7 days   Lab Units 07/11/24  0432 07/10/24  0557 07/09/24  1209 07/09/24  0539 07/08/24  0510 07/07/24  0607 07/06/24  1917 07/06/24  1408 07/06/24  1115   WBC Thousand/uL 9.68 10.14 8.86 9.72 6.58 6.66  --   --  4.05*   HEMOGLOBIN g/dL 9.1* 11.1* 12.0 11.9* 12.0 11.4*  --   --  12.2   HEMATOCRIT % 26.2* 31.7* 34.6* 35.3* 35.8* 33.1*  --   --  36.1*   PLATELETS Thousands/uL 238 247 256 278 226 223  --  188 239   POTASSIUM mmol/L 4.9 4.2  --  4.5 4.9 4.7 4.6  --  5.2   CHLORIDE mmol/L 100 97  --  98 101 107 108  --  107   CO2 mmol/L 20* 24  --  22 22 22 24  --  23   BUN mg/dL 48* 33*  --  32* 38* 43* 44*  --  48*   CREATININE mg/dL 2.07* 1.14  --  1.19 1.51* 1.60* 1.67*  --  1.76*   EGFR ml/min/1.73sq m 42 86  --  82 61 57 54  --  51   CALCIUM mg/dL 8.0* 7.9*  --  8.0* 8.7 8.6 8.9  --  8.8   MAGNESIUM mg/dL 2.4 2.2  --   --   --  2.3  --   --   --    PHOSPHORUS mg/dL 6.4* 4.3  --   --   --  4.9*  --   --   --        RADIOLOGY RESULTS      Results for orders placed during the hospital encounter of 07/06/24    XR chest portable    Narrative  XR CHEST PORTABLE    INDICATION: Hypoxemia.    COMPARISON: 7/6/2024    FINDINGS:  Progressive diffuse bilateral opacity likely representing multifocal pneumonia and possible pulmonary edema    No pneumothorax or pleural effusion.    Normal cardiomediastinal silhouette.    Bones are unremarkable for age.    Normal upper abdomen.    Impression  Progressive diffuse bilateral opacities likely representing multifocal pneumonia with possible superimposed pulmonary edema        Workstation performed: [id number]    Results for orders placed during the hospital encounter of 07/06/24    XR chest 2 views    Narrative  XR CHEST PA & LATERAL    INDICATION: sob. Shortness of breath for 2 days, chest pressure began this morning.    COMPARISON: CXR 2/8/2019.    FINDINGS:    Low lung volumes producing vascular  crowding. No acute disease. No pneumothorax or pleural effusion.    Normal cardiomediastinal silhouette.    Bones are unremarkable for age.    Normal upper abdomen.    Impression  Low lung volumes producing vascular crowding with no acute disease.        Workstation performed: [id number]    Results for orders placed during the hospital encounter of 07/06/24    CT chest wo contrast    Narrative  CT CHEST WITHOUT IV CONTRAST    INDICATION: Hypoxia, ARDS.    COMPARISON: CT chest 7/6/2024.    TECHNIQUE: CT examination of the chest was performed without intravenous contrast. Multiplanar 2D reformatted images were created from the source data.    This examination, like all CT scans performed in the [facility], was performed utilizing techniques to minimize radiation dose exposure, including the use of iterative reconstruction and automated exposure control. Radiation dose length  product (DLP) for this visit: 1039 mGy-cm    FINDINGS:    LUNGS: Endotracheal tube terminates 2.6 cm above the leann in satisfactory position. Mucous threads within the distal trachea. Extensive consolidative airspace opacities throughout the perihilar regions and the bilateral lower lobes, new since 7/6/2024.    PLEURA: Probable trace bilateral pleural effusion, although difficult to visualize without contrast. No pneumothorax.    HEART/GREAT VESSELS: Cardiomegaly. No thoracic aortic aneurysm.    MEDIASTINUM AND BARTOLOME: Unremarkable.    CHEST WALL AND LOWER NECK: Bilateral gynecomastia.    VISUALIZED STRUCTURES IN THE UPPER ABDOMEN: Unremarkable.    OSSEOUS STRUCTURES: No acute fracture or destructive osseous lesion.    Impression  Extensive consolidative airspace opacities throughout the perihilar regions and the bilateral lower lobes, new since 7/6/2024. Findings likely represent a combination of pulmonary edema and multifocal pneumonia in a pattern typical for aspiration  pneumonia. Recommend short-term follow-up noncontrast  "chest CT in 3 months to assess for resolution.    Mild cardiomegaly.    Endotracheal tube in satisfactory position.    The study was marked in EPIC for immediate notification.    Workstation performed: [id number]    No results found for this or any previous visit.    No results found for this or any previous visit.    No results found for this or any previous visit.      PLAN / RECOMMENDATIONS      Acute kidney injury with proteinuria: I just got biopsy report back.  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  Patient does a quite active endocapillary lesion with some present.  I will give Solu-Medrol 1 g every day for 3 days followed by regular steroid which he is getting right now.    Treatment is usually continue steroid along with treating the source which is likely infection at that point.  Will monitor closely    Pneumonia: On antibiotic.  He manipulated    Hypertension: Reasonably well-controlled    Proteinuria: Likely because of C3 glomerulopathy.  Will monitor closely    Discussed with the family at length    Randy Wells MD  Nephrology  7/11/2024        Portions of the record may have been created with voice recognition software. Occasional wrong word or \"sound a like\" substitutions may have occurred due to the inherent limitations of voice recognition software. Read the chart carefully and recognize, using context, where substitutions have occurred.  "

## 2024-07-11 NOTE — RESPIRATORY THERAPY NOTE
07/10/24 1957   Respiratory Assessment   General Appearance Sedated   Respiratory Pattern Assisted   Chest Assessment Chest expansion symmetrical   Bilateral Breath Sounds Diminished   Cough None   Resp Comments pt remains intubated and mechanically ventilated   O2 Device ventilator   Vent Information   Vent ID Lasha   Vent type Drager   Drager Vent Mode AC/VC+   Is the patient reintubated? No   $ Pulse Oximetry Spot Check Charge Completed   SpO2 97 %   AC/VC+ Settings   Resp Rate (BPM) 16 BPM   VT (mL) (S)  400 mL  (found at 480)   Insp Time (S) 0.9 S   FIO2 (%) 100 %   PEEP (cmH2O) 10 cmH2O   Rise Time (%) 20 %   Trigger Sensitivity Flow (LPM) 2 LPM   Humidification Heater   Heater Temp 98.6 °F (37 °C)   AC/VC+ Actuals   Resp Rate (BPM) 16 BPM   VT (mL) 489 mL   MV (Obs) 7.87   MAP (cmH2O) 15 cmH2O   Peak Pressure (cmH2O) 30 cmH2O   I:E Ratio (Obs) 1:2..9   Static Compliance (mL/cmH20) 30.4 mL/cmH2O   Plateau Pressure (cm H2O) 27.8 cm H2O   Heater Temperature (Obs) 97.9 °F (36.6 °C)   AC/VC+ ALARMS   High Peak Pressure (cmH2O) 45 cmH2O   High Resp Rate (BPM) 35 BPM   High MV (L/min) 14.5 L/min   Low MV (L/min) 4 L/min   High VT (mL) 850 mL   Maintenance   Alarm (pink) cable attached Yes   Resuscitation bag with peep valve at bedside Yes   Water bag changed No   Circuit changed No   IHI Ventilator Associated Pneumonia Bundle   Head of Bed Elevated HOB 30   ETT  Hi-Lo;Cuffed;Oral 8 mm   Placement Date/Time: 07/10/24 1340   Mask Ventilation: Difficult mask ventilation (3)  Preoxygenated: Yes  Technique: Video laryngoscopy;Stylet  Type: Hi-Lo;Cuffed;Oral  Tube Size: 8 mm  Laryngoscope: Wallace  Blade Size: 4  Location: Oral  Grade View...   Secured at (cm) 23   Measured from Teeth   Secured Location (S)  Center   Repositioned Right to Center   Secured by Commercial tube doll   Site Condition Cool;Dry   Cuff Pressure (cm H2O)   (MLT)   Cuff Pressure (color) Green   HI-LO Suction  Continuous low suction   HI-LO  Secretions Small;Thick;Yellow;Blood tinged   HI-LO Intervention Patent

## 2024-07-11 NOTE — ANESTHESIA POSTPROCEDURE EVALUATION
Post-Op Assessment Note                Reason for prolonged intubation > 24 hours:  Respiratory failureReason for prolonged intubation > 48 hours:  Respiratory failure          BP      Temp      Pulse     Resp      SpO2

## 2024-07-11 NOTE — NUTRITION
07/11/24 1324   Recommendations/Interventions   Interventions/Recommendations Monitor I & O's;Tube feeding recs provided;Monitor labs for refeeding syndrome;Adjust EN/ PN   Recommendations to Provider Recommend initiation of Vital High Protein at 20 mL/hr, advance by 10 mL/hr to goal rate of 70 mL/hour, continuous. Provides 1680 mL total volume, 1680 kcal, 147 g protein, 1404 mL free water. MD to manage free water flushes versus IVF. Propofol at current rate provides 480 kcal in addition = 2160 kcal total. Meets 100% protein, fluid, and 88% protein needs at goal. Monitor phosphorus, electrolytes. Replete as indicated.

## 2024-07-11 NOTE — PLAN OF CARE
Problem: PAIN - ADULT  Goal: Verbalizes/displays adequate comfort level or baseline comfort level  Description: Interventions:  - Encourage patient to monitor pain and request assistance  - Assess pain using appropriate pain scale  - Administer analgesics based on type and severity of pain and evaluate response  - Implement non-pharmacological measures as appropriate and evaluate response  - Consider cultural and social influences on pain and pain management  - Notify physician/advanced practitioner if interventions unsuccessful or patient reports new pain  Outcome: Progressing     Problem: INFECTION - ADULT  Goal: Absence or prevention of progression during hospitalization  Description: INTERVENTIONS:  - Assess and monitor for signs and symptoms of infection  - Monitor lab/diagnostic results  - Monitor all insertion sites, i.e. indwelling lines, tubes, and drains  - Monitor endotracheal if appropriate and nasal secretions for changes in amount and color  - [place] appropriate cooling/warming therapies per order  - Administer medications as ordered  - Instruct and encourage patient and family to use good hand hygiene technique  - Identify and instruct in appropriate isolation precautions for identified infection/condition  Outcome: Progressing  Goal: Absence of fever/infection during neutropenic period  Description: INTERVENTIONS:  - Monitor WBC    Outcome: Progressing     Problem: SAFETY ADULT  Goal: Patient will remain free of falls  Description: INTERVENTIONS:  - Educate patient/family on patient safety including physical limitations  - Instruct patient to call for assistance with activity   - Consult OT/PT to assist with strengthening/mobility   - Keep Call bell within reach  - Keep bed low and locked with side rails adjusted as appropriate  - Keep care items and personal belongings within reach  - Initiate and maintain comfort rounds  - Make Fall Risk Sign visible to staff  - Offer Toileting every 2 Hours,  in advance of need  - Initiate/Maintain bed alarm  - Apply yellow socks and bracelet for high fall risk patients  - Consider moving patient to room near nurses station  Outcome: Progressing  Goal: Maintain or return to baseline ADL function  Description: INTERVENTIONS:  -  Assess patient's ability to carry out ADLs; assess patient's baseline for ADL function and identify physical deficits which impact ability to perform ADLs (bathing, care of mouth/teeth, toileting, grooming, dressing, etc.)  - Assess/evaluate cause of self-care deficits   - Assess range of motion  - Assess patient's mobility; develop plan if impaired  - Assess patient's need for assistive devices and provide as appropriate  - Encourage maximum independence but intervene and supervise when necessary  - Involve family in performance of ADLs  - Assess for home care needs following discharge   - Consider OT consult to assist with ADL evaluation and planning for discharge  - Provide patient education as appropriate  Outcome: Progressing  Goal: Maintains/Returns to pre admission functional level  Description: INTERVENTIONS:  - Perform AM-PAC 6 Click Basic Mobility/ Daily Activity assessment daily.  - Set and communicate daily mobility goal to care team and patient/family/caregiver.   - Collaborate with rehabilitation services on mobility goals if consulted  - Perform Range of Motion 2 times a day.  - Reposition patient every 2 hours.  - Record patient progress and toleration of activity level   Outcome: Progressing     Problem: DISCHARGE PLANNING  Goal: Discharge to home or other facility with appropriate resources  Description: INTERVENTIONS:  - Identify barriers to discharge w/patient and caregiver  - Arrange for needed discharge resources and transportation as appropriate  - Identify discharge learning needs (meds, wound care, etc.)  - Arrange for interpretive services to assist at discharge as needed  - Refer to Case Management Department for  coordinating discharge planning if the patient needs post-hospital services based on physician/advanced practitioner order or complex needs related to functional status, cognitive ability, or social support system  Outcome: Progressing     Problem: Knowledge Deficit  Goal: Patient/family/caregiver demonstrates understanding of disease process, treatment plan, medications, and discharge instructions  Description: Complete learning assessment and assess knowledge base.  Interventions:  - Provide teaching at level of understanding  - Provide teaching via preferred learning methods  Outcome: Progressing     Problem: Prexisting or High Potential for Compromised Skin Integrity  Goal: Skin integrity is maintained or improved  Description: INTERVENTIONS:  - Identify patients at risk for skin breakdown  - Assess and monitor skin integrity  - Assess and monitor nutrition and hydration status  - Monitor labs   - Assess for incontinence   - Turn and reposition patient  - Assist with mobility/ambulation  - Relieve pressure over bony prominences  - Avoid friction and shearing  - Provide appropriate hygiene as needed including keeping skin clean and dry  - Evaluate need for skin moisturizer/barrier cream  - Collaborate with interdisciplinary team   - Patient/family teaching  - Consider wound care consult   Outcome: Progressing     Problem: RESPIRATORY - ADULT  Goal: Achieves optimal ventilation and oxygenation  Description: INTERVENTIONS:  - Assess for changes in respiratory status  - Assess for changes in mentation and behavior  - Position to facilitate oxygenation and minimize respiratory effort  - Oxygen administered by appropriate delivery if ordered  - Initiate smoking cessation education as indicated  - Encourage broncho-pulmonary hygiene including cough, deep breathe, Incentive Spirometry  - Assess the need for suctioning and aspirate as needed  - Assess and instruct to report SOB or any respiratory difficulty  -  Respiratory Therapy support as indicated  Outcome: Progressing     Problem: Potential for Falls  Goal: Patient will remain free of falls  Description: INTERVENTIONS:  - Educate patient/family on patient safety including physical limitations  - Instruct patient to call for assistance with activity   - Consult OT/PT to assist with strengthening/mobility   - Keep Call bell within reach  - Keep bed low and locked with side rails adjusted as appropriate  - Keep care items and personal belongings within reach  - Initiate and maintain comfort rounds  - Make Fall Risk Sign visible to staff  - Offer Toileting every 2 Hours, in advance of need  - Initiate/Maintain bed alarm  - Apply yellow socks and bracelet for high fall risk patients  - Consider moving patient to room near nurses station  Outcome: Progressing     Problem: Nutrition/Hydration-ADULT  Goal: Nutrient/Hydration intake appropriate for improving, restoring or maintaining nutritional needs  Description: Monitor and assess patient's nutrition/hydration status for malnutrition. Collaborate with interdisciplinary team and initiate plan and interventions as ordered.  Monitor patient's weight and dietary intake as ordered or per policy. Utilize nutrition screening tool and intervene as necessary. Determine patient's food preferences and provide high-protein, high-caloric foods as appropriate.     INTERVENTIONS:  - Monitor oral intake, urinary output, labs, and treatment plans  - Assess nutrition and hydration status and recommend course of action  - Evaluate amount of meals eaten  - Assist patient with eating if necessary   - Allow adequate time for meals  - Recommend/ encourage appropriate diets, oral nutritional supplements, and vitamin/mineral supplements  - Order, calculate, and assess calorie counts as needed  - Recommend, monitor, and adjust tube feedings and TPN/PPN based on assessed needs  - Assess need for intravenous fluids  - Provide specific  nutrition/hydration education as appropriate  - Include patient/family/caregiver in decisions related to nutrition  Outcome: Progressing     Problem: SAFETY,RESTRAINT: NV/NON-SELF DESTRUCTIVE BEHAVIOR  Goal: Remains free of harm/injury (restraint for non violent/non self-detsructive behavior)  Description: INTERVENTIONS:  - Instruct patient/family regarding restraint use   - Assess and monitor physiologic and psychological status   - Provide interventions and comfort measures to meet assessed patient needs   - Identify and implement measures to help patient regain control  - Assess readiness for release of restraint   Outcome: Progressing  Goal: Returns to optimal restraint-free functioning  Description: INTERVENTIONS:  - Assess the patient's behavior and symptoms that indicate continued need for restraint  - Identify and implement measures to help patient regain control  - Assess readiness for release of restraint   Outcome: Progressing

## 2024-07-12 PROBLEM — N05.8 C3 GLOMERULONEPHRITIS: Status: ACTIVE | Noted: 2024-07-12

## 2024-07-12 LAB
ALBUMIN SERPL BCG-MCNC: 2.7 G/DL (ref 3.5–5)
ALP SERPL-CCNC: 61 U/L (ref 34–104)
ALT SERPL W P-5'-P-CCNC: 16 U/L (ref 7–52)
ANION GAP SERPL CALCULATED.3IONS-SCNC: 9 MMOL/L (ref 4–13)
ARTERIAL PATENCY WRIST A: YES
AST SERPL W P-5'-P-CCNC: 11 U/L (ref 13–39)
BACTERIA BRONCH AEROBE CULT: NORMAL
BASE EXCESS BLDA CALC-SCNC: -4 MMOL/L
BILIRUB SERPL-MCNC: 0.46 MG/DL (ref 0.2–1)
BUN SERPL-MCNC: 66 MG/DL (ref 5–25)
CA-I BLD-SCNC: 1.07 MMOL/L (ref 1.12–1.32)
CALCIUM ALBUM COR SERPL-MCNC: 9.2 MG/DL (ref 8.3–10.1)
CALCIUM SERPL-MCNC: 8.2 MG/DL (ref 8.4–10.2)
CHLORIDE SERPL-SCNC: 104 MMOL/L (ref 96–108)
CO2 SERPL-SCNC: 20 MMOL/L (ref 21–32)
CREAT SERPL-MCNC: 1.8 MG/DL (ref 0.6–1.3)
ERYTHROCYTE [DISTWIDTH] IN BLOOD BY AUTOMATED COUNT: 12 % (ref 11.6–15.1)
EST. AVERAGE GLUCOSE BLD GHB EST-MCNC: 123 MG/DL
GFR SERPL CREATININE-BSD FRML MDRD: 49 ML/MIN/1.73SQ M
GLUCOSE SERPL-MCNC: 138 MG/DL (ref 65–140)
GLUCOSE SERPL-MCNC: 168 MG/DL (ref 65–140)
GLUCOSE SERPL-MCNC: 168 MG/DL (ref 65–140)
GLUCOSE SERPL-MCNC: 213 MG/DL (ref 65–140)
GRAM STN SPEC: NORMAL
HBA1C MFR BLD: 5.9 %
HCO3 BLDA-SCNC: 19.2 MMOL/L (ref 22–28)
HCT VFR BLD AUTO: 24.8 % (ref 36.5–49.3)
HGB BLD-MCNC: 8.7 G/DL (ref 12–17)
HOROWITZ INDEX BLDA+IHG-RTO: 40 MM[HG]
I-TIME: 1
MAGNESIUM SERPL-MCNC: 2.8 MG/DL (ref 1.9–2.7)
MCH RBC QN AUTO: 27.7 PG (ref 26.8–34.3)
MCHC RBC AUTO-ENTMCNC: 35.1 G/DL (ref 31.4–37.4)
MCV RBC AUTO: 79 FL (ref 82–98)
O2 CT BLDA-SCNC: 13.1 ML/DL (ref 16–23)
OXYHGB MFR BLDA: 94.7 % (ref 94–97)
PCO2 BLDA: 28.6 MM HG (ref 36–44)
PEEP RESPIRATORY: 8 CM[H2O]
PH BLDA: 7.44 [PH] (ref 7.35–7.45)
PHOSPHATE SERPL-MCNC: 6 MG/DL (ref 2.7–4.5)
PLATELET # BLD AUTO: 218 THOUSANDS/UL (ref 149–390)
PMV BLD AUTO: 9.2 FL (ref 8.9–12.7)
PO2 BLDA: 92 MM HG (ref 75–129)
POTASSIUM SERPL-SCNC: 4.2 MMOL/L (ref 3.5–5.3)
PROCALCITONIN SERPL-MCNC: 0.38 NG/ML
PROT SERPL-MCNC: 7 G/DL (ref 6.4–8.4)
RBC # BLD AUTO: 3.14 MILLION/UL (ref 3.88–5.62)
SODIUM SERPL-SCNC: 133 MMOL/L (ref 135–147)
SPECIMEN SOURCE: ABNORMAL
VANCOMYCIN SERPL-MCNC: 18.3 UG/ML (ref 10–20)
VENT AC: 16
VENT- AC: AC
VT SETTING VENT: 480 ML
WBC # BLD AUTO: 8.63 THOUSAND/UL (ref 4.31–10.16)

## 2024-07-12 PROCEDURE — 84100 ASSAY OF PHOSPHORUS: CPT

## 2024-07-12 PROCEDURE — 85027 COMPLETE CBC AUTOMATED: CPT

## 2024-07-12 PROCEDURE — 94760 N-INVAS EAR/PLS OXIMETRY 1: CPT

## 2024-07-12 PROCEDURE — 84145 PROCALCITONIN (PCT): CPT

## 2024-07-12 PROCEDURE — 99232 SBSQ HOSP IP/OBS MODERATE 35: CPT | Performed by: INTERNAL MEDICINE

## 2024-07-12 PROCEDURE — 80202 ASSAY OF VANCOMYCIN: CPT

## 2024-07-12 PROCEDURE — 88305 TISSUE EXAM BY PATHOLOGIST: CPT | Performed by: PATHOLOGY

## 2024-07-12 PROCEDURE — 94640 AIRWAY INHALATION TREATMENT: CPT

## 2024-07-12 PROCEDURE — 93005 ELECTROCARDIOGRAM TRACING: CPT

## 2024-07-12 PROCEDURE — 94003 VENT MGMT INPAT SUBQ DAY: CPT

## 2024-07-12 PROCEDURE — 82948 REAGENT STRIP/BLOOD GLUCOSE: CPT

## 2024-07-12 PROCEDURE — 88112 CYTOPATH CELL ENHANCE TECH: CPT | Performed by: PATHOLOGY

## 2024-07-12 PROCEDURE — 80053 COMPREHEN METABOLIC PANEL: CPT

## 2024-07-12 PROCEDURE — 83036 HEMOGLOBIN GLYCOSYLATED A1C: CPT | Performed by: NURSE PRACTITIONER

## 2024-07-12 PROCEDURE — 82805 BLOOD GASES W/O2 SATURATION: CPT

## 2024-07-12 PROCEDURE — 83735 ASSAY OF MAGNESIUM: CPT

## 2024-07-12 PROCEDURE — 94664 DEMO&/EVAL PT USE INHALER: CPT

## 2024-07-12 PROCEDURE — 99255 IP/OBS CONSLTJ NEW/EST HI 80: CPT | Performed by: INTERNAL MEDICINE

## 2024-07-12 PROCEDURE — 99291 CRITICAL CARE FIRST HOUR: CPT | Performed by: ANESTHESIOLOGY

## 2024-07-12 PROCEDURE — 82330 ASSAY OF CALCIUM: CPT

## 2024-07-12 RX ORDER — INSULIN LISPRO 100 [IU]/ML
1-6 INJECTION, SOLUTION INTRAVENOUS; SUBCUTANEOUS
Status: DISCONTINUED | OUTPATIENT
Start: 2024-07-12 | End: 2024-07-14 | Stop reason: HOSPADM

## 2024-07-12 RX ORDER — PANTOPRAZOLE SODIUM 40 MG/1
40 TABLET, DELAYED RELEASE ORAL
Status: DISCONTINUED | OUTPATIENT
Start: 2024-07-12 | End: 2024-07-14 | Stop reason: HOSPADM

## 2024-07-12 RX ORDER — AMLODIPINE BESYLATE 5 MG/1
5 TABLET ORAL DAILY
Status: DISCONTINUED | OUTPATIENT
Start: 2024-07-12 | End: 2024-07-12

## 2024-07-12 RX ORDER — CEFAZOLIN SODIUM 2 G/50ML
2000 SOLUTION INTRAVENOUS EVERY 8 HOURS
Status: DISCONTINUED | OUTPATIENT
Start: 2024-07-12 | End: 2024-07-12

## 2024-07-12 RX ORDER — AMLODIPINE BESYLATE 10 MG/1
10 TABLET ORAL DAILY
Status: DISCONTINUED | OUTPATIENT
Start: 2024-07-13 | End: 2024-07-14 | Stop reason: HOSPADM

## 2024-07-12 RX ORDER — CEFAZOLIN SODIUM 2 G/50ML
2000 SOLUTION INTRAVENOUS EVERY 8 HOURS
Status: DISCONTINUED | OUTPATIENT
Start: 2024-07-12 | End: 2024-07-14 | Stop reason: HOSPADM

## 2024-07-12 RX ORDER — INSULIN LISPRO 100 [IU]/ML
1-5 INJECTION, SOLUTION INTRAVENOUS; SUBCUTANEOUS
Status: DISCONTINUED | OUTPATIENT
Start: 2024-07-12 | End: 2024-07-14 | Stop reason: HOSPADM

## 2024-07-12 RX ORDER — AMLODIPINE BESYLATE 5 MG/1
5 TABLET ORAL ONCE
Status: COMPLETED | OUTPATIENT
Start: 2024-07-12 | End: 2024-07-12

## 2024-07-12 RX ADMIN — INSULIN LISPRO 1 UNITS: 100 INJECTION, SOLUTION INTRAVENOUS; SUBCUTANEOUS at 21:31

## 2024-07-12 RX ADMIN — PANTOPRAZOLE SODIUM 40 MG: 40 TABLET, DELAYED RELEASE ORAL at 11:23

## 2024-07-12 RX ADMIN — CEFEPIME 2000 MG: 2 INJECTION, POWDER, FOR SOLUTION INTRAVENOUS at 00:06

## 2024-07-12 RX ADMIN — CEFAZOLIN SODIUM 2000 MG: 2 SOLUTION INTRAVENOUS at 16:51

## 2024-07-12 RX ADMIN — SODIUM CHLORIDE 1000 MG: 0.9 INJECTION, SOLUTION INTRAVENOUS at 08:05

## 2024-07-12 RX ADMIN — FUROSEMIDE 20 MG: 10 INJECTION, SOLUTION INTRAMUSCULAR; INTRAVENOUS at 16:51

## 2024-07-12 RX ADMIN — LEVALBUTEROL HYDROCHLORIDE 1.25 MG: 1.25 SOLUTION RESPIRATORY (INHALATION) at 01:06

## 2024-07-12 RX ADMIN — HEPARIN SODIUM 7500 UNITS: 5000 INJECTION INTRAVENOUS; SUBCUTANEOUS at 21:31

## 2024-07-12 RX ADMIN — HEPARIN SODIUM 7500 UNITS: 5000 INJECTION INTRAVENOUS; SUBCUTANEOUS at 14:46

## 2024-07-12 RX ADMIN — IPRATROPIUM BROMIDE 0.5 MG: 0.5 SOLUTION RESPIRATORY (INHALATION) at 07:29

## 2024-07-12 RX ADMIN — HEPARIN SODIUM 7500 UNITS: 5000 INJECTION INTRAVENOUS; SUBCUTANEOUS at 05:09

## 2024-07-12 RX ADMIN — DEXMEDETOMIDINE HYDROCHLORIDE 0.5 MCG/KG/HR: 400 INJECTION INTRAVENOUS at 04:36

## 2024-07-12 RX ADMIN — CEFEPIME 2000 MG: 2 INJECTION, POWDER, FOR SOLUTION INTRAVENOUS at 08:04

## 2024-07-12 RX ADMIN — PROPOFOL 30 MCG/KG/MIN: 10 INJECTION, EMULSION INTRAVENOUS at 04:30

## 2024-07-12 RX ADMIN — CHLORHEXIDINE GLUCONATE 0.12% ORAL RINSE 15 ML: 1.2 LIQUID ORAL at 08:02

## 2024-07-12 RX ADMIN — AMLODIPINE BESYLATE 5 MG: 5 TABLET ORAL at 11:20

## 2024-07-12 RX ADMIN — CEFAZOLIN SODIUM 2000 MG: 2 SOLUTION INTRAVENOUS at 23:10

## 2024-07-12 RX ADMIN — IPRATROPIUM BROMIDE 0.5 MG: 0.5 SOLUTION RESPIRATORY (INHALATION) at 01:06

## 2024-07-12 RX ADMIN — AMLODIPINE BESYLATE 5 MG: 5 TABLET ORAL at 17:45

## 2024-07-12 RX ADMIN — FUROSEMIDE 20 MG: 10 INJECTION, SOLUTION INTRAMUSCULAR; INTRAVENOUS at 08:04

## 2024-07-12 RX ADMIN — LEVALBUTEROL HYDROCHLORIDE 1.25 MG: 1.25 SOLUTION RESPIRATORY (INHALATION) at 07:29

## 2024-07-12 RX ADMIN — PROPOFOL 30 MCG/KG/MIN: 10 INJECTION, EMULSION INTRAVENOUS at 00:06

## 2024-07-12 RX ADMIN — LEVOTHYROXINE SODIUM 50 MCG: 0.05 TABLET ORAL at 11:20

## 2024-07-12 RX ADMIN — INSULIN LISPRO 1 UNITS: 100 INJECTION, SOLUTION INTRAVENOUS; SUBCUTANEOUS at 17:33

## 2024-07-12 NOTE — PROGRESS NOTES
NEPHROLOGY PROGRESS NOTE    Patient: Ben Daniel               Sex: male          DOA: 7/6/2024 10:38 AM   YOB: 1995        Age:  29 y.o.        LOS:  LOS: 4 days       HPI     Patient with acute kidney injury and pneumonia    SUBJECTIVE     Patient is extubated    Overall feeling better    Denies any acute complaints    No chest pain no palpitation    Still has a cough    CURRENT MEDICATIONS       Current Facility-Administered Medications:     albuterol (PROVENTIL HFA,VENTOLIN HFA) inhaler 2 puff, 2 puff, Inhalation, Q4H PRN, MARTIN Calhoun, 2 puff at 07/09/24 0538    amLODIPine (NORVASC) tablet 5 mg, 5 mg, Oral, Daily, MARTIN Hill, 5 mg at 07/12/24 1120    ceFAZolin (ANCEF) IVPB (premix in dextrose) 2,000 mg 50 mL, 2,000 mg, Intravenous, Q8H, MARTIN Hill    chlorhexidine (PERIDEX) 0.12 % oral rinse 15 mL, 15 mL, Mouth/Throat, Q12H YDAIRA, MARTIN Calhoun, 15 mL at 07/12/24 0802    furosemide (LASIX) injection 20 mg, 20 mg, Intravenous, BID (diuretic), MARTIN Calhoun, 20 mg at 07/12/24 0804    heparin (porcine) subcutaneous injection 7,500 Units, 7,500 Units, Subcutaneous, Q8H YADIRA, 7,500 Units at 07/12/24 0509 **AND** [COMPLETED] Platelet count, , , Once, Uriel Kline MD    insulin lispro (HumALOG/ADMELOG) 100 units/mL subcutaneous injection 1-5 Units, 1-5 Units, Subcutaneous, HS, MARTIN Hill    insulin lispro (HumALOG/ADMELOG) 100 units/mL subcutaneous injection 1-6 Units, 1-6 Units, Subcutaneous, TID AC **AND** Fingerstick Glucose (POCT), , , TID AC, MARTIN Hill    levothyroxine tablet 50 mcg, 50 mcg, Oral, Daily, MARTIN Calhoun, 50 mcg at 07/12/24 1120    methylPREDNISolone sodium succinate (Solu-MEDROL) 1,000 mg in sodium chloride 0.9 % 250 mL IVPB, 1,000 mg, Intravenous, Daily, Randy Wells MD, Last Rate: 250 mL/hr at 07/12/24 0805, 1,000 mg at 07/12/24 0805    ondansetron (ZOFRAN) injection 4 mg, 4 mg, Intravenous, Q8H  SHAKA, MARTIN Calhoun, 4 mg at 07/09/24 0033    pantoprazole (PROTONIX) EC tablet 40 mg, 40 mg, Oral, Early Morning, Dina MARTIN Palacios, 40 mg at 07/12/24 1123    OBJECTIVE     Current Weight: Weight - Scale: 123 kg (271 lb 6.2 oz)  Vitals:    07/12/24 1100   BP:    Pulse:    Resp:    Temp: 98.8 °F (37.1 °C)   SpO2:        Intake/Output Summary (Last 24 hours) at 7/12/2024 1339  Last data filed at 7/12/2024 0619  Gross per 24 hour   Intake 1815.58 ml   Output 2360 ml   Net -544.42 ml       PHYSICAL EXAMINATION     Physical Exam  Constitutional:       General: He is not in acute distress.     Appearance: He is well-developed.   HENT:      Head: Normocephalic.      Mouth/Throat:      Mouth: Mucous membranes are moist.   Eyes:      General: No scleral icterus.     Conjunctiva/sclera: Conjunctivae normal.   Neck:      Vascular: No JVD.   Cardiovascular:      Rate and Rhythm: Normal rate.      Heart sounds: Normal heart sounds.   Pulmonary:      Effort: Pulmonary effort is normal.      Breath sounds: No wheezing.   Abdominal:      Palpations: Abdomen is soft.      Tenderness: There is no abdominal tenderness.   Musculoskeletal:         General: Normal range of motion.      Cervical back: Neck supple.   Skin:     General: Skin is warm.      Findings: No rash.   Neurological:      Mental Status: He is alert and oriented to person, place, and time.   Psychiatric:         Behavior: Behavior normal.          LAB RESULTS     Results from last 7 days   Lab Units 07/12/24  0431 07/11/24  1602 07/11/24  0432 07/10/24  0557 07/09/24  1209 07/09/24  0539 07/08/24  0510 07/07/24  0607   WBC Thousand/uL 8.63  --  9.68 10.14 8.86 9.72 6.58 6.66   HEMOGLOBIN g/dL 8.7*  --  9.1* 11.1* 12.0 11.9* 12.0 11.4*   HEMATOCRIT % 24.8*  --  26.2* 31.7* 34.6* 35.3* 35.8* 33.1*   PLATELETS Thousands/uL 218  --  238 247 256 278 226 223   POTASSIUM mmol/L 4.2 4.5 4.9 4.2  --  4.5 4.9 4.7   CHLORIDE mmol/L 104 102 100 97  --  98 101 107    CO2 mmol/L 20* 20* 20* 24  --  22 22 22   BUN mg/dL 66* 56* 48* 33*  --  32* 38* 43*   CREATININE mg/dL 1.80* 1.92* 2.07* 1.14  --  1.19 1.51* 1.60*   EGFR ml/min/1.73sq m 49 46 42 86  --  82 61 57   CALCIUM mg/dL 8.2* 8.3* 8.0* 7.9*  --  8.0* 8.7 8.6   MAGNESIUM mg/dL 2.8* 2.6 2.4 2.2  --   --   --  2.3   PHOSPHORUS mg/dL 6.0*  --  6.4* 4.3  --   --   --  4.9*       RADIOLOGY RESULTS      Results for orders placed during the hospital encounter of 07/06/24    XR chest portable    Narrative  XR CHEST PORTABLE    INDICATION: Hypoxemia.    COMPARISON: 7/6/2024    FINDINGS:  Progressive diffuse bilateral opacity likely representing multifocal pneumonia and possible pulmonary edema    No pneumothorax or pleural effusion.    Normal cardiomediastinal silhouette.    Bones are unremarkable for age.    Normal upper abdomen.    Impression  Progressive diffuse bilateral opacities likely representing multifocal pneumonia with possible superimposed pulmonary edema        Workstation performed: [id number]    Results for orders placed during the hospital encounter of 07/06/24    XR chest 2 views    Narrative  XR CHEST PA & LATERAL    INDICATION: sob. Shortness of breath for 2 days, chest pressure began this morning.    COMPARISON: CXR 2/8/2019.    FINDINGS:    Low lung volumes producing vascular crowding. No acute disease. No pneumothorax or pleural effusion.    Normal cardiomediastinal silhouette.    Bones are unremarkable for age.    Normal upper abdomen.    Impression  Low lung volumes producing vascular crowding with no acute disease.        Workstation performed: [id number]    Results for orders placed during the hospital encounter of 07/06/24    CT chest wo contrast    Narrative  CT CHEST WITHOUT IV CONTRAST    INDICATION: Hypoxia, ARDS.    COMPARISON: CT chest 7/6/2024.    TECHNIQUE: CT examination of the chest was performed without intravenous contrast. Multiplanar 2D reformatted images were created from the source  data.    This examination, like all CT scans performed in the [facility], was performed utilizing techniques to minimize radiation dose exposure, including the use of iterative reconstruction and automated exposure control. Radiation dose length  product (DLP) for this visit: 1039 mGy-cm    FINDINGS:    LUNGS: Endotracheal tube terminates 2.6 cm above the leann in satisfactory position. Mucous threads within the distal trachea. Extensive consolidative airspace opacities throughout the perihilar regions and the bilateral lower lobes, new since 7/6/2024.    PLEURA: Probable trace bilateral pleural effusion, although difficult to visualize without contrast. No pneumothorax.    HEART/GREAT VESSELS: Cardiomegaly. No thoracic aortic aneurysm.    MEDIASTINUM AND BARTOLOME: Unremarkable.    CHEST WALL AND LOWER NECK: Bilateral gynecomastia.    VISUALIZED STRUCTURES IN THE UPPER ABDOMEN: Unremarkable.    OSSEOUS STRUCTURES: No acute fracture or destructive osseous lesion.    Impression  Extensive consolidative airspace opacities throughout the perihilar regions and the bilateral lower lobes, new since 7/6/2024. Findings likely represent a combination of pulmonary edema and multifocal pneumonia in a pattern typical for aspiration  pneumonia. Recommend short-term follow-up noncontrast chest CT in 3 months to assess for resolution.    Mild cardiomegaly.    Endotracheal tube in satisfactory position.    The study was marked in EPIC for immediate notification.    Workstation performed: [id number]    No results found for this or any previous visit.    No results found for this or any previous visit.    No results found for this or any previous visit.      PLAN / RECOMMENDATIONS      Acute kidney injury: Overall improving.  Fluctuating because of diuresis which we will continue as he needs it    Multilobar pneumonia: On antibiotic.  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  Patient started on Solu-Medrol and will monitor closely    Will follow-up    Randy Wells MD  Nephrology  7/12/2024        Portions of the record may have been created with voice recognition software. Occasional wrong word or \"sound a like\" substitutions may have occurred due to the inherent limitations of voice recognition software. Read the chart carefully and recognize, using context, where substitutions have occurred.  "

## 2024-07-12 NOTE — ASSESSMENT & PLAN NOTE
POA   S/p Renal Biopsy showing C3 glomerulopathy  Baseline creatinine 0.8 and initial creatinine 1.76; peak Cr 2.07  Trending down, most recently 1.92    Plan:  Pulse dose steroids initiated on 7/11  Plan for 3 days  Nephrology consulted, appreciate input  Continue pulmonology workup as above  Monitor I&O  Avoid nephrotoxins  Trend serum creatinine

## 2024-07-12 NOTE — ASSESSMENT & PLAN NOTE
"Patient with report of SOB on arrival with hypoxia requiring 3 L NC  Had worsening hypoxia on 7/9 requiring 15 L NRB followed by BIPAP  He was able to be weaned to 7 L midflow nasal cannula overnight to 7/10  He underwent bronchoscopy yesterday for which he required intubation and was unable to be liberated from the ventilator  Bronchoscopy with minimal/scant blood tinged fluids with edematous and erythematous airway pulmonary thinks more consistent with infectious etiology  He required 100% FiO2 and 10 of PEEP; Pf ratio 90  He was started on broad spectrum ABX and steroids  Differentials include inflammatory/autoimmune +/- pulmonary edema, and/or infection  MALENA and Lyme negative  CT scan on 7/11:  \"Extensive consolidative airspace opacities throughout the perihilar regions and the bilateral lower lobes, new since 7/6/2024. 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   FiO2 weaned to 40%, PEEP to 8  Pf: 214.25    Plan  Continue broad spectrum ABX; Pulse streoids  other serologies pending, follow up results  Continue diuresis  Monitor I&O  [employer] Lung protective ventilatory strategies    "

## 2024-07-12 NOTE — PROGRESS NOTES
"[facility]  Progress Note  Name: Ben Daniel I  MRN: [id number]  Unit/Bed#: ICU 02 I Date of Admission: 7/6/2024   Date of Service: 7/12/2024 I Hospital Day: 4    Assessment & Plan   * Acute respiratory failure with hypoxia (HCC)  Assessment & Plan  Patient with report of SOB on arrival with hypoxia requiring 3 L NC  Had worsening hypoxia on 7/9 requiring 15 L NRB followed by BIPAP  He was able to be weaned to 7 L midflow nasal cannula overnight to 7/10  He underwent bronchoscopy yesterday for which he required intubation and was unable to be liberated from the ventilator  Bronchoscopy with minimal/scant blood tinged fluids with edematous and erythematous airway pulmonary thinks more consistent with infectious etiology  He required 100% FiO2 and 10 of PEEP; Pf ratio 90  He was started on broad spectrum ABX and steroids  Differentials include inflammatory/autoimmune +/- pulmonary edema, and/or infection  MALENA and Lyme negative  CT scan on 7/11:  \"Extensive consolidative airspace opacities throughout the perihilar regions and the bilateral lower lobes, new since 7/6/2024. 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   FiO2 weaned to 40%, PEEP to 8  Pf: 214.25    Plan  Continue broad spectrum ABX; Pulse streoids  other serologies pending, follow up results  Continue diuresis  Monitor I&O  ARDSNet Lung protective ventilatory strategies      ARDS (adult respiratory distress syndrome) ([facility])  Assessment & Plan  Pf Ratio on post intubation ABG 90 indicative of Severe ARDS; now improving  Continue care as above    ROSI (acute kidney injury) ([facility])  Assessment & Plan  POA   S/p Renal Biopsy showing C3 glomerulopathy  Baseline creatinine 0.8 and initial creatinine 1.76; peak Cr 2.07  Trending down, most recently 1.92    Plan:  Pulse dose steroids " "initiated on 7/11  Plan for 3 days  Nephrology consulted, appreciate input  Continue pulmonology workup as above  Monitor I&O  Avoid nephrotoxins  Trend serum creatinine    Hemoptysis  Assessment & Plan  Scant blood seen on Bronchoscopy more indicative of infectious etiology rather than inflammatory  See plan above    C3 glomerulonephritis  Assessment & Plan  See ROSI section    Hyponatremia  Assessment & Plan  Worsening throughout hospital stay  Nephrology following; May be 2/2 C3 glomerulopathy     Hypotension  Assessment & Plan  Sedation related  Hold antihypertensives  Levophed for MAP goal > 65    Hypothyroidism  Assessment & Plan  Continue synthroid    Essential hypertension  Assessment & Plan      Plan:  Hold amlodipine and inderal  HCTZ and benazepril on hold             Disposition: Critical care    ICU Core Measures     Vented Patient  VAP Bundle  VAP bundle ordered     A: Assess, Prevent, and Manage Pain Has pain been assessed? Yes  Need for changes to pain regimen? Yes   B: Both Spontaneous Awakening Trials (SATs) and Spontaneous Breathing Trials (SBTs) Plan to perform spontaneous awakening trial today? Yes   Plan to perform spontaneous breathing trial today? Yes   Obvious barriers to extubation? No   C: Choice of Sedation RASS Goal: -1 Drowsy  Need for changes to sedation or analgesia regimen? No   D: Delirium CAM-ICU: Negative   E: Early Mobility  Plan for early mobility? Yes   F: Family Engagement Plan for family engagement today? Yes       Antibiotic Review: Continue broad spectrum secondary to severity of illness.     Review of Invasive Devices:    Margarita Plan: Continue for accurate I/O monitoring for 48 hours    Susan Plan: Keep arterial line for frequent ABGs    Prophylaxis:  VTE VTE covered by:  heparin (porcine), Subcutaneous, 7,500 Units at 07/11/24 6373       Stress Ulcer  covered byomeprazole (PRILOSEC) suspension 2 mg/mL [[id number]]         HPI:  Per my previous progress note(s):  \"29 year old " "male presented to [facility] on 7/6 with 2 day history of shortness of breath and ROSI with concern for autoimmune etiology. Patient had worsening hypoxia and underwent a renal Biopsy on 7/9 and required midflow/BiPAP and was transferred to the ICU. He was diuresed and able to be taken off of BiPAP to [facility]. Pulmonary and nephrology following\" underwent bronchoscopy on 7/10 and required endotracheal intubation and was unable to be liberated and had high O2 requirements with an initial Pf ratio of 90. He had a CT scan on 7/11 showing concern for pulmonary edema and aspiration. His renal biopsy returned with C3 Glomerulonephritis.        Significant 24hr Events     24hr events:   Diuresed  Started on Pulse steroids  FiO2 weaned to 40% PEEP to 8; Pf Now 214     Subjective   Review of Systems: Review of Systems   Unable to perform ROS: Intubated        Objective                            Vitals I/O      Most Recent Min/Max in 24hrs   Temp 97.9 °F (36.6 °C) Temp  Min: 97.6 °F (36.4 °C)  Max: 98.6 °F (37 °C)   Pulse 62 Pulse  Min: 59  Max: 74   Resp 16 Resp  Min: 16  Max: 26   /58 BP  Min: 104/58  Max: 139/67   O2 Sat 94 % SpO2  Min: 90 %  Max: 99 %      Intake/Output Summary (Last 24 hours) at 7/12/2024 0059  Last data filed at 7/12/2024 0019  Gross per 24 hour   Intake 2492.09 ml   Output 2455 ml   Net 37.09 ml       Diet Enteral/Parenteral; Tube Feeding No Oral Diet; Vital High Protein; Continuous; 70    Invasive Monitoring   Arterial Line  Susan /68  Arterial Line BP  Min: 88/87  Max: 162/72   MAP 91 mmHg  Arterial Line MAP (mmHg)  Min: 84 mmHg  Max: 97 mmHg           Physical Exam   Physical Exam  Vitals and nursing note reviewed.   Eyes:      Conjunctiva/sclera: Conjunctivae normal.   Skin:     General: Skin is warm and dry.      Findings: Rash present.   HENT:      Head: Normocephalic and atraumatic.      Mouth/Throat:      Mouth: Mucous membranes are moist.   Cardiovascular:      Rate and Rhythm: Normal rate. "   Musculoskeletal:         General: Normal range of motion.      Right lower leg: Trace Edema present.      Left lower leg: Trace Edema present.   Abdominal: General: There is no distension.      Palpations: Abdomen is soft.      Tenderness: There is no abdominal tenderness.   Constitutional:       Appearance: He is well-developed and well-nourished. He is toxic-appearing.      Interventions: He is sedated and restrained.   Pulmonary:      Breath sounds: Rhonchi present. No wheezing or rales.   Neurological:      Comments: RASS 0; Awake, nods yes and no to questions   Genitourinary/Anorectal:  Avila present.          Diagnostic Studies      EKG:   Imaging:  I have personally reviewed pertinent films in PACS     Medications:  Scheduled PRN   azithromycin, 500 mg, Q24H  cefepime, 2,000 mg, Q8H  chlorhexidine, 15 mL, Q12H YADIRA  furosemide, 20 mg, BID (diuretic)  heparin (porcine), 7,500 Units, Q8H YADIRA  ipratropium, 0.5 mg, Q6H  levalbuterol, 1.25 mg, Q6H  levothyroxine, 50 mcg, Daily  methylPREDNISolone sodium succinate, 1,000 mg, Daily  omeprazole (PRILOSEC) suspension 2 mg/mL, 20 mg, Daily      albuterol, 2 puff, Q4H PRN  midazolam, 2 mg, Q4H PRN  ondansetron, 4 mg, Q8H PRN  vancomycin, 12.5 mg/kg (Adjusted), Daily PRN       Continuous    dexmedetomidine, 0.1-1.4 mcg/kg/hr, Last Rate: 0.5 mcg/kg/hr (07/11/24 2252)  propofol, 5-50 mcg/kg/min, Last Rate: 30 mcg/kg/min (07/12/24 0006)         Labs:    CBC    Recent Labs     07/10/24  0557 07/11/24  0432   WBC 10.14 9.68   HGB 11.1* 9.1*   HCT 31.7* 26.2*    238     BMP    Recent Labs     07/11/24  0432 07/11/24  1602   SODIUM 129* 131*   K 4.9 4.5    102   CO2 20* 20*   AGAP 9 9   BUN 48* 56*   CREATININE 2.07* 1.92*   CALCIUM 8.0* 8.3*       Coags    No recent results     Additional Electrolytes  Recent Labs     07/10/24  0557 07/11/24  0432 07/11/24  1602   MG 2.2 2.4 2.6   PHOS 4.3 6.4*  --    CAIONIZED 1.06* 1.02*  --           Blood Gas    Recent Labs      07/11/24  2154   PHART 7.412   IFR8HIY 31.9*   PO2ART 85.7   IHI1XJS 19.9*   BEART -3.8   SOURCE Line, Arterial     Recent Labs     07/11/24 2154   SOURCE Line, Arterial    LFTs  No recent results    Infectious  Recent Labs     07/11/24  0432   PROCALCITONI 0.42*     Glucose  Recent Labs     07/10/24  0557 07/11/24  0432 07/11/24  1602   GLUC 99 153* 157*               Rikki Avila PA-C

## 2024-07-12 NOTE — PLAN OF CARE
Problem: PAIN - ADULT  Goal: Verbalizes/displays adequate comfort level or baseline comfort level  Description: Interventions:  - Encourage patient to monitor pain and request assistance  - Assess pain using appropriate pain scale  - Administer analgesics based on type and severity of pain and evaluate response  - Implement non-pharmacological measures as appropriate and evaluate response  - Consider cultural and social influences on pain and pain management  - Notify physician/advanced practitioner if interventions unsuccessful or patient reports new pain  Outcome: Progressing     Problem: INFECTION - ADULT  Goal: Absence or prevention of progression during hospitalization  Description: INTERVENTIONS:  - Assess and monitor for signs and symptoms of infection  - Monitor lab/diagnostic results  - Monitor all insertion sites, i.e. indwelling lines, tubes, and drains  - Monitor endotracheal if appropriate and nasal secretions for changes in amount and color  - Thatcher appropriate cooling/warming therapies per order  - Administer medications as ordered  - Instruct and encourage patient and family to use good hand hygiene technique  - Identify and instruct in appropriate isolation precautions for identified infection/condition  Outcome: Progressing  Goal: Absence of fever/infection during neutropenic period  Description: INTERVENTIONS:  - Monitor WBC    Outcome: Progressing     Problem: SAFETY ADULT  Goal: Patient will remain free of falls  Description: INTERVENTIONS:  - Educate patient/family on patient safety including physical limitations  - Instruct patient to call for assistance with activity   - Consult OT/PT to assist with strengthening/mobility   - Keep Call bell within reach  - Keep bed low and locked with side rails adjusted as appropriate  - Keep care items and personal belongings within reach  - Initiate and maintain comfort rounds  - Make Fall Risk Sign visible to staff  - Offer Toileting every 2 Hours,  in advance of need  - Initiate/Maintain bed alarm  - Apply yellow socks and bracelet for high fall risk patients  - Consider moving patient to room near nurses station  Outcome: Progressing  Goal: Maintain or return to baseline ADL function  Description: INTERVENTIONS:  -  Assess patient's ability to carry out ADLs; assess patient's baseline for ADL function and identify physical deficits which impact ability to perform ADLs (bathing, care of mouth/teeth, toileting, grooming, dressing, etc.)  - Assess/evaluate cause of self-care deficits   - Assess range of motion  - Assess patient's mobility; develop plan if impaired  - Assess patient's need for assistive devices and provide as appropriate  - Encourage maximum independence but intervene and supervise when necessary  - Involve family in performance of ADLs  - Assess for home care needs following discharge   - Consider OT consult to assist with ADL evaluation and planning for discharge  - Provide patient education as appropriate  Outcome: Progressing  Goal: Maintains/Returns to pre admission functional level  Description: INTERVENTIONS:  - Perform AM-PAC 6 Click Basic Mobility/ Daily Activity assessment daily.  - Set and communicate daily mobility goal to care team and patient/family/caregiver.   - Collaborate with rehabilitation services on mobility goals if consulted  - Perform Range of Motion 2 times a day.  - Reposition patient every 2 hours.  - Record patient progress and toleration of activity level   Outcome: Progressing     Problem: DISCHARGE PLANNING  Goal: Discharge to home or other facility with appropriate resources  Description: INTERVENTIONS:  - Identify barriers to discharge w/patient and caregiver  - Arrange for needed discharge resources and transportation as appropriate  - Identify discharge learning needs (meds, wound care, etc.)  - Arrange for interpretive services to assist at discharge as needed  - Refer to Case Management Department for  coordinating discharge planning if the patient needs post-hospital services based on physician/advanced practitioner order or complex needs related to functional status, cognitive ability, or social support system  Outcome: Progressing     Problem: Knowledge Deficit  Goal: Patient/family/caregiver demonstrates understanding of disease process, treatment plan, medications, and discharge instructions  Description: Complete learning assessment and assess knowledge base.  Interventions:  - Provide teaching at level of understanding  - Provide teaching via preferred learning methods  Outcome: Progressing     Problem: RESPIRATORY - ADULT  Goal: Achieves optimal ventilation and oxygenation  Description: INTERVENTIONS:  - Assess for changes in respiratory status  - Assess for changes in mentation and behavior  - Position to facilitate oxygenation and minimize respiratory effort  - Oxygen administered by appropriate delivery if ordered  - Initiate smoking cessation education as indicated  - Encourage broncho-pulmonary hygiene including cough, deep breathe, Incentive Spirometry  - Assess the need for suctioning and aspirate as needed  - Assess and instruct to report SOB or any respiratory difficulty  - Respiratory Therapy support as indicated  Outcome: Progressing     Problem: RESPIRATORY - ADULT  Goal: Achieves optimal ventilation and oxygenation  Description: INTERVENTIONS:  - Assess for changes in respiratory status  - Assess for changes in mentation and behavior  - Position to facilitate oxygenation and minimize respiratory effort  - Oxygen administered by appropriate delivery if ordered  - Initiate smoking cessation education as indicated  - Encourage broncho-pulmonary hygiene including cough, deep breathe, Incentive Spirometry  - Assess the need for suctioning and aspirate as needed  - Assess and instruct to report SOB or any respiratory difficulty  - Respiratory Therapy support as indicated  Outcome: Progressing      Problem: Potential for Falls  Goal: Patient will remain free of falls  Description: INTERVENTIONS:  - Educate patient/family on patient safety including physical limitations  - Instruct patient to call for assistance with activity   - Consult OT/PT to assist with strengthening/mobility   - Keep Call bell within reach  - Keep bed low and locked with side rails adjusted as appropriate  - Keep care items and personal belongings within reach  - Initiate and maintain comfort rounds  - Make Fall Risk Sign visible to staff  - Offer Toileting every 2 Hours, in advance of need  - Initiate/Maintain bed alarm  - Apply yellow socks and bracelet for high fall risk patients  - Consider moving patient to room near nurses station  Outcome: Progressing     Problem: Nutrition/Hydration-ADULT  Goal: Nutrient/Hydration intake appropriate for improving, restoring or maintaining nutritional needs  Description: Monitor and assess patient's nutrition/hydration status for malnutrition. Collaborate with interdisciplinary team and initiate plan and interventions as ordered.  Monitor patient's weight and dietary intake as ordered or per policy. Utilize nutrition screening tool and intervene as necessary. Determine patient's food preferences and provide high-protein, high-caloric foods as appropriate.     INTERVENTIONS:  - Monitor oral intake, urinary output, labs, and treatment plans  - Assess nutrition and hydration status and recommend course of action  - Evaluate amount of meals eaten  - Assist patient with eating if necessary   - Allow adequate time for meals  - Recommend/ encourage appropriate diets, oral nutritional supplements, and vitamin/mineral supplements  - Order, calculate, and assess calorie counts as needed  - Recommend, monitor, and adjust tube feedings and TPN/PPN based on assessed needs  - Assess need for intravenous fluids  - Provide specific nutrition/hydration education as appropriate  - Include patient/family/caregiver  in decisions related to nutrition  Outcome: Progressing     Problem: SAFETY,RESTRAINT: NV/NON-SELF DESTRUCTIVE BEHAVIOR  Goal: Remains free of harm/injury (restraint for non violent/non self-detsructive behavior)  Description: INTERVENTIONS:  - Instruct patient/family regarding restraint use   - Assess and monitor physiologic and psychological status   - Provide interventions and comfort measures to meet assessed patient needs   - Identify and implement measures to help patient regain control  - Assess readiness for release of restraint   Outcome: Progressing  Goal: Returns to optimal restraint-free functioning  Description: INTERVENTIONS:  - Assess the patient's behavior and symptoms that indicate continued need for restraint  - Identify and implement measures to help patient regain control  - Assess readiness for release of restraint   Outcome: Progressing

## 2024-07-12 NOTE — CONSULTS
Consultation - Infectious Disease   Ben Daniel 29 y.o. male MRN: [id number]  Unit/Bed#: ICU 02 Encounter: [id number]    IMPRESSION & RECOMMENDATIONS:   Group A strep pneumonia. Presented with SOB. Reports flu-like symptoms 2-3 weeks prior to presentation. Initial CT chest on 7/6 with mild pulmonary edema and pleural effusions. Also demonstrated numerous nodules. Respiratory status worsened on 7/9. Patient was started on broad-spectrum antibiotics and underwent bronchoscopy on 7/10. Was initially unable to be extubated. Repeat CT chest on 7/11 demonstrated extensive consolidative airspace opacities concerning for multifocal pneumonia. BAL cultures resulted positive for group A strep. Consider post-viral pneumonia. Group A strep is intrinsically usually sensitive to penicillins. Course further complicated by #2. Now extubated to [facility].   Stop broad-spectrum antibiotics  Narrow to IV cefazolin 2g q8h  Anticipate treatment x 7d for pneumonia  If patient is discharged prior to completing antibiotic course, can transition to PO antibiotics (PO amoxicillin) to complete course.   Continue to monitor respiratory status  Continue to follow CBCD and BMP with AM labs to monitor for potential antimicrobial toxicities and assess for clinical response to antibiotics.     Glomerulonephritis. Patient presented with ROSI and proteinuria. 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 Consider streptococcal glomerulonephritis. Nephrology on board.   Steroid management per nephrology  Appreciate nephrology recommendations  Antibiotics as above  Follow-up HIV screening    ROSI with proteinuria. Baseline serum creatinine ~0.8. Elevated to 1.76 on admission with peak Cr at 2.07. s/p renal biopsy showing C3 glomerulopathy as above. Improving.   Dose adjust antibiotics for renal function  Avoid nephrotoxins   Continue to trend serum creatinine  Appreciate nephrology recommendations.     Acute hypoxic respiratory failure. Likely  multifactorial in setting of pulmonary edema and now pneumonia as above. Initially was intubated for bronchoscopy and unable to be extubated. Bronchoscopy findings with minimal/scant blood tinged fluids with edematous and erythematous airway. Pulmonary favored infectious etiology. Now extubated to [facility].   Antibiotics as above  Appreciate pulmonary recommendations  Diuresis per critical care  Pulmonary toilet  O2 management per critical care team    Obesity. BMI 43. Will dose adjust antibiotics as needed.     Above plan was discussed in detail with patient at bedside.   Above plan was discussed in detail with primary service, who agrees with the plan to narrow to cefazolin as above. Anticipate at least 7d total antibiotic course. ID consult service will continue to follow.     HISTORY OF PRESENT ILLNESS:  Reason for Consult: 1. Strep pneumonia 2. Glomerulonephritis 3. Recent URI  HPI: Ben Daniel is a 29 y.o. year old male with pmhx of hypothyroidism, obesity, HTN, who presented with worsening SOB. Per my record review, patient reported having URI type symptoms approximately 2-3 weeks prior to this presentation. Reports flu-like symptoms including chills, fevers, myalgias, etc. He reports that his symptoms resolved, but then he developed worsening shortness of breath 3 days prior to admission prompting ED evaluation. Upon arrival on 7/6, patient was afebrile and hemodynamically stable, though was on 3L NC on presentation. Labs demonstrated elevated serum creatinine to 1.76. Patient underwent CT chest which showed pulmonary edema, trace pleural effusions bilaterally, and numerous solid nodules that could be infectious/inflammatory. Patient was admitted for treatment of his ROSI. Patient's creatinine continued to worsen and peaked at 2.07. He eventually underwent a renal biopsy which showed C3 glomerulonephritis, likely related to infection. Patient was started on steroids. On 7/9 patient's hypoxia worsened and he  required BiPAP. He was then weaned to 7L after some diuresis. Patient underwent bronchoscopy on 7/10/24 and was unable to be extubated post-procedure. Bronchoscopy with minimal/scant blood tinged fluids with edematous and erythematous airway. Pulmonary favored infectious etiology. Patient underwent a repeat CT chest on 7/11 and it showed extensive consolidative airspace opacities throughout the perihilar regions and bilateral lower lobes, new since 7/6/24. Patient was started on broad spectrum antibiotics and eventually extubated to 10 L MFNC. Bronchoscopy cultures returned back positive for group A strep for which ID is consulted.     Patient reports that 2-3 weeks prior to this presentation, he had flu-like symptoms as above. He lives at home with his wife and 4 year old son. Son has not been sick recently. No known sick contacts, though states he is currently in classes and someone else he was in class with was also out sick. Unknown what that person had. Patient currently denies having any fevers, chills, N/V/D, CP, rashes, urinary symptoms, headache, visual changes, neck pain, wounds. Tolerating 10L MFNC.     REVIEW OF SYSTEMS:  A complete 12 point system-based review of systems is otherwise negative.    PAST MEDICAL HISTORY:  Past Medical History:   Diagnosis Date    Anxiety     Hypertension      Past Surgical History:   Procedure Laterality Date    ANKLE SURGERY      FETAL SURGERY FOR CONGENITAL HERNIA      IR BIOPSY KIDNEY RANDOM  7/9/2024       FAMILY HISTORY:  Non-contributory    SOCIAL HISTORY:  Social History   Social History     Substance and Sexual Activity   Alcohol Use Not Currently    Alcohol/week: 1.0 standard drink of alcohol    Types: 1 Cans of beer per week    Comment: occ     Social History     Substance and Sexual Activity   Drug Use Never     Social History     Tobacco Use   Smoking Status Never   Smokeless Tobacco Never       ALLERGIES:  No Known Allergies    MEDICATIONS:  All current active  medications have been reviewed.    ANTIBIOTICS:  Cefepime D3    S/p vancomycin x 2d  S/p azithromycin x 2d    PHYSICAL EXAM:  Temp:  [97.6 °F (36.4 °C)-98.2 °F (36.8 °C)] 98.2 °F (36.8 °C)  HR:  [59-69] 67  Resp:  [16-24] 18  BP: (106-139)/(58-69) 133/64  SpO2:  [93 %-99 %] 95 %  Temp (24hrs), Av.8 °F (36.6 °C), Min:97.6 °F (36.4 °C), Max:98.2 °F (36.8 °C)  Current: Temperature: 98.2 °F (36.8 °C)    Intake/Output Summary (Last 24 hours) at 2024 0944  Last data filed at 2024 0619  Gross per 24 hour   Intake 2218.18 ml   Output 3135 ml   Net -916.82 ml       General Appearance:  Appearing well, nontoxic, and in no distress. Seen in ICU on NC.   Head:  Normocephalic, without obvious abnormality, atraumatic.   Eyes:  Conjunctiva pink and sclera anicteric, both eyes.   Nose: Nares normal, mucosa normal, no drainage.   Throat: Oropharynx moist without lesions.   Neck: Supple, symmetrical, no adenopathy, no tenderness/mass/nodules.   Back:   Symmetric, ROM normal, no CVA tenderness.   Lungs:   Decreased breath sounds, respirations unlabored, on MFNC.    Chest Wall:  No tenderness or deformity.   Heart:  RRR; no murmur, rub or gallop.   Abdomen:   Soft, non-tender, non-distended, positive bowel sounds throughout.   Extremities: No cyanosis or clubbing, no edema.   Skin: No rashes or lesions. No draining wounds noted.   Neurologic: Alert and oriented times 3, follows commands, speech is organized and appropriate, symmetric facial movement.     LABS, IMAGING, & OTHER STUDIES:  Lab Results:  I have personally reviewed pertinent labs.  Results from last 7 days   Lab Units 24  0431 24  0432 07/10/24  0557   WBC Thousand/uL 8.63 9.68 10.14   HEMOGLOBIN g/dL 8.7* 9.1* 11.1*   PLATELETS Thousands/uL 218 238 247     Results from last 7 days   Lab Units 24  0431 24  0510 24  0607 24  1917 24  1115   POTASSIUM mmol/L 4.2   < > 4.7   < > 5.2   CHLORIDE mmol/L 104   < > 107   < >  107   CO2 mmol/L 20*   < > 22   < > 23   BUN mg/dL 66*   < > 43*   < > 48*   CREATININE mg/dL 1.80*   < > 1.60*   < > 1.76*   EGFR ml/min/1.73sq m 49   < > 57   < > 51   CALCIUM mg/dL 8.2*   < > 8.6   < > 8.8   AST U/L 11*  --  16  --  16   ALT U/L 16  --  24  --  22   ALK PHOS U/L 61  --  71  --  68    < > = values in this interval not displayed.     Results from last 7 days   Lab Units 07/10/24  1555 07/10/24  1530 07/10/24  1528 07/10/24  1425 07/10/24  1423 07/10/24  1422 07/09/24  1917   BLOOD CULTURE  No Growth at 24 hrs.  --  No Growth at 24 hrs.  --   --   --   --    GRAM STAIN RESULT   --   --   --  Rare Mononuclear Cells  No bacteria seen No Polys or Bacteria seen No Polys or Bacteria seen  --    MRSA CULTURE ONLY   --  No Methicillin Resistant Staphlyococcus aureus (MRSA) isolated  --   --   --   --   --    LEGIONELLA URINARY ANTIGEN   --   --   --   --   --   --  Negative     Results from last 7 days   Lab Units 07/12/24  0431 07/11/24  0432 07/07/24  0607   PROCALCITONIN ng/ml 0.38* 0.42* 0.11     Results from last 7 days   Lab Units 07/09/24  0539   CRP mg/L 180.3*               Imaging Studies:   I have personally reviewed pertinent imaging study reports and images in PACS.    7/11 CT chest: extensive consolidative airspace opacities throughout the perihilar regions and bilateral lower lobes, new since 7/6/24. Likely combination of pulmonary edema and multifocal pneumonia. Mild cardiomegaly.     Other Studies:   I have personally reviewed pertinent reports.        Dina Galeas PA-C  [facility]

## 2024-07-12 NOTE — RESPIRATORY THERAPY NOTE
07/12/24 0101   Respiratory Assessment   General Appearance Sleeping   Respiratory Pattern Assisted;Spontaneous   Chest Assessment Chest expansion symmetrical   Bilateral Breath Sounds Diminished   Cough None   Resp Comments no acute respiratory changes   O2 Device ventilator   Vent Information   Vent ID Lasha   Vent type Drager   Drager Vent Mode AC/VC+   $ Pulse Oximetry Spot Check Charge Completed   SpO2 94 %   AC/VC+ Settings   Resp Rate (BPM) 16 BPM   VT (mL) 480 mL   Insp Time (S) 1 S   FIO2 (%) 40 %   PEEP (cmH2O) 8 cmH2O   Rise Time (%) 20 %   Trigger Sensitivity Flow (LPM) 2 LPM   Humidification Heater   Heater Temp 98.6 °F (37 °C)   AC/VC+ Actuals   Resp Rate (BPM) 18 BPM   VT (mL) 486 mL   MV (Obs) 7.75   MAP (cmH2O) 13 cmH2O   Peak Pressure (cmH2O) 23 cmH2O   I:E Ratio (Obs) 1:2.3   Static Compliance (mL/cmH20) 22.9 mL/cmH2O   Plateau Pressure (cm H2O) 24.3 cm H2O   Heater Temperature (Obs) 98.6 °F (37 °C)   AC/VC+ ALARMS   High Peak Pressure (cmH2O) 45 cmH2O   High Resp Rate (BPM) 40 BPM   High MV (L/min) 15 L/min   Low MV (L/min) 4.5 L/min   High VT (mL) 850 mL   Maintenance   Alarm (pink) cable attached Yes   Resuscitation bag with peep valve at bedside Yes   Water bag changed No   Circuit changed No   IHI Ventilator Associated Pneumonia Bundle   Head of Bed Elevated HOB 45   ETT  Hi-Lo;Cuffed;Oral 8 mm   Placement Date/Time: 07/10/24 1340   Mask Ventilation: Difficult mask ventilation (3)  Preoxygenated: Yes  Technique: Video laryngoscopy;Stylet  Type: Hi-Lo;Cuffed;Oral  Tube Size: 8 mm  Laryngoscope: Wallace  Blade Size: 4  Location: Oral  Grade View...   Secured at (cm) 23   Measured from Teeth   Secured Location (S)  Right   Repositioned Left to Right   Secured by Commercial tube doll   Site Condition Cool;Dry   Cuff Pressure (cm H2O)   (MLT)   Cuff Pressure (color) Green   HI-LO Suction  Continuous low suction   HI-LO Secretions Scant   HI-LO Intervention Patent

## 2024-07-12 NOTE — PROGRESS NOTES
follow-up visit notes are in Spiritual Encounter Notes field.        07/12/24 1300   Clinical Encounter Type   Visited With Patient and family together

## 2024-07-12 NOTE — CASE MANAGEMENT
Case Management Discharge Planning Note    Patient name Ben Daniel  Location ICU 02/ICU 02 MRN [id number]  : 1995 Date 2024       Current Admission Date: 2024  Current Admission Diagnosis:Acute respiratory failure with hypoxia (HCC)   Patient Active Problem List    Diagnosis Date Noted Date Diagnosed    C3 glomerulonephritis 2024     ARDS (adult respiratory distress syndrome) (HCC) 2024     Hemoptysis 2024     Hyponatremia 2024     Hypotension 07/10/2024     Acute respiratory failure with hypoxia (HCC) 2024     SOB (shortness of breath) 2024     ROSI (acute kidney injury) ([facility]) 2024     Other neutropenia ([facility]) 2024     Nutritional anemia 2024     Abnormal white blood cell (WBC) 2024     Hypergammaglobulinemia 2024     Vitamin D deficiency 2023     Hypothyroidism 2023     Family history of cancer 2023     Insomnia 2023     Anxiety 2022     Morbid obesity with BMI of 40.0-44.9, adult (HCC) 2022     Palpitations 2022     Eczema 2022     Scrotal pain 2022     Acute right-sided low back pain without sciatica 2022     Angiokeratoma of scrotum 2022     Cervical strain 2021     Essential hypertension 2018     Fatigue 2018       LOS (days): 4  Geometric Mean LOS (GMLOS) (days):   Days to GMLOS:     OBJECTIVE:  Risk of Unplanned Readmission Score: 20.15         Current admission status: Inpatient   Preferred Pharmacy:   [facility] [place], [place] - [address]  [address] [facility] [place] [address]  Phone: [phone] Fax: [phone]    [facility] [place] - [place] - [address]  [facility],  [address]  [facility]  First [address] [place] [facility] [place] [address]  Phone: [phone] Fax: [phone]    [facility] - [place], [place] - [address] [place] [facility]  [address]  [place] [address]  Phone:  [phone] Fax: [phone]    [id number] [place] - [address]  [address]  [place] [address]  Phone: [phone] Fax: [phone]    Primary Care Provider: MARTIN Shearer    Primary Insurance: plista  Secondary Insurance:     DISCHARGE DETAILS:                                          Other Referral/Resources/Interventions Provided:  Referral Comments: Pt extubated this AM.  MFNC at 10 LPM, IV abx, IV lasix, nebs, IV solumedrol; IV precedex d/c'd, IV diprivan d/c'd.  Per ICU rounds this AM, pt has post strep glomerulonephritis.  No post d/c needs identified at this time;  CM will continue to follow.         Treatment Team Recommendation: Other (TBD)  Discharge Destination Plan:: Home  Transport at Discharge : Family

## 2024-07-12 NOTE — PROGRESS NOTES
"Progress Note - Pulmonary   Ben Daniel 29 y.o. male MRN: [id number]  Unit/Bed#: ICU 02 Encounter: [id number]    Assessment:  Acute hypoxemic respiratory failure  Abnormal CT of the chest with bilateral extensive consolidations  Acute kidney injury, improved    Plan:  Patient initially on 3 L on presentation, on 7/9 had worsening hypoxia requiring BiPAP, was subsequently weaned to 7 L after some diuresis.  Underwent bronchoscopy on 7/10/2024 and was unable to be extubated postprocedure  Patient was extubated this morning and is currently on 10 L mid flow  Bronchial culture with few colonies of beta-hemolytic strep group A, other cultures pending  Renal biopsy shows C3 glomerulopathy, possibly related to infection per nephrology  Currently on pulse dose steroids  Continue broad-spectrum antibiotics for now  Infectious disease has been consulted for input  Continue nebs  Will continue to follow    Subjective:   Patient resting in bed. He is feeling better with his breathing, would like to eat this morning.     Objective:     Vitals: Blood pressure 133/64, pulse 67, temperature 98.2 °F (36.8 °C), temperature source Axillary, resp. rate 18, height 5' 6.5\" (1.689 m), weight 123 kg (271 lb 6.2 oz), SpO2 94%.,Body mass index is 43.15 kg/m².      Intake/Output Summary (Last 24 hours) at 7/12/2024 0722  Last data filed at 7/12/2024 0619  Gross per 24 hour   Intake 2332.38 ml   Output 3310 ml   Net -977.62 ml       Invasive Devices       Peripheral Intravenous Line  Duration             Long-Dwell Peripheral IV (Midline) 07/08/24 Left Cephalic Vein 3 days    Peripheral IV 07/10/24 Distal;Right;Ventral (anterior) Forearm 2 days              Arterial Line  Duration             Arterial Line 07/11/24 Radial <1 day              Drain  Duration             Urethral Catheter 16 Fr. 1 day    NG/OG/Enteral Tube Orogastric 14 Fr Center mouth <1 day              Airway  Duration             ETT  Hi-Lo;Cuffed;Oral 8 mm 1 day         " "           Physical Exam: /64   Pulse 67   Temp 98.2 °F (36.8 °C) (Axillary)   Resp 18   Ht 5' 6.5\" (1.689 m)   Wt 123 kg (271 lb 6.2 oz)   SpO2 94%   BMI 43.15 kg/m²   General appearance: alert and oriented, in no acute distress  Head: Normocephalic, without obvious abnormality, atraumatic  Eyes: negative findings: conjunctivae and sclerae normal  Lungs: diminished breath sounds  Heart: regular rate and rhythm  Abdomen: normal findings: soft, non-tender  Extremities:  trace pedal edema  Skin:  warm and dry  Neurologic: Mental status: Alert, oriented, thought content appropriate     Labs: I have personally reviewed pertinent lab results., CBC:   Lab Results   Component Value Date    WBC 8.63 07/12/2024    HGB 8.7 (L) 07/12/2024    HCT 24.8 (L) 07/12/2024    MCV 79 (L) 07/12/2024     07/12/2024    RBC 3.14 (L) 07/12/2024    MCH 27.7 07/12/2024    MCHC 35.1 07/12/2024    RDW 12.0 07/12/2024    MPV 9.2 07/12/2024   , CMP:   Lab Results   Component Value Date    SODIUM 133 (L) 07/12/2024    K 4.2 07/12/2024     07/12/2024    CO2 20 (L) 07/12/2024    BUN 66 (H) 07/12/2024    CREATININE 1.80 (H) 07/12/2024    CALCIUM 8.2 (L) 07/12/2024    AST 11 (L) 07/12/2024    ALT 16 07/12/2024    ALKPHOS 61 07/12/2024    EGFR 49 07/12/2024     Imaging and other studies: I have personally reviewed pertinent reports.   and I have personally reviewed pertinent films in PACS    "

## 2024-07-12 NOTE — RESPIRATORY THERAPY NOTE
07/12/24 0502   Respiratory Assessment   General Appearance Awake   Respiratory Pattern Spontaneous;Assisted   Chest Assessment Chest expansion symmetrical   Bilateral Breath Sounds Diminished   Cough None   Resp Comments pt awake and texting on his phone, continue to monitor and wean from support as tolerated   O2 Device ventilator   Vent Information   Vent ID Lasha   Vent type Drager   Drager Vent Mode AC/VC+   $ Pulse Oximetry Spot Check Charge Completed   SpO2 94 %   AC/VC+ Settings   Resp Rate (BPM) 16 BPM   VT (mL) 480 mL   Insp Time (S) 1 S   FIO2 (%) 40 %   PEEP (cmH2O) (S)  6 cmH2O  (decreased from 8)   Rise Time (%) 20 %   Trigger Sensitivity Flow (LPM) 2 LPM   Humidification Heater   Heater Temp 98.6 °F (37 °C)   AC/VC+ Actuals   Resp Rate (BPM) 26 BPM   VT (mL) 507 mL   MV (Obs) 11.5   MAP (cmH2O) 10 cmH2O   Peak Pressure (cmH2O) 17 cmH2O   I:E Ratio (Obs) 1:1.3   Static Compliance (mL/cmH20) 47 mL/cmH2O   Plateau Pressure (cm H2O) 18.4 cm H2O   Heater Temperature (Obs) 98.4 °F (36.9 °C)   AC/VC+ ALARMS   High Peak Pressure (cmH2O) 45 cmH2O   High Resp Rate (BPM) 40 BPM   High MV (L/min) 15 L/min   Low MV (L/min) 4.5 L/min   High VT (mL) 850 mL   Maintenance   Alarm (pink) cable attached Yes   Resuscitation bag with peep valve at bedside Yes   Water bag changed No   Circuit changed No   IHI Ventilator Associated Pneumonia Bundle   Head of Bed Elevated HOB 45   ETT  Hi-Lo;Cuffed;Oral 8 mm   Placement Date/Time: 07/10/24 1340   Mask Ventilation: Difficult mask ventilation (3)  Preoxygenated: Yes  Technique: Video laryngoscopy;Stylet  Type: Hi-Lo;Cuffed;Oral  Tube Size: 8 mm  Laryngoscope: Wallace  Blade Size: 4  Location: Oral  Grade View...   Secured at (cm) 23   Measured from Teeth   Secured Location (S)  Center   Repositioned Right to Center   Secured by Commercial tube doll   Site Condition Cool;Dry   Cuff Pressure (cm H2O)   (MLT)   Cuff Pressure (color) Green   HI-LO Suction  Continuous low  suction   HI-LO Secretions Scant   HI-LO Intervention Patent

## 2024-07-12 NOTE — RESPIRATORY THERAPY NOTE
07/11/24 1955   Respiratory Assessment   General Appearance Awake   Respiratory Pattern Assisted;Spontaneous   Chest Assessment Chest expansion symmetrical   Bilateral Breath Sounds Clear;Diminished   Cough None   Resp Comments pt remains intubated and mechanically ventilated, communicating with visitor by drawing letters into her hand and shaking his head yes and no   O2 Device ventilator   Vent Information   Vent ID Lasha   Vent type Drager   Drager Vent Mode AC/VC+   Is the patient reintubated? No   $ Pulse Oximetry Spot Check Charge Completed   SpO2 94 %   AC/VC+ Settings   Resp Rate (BPM) 16 BPM   VT (mL) 480 mL   Insp Time (S) 1 S   FIO2 (%) 40 %   PEEP (cmH2O) 10 cmH2O   Rise Time (%) 20 %   Trigger Sensitivity Flow (LPM) 2 LPM   Humidification Heater   Heater Temp 98.6 °F (37 °C)   AC/VC+ Actuals   Resp Rate (BPM) 22 BPM   VT (mL) 507 mL   MV (Obs) 9.99   MAP (cmH2O) 15 cmH2O   Peak Pressure (cmH2O) 24 cmH2O   I:E Ratio (Obs) 1:1.7   Static Compliance (mL/cmH20) 32.4 mL/cmH2O   Plateau Pressure (cm H2O) 22.4 cm H2O   Heater Temperature (Obs) 98.4 °F (36.9 °C)   AC/VC+ ALARMS   High Peak Pressure (cmH2O) 45 cmH2O   High Resp Rate (BPM) 40 BPM   High MV (L/min) 15 L/min   Low MV (L/min) 4.5 L/min   High VT (mL) 850 mL   Maintenance   Alarm (pink) cable attached Yes   Resuscitation bag with peep valve at bedside Yes   Water bag changed Yes   Circuit changed No   IHI Ventilator Associated Pneumonia Bundle   Head of Bed Elevated HOB 30   ETT  Hi-Lo;Cuffed;Oral 8 mm   Placement Date/Time: 07/10/24 1340   Mask Ventilation: Difficult mask ventilation (3)  Preoxygenated: Yes  Technique: Video laryngoscopy;Stylet  Type: Hi-Lo;Cuffed;Oral  Tube Size: 8 mm  Laryngoscope: Wallace  Blade Size: 4  Location: Oral  Grade View...   Secured at (cm) 23   Measured from Teeth   Secured Location (S)  Left   Repositioned Center to Left   Secured by Commercial tube doll   Site Condition Cool;Dry   Cuff Pressure (cm H2O)    (MLT)   Cuff Pressure (color) Green   HI-LO Suction  Continuous low suction   HI-LO Secretions Small;Clear;Yellow   HI-LO Intervention Patent

## 2024-07-12 NOTE — RESPIRATORY THERAPY NOTE
RT Protocol Note  Ben Daniel 29 y.o. male MRN: [id number]  Unit/Bed#: ICU 02 Encounter: [id number]    Assessment    Principal Problem:    Acute respiratory failure with hypoxia (HCC)  Active Problems:    Essential hypertension    Palpitations    Hypothyroidism    ROSI (acute kidney injury) (HCC)    Hypotension    ARDS (adult respiratory distress syndrome) (HCC)    Hemoptysis    Hyponatremia    C3 glomerulonephritis      Home Pulmonary Medications:         Past Medical History:   Diagnosis Date    Anxiety     Hypertension      Social History     Socioeconomic History    Marital status: Single     Spouse name: None    Number of children: None    Years of education: None    Highest education level: None   Occupational History    None   Tobacco Use    Smoking status: Never    Smokeless tobacco: Never   Vaping Use    Vaping status: Never Used   Substance and Sexual Activity    Alcohol use: Not Currently     Alcohol/week: 1.0 standard drink of alcohol     Types: 1 Cans of beer per week     Comment: occ    Drug use: Never    Sexual activity: Yes   Other Topics Concern    None   Social History Narrative    None     Social Determinants of Health     Financial Resource Strain: Not on file   Food Insecurity: No Food Insecurity (7/8/2024)    Hunger Vital Sign     Worried About Running Out of Food in the Last Year: Never true     Ran Out of Food in the Last Year: Never true   Transportation Needs: No Transportation Needs (7/8/2024)    PRAPARE - Transportation     Lack of Transportation (Medical): No     Lack of Transportation (Non-Medical): No   Physical Activity: Not on file   Stress: Not on file   Social Connections: Not on file   Intimate Partner Violence: Not on file   Housing Stability: Low Risk  (7/8/2024)    Housing Stability Vital Sign     Unable to Pay for Housing in the Last Year: No     Number of Times Moved in the Last Year: 0     Homeless in the Last Year: No       Subjective    Subjective Data: awake  "alert    Objective    Physical Exam:   Assessment Type: Assess only  General Appearance: Alert, Awake  Respiratory Pattern: Normal  Chest Assessment: Chest expansion symmetrical  Bilateral Breath Sounds: Diminished  Suction: ET Tube  O2 Device: nc    Vitals:  Blood pressure 113/54, pulse 91, temperature 98.3 °F (36.8 °C), temperature source Axillary, resp. rate 19, height 5' 6.5\" (1.689 m), weight 123 kg (271 lb 6.2 oz), SpO2 95%.    Results from last 7 days   Lab Units 07/12/24  0434   PH ART  7.444   PCO2 ART mm Hg 28.6*   PO2 ART mm Hg 92.0   HCO3 ART mmol/L 19.2*   BASE EXC ART mmol/L -4.0   O2 CONTENT ART mL/dL 13.1*   O2 HGB, ARTERIAL % 94.7   ABG SOURCE  Line, Arterial   COLE TEST  Yes       Imaging and other studies: I have personally reviewed pertinent reports.      O2 Device: nc     Plan    Respiratory Plan: No distress/Pulmonary history        Resp Comments: pt not in distress. spo2 on 6L mf 98%. decreased to 4L nc. lungs diminished coarse. no resp intervention required at this time.   "

## 2024-07-12 NOTE — PROGRESS NOTES
Ben Daniel is a 29 y.o. male who is currently ordered Vancomycin IV with management by the Pharmacy Consult service.  Relevant clinical data and objective / subjective history reviewed.  Vancomycin Assessment:  Indication and Goal AUC/Trough: Pneumonia (goal -600, trough >10)  Clinical Status: ICU admit  Micro:             Renal Function:  SCr: 1.8 mg/dL  CrCl: 75.5 mL/min  Renal replacement: Not on dialysis  Days of Therapy: 3  Current Dose: 1000mg IV daily PRN when vancomycin <15  Vancomycin Plan:  New Dosinmg IV daily PRN when vancomycin <15;  level 18.3; No dose today  Next Level: 24 with AM labs  Renal Function Monitoring: Daily BMP and UOP  Pharmacy will continue to follow closely for s/sx of nephrotoxicity, infusion reactions and appropriateness of therapy.  BMP and CBC will be ordered per protocol. We will continue to follow the patient’s culture results and clinical progress daily.    Mariama Avila, Pharmacist

## 2024-07-12 NOTE — RESPIRATORY THERAPY NOTE
RT Ventilator Management Note  Ben Daniel 29 y.o. male MRN: [id number]  Unit/Bed#: ICU 02 Encounter: [id number]      Daily Screen         7/11/2024  0725 7/12/2024  0730          Patient safety screen outcome:: Failed Passed      Not Ready for Weaning due to:: PEEP > 8cmH2O;FiO2 >60% --                Physical Exam:   Assessment Type: During-treatment  General Appearance: Awake, Alert  Respiratory Pattern: Assisted, Spontaneous  Chest Assessment: Chest expansion symmetrical  Bilateral Breath Sounds: Clear, Diminished  Cough: None  Suction: ET Tube  O2 Device: vent  Subjective Data: awake alert      Resp Comments: pt remains intubated and is now awake and alert on cpap/ps 6/6  skin integrity intact       07/12/24 0730   Respiratory Assessment   Assessment Type During-treatment   General Appearance Awake;Alert   Respiratory Pattern Assisted;Spontaneous   Chest Assessment Chest expansion symmetrical   Bilateral Breath Sounds Clear;Diminished   Suction ET Tube   Resp Comments pt remains intubated and is now awake and alert on cpap/ps 6/6  skin integrity intact   O2 Device vent   Subjective Data awake alert   Vent Information   Vent ID Lasha   Vent type Drager   Drager Vent Mode CPAP/PS Spont   Is the patient reintubated? No   $ Vent Daily Charge-Subsequent Yes   $ Pulse Oximetry Spot Check Charge Completed   SpO2 94 %   CPAP/PS Spont Settings   FIO2 (%) 40 %   PEEP (cmH2O) 6 cmH2O   Pressure Support (cmH2O) 6 cmH20   Rise Time (%) 20 %   Humidification Heater   Heater Temp 98.6 °F (37 °C)   CPAP/PS Spont Actuals   Resp Rate (BPM) 29 BPM   VT (mL) 429 mL   MV (Obs) 9.55   MAP (cmH2O) 8.7 cmH2O   Peak Pressure (cmH2O) 13 cmH2O   RSBI 69   Heater Temperature (Obs) 99.1 °F (37.3 °C)   CPAP/PS Spont Alarms   High Peak Pressure (cmH20) 45 cmH2O   High Resp Rate (BPM) 40 BPM   High MV (L/min) 15 L/min   Low MV (L/min) 3 L/min   High SPONT VTE (mL) 850 mL   Low Spont VTE (mL) 220 mL   Maintenance   Alarm (pink) cable  attached Yes   Resuscitation bag with peep valve at bedside Yes   Water bag changed No   Circuit changed No   Daily Screen   Patient safety screen outcome: Passed   IHI Ventilator Associated Pneumonia Bundle   Daily Awakening Trials Performed Yes   Daily Assessment of Readiness to Extubate Yes   Head of Bed Elevated HOB 45   ETT  Hi-Lo;Cuffed;Oral 8 mm   Placement Date/Time: 07/10/24 1340   Mask Ventilation: Difficult mask ventilation (3)  Preoxygenated: Yes  Technique: Video laryngoscopy;Stylet  Type: Hi-Lo;Cuffed;Oral  Tube Size: 8 mm  Laryngoscope: [employer]  Blade Size: 4  Location: Oral  Grade View...   Secured at (cm) 23   Measured from Teeth   Secured Location Center   Repositioned (S)  Center to Left   Secured by Commercial tube doll   Site Condition Dry   Cuff Pressure (color) Green   HI-LO Suction  Continuous low suction   HI-LO Secretions Scant   HI-LO Intervention Patent

## 2024-07-12 NOTE — RESPIRATORY THERAPY NOTE
RT Protocol Note  Ben Daniel 29 y.o. male MRN: [id number]  Unit/Bed#: ICU 02 Encounter: [id number]    Assessment    Principal Problem:    Acute respiratory failure with hypoxia (HCC)  Active Problems:    Essential hypertension    Palpitations    Hypothyroidism    ROSI (acute kidney injury) (HCC)    Hypotension    ARDS (adult respiratory distress syndrome) (HCC)    Hemoptysis    Hyponatremia    C3 glomerulonephritis      Home Pulmonary Medications:         Past Medical History:   Diagnosis Date    Anxiety     Hypertension      Social History     Socioeconomic History    Marital status: Single     Spouse name: None    Number of children: None    Years of education: None    Highest education level: None   Occupational History    None   Tobacco Use    Smoking status: Never    Smokeless tobacco: Never   Vaping Use    Vaping status: Never Used   Substance and Sexual Activity    Alcohol use: Not Currently     Alcohol/week: 1.0 standard drink of alcohol     Types: 1 Cans of beer per week     Comment: occ    Drug use: Never    Sexual activity: Yes   Other Topics Concern    None   Social History Narrative    None     Social Determinants of Health     Financial Resource Strain: Not on file   Food Insecurity: No Food Insecurity (7/8/2024)    Hunger Vital Sign     Worried About Running Out of Food in the Last Year: Never true     Ran Out of Food in the Last Year: Never true   Transportation Needs: No Transportation Needs (7/8/2024)    PRAPARE - Transportation     Lack of Transportation (Medical): No     Lack of Transportation (Non-Medical): No   Physical Activity: Not on file   Stress: Not on file   Social Connections: Not on file   Intimate Partner Violence: Not on file   Housing Stability: Low Risk  (7/8/2024)    Housing Stability Vital Sign     Unable to Pay for Housing in the Last Year: No     Number of Times Moved in the Last Year: 0     Homeless in the Last Year: No       Subjective    Subjective Data: awake  "alert    Objective    Physical Exam:   Assessment Type: During-treatment  General Appearance: Awake, Alert  Respiratory Pattern: Assisted, Spontaneous  Chest Assessment: Chest expansion symmetrical  Bilateral Breath Sounds: Clear, Diminished  Cough: None  Suction: ET Tube  O2 Device: vent    Vitals:  Blood pressure 133/64, pulse 67, temperature 98.2 °F (36.8 °C), temperature source Axillary, resp. rate 18, height 5' 6.5\" (1.689 m), weight 123 kg (271 lb 6.2 oz), SpO2 94%.    Results from last 7 days   Lab Units 07/12/24  0434   PH ART  7.444   PCO2 ART mm Hg 28.6*   PO2 ART mm Hg 92.0   HCO3 ART mmol/L 19.2*   BASE EXC ART mmol/L -4.0   O2 CONTENT ART mL/dL 13.1*   O2 HGB, ARTERIAL % 94.7   ABG SOURCE  Line, Arterial   COLE TEST  Yes       Imaging and other studies: I have personally reviewed pertinent reports.      O2 Device: vent     Plan    Respiratory Plan: Vent/NIV/HFNC, No distress/Pulmonary history        Resp Comments: will continue with xop/atr unitl pt is extubated   "

## 2024-07-12 NOTE — ASSESSMENT & PLAN NOTE
Pf Ratio on post intubation ABG 90 indicative of Severe ARDS; now improving  Continue care as above

## 2024-07-13 LAB
ALBUMIN SERPL BCG-MCNC: 2.7 G/DL (ref 3.5–5)
ALP SERPL-CCNC: 62 U/L (ref 34–104)
ALT SERPL W P-5'-P-CCNC: 22 U/L (ref 7–52)
ANION GAP SERPL CALCULATED.3IONS-SCNC: 7 MMOL/L (ref 4–13)
AST SERPL W P-5'-P-CCNC: 17 U/L (ref 13–39)
ATRIAL RATE: 88 BPM
BACTERIA BRONCH AEROBE CULT: ABNORMAL
BASOPHILS # BLD AUTO: 0.01 THOUSANDS/ÂΜL (ref 0–0.1)
BASOPHILS NFR BLD AUTO: 0 % (ref 0–1)
BILIRUB SERPL-MCNC: 0.35 MG/DL (ref 0.2–1)
BUN SERPL-MCNC: 58 MG/DL (ref 5–25)
CA-I BLD-SCNC: 1.07 MMOL/L (ref 1.12–1.32)
CALCIUM ALBUM COR SERPL-MCNC: 8.7 MG/DL (ref 8.3–10.1)
CALCIUM SERPL-MCNC: 7.7 MG/DL (ref 8.4–10.2)
CHLORIDE SERPL-SCNC: 109 MMOL/L (ref 96–108)
CO2 SERPL-SCNC: 23 MMOL/L (ref 21–32)
CREAT SERPL-MCNC: 1.3 MG/DL (ref 0.6–1.3)
EOSINOPHIL # BLD AUTO: 0 THOUSAND/ÂΜL (ref 0–0.61)
EOSINOPHIL NFR BLD AUTO: 0 % (ref 0–6)
ERYTHROCYTE [DISTWIDTH] IN BLOOD BY AUTOMATED COUNT: 12.9 % (ref 11.6–15.1)
GFR SERPL CREATININE-BSD FRML MDRD: 73 ML/MIN/1.73SQ M
GLUCOSE SERPL-MCNC: 136 MG/DL (ref 65–140)
GLUCOSE SERPL-MCNC: 146 MG/DL (ref 65–140)
GLUCOSE SERPL-MCNC: 156 MG/DL (ref 65–140)
GLUCOSE SERPL-MCNC: 179 MG/DL (ref 65–140)
GLUCOSE SERPL-MCNC: 196 MG/DL (ref 65–140)
GRAM STN SPEC: ABNORMAL
HCT VFR BLD AUTO: 26.5 % (ref 36.5–49.3)
HGB BLD-MCNC: 9 G/DL (ref 12–17)
HIV 1+2 AB+HIV1 P24 AG SERPL QL IA: NORMAL
HIV1 P24 AG SER QL: NORMAL
IMM GRANULOCYTES # BLD AUTO: 0.17 THOUSAND/UL (ref 0–0.2)
IMM GRANULOCYTES NFR BLD AUTO: 2 % (ref 0–2)
LYMPHOCYTES # BLD AUTO: 0.75 THOUSANDS/ÂΜL (ref 0.6–4.47)
LYMPHOCYTES NFR BLD AUTO: 7 % (ref 14–44)
MAGNESIUM SERPL-MCNC: 2.9 MG/DL (ref 1.9–2.7)
MCH RBC QN AUTO: 27.9 PG (ref 26.8–34.3)
MCHC RBC AUTO-ENTMCNC: 34 G/DL (ref 31.4–37.4)
MCV RBC AUTO: 82 FL (ref 82–98)
MONOCYTES # BLD AUTO: 1.25 THOUSAND/ÂΜL (ref 0.17–1.22)
MONOCYTES NFR BLD AUTO: 12 % (ref 4–12)
NEUTROPHILS # BLD AUTO: 8.43 THOUSANDS/ÂΜL (ref 1.85–7.62)
NEUTS SEG NFR BLD AUTO: 79 % (ref 43–75)
NRBC BLD AUTO-RTO: 0 /100 WBCS
P AXIS: 58 DEGREES
PHOSPHATE SERPL-MCNC: 4.6 MG/DL (ref 2.7–4.5)
PLATELET # BLD AUTO: 230 THOUSANDS/UL (ref 149–390)
PMV BLD AUTO: 8.8 FL (ref 8.9–12.7)
POTASSIUM SERPL-SCNC: 3.9 MMOL/L (ref 3.5–5.3)
PR INTERVAL: 134 MS
PROT SERPL-MCNC: 6.8 G/DL (ref 6.4–8.4)
QRS AXIS: 72 DEGREES
QRSD INTERVAL: 84 MS
QT INTERVAL: 348 MS
QTC INTERVAL: 421 MS
RBC # BLD AUTO: 3.23 MILLION/UL (ref 3.88–5.62)
SODIUM SERPL-SCNC: 139 MMOL/L (ref 135–147)
T WAVE AXIS: 27 DEGREES
VANCOMYCIN SERPL-MCNC: 6.2 UG/ML (ref 10–20)
VENTRICULAR RATE: 88 BPM
WBC # BLD AUTO: 10.61 THOUSAND/UL (ref 4.31–10.16)

## 2024-07-13 PROCEDURE — 80202 ASSAY OF VANCOMYCIN: CPT | Performed by: NURSE PRACTITIONER

## 2024-07-13 PROCEDURE — 94664 DEMO&/EVAL PT USE INHALER: CPT

## 2024-07-13 PROCEDURE — 82330 ASSAY OF CALCIUM: CPT | Performed by: NURSE PRACTITIONER

## 2024-07-13 PROCEDURE — 94760 N-INVAS EAR/PLS OXIMETRY 1: CPT

## 2024-07-13 PROCEDURE — 87806 HIV AG W/HIV1&2 ANTB W/OPTIC: CPT | Performed by: NURSE PRACTITIONER

## 2024-07-13 PROCEDURE — 85025 COMPLETE CBC W/AUTO DIFF WBC: CPT | Performed by: NURSE PRACTITIONER

## 2024-07-13 PROCEDURE — 82948 REAGENT STRIP/BLOOD GLUCOSE: CPT

## 2024-07-13 PROCEDURE — 99232 SBSQ HOSP IP/OBS MODERATE 35: CPT | Performed by: INTERNAL MEDICINE

## 2024-07-13 PROCEDURE — 80053 COMPREHEN METABOLIC PANEL: CPT | Performed by: NURSE PRACTITIONER

## 2024-07-13 PROCEDURE — 93010 ELECTROCARDIOGRAM REPORT: CPT | Performed by: INTERNAL MEDICINE

## 2024-07-13 PROCEDURE — 84100 ASSAY OF PHOSPHORUS: CPT | Performed by: NURSE PRACTITIONER

## 2024-07-13 PROCEDURE — 83735 ASSAY OF MAGNESIUM: CPT | Performed by: NURSE PRACTITIONER

## 2024-07-13 PROCEDURE — 99232 SBSQ HOSP IP/OBS MODERATE 35: CPT | Performed by: FAMILY MEDICINE

## 2024-07-13 RX ORDER — PREDNISONE 20 MG/1
20 TABLET ORAL 2 TIMES DAILY WITH MEALS
Status: DISCONTINUED | OUTPATIENT
Start: 2024-07-14 | End: 2024-07-14 | Stop reason: HOSPADM

## 2024-07-13 RX ADMIN — HEPARIN SODIUM 7500 UNITS: 5000 INJECTION INTRAVENOUS; SUBCUTANEOUS at 21:46

## 2024-07-13 RX ADMIN — FUROSEMIDE 20 MG: 10 INJECTION, SOLUTION INTRAMUSCULAR; INTRAVENOUS at 09:41

## 2024-07-13 RX ADMIN — LEVOTHYROXINE SODIUM 50 MCG: 0.05 TABLET ORAL at 09:42

## 2024-07-13 RX ADMIN — CEFAZOLIN SODIUM 2000 MG: 2 SOLUTION INTRAVENOUS at 15:44

## 2024-07-13 RX ADMIN — HEPARIN SODIUM 7500 UNITS: 5000 INJECTION INTRAVENOUS; SUBCUTANEOUS at 15:44

## 2024-07-13 RX ADMIN — INSULIN LISPRO 1 UNITS: 100 INJECTION, SOLUTION INTRAVENOUS; SUBCUTANEOUS at 15:58

## 2024-07-13 RX ADMIN — CEFAZOLIN SODIUM 2000 MG: 2 SOLUTION INTRAVENOUS at 23:15

## 2024-07-13 RX ADMIN — AMLODIPINE BESYLATE 10 MG: 10 TABLET ORAL at 09:42

## 2024-07-13 RX ADMIN — SODIUM CHLORIDE 1000 MG: 0.9 INJECTION, SOLUTION INTRAVENOUS at 09:42

## 2024-07-13 RX ADMIN — FUROSEMIDE 20 MG: 10 INJECTION, SOLUTION INTRAMUSCULAR; INTRAVENOUS at 15:45

## 2024-07-13 RX ADMIN — INSULIN LISPRO 1 UNITS: 100 INJECTION, SOLUTION INTRAVENOUS; SUBCUTANEOUS at 12:38

## 2024-07-13 RX ADMIN — CEFAZOLIN SODIUM 2000 MG: 2 SOLUTION INTRAVENOUS at 09:41

## 2024-07-13 RX ADMIN — PANTOPRAZOLE SODIUM 40 MG: 40 TABLET, DELAYED RELEASE ORAL at 05:19

## 2024-07-13 RX ADMIN — HEPARIN SODIUM 7500 UNITS: 5000 INJECTION INTRAVENOUS; SUBCUTANEOUS at 05:19

## 2024-07-13 RX ADMIN — INSULIN LISPRO 1 UNITS: 100 INJECTION, SOLUTION INTRAVENOUS; SUBCUTANEOUS at 21:46

## 2024-07-13 NOTE — ASSESSMENT & PLAN NOTE
Baseline creatinine is 0.8    He reported he has flulike illness starting approximately 2 weeks ago took Tylenol.  Has not been doing much until 3 days ago when he started having some shortness of breath on climbing up stairs  On examination, no signs of fluid overload no JVD, and inspiratory crackles and pedal edema  Chest x-ray looks similar to prior when  BNP elevated in the setting of ROSI  Possibly patient had ROSI due to decreased oral intake of fluids compounded by concomitant ankle hydrochlorothiazide and ACE inhibitor's  CT chest showed mild pulmonary edema with significant increase in pulmonary nodules, pulmonary nodules may be infectious/inflammatory origin  Patient with likely C3 glomerulonephritis.  On pulse dose steroids last dose today.  Creatinine is improving to 1.3.  Patient on diuretics per nephrology

## 2024-07-13 NOTE — PROGRESS NOTES
"Progress Note - Pulmonary   Ben Daniel 29 y.o. male MRN: [id number]  Unit/Bed#: ICU 02 Encounter: [id number]    Assessment:  Acute hypoxemic respiratory failure  Abnormal CT of the chest with bilateral extensive consolidations  Postviral bacterial pneumonia beta-hemolytic Streptococcus group A  Acute kidney injury    Plan:  Acute hypoxemic respiratory failure in this patient with bilateral extensive consolidations likely postviral pneumonia in the setting of beta-hemolytic Streptococcus group in the BAL and C3 glomerulonephritis the pathology from the kidney biopsy  Currently on antibiotics on  ceftazidime ID following, to complete a total duration of 1 week  On pulse dose steroids 1 g for 3 days will be completing today, can be tapered down and likely switch to prednisone in a.m.  Currently on 4 L of oxygen by nasal cannula titrate down as able to maintain oxygen saturation greater than 91%  Will continue with nebulizer treatments around-the-clock  Again reinforced the need for airway clearance measures with incentive spirometry  Will follow-up with a chest x-ray in a.m.  Out of bed to chair as able  Assessment for home oxygen need at time of discharge  Will continue to follow    Chief Complaint:   Acute hypoxemic respiratory failure    Subjective:   Feeling much better    Objective:     Vitals: Blood pressure 143/71, pulse 90, temperature 98.2 °F (36.8 °C), temperature source Oral, resp. rate 22, height 5' 6.5\" (1.689 m), weight 123 kg (270 lb 8.1 oz), SpO2 99%.,Body mass index is 43.01 kg/m².      Intake/Output Summary (Last 24 hours) at 7/13/2024 1052  Last data filed at 7/13/2024 1003  Gross per 24 hour   Intake 828.5 ml   Output 1750 ml   Net -921.5 ml       Invasive Devices       Peripheral Intravenous Line  Duration             Long-Dwell Peripheral IV (Midline) 07/08/24 Left Cephalic Vein 4 days    Peripheral IV 07/10/24 Distal;Right;Ventral (anterior) Forearm 3 days                    Physical Exam: " Currently sitting up on the chair in no acute respiratory distress  Eyes anicteric Sleeter, conjunctiva is clear  ENT nares is patent, no evidence of any discharge  Lungs diminished breath sounds bilaterally no rhonchi  Heart first and second heart sounds are heard no murmur or gallop is heard  Extremities no pedal edema  CNS awake alert oriented x 3.    Labs: CBC:   Lab Results   Component Value Date    WBC 10.61 (H) 07/13/2024    HGB 9.0 (L) 07/13/2024    HCT 26.5 (L) 07/13/2024    MCV 82 07/13/2024     07/13/2024    RBC 3.23 (L) 07/13/2024    MCH 27.9 07/13/2024    MCHC 34.0 07/13/2024    RDW 12.9 07/13/2024    MPV 8.8 (L) 07/13/2024    NRBC 0 07/13/2024     Imaging and other studies: I have personally reviewed pertinent films in PACS

## 2024-07-13 NOTE — ASSESSMENT & PLAN NOTE
Pf Ratio on post intubation ABG 90 indicative of Severe ARDS; now improving  Continue care as above  Wean oxygen as able

## 2024-07-13 NOTE — ASSESSMENT & PLAN NOTE
"Patient with report of SOB on arrival with hypoxia requiring 3 L NC  Had worsening hypoxia on 7/9 requiring 15 L NRB followed by BIPAP  He was able to be weaned to 7 L midflow nasal cannula overnight to 7/10  He underwent bronchoscopy yesterday for which he required intubation and was unable to be liberated from the ventilator  Bronchoscopy with minimal/scant blood tinged fluids with edematous and erythematous airway pulmonary thinks more consistent with infectious etiology  He required 100% FiO2 and 10 of PEEP; Pf ratio 90  He was started on broad spectrum ABX and steroids  Differentials include inflammatory/autoimmune +/- pulmonary edema, and/or infection  MALENA and Lyme negative  CT scan on 7/11:  \"Extensive consolidative airspace opacities throughout the perihilar regions and the bilateral lower lobes, new since 7/6/2024. 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       Plan  Continue broad spectrum ABX; Pulse streoids  Last day of steroids today.  Patient is able to be weaned currently and is on 4 L at 99% so we can continue weaning more.  Encourage ambulation  "

## 2024-07-13 NOTE — PROGRESS NOTES
NEPHROLOGY PROGRESS NOTE    Patient: Ben Daniel               Sex: male          DOA: 7/6/2024 10:38 AM   YOB: 1995        Age:  29 y.o.        LOS:  LOS: 5 days       HPI        glomerulonephritis and pneumoniaPatient with an pneumonia    Overall feeling better    No acute complaint    No chest pain no palpitation or shortness of breath    Does have C3 glomerulonephritis    CURRENT MEDICATIONS       Current Facility-Administered Medications:     albuterol (PROVENTIL HFA,VENTOLIN HFA) inhaler 2 puff, 2 puff, Inhalation, Q4H PRN, MARTIN Hill, 2 puff at 07/09/24 0538    amLODIPine (NORVASC) tablet 10 mg, 10 mg, Oral, Daily, MARTIN Hill, 10 mg at 07/13/24 0942    ceFAZolin (ANCEF) IVPB (premix in dextrose) 2,000 mg 50 mL, 2,000 mg, Intravenous, Q8H, MARTIN Hill, Last Rate: 100 mL/hr at 07/13/24 0941, 2,000 mg at 07/13/24 0941    furosemide (LASIX) injection 20 mg, 20 mg, Intravenous, BID (diuretic), MARTIN Hill, 20 mg at 07/13/24 0941    heparin (porcine) subcutaneous injection 7,500 Units, 7,500 Units, Subcutaneous, Q8H YADIRA, 7,500 Units at 07/13/24 0519 **AND** [COMPLETED] Platelet count, , , Once, Uriel Kline MD    insulin lispro (HumALOG/ADMELOG) 100 units/mL subcutaneous injection 1-5 Units, 1-5 Units, Subcutaneous, HS, MARTIN Hill, 1 Units at 07/12/24 2131    insulin lispro (HumALOG/ADMELOG) 100 units/mL subcutaneous injection 1-6 Units, 1-6 Units, Subcutaneous, TID AC, 1 Units at 07/12/24 1733 **AND** Fingerstick Glucose (POCT), , , TID AC, MARTIN Hill    levothyroxine tablet 50 mcg, 50 mcg, Oral, Daily, MARTIN Hill, 50 mcg at 07/13/24 0942    methylPREDNISolone sodium succinate (Solu-MEDROL) 1,000 mg in sodium chloride 0.9 % 250 mL IVPB, 1,000 mg, Intravenous, Daily, Randy Wells MD, Last Rate: 250 mL/hr at 07/13/24 0942, 1,000 mg at 07/13/24 0942    ondansetron (ZOFRAN) injection 4 mg, 4 mg, Intravenous, Q8H PRN, Dina Palacios,  CRNP, 4 mg at 07/09/24 0033    pantoprazole (PROTONIX) EC tablet 40 mg, 40 mg, Oral, Early Morning, MARTIN Hill, 40 mg at 07/13/24 0519    OBJECTIVE     Current Weight: Weight - Scale: 123 kg (270 lb 8.1 oz)  Vitals:    07/13/24 0916   BP:    Pulse:    Resp:    Temp:    SpO2: 99%       Intake/Output Summary (Last 24 hours) at 7/13/2024 1024  Last data filed at 7/13/2024 1003  Gross per 24 hour   Intake 828.5 ml   Output 1750 ml   Net -921.5 ml       PHYSICAL EXAMINATION     Physical Exam  Constitutional:       General: He is not in acute distress.     Appearance: He is well-developed.   HENT:      Head: Normocephalic.      Mouth/Throat:      Mouth: Mucous membranes are moist.   Eyes:      General: No scleral icterus.     Conjunctiva/sclera: Conjunctivae normal.   Neck:      Vascular: No JVD.   Cardiovascular:      Rate and Rhythm: Normal rate.      Heart sounds: Normal heart sounds.   Pulmonary:      Effort: Pulmonary effort is normal.      Breath sounds: No wheezing.   Abdominal:      Palpations: Abdomen is soft.      Tenderness: There is no abdominal tenderness.   Musculoskeletal:         General: Normal range of motion.      Cervical back: Neck supple.   Skin:     General: Skin is warm.      Findings: No rash.   Neurological:      Mental Status: He is alert and oriented to person, place, and time.   Psychiatric:         Behavior: Behavior normal.          LAB RESULTS     Results from last 7 days   Lab Units 07/13/24  0527 07/12/24  0431 07/11/24  1602 07/11/24  0432 07/10/24  0557 07/09/24  1209 07/09/24  0539 07/08/24  0510 07/07/24  0607   WBC Thousand/uL 10.61* 8.63  --  9.68 10.14 8.86 9.72 6.58 6.66   HEMOGLOBIN g/dL 9.0* 8.7*  --  9.1* 11.1* 12.0 11.9* 12.0 11.4*   HEMATOCRIT % 26.5* 24.8*  --  26.2* 31.7* 34.6* 35.3* 35.8* 33.1*   PLATELETS Thousands/uL 230 218  --  238 247 256 278 226 223   POTASSIUM mmol/L 3.9 4.2 4.5 4.9 4.2  --  4.5 4.9 4.7   CHLORIDE mmol/L 109* 104 102 100 97  --  98 101 107    CO2 mmol/L 23 20* 20* 20* 24  --  22 22 22   BUN mg/dL 58* 66* 56* 48* 33*  --  32* 38* 43*   CREATININE mg/dL 1.30 1.80* 1.92* 2.07* 1.14  --  1.19 1.51* 1.60*   EGFR ml/min/1.73sq m 73 49 46 42 86  --  82 61 57   CALCIUM mg/dL 7.7* 8.2* 8.3* 8.0* 7.9*  --  8.0* 8.7 8.6   MAGNESIUM mg/dL 2.9* 2.8* 2.6 2.4 2.2  --   --   --  2.3   PHOSPHORUS mg/dL 4.6* 6.0*  --  6.4* 4.3  --   --   --  4.9*       RADIOLOGY RESULTS      Results for orders placed during the hospital encounter of 07/06/24    XR chest portable    Narrative  XR CHEST PORTABLE    INDICATION: Hypoxemia.    COMPARISON: 7/6/2024    FINDINGS:  Progressive diffuse bilateral opacity likely representing multifocal pneumonia and possible pulmonary edema    No pneumothorax or pleural effusion.    Normal cardiomediastinal silhouette.    Bones are unremarkable for age.    Normal upper abdomen.    Impression  Progressive diffuse bilateral opacities likely representing multifocal pneumonia with possible superimposed pulmonary edema        Workstation performed: [id number]    Results for orders placed during the hospital encounter of 07/06/24    XR chest 2 views    Narrative  XR CHEST PA & LATERAL    INDICATION: sob. Shortness of breath for 2 days, chest pressure began this morning.    COMPARISON: CXR 2/8/2019.    FINDINGS:    Low lung volumes producing vascular crowding. No acute disease. No pneumothorax or pleural effusion.    Normal cardiomediastinal silhouette.    Bones are unremarkable for age.    Normal upper abdomen.    Impression  Low lung volumes producing vascular crowding with no acute disease.        Workstation performed: [id number]    Results for orders placed during the hospital encounter of 07/06/24    CT chest wo contrast    Narrative  CT CHEST WITHOUT IV CONTRAST    INDICATION: Hypoxia, ARDS.    COMPARISON: CT chest 7/6/2024.    TECHNIQUE: CT examination of the chest was performed without intravenous contrast. Multiplanar 2D reformatted images were  created from the source data.    This examination, like all CT scans performed in the [facility], was performed utilizing techniques to minimize radiation dose exposure, including the use of iterative reconstruction and automated exposure control. Radiation dose length  product (DLP) for this visit: 1039 mGy-cm    FINDINGS:    LUNGS: Endotracheal tube terminates 2.6 cm above the leann in satisfactory position. Mucous threads within the distal trachea. Extensive consolidative airspace opacities throughout the perihilar regions and the bilateral lower lobes, new since 7/6/2024.    PLEURA: Probable trace bilateral pleural effusion, although difficult to visualize without contrast. No pneumothorax.    HEART/GREAT VESSELS: Cardiomegaly. No thoracic aortic aneurysm.    MEDIASTINUM AND BARTOLOME: Unremarkable.    CHEST WALL AND LOWER NECK: Bilateral gynecomastia.    VISUALIZED STRUCTURES IN THE UPPER ABDOMEN: Unremarkable.    OSSEOUS STRUCTURES: No acute fracture or destructive osseous lesion.    Impression  Extensive consolidative airspace opacities throughout the perihilar regions and the bilateral lower lobes, new since 7/6/2024. Findings likely represent a combination of pulmonary edema and multifocal pneumonia in a pattern typical for aspiration  pneumonia. Recommend short-term follow-up noncontrast chest CT in 3 months to assess for resolution.    Mild cardiomegaly.    Endotracheal tube in satisfactory position.    The study was marked in EPIC for immediate notification.    Workstation performed: [id number]    No results found for this or any previous visit.    No results found for this or any previous visit.    No results found for this or any previous visit.      PLAN / RECOMMENDATIONS      C3 glomerulonephritis: We will continue steroid for now.  Will switch to p.o. prednisone tomorrow    Pneumonia: On antibiotic being managed by pulmonary and ID    Hypertension: Slowly getting better    Proteinuria:  "Likely because of glomerulonephritis.  Once kidney function is stabilized we will restart ACE inhibitor    Will monitor    Randy Wells MD  Nephrology  7/13/2024        Portions of the record may have been created with voice recognition software. Occasional wrong word or \"sound a like\" substitutions may have occurred due to the inherent limitations of voice recognition software. Read the chart carefully and recognize, using context, where substitutions have occurred.  "

## 2024-07-13 NOTE — ASSESSMENT & PLAN NOTE
Scant blood seen on Bronchoscopy more indicative of infectious etiology rather than inflammatory  See plan above.  No further episodes  Pulmonology following the patient as well

## 2024-07-13 NOTE — PROGRESS NOTES
[facility]  Progress Note  Name: Ben Daniel I  MRN: [id number]  Unit/Bed#: ICU 02 I Date of Admission: 7/6/2024   Date of Service: 7/13/2024 I Hospital Day: 5    Assessment & Plan   C3 glomerulonephritis  Assessment & Plan  Nephrology is following.  Today is day 3 of 3 of high-dose steroids    Hyponatremia  Assessment & Plan    Nephrology following; May be 2/2 C3 glomerulopathy   Resolved    Hemoptysis  Assessment & Plan  Scant blood seen on Bronchoscopy more indicative of infectious etiology rather than inflammatory  See plan above.  No further episodes  Pulmonology following the patient as well    ARDS (adult respiratory distress syndrome) ([facility])  Assessment & Plan  Pf Ratio on post intubation ABG 90 indicative of Severe ARDS; now improving  Continue care as above  Wean oxygen as able    Hypotension  Assessment & Plan  Resolved continue Norvasc    ROSI (acute kidney injury) ([facility])  Assessment & Plan  Baseline creatinine is 0.8    He reported he has flulike illness starting approximately 2 weeks ago took Tylenol.  Has not been doing much until 3 days ago when he started having some shortness of breath on climbing up stairs  On examination, no signs of fluid overload no JVD, and inspiratory crackles and pedal edema  Chest x-ray looks similar to prior when  BNP elevated in the setting of ROSI  Possibly patient had ROSI due to decreased oral intake of fluids compounded by concomitant ankle hydrochlorothiazide and ACE inhibitor's  CT chest showed mild pulmonary edema with significant increase in pulmonary nodules, pulmonary nodules may be infectious/inflammatory origin  Patient with likely C3 glomerulonephritis.  On pulse dose steroids last dose today.  Creatinine is improving to 1.3.  Patient on diuretics per nephrology    Hypothyroidism  Assessment & Plan  Patient has not been compliant to levothyroxine  Counseling has been done at length  Restart levothyroxine 50 mcg    Essential  "hypertension  Assessment & Plan  Continue with amlodipine, all other previous home medications on hold    * Acute respiratory failure with hypoxia (HCC)  Assessment & Plan  Patient with report of SOB on arrival with hypoxia requiring 3 L NC  Had worsening hypoxia on 7/9 requiring 15 L NRB followed by BIPAP  He was able to be weaned to 7 L midflow nasal cannula overnight to 7/10  He underwent bronchoscopy yesterday for which he required intubation and was unable to be liberated from the ventilator  Bronchoscopy with minimal/scant blood tinged fluids with edematous and erythematous airway pulmonary thinks more consistent with infectious etiology  He required 100% FiO2 and 10 of PEEP; Pf ratio 90  He was started on broad spectrum ABX and steroids  Differentials include inflammatory/autoimmune +/- pulmonary edema, and/or infection  MALENA and Lyme negative  CT scan on 7/11:  \"Extensive consolidative airspace opacities throughout the perihilar regions and the bilateral lower lobes, new since 7/6/2024. 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       Plan  Continue broad spectrum ABX; Pulse streoids  Last day of steroids today.  Patient is able to be weaned currently and is on 4 L at 99% so we can continue weaning more.  Encourage ambulation               VTE Pharmacologic Prophylaxis:   Moderate Risk (Score 3-4) - Pharmacological DVT Prophylaxis Ordered: heparin.    Mobility:   Basic Mobility Inpatient Raw Score: 18  JH-HLM Goal: 6: Walk 10 steps or more  JH-HLM Achieved: 3: Sit at edge of bed  JH-HLM Goal NOT achieved. Continue with multidisciplinary rounding and encourage appropriate mobility to improve upon JH-HLM goals.    Patient Centered Rounds: I performed bedside rounds with nursing staff today.       Education and Discussions with Family / Patient:  Patient. "     Total Time Spent on Date of Encounter in care of patient: 35 mins. This time was spent on one or more of the following: performing physical exam; counseling and coordination of care; obtaining or reviewing history; documenting in the medical record; reviewing/ordering tests, medications or procedures; communicating with other healthcare professionals and discussing with patient's family/caregivers.    Current Length of Stay: 5 day(s)  Current Patient Status: Inpatient   Certification Statement: The patient will continue to require additional inpatient hospital stay due to needing weaning off oxygen monitoring labs as well as needing IV antibiotics  Discharge Plan: Anticipate discharge in 48-72 hrs to home.    Code Status: Level 1 - Full Code    Subjective:   Patient seen and examined.  States he is starting to feel little bit better.    Objective:     Vitals:   Temp (24hrs), Av.4 °F (36.9 °C), Min:98 °F (36.7 °C), Max:98.8 °F (37.1 °C)    Temp:  [98 °F (36.7 °C)-98.8 °F (37.1 °C)] 98.2 °F (36.8 °C)  HR:  [87-91] 90  Resp:  [18-25] 22  BP: (143-165)/(71-80) 143/71  SpO2:  [95 %-99 %] 99 %  Body mass index is 43.01 kg/m².     Input and Output Summary (last 24 hours):     Intake/Output Summary (Last 24 hours) at 2024 1025  Last data filed at 2024 1003  Gross per 24 hour   Intake 828.5 ml   Output 1750 ml   Net -921.5 ml       Physical Exam:   Physical Exam   General Appearance:    Alert, cooperative, no distress, appears stated age                               Lungs:   Decreased at the bases otherwise clear       Heart:    Regular rate and rhythm, S1 and S2 normal, no murmur, rub    or gallop   Abdomen:     Soft, non-tender, bowel sounds active all four quadrants,     no masses, no organomegaly           Extremities:   Extremities normal, atraumatic, no cyanosis or edema                         Additional Data:     Labs:  Results from last 7 days   Lab Units 24  0527   WBC Thousand/uL 10.61*    HEMOGLOBIN g/dL 9.0*   HEMATOCRIT % 26.5*   PLATELETS Thousands/uL 230   SEGS PCT % 79*   LYMPHO PCT % 7*   MONO PCT % 12   EOS PCT % 0     Results from last 7 days   Lab Units 07/13/24  0527   SODIUM mmol/L 139   POTASSIUM mmol/L 3.9   CHLORIDE mmol/L 109*   CO2 mmol/L 23   BUN mg/dL 58*   CREATININE mg/dL 1.30   ANION GAP mmol/L 7   CALCIUM mg/dL 7.7*   ALBUMIN g/dL 2.7*   TOTAL BILIRUBIN mg/dL 0.35   ALK PHOS U/L 62   ALT U/L 22   AST U/L 17   GLUCOSE RANDOM mg/dL 146*     Results from last 7 days   Lab Units 07/08/24  1420   INR  1.14     Results from last 7 days   Lab Units 07/13/24  0708 07/12/24  2035 07/12/24  1618 07/12/24  1122 07/11/24  0005 07/10/24  1810   POC GLUCOSE mg/dl 136 168* 168* 138 133 110     Results from last 7 days   Lab Units 07/12/24  1113   HEMOGLOBIN A1C % 5.9*     Results from last 7 days   Lab Units 07/12/24  0431 07/11/24  0432 07/07/24  0607   PROCALCITONIN ng/ml 0.38* 0.42* 0.11       Lines/Drains:  Invasive Devices       Peripheral Intravenous Line  Duration             Long-Dwell Peripheral IV (Midline) 07/08/24 Left Cephalic Vein 4 days    Peripheral IV 07/10/24 Distal;Right;Ventral (anterior) Forearm 3 days                          Imaging: No pertinent imaging reviewed.    Recent Cultures (last 7 days):   Results from last 7 days   Lab Units 07/10/24  1555 07/10/24  1528 07/10/24  1425 07/10/24  1423 07/10/24  1422 07/09/24  1917   BLOOD CULTURE  No Growth at 48 hrs. No Growth at 48 hrs.  --   --   --   --    GRAM STAIN RESULT   --   --  Rare Mononuclear Cells  No bacteria seen No Polys or Bacteria seen No Polys or Bacteria seen  --    LEGIONELLA URINARY ANTIGEN   --   --   --   --   --  Negative       Last 24 Hours Medication List:   Current Facility-Administered Medications   Medication Dose Route Frequency Provider Last Rate    albuterol  2 puff Inhalation Q4H PRN Dina Suzanne, CRNP      amLODIPine  10 mg Oral Daily Dina Palacios, CRELOISE      cefazolin  2,000 mg  Intravenous Q8H MARTIN Hill 2,000 mg (07/13/24 0941)    furosemide  20 mg Intravenous BID (diuretic) MARTIN Hill      heparin (porcine)  7,500 Units Subcutaneous Q8H YADIRA MARTIN Hill      insulin lispro  1-5 Units Subcutaneous HS MARTIN Hill      insulin lispro  1-6 Units Subcutaneous TID AC MARTIN Hill      levothyroxine  50 mcg Oral Daily MARTIN Hill      methylPREDNISolone sodium succinate  1,000 mg Intravenous Daily Randy Wells MD 1,000 mg (07/13/24 0942)    ondansetron  4 mg Intravenous Q8H PRN MARTIN Hill      pantoprazole  40 mg Oral Early Morning MARTIN Hill          Today, Patient Was Seen By: Parish Saldivar MD    **Please Note: This note may have been constructed using a voice recognition system.**

## 2024-07-13 NOTE — PLAN OF CARE
Problem: PAIN - ADULT  Goal: Verbalizes/displays adequate comfort level or baseline comfort level  Description: Interventions:  - Encourage patient to monitor pain and request assistance  - Assess pain using appropriate pain scale  - Administer analgesics based on type and severity of pain and evaluate response  - Implement non-pharmacological measures as appropriate and evaluate response  - Consider cultural and social influences on pain and pain management  - Notify physician/advanced practitioner if interventions unsuccessful or patient reports new pain  Outcome: Progressing     Problem: INFECTION - ADULT  Goal: Absence or prevention of progression during hospitalization  Description: INTERVENTIONS:  - Assess and monitor for signs and symptoms of infection  - Monitor lab/diagnostic results  - Monitor all insertion sites, i.e. indwelling lines, tubes, and drains  - Monitor endotracheal if appropriate and nasal secretions for changes in amount and color  - [place] appropriate cooling/warming therapies per order  - Administer medications as ordered  - Instruct and encourage patient and family to use good hand hygiene technique  - Identify and instruct in appropriate isolation precautions for identified infection/condition  Outcome: Progressing  Goal: Absence of fever/infection during neutropenic period  Description: INTERVENTIONS:  - Monitor WBC    Outcome: Progressing     Problem: SAFETY ADULT  Goal: Patient will remain free of falls  Description: INTERVENTIONS:  - Educate patient/family on patient safety including physical limitations  - Instruct patient to call for assistance with activity   - Consult OT/PT to assist with strengthening/mobility   - Keep Call bell within reach  - Keep bed low and locked with side rails adjusted as appropriate  - Keep care items and personal belongings within reach  - Initiate and maintain comfort rounds  - Make Fall Risk Sign visible to staff  - Offer Toileting every 2 Hours,  in advance of need  - Initiate/Maintain bed alarm  - Apply yellow socks and bracelet for high fall risk patients  - Consider moving patient to room near nurses station  Outcome: Progressing     Problem: DISCHARGE PLANNING  Goal: Discharge to home or other facility with appropriate resources  Description: INTERVENTIONS:  - Identify barriers to discharge w/patient and caregiver  - Arrange for needed discharge resources and transportation as appropriate  - Identify discharge learning needs (meds, wound care, etc.)  - Arrange for interpretive services to assist at discharge as needed  - Refer to Case Management Department for coordinating discharge planning if the patient needs post-hospital services based on physician/advanced practitioner order or complex needs related to functional status, cognitive ability, or social support system  Outcome: Progressing     Problem: Knowledge Deficit  Goal: Patient/family/caregiver demonstrates understanding of disease process, treatment plan, medications, and discharge instructions  Description: Complete learning assessment and assess knowledge base.  Interventions:  - Provide teaching at level of understanding  - Provide teaching via preferred learning methods  Outcome: Progressing     Problem: Prexisting or High Potential for Compromised Skin Integrity  Goal: Skin integrity is maintained or improved  Description: INTERVENTIONS:  - Identify patients at risk for skin breakdown  - Assess and monitor skin integrity  - Assess and monitor nutrition and hydration status  - Monitor labs   - Assess for incontinence   - Turn and reposition patient  - Assist with mobility/ambulation  - Relieve pressure over bony prominences  - Avoid friction and shearing  - Provide appropriate hygiene as needed including keeping skin clean and dry  - Evaluate need for skin moisturizer/barrier cream  - Collaborate with interdisciplinary team   - Patient/family teaching  - Consider wound care consult    Outcome: Progressing     Problem: RESPIRATORY - ADULT  Goal: Achieves optimal ventilation and oxygenation  Description: INTERVENTIONS:  - Assess for changes in respiratory status  - Assess for changes in mentation and behavior  - Position to facilitate oxygenation and minimize respiratory effort  - Oxygen administered by appropriate delivery if ordered  - Initiate smoking cessation education as indicated  - Encourage broncho-pulmonary hygiene including cough, deep breathe, Incentive Spirometry  - Assess the need for suctioning and aspirate as needed  - Assess and instruct to report SOB or any respiratory difficulty  - Respiratory Therapy support as indicated  Outcome: Progressing     Problem: Potential for Falls  Goal: Patient will remain free of falls  Description: INTERVENTIONS:  - Educate patient/family on patient safety including physical limitations  - Instruct patient to call for assistance with activity   - Consult OT/PT to assist with strengthening/mobility   - Keep Call bell within reach  - Keep bed low and locked with side rails adjusted as appropriate  - Keep care items and personal belongings within reach  - Initiate and maintain comfort rounds  - Make Fall Risk Sign visible to staff  - Offer Toileting every 2 Hours, in advance of need  - Initiate/Maintain bed alarm  - Apply yellow socks and bracelet for high fall risk patients  - Consider moving patient to room near nurses station  Outcome: Progressing     Problem: Nutrition/Hydration-ADULT  Goal: Nutrient/Hydration intake appropriate for improving, restoring or maintaining nutritional needs  Description: Monitor and assess patient's nutrition/hydration status for malnutrition. Collaborate with interdisciplinary team and initiate plan and interventions as ordered.  Monitor patient's weight and dietary intake as ordered or per policy. Utilize nutrition screening tool and intervene as necessary. Determine patient's food preferences and provide  high-protein, high-caloric foods as appropriate.     INTERVENTIONS:  - Monitor oral intake, urinary output, labs, and treatment plans  - Assess nutrition and hydration status and recommend course of action  - Evaluate amount of meals eaten  - Assist patient with eating if necessary   - Allow adequate time for meals  - Recommend/ encourage appropriate diets, oral nutritional supplements, and vitamin/mineral supplements  - Order, calculate, and assess calorie counts as needed  - Recommend, monitor, and adjust tube feedings and TPN/PPN based on assessed needs  - Assess need for intravenous fluids  - Provide specific nutrition/hydration education as appropriate  - Include patient/family/caregiver in decisions related to nutrition  Outcome: Progressing     Problem: SAFETY,RESTRAINT: NV/NON-SELF DESTRUCTIVE BEHAVIOR  Goal: Remains free of harm/injury (restraint for non violent/non self-detsructive behavior)  Description: INTERVENTIONS:  - Instruct patient/family regarding restraint use   - Assess and monitor physiologic and psychological status   - Provide interventions and comfort measures to meet assessed patient needs   - Identify and implement measures to help patient regain control  - Assess readiness for release of restraint   Outcome: Progressing  Goal: Returns to optimal restraint-free functioning  Description: INTERVENTIONS:  - Assess the patient's behavior and symptoms that indicate continued need for restraint  - Identify and implement measures to help patient regain control  - Assess readiness for release of restraint   Outcome: Progressing

## 2024-07-13 NOTE — RESPIRATORY THERAPY NOTE
RT Protocol Note  Ben Daniel 29 y.o. male MRN: [id number]  Unit/Bed#: ICU 02 Encounter: [id number]    Assessment    Principal Problem:    Acute respiratory failure with hypoxia (HCC)  Active Problems:    Essential hypertension    Palpitations    Hypothyroidism    ROSI (acute kidney injury) (HCC)    Hypotension    ARDS (adult respiratory distress syndrome) (HCC)    Hemoptysis    Hyponatremia    C3 glomerulonephritis      Home Pulmonary Medications:  none       Past Medical History:   Diagnosis Date    Anxiety     Hypertension      Social History     Socioeconomic History    Marital status: Single     Spouse name: None    Number of children: None    Years of education: None    Highest education level: None   Occupational History    None   Tobacco Use    Smoking status: Never    Smokeless tobacco: Never   Vaping Use    Vaping status: Never Used   Substance and Sexual Activity    Alcohol use: Not Currently     Alcohol/week: 1.0 standard drink of alcohol     Types: 1 Cans of beer per week     Comment: occ    Drug use: Never    Sexual activity: Yes   Other Topics Concern    None   Social History Narrative    None     Social Determinants of Health     Financial Resource Strain: Not on file   Food Insecurity: No Food Insecurity (7/8/2024)    Hunger Vital Sign     Worried About Running Out of Food in the Last Year: Never true     Ran Out of Food in the Last Year: Never true   Transportation Needs: No Transportation Needs (7/8/2024)    PRAPARE - Transportation     Lack of Transportation (Medical): No     Lack of Transportation (Non-Medical): No   Physical Activity: Not on file   Stress: Not on file   Social Connections: Not on file   Intimate Partner Violence: Not on file   Housing Stability: Low Risk  (7/8/2024)    Housing Stability Vital Sign     Unable to Pay for Housing in the Last Year: No     Number of Times Moved in the Last Year: 0     Homeless in the Last Year: No       Subjective    Subjective Data: awake  "alert    Objective    Physical Exam:        Vitals:  Blood pressure 153/73, pulse 90, temperature 98.7 °F (37.1 °C), temperature source Oral, resp. rate 18, height 5' 6.5\" (1.689 m), weight 123 kg (270 lb 8.1 oz), SpO2 99%.    Results from last 7 days   Lab Units 07/12/24  0434   PH ART  7.444   PCO2 ART mm Hg 28.6*   PO2 ART mm Hg 92.0   HCO3 ART mmol/L 19.2*   BASE EXC ART mmol/L -4.0   O2 CONTENT ART mL/dL 13.1*   O2 HGB, ARTERIAL % 94.7   ABG SOURCE  Line, Arterial   COLE TEST  Yes       Imaging and other studies: I have personally reviewed pertinent reports.      O2 Device: nc     Plan    Respiratory Plan: No distress/Pulmonary history        Resp Comments: will continue with prn albuterol inhaler   "

## 2024-07-14 ENCOUNTER — APPOINTMENT (INPATIENT)
Dept: RADIOLOGY | Facility: HOSPITAL | Age: 29
DRG: 133 | End: 2024-07-14
Payer: COMMERCIAL

## 2024-07-14 VITALS
SYSTOLIC BLOOD PRESSURE: 136 MMHG | RESPIRATION RATE: 22 BRPM | OXYGEN SATURATION: 98 % | WEIGHT: 268.08 LBS | HEART RATE: 75 BPM | HEIGHT: 67 IN | DIASTOLIC BLOOD PRESSURE: 84 MMHG | TEMPERATURE: 98.4 F | BODY MASS INDEX: 42.08 KG/M2

## 2024-07-14 LAB
ANION GAP SERPL CALCULATED.3IONS-SCNC: 5 MMOL/L (ref 4–13)
BUN SERPL-MCNC: 48 MG/DL (ref 5–25)
CALCIUM SERPL-MCNC: 7.7 MG/DL (ref 8.4–10.2)
CHLORIDE SERPL-SCNC: 110 MMOL/L (ref 96–108)
CO2 SERPL-SCNC: 26 MMOL/L (ref 21–32)
CREAT SERPL-MCNC: 1.03 MG/DL (ref 0.6–1.3)
ERYTHROCYTE [DISTWIDTH] IN BLOOD BY AUTOMATED COUNT: 12.9 % (ref 11.6–15.1)
GFR SERPL CREATININE-BSD FRML MDRD: 97 ML/MIN/1.73SQ M
GLUCOSE SERPL-MCNC: 127 MG/DL (ref 65–140)
GLUCOSE SERPL-MCNC: 129 MG/DL (ref 65–140)
HCT VFR BLD AUTO: 28 % (ref 36.5–49.3)
HGB BLD-MCNC: 9 G/DL (ref 12–17)
MCH RBC QN AUTO: 27.2 PG (ref 26.8–34.3)
MCHC RBC AUTO-ENTMCNC: 32.1 G/DL (ref 31.4–37.4)
MCV RBC AUTO: 85 FL (ref 82–98)
PLATELET # BLD AUTO: 233 THOUSANDS/UL (ref 149–390)
PMV BLD AUTO: 8.9 FL (ref 8.9–12.7)
POTASSIUM SERPL-SCNC: 4.3 MMOL/L (ref 3.5–5.3)
RBC # BLD AUTO: 3.31 MILLION/UL (ref 3.88–5.62)
SODIUM SERPL-SCNC: 141 MMOL/L (ref 135–147)
WBC # BLD AUTO: 7.52 THOUSAND/UL (ref 4.31–10.16)

## 2024-07-14 PROCEDURE — 85027 COMPLETE CBC AUTOMATED: CPT | Performed by: FAMILY MEDICINE

## 2024-07-14 PROCEDURE — 82948 REAGENT STRIP/BLOOD GLUCOSE: CPT

## 2024-07-14 PROCEDURE — 99232 SBSQ HOSP IP/OBS MODERATE 35: CPT | Performed by: INTERNAL MEDICINE

## 2024-07-14 PROCEDURE — 71045 X-RAY EXAM CHEST 1 VIEW: CPT

## 2024-07-14 PROCEDURE — 80048 BASIC METABOLIC PNL TOTAL CA: CPT | Performed by: FAMILY MEDICINE

## 2024-07-14 PROCEDURE — 99239 HOSP IP/OBS DSCHRG MGMT >30: CPT | Performed by: STUDENT IN AN ORGANIZED HEALTH CARE EDUCATION/TRAINING PROGRAM

## 2024-07-14 RX ORDER — ALBUTEROL SULFATE 90 UG/1
2 AEROSOL, METERED RESPIRATORY (INHALATION) EVERY 4 HOURS PRN
Qty: 18 G | Refills: 0 | Status: SHIPPED | OUTPATIENT
Start: 2024-07-14 | End: 2024-08-13

## 2024-07-14 RX ORDER — FUROSEMIDE 20 MG/1
20 TABLET ORAL
Status: DISCONTINUED | OUTPATIENT
Start: 2024-07-14 | End: 2024-07-14 | Stop reason: HOSPADM

## 2024-07-14 RX ORDER — PREDNISONE 20 MG/1
20 TABLET ORAL 2 TIMES DAILY WITH MEALS
Qty: 28 TABLET | Refills: 0 | Status: SHIPPED | OUTPATIENT
Start: 2024-07-14 | End: 2024-07-14

## 2024-07-14 RX ORDER — AMOXICILLIN AND CLAVULANATE POTASSIUM 875; 125 MG/1; MG/1
1 TABLET, FILM COATED ORAL EVERY 12 HOURS SCHEDULED
Qty: 4 TABLET | Refills: 0 | Status: SHIPPED | OUTPATIENT
Start: 2024-07-14 | End: 2024-07-16

## 2024-07-14 RX ORDER — AMLODIPINE BESYLATE 10 MG/1
10 TABLET ORAL DAILY
Qty: 30 TABLET | Refills: 0 | Status: SHIPPED | OUTPATIENT
Start: 2024-07-15 | End: 2024-07-14

## 2024-07-14 RX ORDER — PANTOPRAZOLE SODIUM 40 MG/1
40 TABLET, DELAYED RELEASE ORAL
Qty: 30 TABLET | Refills: 0 | Status: SHIPPED | OUTPATIENT
Start: 2024-07-15 | End: 2024-08-14

## 2024-07-14 RX ORDER — PREDNISONE 20 MG/1
20 TABLET ORAL 2 TIMES DAILY WITH MEALS
Qty: 28 TABLET | Refills: 0 | Status: SHIPPED | OUTPATIENT
Start: 2024-07-14 | End: 2024-07-26 | Stop reason: SDUPTHER

## 2024-07-14 RX ORDER — PANTOPRAZOLE SODIUM 40 MG/1
40 TABLET, DELAYED RELEASE ORAL
Qty: 30 TABLET | Refills: 0 | Status: SHIPPED | OUTPATIENT
Start: 2024-07-15 | End: 2024-07-14

## 2024-07-14 RX ORDER — AMLODIPINE BESYLATE 10 MG/1
10 TABLET ORAL DAILY
Qty: 30 TABLET | Refills: 0 | Status: SHIPPED | OUTPATIENT
Start: 2024-07-15 | End: 2024-08-14

## 2024-07-14 RX ORDER — ALBUTEROL SULFATE 90 UG/1
2 AEROSOL, METERED RESPIRATORY (INHALATION) EVERY 4 HOURS PRN
Qty: 18 G | Refills: 0 | Status: SHIPPED | OUTPATIENT
Start: 2024-07-14 | End: 2024-07-14

## 2024-07-14 RX ORDER — FUROSEMIDE 20 MG/1
20 TABLET ORAL DAILY
Qty: 30 TABLET | Refills: 0 | Status: SHIPPED | OUTPATIENT
Start: 2024-07-14 | End: 2024-07-14

## 2024-07-14 RX ORDER — FUROSEMIDE 20 MG/1
20 TABLET ORAL DAILY
Qty: 30 TABLET | Refills: 0 | Status: SHIPPED | OUTPATIENT
Start: 2024-07-14 | End: 2024-08-13

## 2024-07-14 RX ADMIN — PANTOPRAZOLE SODIUM 40 MG: 40 TABLET, DELAYED RELEASE ORAL at 05:56

## 2024-07-14 RX ADMIN — HEPARIN SODIUM 7500 UNITS: 5000 INJECTION INTRAVENOUS; SUBCUTANEOUS at 05:55

## 2024-07-14 RX ADMIN — PREDNISONE 20 MG: 20 TABLET ORAL at 09:30

## 2024-07-14 RX ADMIN — LEVOTHYROXINE SODIUM 50 MCG: 0.05 TABLET ORAL at 09:30

## 2024-07-14 RX ADMIN — AMLODIPINE BESYLATE 10 MG: 10 TABLET ORAL at 09:30

## 2024-07-14 RX ADMIN — CEFAZOLIN SODIUM 2000 MG: 2 SOLUTION INTRAVENOUS at 09:30

## 2024-07-14 NOTE — PROGRESS NOTES
NEPHROLOGY PROGRESS NOTE    Patient: Ben Daniel               Sex: male          DOA: 7/6/2024 10:38 AM   YOB: 1995        Age:  29 y.o.        LOS:  LOS: 6 days       HPI     Patient with glomerulonephritis as well as pneumonia    SUBJECTIVE     Getting better    Awake and alert    Not on oxygen    Urinating quite well    No cough no cold    Denies any shortness of breath    CURRENT MEDICATIONS       Current Facility-Administered Medications:     albuterol (PROVENTIL HFA,VENTOLIN HFA) inhaler 2 puff, 2 puff, Inhalation, Q4H PRN, MARTIN Hill, 2 puff at 07/09/24 0538    amLODIPine (NORVASC) tablet 10 mg, 10 mg, Oral, Daily, MARTIN Hill, 10 mg at 07/14/24 0930    ceFAZolin (ANCEF) IVPB (premix in dextrose) 2,000 mg 50 mL, 2,000 mg, Intravenous, Q8H, MARTIN Hill, Last Rate: 100 mL/hr at 07/14/24 0930, 2,000 mg at 07/14/24 0930    furosemide (LASIX) tablet 20 mg, 20 mg, Oral, BID (diuretic), Randy Wells MD    heparin (porcine) subcutaneous injection 7,500 Units, 7,500 Units, Subcutaneous, Q8H YADIRA, 7,500 Units at 07/14/24 0555 **AND** [COMPLETED] Platelet count, , , Once, Uriel Kline MD    insulin lispro (HumALOG/ADMELOG) 100 units/mL subcutaneous injection 1-5 Units, 1-5 Units, Subcutaneous, HS, MARTIN Hill, 1 Units at 07/13/24 2146    insulin lispro (HumALOG/ADMELOG) 100 units/mL subcutaneous injection 1-6 Units, 1-6 Units, Subcutaneous, TID AC, 1 Units at 07/13/24 1558 **AND** Fingerstick Glucose (POCT), , , TID AC, MARTIN Hill    levothyroxine tablet 50 mcg, 50 mcg, Oral, Daily, MARTIN Hill, 50 mcg at 07/14/24 0930    ondansetron (ZOFRAN) injection 4 mg, 4 mg, Intravenous, Q8H PRN, MARTIN Hill, 4 mg at 07/09/24 0033    pantoprazole (PROTONIX) EC tablet 40 mg, 40 mg, Oral, Early Morning, MARTIN Hill, 40 mg at 07/14/24 0556    predniSONE tablet 20 mg, 20 mg, Oral, BID With Meals, Randy Wells MD, 20 mg at 07/14/24  0930    OBJECTIVE     Current Weight: Weight - Scale: 122 kg (268 lb 1.3 oz)  Vitals:    07/13/24 2208   BP: 166/78   Pulse: 90   Resp: (!) 24   Temp: 98.9 °F (37.2 °C)   SpO2: 99%       Intake/Output Summary (Last 24 hours) at 7/14/2024 1036  Last data filed at 7/13/2024 2034  Gross per 24 hour   Intake 240 ml   Output 1575 ml   Net -1335 ml       PHYSICAL EXAMINATION     Physical Exam  Constitutional:       General: He is not in acute distress.     Appearance: He is well-developed. He is obese.   HENT:      Head: Normocephalic.      Mouth/Throat:      Mouth: Mucous membranes are moist.   Eyes:      General: No scleral icterus.     Conjunctiva/sclera: Conjunctivae normal.   Neck:      Vascular: No JVD.   Cardiovascular:      Rate and Rhythm: Normal rate.      Heart sounds: Normal heart sounds.   Pulmonary:      Effort: Pulmonary effort is normal.      Breath sounds: No wheezing.   Abdominal:      Palpations: Abdomen is soft.      Tenderness: There is no abdominal tenderness.   Musculoskeletal:         General: Normal range of motion.      Cervical back: Neck supple.   Skin:     General: Skin is warm.      Findings: No rash.   Neurological:      Mental Status: He is alert and oriented to person, place, and time.   Psychiatric:         Behavior: Behavior normal.          LAB RESULTS     Results from last 7 days   Lab Units 07/14/24  0603 07/13/24  0527 07/12/24  0431 07/11/24  1602 07/11/24  0432 07/10/24  0557 07/09/24  1209 07/09/24  0539   WBC Thousand/uL 7.52 10.61* 8.63  --  9.68 10.14 8.86 9.72   HEMOGLOBIN g/dL 9.0* 9.0* 8.7*  --  9.1* 11.1* 12.0 11.9*   HEMATOCRIT % 28.0* 26.5* 24.8*  --  26.2* 31.7* 34.6* 35.3*   PLATELETS Thousands/uL 233 230 218  --  238 247 256 278   POTASSIUM mmol/L 4.3 3.9 4.2 4.5 4.9 4.2  --  4.5   CHLORIDE mmol/L 110* 109* 104 102 100 97  --  98   CO2 mmol/L 26 23 20* 20* 20* 24  --  22   BUN mg/dL 48* 58* 66* 56* 48* 33*  --  32*   CREATININE mg/dL 1.03 1.30 1.80* 1.92* 2.07* 1.14   --  1.19   EGFR ml/min/1.73sq m 97 73 49 46 42 86  --  82   CALCIUM mg/dL 7.7* 7.7* 8.2* 8.3* 8.0* 7.9*  --  8.0*   MAGNESIUM mg/dL  --  2.9* 2.8* 2.6 2.4 2.2  --   --    PHOSPHORUS mg/dL  --  4.6* 6.0*  --  6.4* 4.3  --   --        RADIOLOGY RESULTS      Results for orders placed during the hospital encounter of 07/06/24    XR chest portable    Narrative  XR CHEST PORTABLE    INDICATION: Follow-up pneumonia.    COMPARISON: Chest CT 7/11/2024, CXR 7/10/2024.    FINDINGS:    Marked improvement in bilateral consolidation. No pneumothorax or pleural effusion.    Normal cardiomediastinal silhouette.    Bones are unremarkable for age.    Normal upper abdomen.    Impression  Marked improvement in bilateral consolidation. The rapid improvement suggest pulmonary edema. Superimposed pneumonia not excluded in the appropriate clinical setting.        Workstation performed: [id number]    Results for orders placed during the hospital encounter of 07/06/24    XR chest 2 views    Narrative  XR CHEST PA & LATERAL    INDICATION: sob. Shortness of breath for 2 days, chest pressure began this morning.    COMPARISON: CXR 2/8/2019.    FINDINGS:    Low lung volumes producing vascular crowding. No acute disease. No pneumothorax or pleural effusion.    Normal cardiomediastinal silhouette.    Bones are unremarkable for age.    Normal upper abdomen.    Impression  Low lung volumes producing vascular crowding with no acute disease.        Workstation performed: [id number]    Results for orders placed during the hospital encounter of 07/06/24    CT chest wo contrast    Narrative  CT CHEST WITHOUT IV CONTRAST    INDICATION: Hypoxia, ARDS.    COMPARISON: CT chest 7/6/2024.    TECHNIQUE: CT examination of the chest was performed without intravenous contrast. Multiplanar 2D reformatted images were created from the source data.    This examination, like all CT scans performed in the [facility], was performed utilizing techniques to  minimize radiation dose exposure, including the use of iterative reconstruction and automated exposure control. Radiation dose length  product (DLP) for this visit: 1039 mGy-cm    FINDINGS:    LUNGS: Endotracheal tube terminates 2.6 cm above the leann in satisfactory position. Mucous threads within the distal trachea. Extensive consolidative airspace opacities throughout the perihilar regions and the bilateral lower lobes, new since 7/6/2024.    PLEURA: Probable trace bilateral pleural effusion, although difficult to visualize without contrast. No pneumothorax.    HEART/GREAT VESSELS: Cardiomegaly. No thoracic aortic aneurysm.    MEDIASTINUM AND BARTOLOME: Unremarkable.    CHEST WALL AND LOWER NECK: Bilateral gynecomastia.    VISUALIZED STRUCTURES IN THE UPPER ABDOMEN: Unremarkable.    OSSEOUS STRUCTURES: No acute fracture or destructive osseous lesion.    Impression  Extensive consolidative airspace opacities throughout the perihilar regions and the bilateral lower lobes, new since 7/6/2024. Findings likely represent a combination of pulmonary edema and multifocal pneumonia in a pattern typical for aspiration  pneumonia. Recommend short-term follow-up noncontrast chest CT in 3 months to assess for resolution.    Mild cardiomegaly.    Endotracheal tube in satisfactory position.    The study was marked in EPIC for immediate notification.    Workstation performed: [id number]    No results found for this or any previous visit.    No results found for this or any previous visit.    No results found for this or any previous visit.      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  Patient got Solu-Medrol for 3 days and now on p.o. prednisone    Pneumonia: On IV ceftriaxone being monitored by pulmonary    Hypertension: Reasonable well-controlled.  As a mention in the past when kidney  "function is stable we will start patient on ACE inhibitor    Will monitor    Randy Wells MD  Nephrology  7/14/2024        Portions of the record may have been created with voice recognition software. Occasional wrong word or \"sound a like\" substitutions may have occurred due to the inherent limitations of voice recognition software. Read the chart carefully and recognize, using context, where substitutions have occurred.  "

## 2024-07-14 NOTE — DISCHARGE INSTR - AVS FIRST PAGE
Dear Ben Daniel,     It was our pleasure to care for you here at [facility].  It is our hope that we were always able to exceed the expected standards for your care during your stay.  You were hospitalized due to respiratory failure and C3 glomerulonephritis.  You were cared for on the ICU floor by Yfn Jiang MD under the service of Yfn Deluna MD with the [facility] who covers for your primary care physician (PCP), MARTIN Shearer, while you were hospitalized.  If you have any questions or concerns related to this hospitalization, you may contact us at .  For follow up as well as any medication refills, we recommend that you follow up with your primary care physician.  A registered nurse will reach out to you by phone within a few days after your discharge to answer any additional questions that you may have after going home.  However, at this time we provide for you here, the most important instructions / recommendations at discharge:     Notable Medication Adjustments -   Added augmentin- antibiotic, take on 7/15 and 7/16. Take it twice daily. No antibiotics needed on 7/14.   Added lasix- water pill  Added prednisone- take daily 20 mg until you follow up with nephrology as an outpatient  Added protonix- continue taking while on prednisone   Continue amlodipine   Discontinued hydrochlorothiazide  Testing Required after Discharge -   Follow up with nephrology   Important follow up information -   na  Other Instructions -   na  Please review this entire after visit summary as additional general instructions including medication list, appointments, activity, diet, any pertinent wound care, and other additional recommendations from your care team that may be provided for you.      Sincerely,     Yfn Jiang MD

## 2024-07-14 NOTE — ASSESSMENT & PLAN NOTE
Patient with report of SOB on arrival with hypoxia requiring 3 L NC  Had worsening hypoxia on 7/9 requiring 15 L NRB followed by BIPAP  He was able to be weaned to 7 L midflow nasal cannula overnight to 7/10  He underwent bronchoscopy for which he required intubation and was unable to be liberated from the ventilator  Bronchoscopy with minimal/scant blood tinged fluids with edematous and erythematous airway pulmonary thinks more consistent with infectious etiology  MALENA and Lyme negative  CT scan on 7/11: suggestive of multifocal pneumonia   He was diuresed, started on pulse steroids for C3 glomerulopathy   Continue prednisone until outpatient follow up with nephrology   Continue lasix PO  Transition from IV antibiotics to PO antibiotics, to be completed on 7/16

## 2024-07-14 NOTE — ASSESSMENT & PLAN NOTE
Pf Ratio on post intubation ABG 90 indicative of Severe ARDS; now improving  Continue care as above  Weaned off oxygen  Does not require oxygen based on pulse ox while ambulating  CXR improved

## 2024-07-14 NOTE — UTILIZATION REVIEW
Continued Stay Review    Date: 7/13                          Current Patient Class: IP   Current Level of Care: MS    HPI:29 y.o. male initially admitted on  7/8    Assessment/Plan:     7/13 IM Note   Cont to wean off O2 . Cont IV abx . Today is the last day of steroids . On 4l , O2 sat 99 % , cont to wean . Enc ambulation . Cr improved to 1.30 from 1.80.     Vital Signs (last 3 days)       Date/Time Temp Pulse Resp BP MAP (mmHg) Arterial Line BP MAP SpO2 FiO2 (%) Calculated FIO2 (%) - Nasal Cannula Nasal Cannula O2 Flow Rate (L/min) O2 Device O2 Interface Device Patient Position - Orthostatic VS Noemi Coma Scale Score Pain    07/13/24 2208 98.9 °F (37.2 °C) 90 24 166/78 112 -- -- 99 % -- -- -- None (Room air) -- Lying -- --    07/13/24 2000 -- -- -- -- -- -- -- -- -- -- -- -- -- -- 15 No Pain    07/13/24 1743 -- 88 -- -- -- -- -- 95 % -- -- -- -- -- -- -- --    07/13/24 1640 -- 86 -- -- -- -- -- 98 % -- -- -- -- -- -- -- --    07/13/24 1637 -- 80 -- -- -- -- -- 99 % -- -- -- -- -- -- -- --    07/13/24 1430 97.8 °F (36.6 °C) 77 20 167/84 118 -- -- 97 % -- 28 2 L/min Nasal cannula -- Sitting -- No Pain    07/13/24 1412 -- 80 -- -- -- -- -- 99 % -- 28 2 L/min Nasal cannula -- -- -- --    07/13/24 1200 -- 82 -- -- -- -- -- 99 % -- 32 3 L/min Nasal cannula -- -- -- --    07/13/24 1100 -- 78 -- -- -- -- -- 98 % -- -- -- -- -- -- -- --    07/13/24 1000 -- 86 -- 143/71 101 -- -- 99 % -- -- -- -- -- -- -- --    07/13/24 0916 -- -- -- -- -- -- -- 99 % -- -- -- -- -- -- -- --    07/13/24 0900 98.2 °F (36.8 °C) 90 22 143/71 101 -- -- 99 % -- 36 4 L/min Nasal cannula -- Sitting -- --    07/13/24 0800 -- -- -- -- -- -- -- -- -- -- -- -- -- -- 15 --    07/12/24 2313 98.7 °F (37.1 °C) 90 18 153/73 104 -- -- 97 % -- -- -- -- -- Sitting -- --    07/12/24 2000 -- -- -- -- -- -- -- -- -- -- -- -- -- -- 15 No Pain    07/12/24 1915 -- 91 -- -- -- -- -- 95 % -- 36 4 L/min Nasal cannula -- -- -- --    07/12/24 1745 -- -- -- 165/80 -- --  -- -- -- -- -- -- -- -- -- --    07/12/24 1704 -- -- -- -- -- -- -- -- -- 44 6 L/min -- -- -- -- --    07/12/24 1459 98 °F (36.7 °C) 87 25 149/77 105 -- -- 98 % -- -- -- Mid flow nasal cannula -- Sitting -- --    07/12/24 1344 -- -- -- -- -- -- -- 97 % -- 52 8 L/min Mid flow nasal cannula MFNC prongs -- -- --    07/12/24 1200 -- -- -- -- -- -- -- -- -- -- -- -- -- -- 15 --    07/12/24 1100 98.8 °F (37.1 °C) -- -- -- -- -- -- -- -- -- -- -- -- -- -- --    07/12/24 0800 -- -- -- -- -- -- -- -- -- -- -- -- -- -- 15 --    07/12/24 0753 -- -- -- -- -- -- -- 95 % -- 60 10 L/min Mid flow nasal cannula MFNC prongs -- -- --    07/12/24 0730 -- -- -- -- -- -- -- 94 % -- -- -- -- -- -- -- --    07/12/24 0700 98.2 °F (36.8 °C) -- -- -- -- -- -- -- -- -- -- -- -- -- -- --    07/12/24 0600 -- 67 18 133/64 91 141/71 92 mmHg 94 % -- -- -- -- -- -- -- --    07/12/24 0502 -- -- -- -- -- -- -- 94 % -- -- -- -- -- -- -- --    07/12/24 0500 -- -- -- -- -- 142/67 91 mmHg -- -- -- -- -- -- -- -- --    07/12/24 0400 -- 64 19 129/65 90 154/68 91 mmHg 95 % -- -- -- -- -- -- 11 No Pain    07/12/24 0300 97.7 °F (36.5 °C) 68 23 131/67 92 160/67 91 mmHg 94 % -- -- -- Ventilator -- Lying -- --    07/12/24 0200 -- 69 20 127/65 90 153/64 88 mmHg 94 % -- -- -- -- -- -- -- --    07/12/24 0106 -- -- -- -- -- -- -- 94 % -- -- -- -- -- -- -- --    07/12/24 0101 -- -- -- -- -- -- -- 94 % -- -- -- -- -- -- -- --    07/12/24 0100 -- 66 16 -- -- 162/72 97 mmHg 95 % -- -- -- -- -- -- -- --    07/12/24 0000 -- 59 16 120/58 82 153/71 92 mmHg 94 % -- -- -- -- -- -- 11 --    07/11/24 2300 97.9 °F (36.6 °C) 62 16 122/58 83 -- -- 94 % -- -- -- Ventilator -- Lying -- --    07/11/24 2200 -- 61 16 122/64 87 151/66 89 mmHg 95 % -- -- -- -- -- -- -- --    07/11/24 2000 -- 66 20 118/62 85 147/66 88 mmHg 94 % -- -- -- -- -- -- 11 No Pain    07/11/24 1956 -- -- -- -- -- -- -- 94 % -- -- -- -- -- -- -- --    07/11/24 1955 -- -- -- -- -- -- -- 94 % -- -- -- -- -- -- -- --     07/11/24 1812 -- -- -- -- -- -- -- 96 % -- -- -- -- -- -- -- --    07/11/24 1800 -- 64 19 106/58 78 138/64 84 mmHg 95 % -- -- -- -- -- -- -- --    07/11/24 1600 -- 63 17 -- -- 88/87 88 mmHg 96 % 50 -- -- Ventilator -- -- 11 No Pain    07/11/24 1513 97.7 °F (36.5 °C) -- -- 139/67 95 -- -- -- -- -- -- Ventilator -- Lying -- --    07/11/24 1423 -- -- -- -- -- -- -- 99 % -- -- -- -- -- -- -- --    07/11/24 1314 -- -- -- -- -- -- -- 98 % -- -- -- -- -- -- -- --    07/11/24 1312 -- -- -- -- -- -- -- 98 % -- -- -- Ventilator -- -- -- --    07/11/24 1300 -- 59 16 -- -- 148/64 86 mmHg 97 % -- -- -- -- -- -- -- --    07/11/24 1200 -- 60 16 136/69 96 144/63 85 mmHg 97 % 60 -- -- Ventilator -- -- 10 No Pain    07/11/24 1100 97.6 °F (36.4 °C) 64 24 115/60 81 -- -- 95 % -- -- -- -- -- Lying -- --    07/11/24 1049 -- -- -- -- -- -- -- 93 % -- -- -- -- -- -- -- --    07/11/24 1000 -- 63 17 113/60 78 -- -- 95 % -- -- -- -- -- -- -- --    07/11/24 0943 -- 64 18 -- -- -- -- 95 % -- -- -- -- -- -- -- --    07/11/24 0900 -- 62 16 111/57 76 -- -- 94 % -- -- -- -- -- -- -- --    07/11/24 0800 -- 69 26 115/61 79 -- -- 93 % 60 -- -- Ventilator -- -- 10 No Pain    07/11/24 0725 97.7 °F (36.5 °C) 67 18 111/55 76 -- -- 96 % -- -- -- Ventilator -- Lying -- --    07/11/24 0700 -- 67 18 109/59 79 -- -- 94 % -- -- -- -- -- -- -- --    07/11/24 0600 -- 67 19 108/57 74 -- -- 94 % -- -- -- -- -- -- -- --    07/11/24 0531 -- 67 20 104/58 77 -- -- 94 % -- -- -- -- -- -- -- --    07/11/24 0500 98.6 °F (37 °C) 67 19 107/59 78 -- -- 95 % -- -- -- -- -- -- -- --    07/11/24 0416 -- -- -- -- -- -- -- 90 % -- -- -- -- -- -- -- --    07/11/24 0400 -- 68 17 -- -- -- -- 98 % -- -- -- -- -- -- 7 No Pain    07/11/24 0300 -- 72 26 113/57 80 -- -- 92 % -- -- -- -- -- -- -- --    07/11/24 0200 -- 72 23 113/55 78 -- -- 96 % -- -- -- -- -- -- -- --    07/11/24 0100 -- 74 23 116/57 81 -- -- 95 % -- -- -- -- -- -- -- --    07/11/24 0054 99.7 °F (37.6 °C) 74 24 115/57  80 -- -- 95 % -- -- -- -- -- -- -- --    07/11/24 0010 -- -- -- -- -- -- -- 94 % -- -- -- -- -- -- -- --    07/11/24 0000 -- 75 24 120/58 84 -- -- 93 % -- -- -- Ventilator -- Lying 7 No Pain          Weight (last 2 days)       Date/Time Weight    07/14/24 0600 122 (268.08)    07/13/24 0531 123 (270.5)    07/12/24 0600 123 (271.39)              Pertinent Labs/Diagnostic Results:   Radiology:  XR chest portable   Final Interpretation by Betsy Clarke MD (07/14 0953)      Marked improvement in bilateral consolidation. The rapid improvement suggest pulmonary edema. Superimposed pneumonia not excluded in the appropriate clinical setting.            Workstation performed: [id number]         CT chest wo contrast   Final Interpretation by Betzaida Sotelo MD (07/11 0603)      Extensive consolidative airspace opacities throughout the perihilar regions and the bilateral lower lobes, new since 7/6/2024. Findings likely represent a combination of pulmonary edema and multifocal pneumonia in a pattern typical for aspiration    pneumonia. Recommend short-term follow-up noncontrast chest CT in 3 months to assess for resolution.      Mild cardiomegaly.      Endotracheal tube in satisfactory position.      The study was marked in EPIC for immediate notification.      Workstation performed: [id number]         XR chest portable ICU   Final Interpretation by Paddy Carlos MD (07/10 1615)      Satisfactory endotracheal tube positioning. Extensive bilateral infiltrates without significant change from recent prior            Workstation performed: [id number]         XR chest portable ICU   Final Interpretation by Paddy Carlos MD (07/10 1511)      Increased prominence of bilateral pulmonary consolidations            Workstation performed: [id number]         XR chest portable   Final Interpretation by Otis Curtis MD (07/10 4773)      Progressive diffuse bilateral opacities likely representing  multifocal pneumonia with possible superimposed pulmonary edema            Workstation performed: [id number]         IR biopsy kidney random   Final Interpretation by Duane Klein MD (07/09 1202)      CT-guided biopsy of midpole left renal cortex.      Plan:      Specimens sent for evaluation.      Workstation performed: [id number]         US kidney and bladder   Final Interpretation by Estelle Talavera MD (07/07 1545)   Unremarkable renal sonogram   No hydronephrosis   Additional imaging to be based on clinical evaluation      Workstation performed: [id number]         CT chest wo contrast   Final Interpretation by Lopez Shelton MD (07/06 1522)      Mild pulmonary edema and trace bilateral pleural effusions.      Numerous 1 to 5 mm solid nodules in a similar distribution to the pulmonary edema, which can rarely be seen as a manifestation of pulmonary edema. Findings could also be infectious/inflammatory. Metastatic disease is unlikely given the patient's age and    lack of known malignancy. Follow-up chest CT in 3 months is recommended.      The study was marked in EPIC for immediate notification.               Workstation performed: [id number]         XR chest 2 views   Final Interpretation by Betsy Clarke MD (07/06 1115)      Low lung volumes producing vascular crowding with no acute disease.            Workstation performed: [id number]           Cardiology:  ECG 12 lead   Final Result by Adeola Parra MD (07/13 1648)   Normal sinus rhythm   Normal ECG      Confirmed by Adeola Parra ([id number]) on 7/13/2024 4:45:09 PM      Echo complete w/ contrast if indicated   Final Result by Gutierrez June MD (07/08 1549)        Technically difficult study.     Left Ventricle: Left ventricular cavity size is normal. Wall thickness    is normal. The left ventricular ejection fraction is 60%. Systolic    function is normal. Wall motion is normal. Diastolic function is normal.     No significant change since prior  echo 9/27/2021.         ECG 12 lead   Final Result by Bart Gregory MD (07/07 1036)   Normal sinus rhythm   When compared with ECG of 07-JUL-2024 01:33, (unconfirmed)   No significant change was found   Confirmed by Bart Gregory ([id number]) on 7/7/2024 10:36:38 AM      ECG 12 lead   Final Result by Bart Gregory MD (07/07 1036)   Normal sinus rhythm   When compared with ECG of 06-JUL-2024 10:10, (unconfirmed)   No significant change was found   Confirmed by Bart Gregory ([id number]) on 7/7/2024 10:36:42 AM      ECG 12 lead   Final Result by Bart Gregory MD (07/07 1046)   Normal sinus rhythm   Rightward axis   Borderline ECG   When compared with ECG of 23-JUN-2022 17:00,   No significant change was found   Confirmed by Bart Gregory ([id number]) on 7/7/2024 10:46:39 AM        GI:  Bronchoscopy   Final Result by Ofelia Corea MD (07/10 1526)   Mild edematous, friable mucosa in the RUL and right lower lobar bronchus   Minimal and thick secretions present in the right upper lobar bronchus;    performed washing   Bronchoalveolar lavage was performed x2   Mild edematous, erythematous mucosa in the right upper lobar bronchus;    performed washing   Mild edematous mucosa in the left lower lobar bronchus; performed washing         RECOMMENDATION:   Follow-up BAL cultures bacterial viral and fungal cultures cytology and    differential cell count microbiology for follow-up             Results from last 7 days   Lab Units 07/10/24  1423   SARS-COV-2  COMMENT     Results from last 7 days   Lab Units 07/13/24  0527 07/12/24  0431 07/11/24  0432 07/10/24  0557 07/09/24  0539 07/08/24  0510   WBC Thousand/uL 10.61* 8.63 9.68 10.14   < > 6.58   HEMOGLOBIN g/dL 9.0* 8.7* 9.1* 11.1*   < > 12.0   HEMATOCRIT % 26.5* 24.8* 26.2* 31.7*   < > 35.8*   PLATELETS Thousands/uL 230 218 238 247   < > 226   TOTAL NEUT ABS Thousands/µL 8.43*  --  8.12*  --   --  4.37    < > = values in this interval not displayed.         Results from last 7  "days   Lab Units 07/13/24  0527 07/12/24  0431 07/11/24  1602 07/11/24  0432 07/10/24  0557   SODIUM mmol/L 139 133* 131* 129* 128*   POTASSIUM mmol/L 3.9 4.2 4.5 4.9 4.2   CHLORIDE mmol/L 109* 104 102 100 97   CO2 mmol/L 23 20* 20* 20* 24   ANION GAP mmol/L 7 9 9 9 7   BUN mg/dL 58* 66* 56* 48* 33*   CREATININE mg/dL 1.30 1.80* 1.92* 2.07* 1.14   EGFR ml/min/1.73sq m 73 49 46 42 86   CALCIUM mg/dL 7.7* 8.2* 8.3* 8.0* 7.9*   CALCIUM, IONIZED mmol/L 1.07* 1.07*  --  1.02* 1.06*   MAGNESIUM mg/dL 2.9* 2.8* 2.6 2.4 2.2   PHOSPHORUS mg/dL 4.6* 6.0*  --  6.4* 4.3     Results from last 7 days   Lab Units 07/13/24  0527 07/12/24  0431 07/08/24  1420   AST U/L 17 11*  --    ALT U/L 22 16  --    ALK PHOS U/L 62 61  --    TOTAL PROTEIN g/dL 6.8 7.0 7.1   ALBUMIN g/dL 2.7* 2.7*  --    TOTAL BILIRUBIN mg/dL 0.35 0.46  --      Results from last 7 days   Lab Units 07/13/24 2036 07/13/24  1549 07/13/24  1131 07/13/24  0708 07/12/24  2035 07/12/24  1618 07/12/24  1122 07/11/24  0005 07/10/24  1810   POC GLUCOSE mg/dl 196* 179* 156* 136 168* 168* 138 133 110     Results from last 7 days   Lab Units 07/13/24  0527 07/12/24  0431 07/11/24  1602 07/11/24  0432 07/10/24  0557 07/09/24  0539 07/08/24  0510   GLUCOSE RANDOM mg/dL 146* 213* 157* 153* 99 101 95         Results from last 7 days   Lab Units 07/12/24  1113   HEMOGLOBIN A1C % 5.9*   EAG mg/dl 123     No results found for: \"BETA-HYDROXYBUTYRATE\"   Results from last 7 days   Lab Units 07/12/24  0434 07/11/24  2154 07/11/24  1602   PH ART  7.444 7.412 7.407   PCO2 ART mm Hg 28.6* 31.9* 33.6*   PO2 ART mm Hg 92.0 85.7 94.4   HCO3 ART mmol/L 19.2* 19.9* 20.7*   BASE EXC ART mmol/L -4.0 -3.8 -3.5   O2 CONTENT ART mL/dL 13.1* 17.0 11.8*   O2 HGB, ARTERIAL % 94.7 94.3 95.1   ABG SOURCE  Line, Arterial Line, Arterial Line, Arterial     Results from last 7 days   Lab Units 07/08/24  1420   PROTIME seconds 15.3*   INR  1.14     Results from last 7 days   Lab Units 07/10/24  1017   TSH " 3RD GENERATON uIU/mL 4.625*     Results from last 7 days   Lab Units 07/12/24  0431 07/11/24  0432   PROCALCITONIN ng/ml 0.38* 0.42*           Results from last 7 days   Lab Units 07/09/24  0539   CRP mg/L 180.3*         Results from last 7 days   Lab Units 07/10/24  1024   OSMO UR mmol/     Results from last 7 days   Lab Units 07/10/24  1024 07/09/24  1217   SODIUM UR  39  --    CREATININE UR mg/dL  --  14.4   PROTEIN UR mg/dL  --  68   PROT/CREAT RATIO UR   --  4.72*     Results from last 7 days   Lab Units 07/09/24  1917   STREP PNEUMONIAE ANTIGEN, URINE  Negative   LEGIONELLA URINARY ANTIGEN  Negative       Results from last 7 days   Lab Units 07/10/24  1555 07/10/24  1528 07/10/24  1425 07/10/24  1423 07/10/24  1422   BLOOD CULTURE  No Growth at 72 hrs. No Growth at 72 hrs.  --   --   --    GRAM STAIN RESULT   --   --  Rare Mononuclear Cells  No bacteria seen No Polys or Bacteria seen No Polys or Bacteria seen     Results from last 7 days   Lab Units 07/10/24  1421   TOTAL COUNTED  100         Results from last 7 days   Lab Units 07/13/24  0527 07/12/24  0431 07/11/24  1013   VANCOMYCIN RM ug/mL 6.2* 18.3 36.7*       Medications:   Scheduled Medications:  amLODIPine, 10 mg, Oral, Daily  cefazolin, 2,000 mg, Intravenous, Q8H  furosemide, 20 mg, Oral, BID (diuretic)  heparin (porcine), 7,500 Units, Subcutaneous, Q8H YADIRA  insulin lispro, 1-5 Units, Subcutaneous, HS  insulin lispro, 1-6 Units, Subcutaneous, TID AC  levothyroxine, 50 mcg, Oral, Daily  pantoprazole, 40 mg, Oral, Early Morning  predniSONE, 20 mg, Oral, BID With Meals      Continuous IV Infusions:     PRN Meds:  albuterol, 2 puff, Inhalation, Q4H PRN  ondansetron, 4 mg, Intravenous, Q8H PRN        Discharge Plan: D     Network Utilization Review Department  ATTENTION: Please call with any questions or concerns to [phone] and carefully listen to the prompts so that you are directed to the right person. All voicemails are confidential.    For Discharge needs, contact Care Management DC Support Team at [phone] opt. [phone]  Send all requests for admission clinical reviews, approved or denied determinations and any other requests to dedicated fax number below belonging to the campus where the patient is receiving treatment. List of dedicated fax numbers for the Facilities:  FACILITY NAME UR FAX NUMBER   ADMISSION DENIALS (Administrative/Medical Necessity) [phone]   DISCHARGE SUPPORT TEAM (NETWORK) [phone]   PARENT CHILD HEALTH (Maternity/NICU/Pediatrics) [phone]   [facility] [phone]   [facility] [phone]   [facility] [phone]   [facility] [phone]   [facility] [phone]   [facility] [phone]   [facility] [phone]   [facility] [phone]   [facility] [phone]   [facility] [phone]   [facility] [phone]   [facility] [phone]

## 2024-07-14 NOTE — PLAN OF CARE
Problem: PAIN - ADULT  Goal: Verbalizes/displays adequate comfort level or baseline comfort level  Description: Interventions:  - Encourage patient to monitor pain and request assistance  - Assess pain using appropriate pain scale  - Administer analgesics based on type and severity of pain and evaluate response  - Implement non-pharmacological measures as appropriate and evaluate response  - Consider cultural and social influences on pain and pain management  - Notify physician/advanced practitioner if interventions unsuccessful or patient reports new pain  Outcome: Progressing     Problem: INFECTION - ADULT  Goal: Absence or prevention of progression during hospitalization  Description: INTERVENTIONS:  - Assess and monitor for signs and symptoms of infection  - Monitor lab/diagnostic results  - Monitor all insertion sites, i.e. indwelling lines, tubes, and drains  - Monitor endotracheal if appropriate and nasal secretions for changes in amount and color  - [place] appropriate cooling/warming therapies per order  - Administer medications as ordered  - Instruct and encourage patient and family to use good hand hygiene technique  - Identify and instruct in appropriate isolation precautions for identified infection/condition  Outcome: Progressing  Goal: Absence of fever/infection during neutropenic period  Description: INTERVENTIONS:  - Monitor WBC    Outcome: Progressing     Problem: SAFETY ADULT  Goal: Patient will remain free of falls  Description: INTERVENTIONS:  - Educate patient/family on patient safety including physical limitations  - Instruct patient to call for assistance with activity   - Consult OT/PT to assist with strengthening/mobility   - Keep Call bell within reach  - Keep bed low and locked with side rails adjusted as appropriate  - Keep care items and personal belongings within reach  - Initiate and maintain comfort rounds  - Make Fall Risk Sign visible to staff  - Offer Toileting every 2 Hours,  in advance of need  - Initiate/Maintain bed alarm  - Apply yellow socks and bracelet for high fall risk patients  - Consider moving patient to room near nurses station  Outcome: Progressing     Problem: DISCHARGE PLANNING  Goal: Discharge to home or other facility with appropriate resources  Description: INTERVENTIONS:  - Identify barriers to discharge w/patient and caregiver  - Arrange for needed discharge resources and transportation as appropriate  - Identify discharge learning needs (meds, wound care, etc.)  - Arrange for interpretive services to assist at discharge as needed  - Refer to Case Management Department for coordinating discharge planning if the patient needs post-hospital services based on physician/advanced practitioner order or complex needs related to functional status, cognitive ability, or social support system  Outcome: Progressing     Problem: Knowledge Deficit  Goal: Patient/family/caregiver demonstrates understanding of disease process, treatment plan, medications, and discharge instructions  Description: Complete learning assessment and assess knowledge base.  Interventions:  - Provide teaching at level of understanding  - Provide teaching via preferred learning methods  Outcome: Progressing     Problem: Prexisting or High Potential for Compromised Skin Integrity  Goal: Skin integrity is maintained or improved  Description: INTERVENTIONS:  - Identify patients at risk for skin breakdown  - Assess and monitor skin integrity  - Assess and monitor nutrition and hydration status  - Monitor labs   - Assess for incontinence   - Turn and reposition patient  - Assist with mobility/ambulation  - Relieve pressure over bony prominences  - Avoid friction and shearing  - Provide appropriate hygiene as needed including keeping skin clean and dry  - Evaluate need for skin moisturizer/barrier cream  - Collaborate with interdisciplinary team   - Patient/family teaching  - Consider wound care consult    Outcome: Progressing     Problem: RESPIRATORY - ADULT  Goal: Achieves optimal ventilation and oxygenation  Description: INTERVENTIONS:  - Assess for changes in respiratory status  - Assess for changes in mentation and behavior  - Position to facilitate oxygenation and minimize respiratory effort  - Oxygen administered by appropriate delivery if ordered  - Initiate smoking cessation education as indicated  - Encourage broncho-pulmonary hygiene including cough, deep breathe, Incentive Spirometry  - Assess the need for suctioning and aspirate as needed  - Assess and instruct to report SOB or any respiratory difficulty  - Respiratory Therapy support as indicated  Outcome: Progressing     Problem: Potential for Falls  Goal: Patient will remain free of falls  Description: INTERVENTIONS:  - Educate patient/family on patient safety including physical limitations  - Instruct patient to call for assistance with activity   - Consult OT/PT to assist with strengthening/mobility   - Keep Call bell within reach  - Keep bed low and locked with side rails adjusted as appropriate  - Keep care items and personal belongings within reach  - Initiate and maintain comfort rounds  - Make Fall Risk Sign visible to staff  - Offer Toileting every 2 Hours, in advance of need  - Initiate/Maintain bed alarm  - Apply yellow socks and bracelet for high fall risk patients  - Consider moving patient to room near nurses station  Outcome: Progressing     Problem: Nutrition/Hydration-ADULT  Goal: Nutrient/Hydration intake appropriate for improving, restoring or maintaining nutritional needs  Description: Monitor and assess patient's nutrition/hydration status for malnutrition. Collaborate with interdisciplinary team and initiate plan and interventions as ordered.  Monitor patient's weight and dietary intake as ordered or per policy. Utilize nutrition screening tool and intervene as necessary. Determine patient's food preferences and provide  high-protein, high-caloric foods as appropriate.     INTERVENTIONS:  - Monitor oral intake, urinary output, labs, and treatment plans  - Assess nutrition and hydration status and recommend course of action  - Evaluate amount of meals eaten  - Assist patient with eating if necessary   - Allow adequate time for meals  - Recommend/ encourage appropriate diets, oral nutritional supplements, and vitamin/mineral supplements  - Order, calculate, and assess calorie counts as needed  - Recommend, monitor, and adjust tube feedings and TPN/PPN based on assessed needs  - Assess need for intravenous fluids  - Provide specific nutrition/hydration education as appropriate  - Include patient/family/caregiver in decisions related to nutrition  Outcome: Progressing     Problem: SAFETY,RESTRAINT: NV/NON-SELF DESTRUCTIVE BEHAVIOR  Goal: Remains free of harm/injury (restraint for non violent/non self-detsructive behavior)  Description: INTERVENTIONS:  - Instruct patient/family regarding restraint use   - Assess and monitor physiologic and psychological status   - Provide interventions and comfort measures to meet assessed patient needs   - Identify and implement measures to help patient regain control  - Assess readiness for release of restraint   Outcome: Progressing  Goal: Returns to optimal restraint-free functioning  Description: INTERVENTIONS:  - Assess the patient's behavior and symptoms that indicate continued need for restraint  - Identify and implement measures to help patient regain control  - Assess readiness for release of restraint   Outcome: Progressing

## 2024-07-14 NOTE — NURSING NOTE
Ben's resting SpO2 on room air = 98%. Ben walked approximately 200 ft and maintained an SpO2 of 94-97% on room air with normal work of breathing.  He returned to a resting SpO2 of 98% on room air with minimal rest.

## 2024-07-14 NOTE — DISCHARGE SUMMARY
[facility]  Discharge- Ben Daniel 1995, 29 y.o. male MRN: [id number]  Unit/Bed#: ICU 02 Encounter: [id number]  Primary Care Provider: MARTIN Shearer   Date and time admitted to hospital: 7/6/2024 10:38 AM    * Acute respiratory failure with hypoxia (HCC)  Assessment & Plan  Patient with report of SOB on arrival with hypoxia requiring 3 L NC  Had worsening hypoxia on 7/9 requiring 15 L NRB followed by [facility]  He was able to be weaned to 7 L midflow nasal cannula overnight to 7/10  He underwent bronchoscopy for which he required intubation and was unable to be liberated from the ventilator  Bronchoscopy with minimal/scant blood tinged fluids with edematous and erythematous airway pulmonary thinks more consistent with infectious etiology  MALENA and Lyme negative  CT scan on 7/11: suggestive of multifocal pneumonia   He was diuresed, started on pulse steroids for C3 glomerulopathy   Continue prednisone until outpatient follow up with nephrology   Continue lasix PO  Transition from IV antibiotics to PO antibiotics, to be completed on 7/16    C3 glomerulonephritis  Assessment & Plan  Nephrology is following  See recommendations    ROSI (acute kidney injury) (HCC)  Assessment & Plan  Baseline creatinine is 0.8  Likely from C3 glomerulopathy   Completed pulse dose steroids  Creatinine is improving   ROSI resolved    Essential hypertension  Assessment & Plan  Adequately controlled  Monitor blood pressure        Medical Problems       Resolved Problems  Date Reviewed: 7/13/2024   None       Discharging Physician / Practitioner: Yfn Jiang MD  PCP: MARTIN Shearer  Admission Date:   Admission Orders (From admission, onward)       Ordered        07/08/24 1502  INPATIENT ADMISSION  Once            07/06/24 1322  Place in Observation  Once                          Discharge Date: 07/14/24    Consultations During Hospital Stay:  IP CONSULT TO NEPHROLOGY  IP CONSULT TO  PULMONOLOGY  INPATIENT CONSULT TO IR  IP CONSULT TO PHARMACY  IP CONSULT TO NUTRITION SERVICES  IP CONSULT TO INFECTIOUS DISEASES      Procedures Performed:   Imaging, renal biopsy, bronchoscopy, intubation    Significant Findings / Test Results:   Principal Problem:    Acute respiratory failure with hypoxia (HCC)  Active Problems:    Essential hypertension    Palpitations    Hypothyroidism    ROSI (acute kidney injury) (HCC)    Hypotension    ARDS (adult respiratory distress syndrome) (HCC)    Hemoptysis    Hyponatremia    C3 glomerulonephritis  Resolved Problems:    * No resolved hospital problems. *        Incidental Findings:   See above    Test Results Pending at Discharge (will require follow up):   na     Outpatient Tests Requested:  Follow up with PCP   Follow up with nephrology  Follow up with pulmonology     Complications:  ARDS, respiratory failure    Reason for Admission: shortness of breath    Hospital Course:   Ben Daniel is a 29 y.o. male patient who originally presented to the hospital on 7/6/2024 due to shortness of breath secondary to pneumonia. Complicated by worsening respiratory failure requiring intubation. Completed bronchoscopy. Also noted to have worsening ROSI so nephrology was consulted. Completed renal biopsy which showed C3 glomerulonephritis. Improved with IV steroids and diuretics. Now stable for discharge with plan to complete remaining course of antibiotics at home.       Condition at Discharge: good    Discharge Day Visit / Exam:   * Please refer to separate progress note for these details *    Discussion with Family: Updated  (wife) at bedside.    Discharge instructions/Information to patient and family:   See after visit summary for information provided to patient and family.      Provisions for Follow-Up Care:  See after visit summary for information related to follow-up care and any pertinent home health orders.      Mobility at time of Discharge:   Basic Mobility  Inpatient Raw Score: 18  JH-HLM Goal: 6: Walk 10 steps or more  JH-HLM Achieved: 8: Walk 250 feet ot more  HLM Goal achieved. Continue to encourage appropriate mobility.     Disposition:   Home    Planned Readmission: none     Discharge Statement:  I spent 30+ minutes discharging the patient. This time was spent on the day of discharge. I had direct contact with the patient on the day of discharge. Greater than 50% of the total time was spent examining patient, answering all patient questions, arranging and discussing plan of care with patient as well as directly providing post-discharge instructions.  Additional time then spent on discharge activities.    Discharge Medications:  See after visit summary for reconciled discharge medications provided to patient and/or family.      **Please Note: This note may have been constructed using a voice recognition system**

## 2024-07-14 NOTE — ASSESSMENT & PLAN NOTE
Baseline creatinine is 0.8  Likely from C3 glomerulopathy   Completed pulse dose steroids  Creatinine is improving   ROSI resolved

## 2024-07-15 ENCOUNTER — TRANSITIONAL CARE MANAGEMENT (OUTPATIENT)
Dept: FAMILY MEDICINE CLINIC | Facility: CLINIC | Age: 29
End: 2024-07-15

## 2024-07-15 LAB
BACTERIA BLD CULT: NORMAL
BACTERIA BLD CULT: NORMAL
FUNGUS SPEC CULT: NORMAL
FUNGUS SPEC CULT: NORMAL
PNEUMOCYSTIS PCR: NEGATIVE
PNEUMOCYSTIS PCR: NEGATIVE

## 2024-07-16 ENCOUNTER — TELEPHONE (OUTPATIENT)
Age: 29
End: 2024-07-16

## 2024-07-16 ENCOUNTER — OFFICE VISIT (OUTPATIENT)
Dept: NEPHROLOGY | Facility: CLINIC | Age: 29
End: 2024-07-16
Payer: COMMERCIAL

## 2024-07-16 VITALS
HEIGHT: 67 IN | WEIGHT: 271.6 LBS | OXYGEN SATURATION: 97 % | HEART RATE: 94 BPM | BODY MASS INDEX: 42.63 KG/M2 | SYSTOLIC BLOOD PRESSURE: 150 MMHG | RESPIRATION RATE: 18 BRPM | DIASTOLIC BLOOD PRESSURE: 80 MMHG | TEMPERATURE: 98 F

## 2024-07-16 DIAGNOSIS — N17.9 AKI (ACUTE KIDNEY INJURY) (HCC): Primary | ICD-10-CM

## 2024-07-16 DIAGNOSIS — I10 ESSENTIAL HYPERTENSION: ICD-10-CM

## 2024-07-16 DIAGNOSIS — R80.1 PERSISTENT PROTEINURIA: ICD-10-CM

## 2024-07-16 DIAGNOSIS — N05.8 C3 GLOMERULONEPHRITIS: ICD-10-CM

## 2024-07-16 LAB
CRYOGLOB SER QL 1D COLD INC: NORMAL
MYCOBACTERIUM SPEC CULT: NORMAL
RHODAMINE-AURAMINE STN SPEC: NORMAL

## 2024-07-16 PROCEDURE — 99214 OFFICE O/P EST MOD 30 MIN: CPT | Performed by: INTERNAL MEDICINE

## 2024-07-16 RX ORDER — LOSARTAN POTASSIUM 50 MG/1
50 TABLET ORAL DAILY
Qty: 30 TABLET | Refills: 4 | Status: SHIPPED | OUTPATIENT
Start: 2024-07-16

## 2024-07-16 NOTE — PROGRESS NOTES
NEPHROLOGY OFFICE FOLLOW UP  Ben Daniel 29 y.o. male MRN: [id number]    Encounter: [id number] 7/16/2024    REASON FOR VISIT: Ben Daniel is a 29 y.o. male who is here on 7/16/2024 for Acute Kidney Injury and Hospital Follow-up  .    HPI:    Bashir came in today for follow-up after hospital discharge.  29-year-old gentleman who got admitted to the hospital with pneumonia and acute kidney injury    Patient had a kidney biopsy which suggest C3 glomerulonephritis.  Patient was started on steroid in the hospital    Patient also had pneumonia in the hospital requiring intubation after bronchoscopy    Further workup in the hospital was negative for any other etiology    Patient came back for the follow-up    Overall doing well.  Feeling tired but no other acute complaint        REVIEW OF SYSTEMS:    Review of Systems   Constitutional:  Negative for fatigue.   HENT:  Negative for congestion.    Eyes:  Negative for photophobia and pain.   Respiratory:  Negative for chest tightness and shortness of breath.    Cardiovascular:  Positive for leg swelling. Negative for chest pain and palpitations.   Gastrointestinal:  Negative for abdominal distention, abdominal pain and blood in stool.   Endocrine: Negative for polydipsia.   Genitourinary:  Negative for difficulty urinating, dysuria, flank pain, hematuria and urgency.   Musculoskeletal:  Negative for arthralgias and back pain.   Skin:  Negative for rash.   Neurological:  Negative for dizziness, light-headedness and headaches.   Hematological:  Does not bruise/bleed easily.   Psychiatric/Behavioral:  Negative for behavioral problems. The patient is not nervous/anxious.          PAST MEDICAL HISTORY:  Past Medical History:   Diagnosis Date    Anxiety     Chronic kidney disease     Hypertension        PAST SURGICAL HISTORY:  Past Surgical History:   Procedure Laterality Date    ANKLE SURGERY      FETAL SURGERY FOR CONGENITAL HERNIA      IR BIOPSY KIDNEY RANDOM  7/9/2024        SOCIAL HISTORY:  Social History     Substance and Sexual Activity   Alcohol Use Not Currently    Alcohol/week: 1.0 standard drink of alcohol    Types: 1 Cans of beer per week    Comment: occ     Social History     Substance and Sexual Activity   Drug Use Never     Social History     Tobacco Use   Smoking Status Never    Passive exposure: Never   Smokeless Tobacco Never       FAMILY HISTORY:  Family History   Problem Relation Age of Onset    Heart failure Maternal Grandmother     Heart failure Maternal Grandfather        MEDICATIONS:    Current Outpatient Medications:     albuterol (PROVENTIL HFA,VENTOLIN HFA) 90 mcg/act inhaler, Inhale 2 puffs every 4 (four) hours as needed for wheezing or shortness of breath, Disp: 18 g, Rfl: 0    amLODIPine (NORVASC) 10 mg tablet, Take 1 tablet (10 mg total) by mouth daily Do not start before July 15, 2024., Disp: 30 tablet, Rfl: 0    amoxicillin-clavulanate (AUGMENTIN) 875-125 mg per tablet, Take 1 tablet by mouth every 12 (twelve) hours for 2 days, Disp: 4 tablet, Rfl: 0    furosemide (LASIX) 20 mg tablet, Take 1 tablet (20 mg total) by mouth daily, Disp: 30 tablet, Rfl: 0    levothyroxine 50 mcg tablet, Take 1 tablet by mouth once daily, Disp: 30 tablet, Rfl: 5    losartan (COZAAR) 50 mg tablet, Take 1 tablet (50 mg total) by mouth daily, Disp: 30 tablet, Rfl: 4    pantoprazole (PROTONIX) 40 mg tablet, Take 1 tablet (40 mg total) by mouth daily in the early morning Do not start before July 15, 2024., Disp: 30 tablet, Rfl: 0    predniSONE 20 mg tablet, Take 1 tablet (20 mg total) by mouth 2 (two) times a day with meals for 14 days, Disp: 28 tablet, Rfl: 0    ergocalciferol (VITAMIN D2) 50,000 units, Take 1 capsule (50,000 Units total) by mouth once a week (Patient not taking: Reported on 7/6/2024), Disp: 16 capsule, Rfl: 0    PHYSICAL EXAM:  Vitals:    07/16/24 1430   BP: 150/80   BP Location: Right arm   Patient Position: Sitting   Pulse: 94   Resp: 18   Temp: 98 °F (36.7  "°C)   TempSrc: Temporal   SpO2: 97%   Weight: 123 kg (271 lb 9.6 oz)   Height: 5' 6.5\" (1.689 m)     Body mass index is 43.18 kg/m².    Physical Exam  Constitutional:       General: He is not in acute distress.     Appearance: He is well-developed.   HENT:      Head: Normocephalic.      Mouth/Throat:      Mouth: Mucous membranes are moist.   Eyes:      General: No scleral icterus.     Conjunctiva/sclera: Conjunctivae normal.   Neck:      Vascular: No JVD.   Cardiovascular:      Rate and Rhythm: Normal rate.      Heart sounds: Normal heart sounds.   Pulmonary:      Effort: Pulmonary effort is normal.      Breath sounds: No wheezing.   Abdominal:      Palpations: Abdomen is soft.      Tenderness: There is no abdominal tenderness.   Musculoskeletal:         General: Swelling present. Normal range of motion.      Cervical back: Neck supple.   Skin:     General: Skin is warm.      Findings: No rash.   Neurological:      Mental Status: He is alert and oriented to person, place, and time.   Psychiatric:         Behavior: Behavior normal.         LAB RESULTS:  Results for orders placed or performed during the hospital encounter of 07/06/24   FLU/RSV/COVID - if FLU/RSV clinically relevant    Specimen: Nose; Nares   Result Value Ref Range    SARS-CoV-2 Negative Negative    INFLUENZA A PCR Negative Negative    INFLUENZA B PCR Negative Negative    RSV PCR Negative Negative   Legionella antigen, urine    Specimen: Urine, Clean Catch   Result Value Ref Range    Legionella Urinary Antigen Negative Negative   Strep Pneumoniae, Urine    Specimen: Urine, Clean Catch   Result Value Ref Range    Strep pneumoniae antigen, urine Negative Negative   Bronchial culture and Gram stain    Specimen: Lung, Left Lower Lobe Bronchoalveolar Lavage; Bronchial   Result Value Ref Range    Bronchial Culture 1 colony Beta Hemolytic Streptococcus Group A (A)     Bronchial Culture 1+ Growth of     Gram Stain Result No Polys or Bacteria seen    AFB " Culture with Stain    Specimen: Lung, Left Lower Lobe Bronchoalveolar Lavage; Bronchial   Result Value Ref Range    AFB Culture No growth to date     AFB Stain No acid fast bacilli seen    Fungal culture    Specimen: Lung, Right Lower Lobe Bronchoalveolar Lavage; Bronchial   Result Value Ref Range    Fungus (Mycology) Culture No growth to date    Bronchial culture and Gram stain    Specimen: Lung, Right Lower Lobe Bronchoalveolar Lavage; Bronchial   Result Value Ref Range    Bronchial Culture 1+ Growth of     Gram Stain Result No Polys or Bacteria seen    AFB Culture with Stain    Specimen: Lung, Right Lower Lobe Bronchoalveolar Lavage; Bronchial   Result Value Ref Range    AFB Culture No growth to date     AFB Stain No acid fast bacilli seen    Novel Coronavirus (COVID-19), PCR [facility]    Specimen: Lung, Right Lower Lobe Bronchoalveolar Lavage; Bronchial   Result Value Ref Range    SARS-CoV-2  COMMENT    Pneumocystis PCR    Specimen: Lung, Right Lower Lobe Bronchoalveolar Lavage; Bronchial   Result Value Ref Range    PNEUMOCYSTIS PCR Negative Negative, Invalid   Fungal culture    Specimen: Bronchial Wash   Result Value Ref Range    Fungus (Mycology) Culture No growth to date    Bronchial culture and Gram stain    Specimen: Bronchial Wash   Result Value Ref Range    Bronchial Culture (A)      Few Colonies of Beta Hemolytic Streptococcus Group A    Bronchial Culture 1+ Growth of     Gram Stain Result Rare Mononuclear Cells     Gram Stain Result No bacteria seen    AFB Culture with Stain    Specimen: Bronchial Wash   Result Value Ref Range    AFB Culture No growth to date     AFB Stain No acid fast bacilli seen    Pneumocystis PCR    Specimen: Bronchial Wash   Result Value Ref Range    PNEUMOCYSTIS PCR Negative Negative, Invalid   Blood culture    Specimen: Hand, Right; Blood   Result Value Ref Range    Blood Culture No Growth After 5 Days.    Blood culture    Specimen: Hand, Left; Blood   Result Value Ref Range     "Blood Culture No Growth After 5 Days.    MRSA culture    Specimen: Nose; Nares   Result Value Ref Range    MRSA Culture Only       No Methicillin Resistant Staphlyococcus aureus (MRSA) isolated   CBC and differential   Result Value Ref Range    WBC 4.05 (L) 4.31 - 10.16 Thousand/uL    RBC 4.46 3.88 - 5.62 Million/uL    Hemoglobin 12.2 12.0 - 17.0 g/dL    Hematocrit 36.1 (L) 36.5 - 49.3 %    MCV 81 (L) 82 - 98 fL    MCH 27.4 26.8 - 34.3 pg    MCHC 33.8 31.4 - 37.4 g/dL    RDW 11.9 11.6 - 15.1 %    MPV 8.8 (L) 8.9 - 12.7 fL    Platelets 239 149 - 390 Thousands/uL    nRBC 0 /100 WBCs    Segmented % 55 43 - 75 %    Immature Grans % 0 0 - 2 %    Lymphocytes % 24 14 - 44 %    Monocytes % 17 (H) 4 - 12 %    Eosinophils Relative 3 0 - 6 %    Basophils Relative 1 0 - 1 %    Absolute Neutrophils 2.22 1.85 - 7.62 Thousands/µL    Absolute Immature Grans 0.01 0.00 - 0.20 Thousand/uL    Absolute Lymphocytes 0.99 0.60 - 4.47 Thousands/µL    Absolute Monocytes 0.70 0.17 - 1.22 Thousand/µL    Eosinophils Absolute 0.11 0.00 - 0.61 Thousand/µL    Basophils Absolute 0.02 0.00 - 0.10 Thousands/µL   Comprehensive metabolic panel   Result Value Ref Range    Sodium 137 135 - 147 mmol/L    Potassium 5.2 3.5 - 5.3 mmol/L    Chloride 107 96 - 108 mmol/L    CO2 23 21 - 32 mmol/L    ANION GAP 7 4 - 13 mmol/L    BUN 48 (H) 5 - 25 mg/dL    Creatinine 1.76 (H) 0.60 - 1.30 mg/dL    Glucose 94 65 - 140 mg/dL    Calcium 8.8 8.4 - 10.2 mg/dL    Corrected Calcium 9.3 8.3 - 10.1 mg/dL    AST 16 13 - 39 U/L    ALT 22 7 - 52 U/L    Alkaline Phosphatase 68 34 - 104 U/L    Total Protein 7.7 6.4 - 8.4 g/dL    Albumin 3.4 (L) 3.5 - 5.0 g/dL    Total Bilirubin 0.50 0.20 - 1.00 mg/dL    eGFR 51 ml/min/1.73sq m   HS Troponin 0hr (reflex protocol)   Result Value Ref Range    hs TnI 0hr 3 \"Refer to ACS Flowchart\"- see link ng/L   B-Type Natriuretic Peptide(BNP)   Result Value Ref Range     (H) 0 - 100 pg/mL   HS Troponin I 2hr   Result Value Ref Range    hs " "TnI 2hr <2 \"Refer to ACS Flowchart\"- see link ng/L    Delta 2hr hsTnI <-1 <20 ng/L   HS Troponin I 4hr   Result Value Ref Range    hs TnI 4hr <2 \"Refer to ACS Flowchart\"- see link ng/L    Delta 4hr hsTnI <-1 <20 ng/L   Urinalysis with microscopic   Result Value Ref Range    Color, UA Light Yellow     Clarity, UA Clear     Specific Gravity, UA 1.018 1.003 - 1.030    pH, UA 6.0 4.5, 5.0, 5.5, 6.0, 6.5, 7.0, 7.5, 8.0    Leukocytes, UA Negative Negative    Nitrite, UA Negative Negative    Protein,  (3+) (A) Negative mg/dl    Glucose, UA Negative Negative mg/dl    Ketones, UA Negative Negative mg/dl    Urobilinogen, UA <2.0 <2.0 mg/dl mg/dl    Bilirubin, UA Negative Negative    Occult Blood, UA Moderate (A) Negative    RBC, UA 10-20 (A) None Seen, 1-2 /hpf    WBC, UA 4-10 (A) None Seen, 1-2 /hpf    Epithelial Cells Occasional None Seen, Occasional /hpf    Bacteria, UA None Seen None Seen, Occasional /hpf    Hyaline Casts, UA 5-10 (A) None Seen /lpf   Platelet count   Result Value Ref Range    Platelets 188 149 - 390 Thousands/uL    MPV 8.8 (L) 8.9 - 12.7 fL   Basic metabolic panel   Result Value Ref Range    Sodium 139 135 - 147 mmol/L    Potassium 4.6 3.5 - 5.3 mmol/L    Chloride 108 96 - 108 mmol/L    CO2 24 21 - 32 mmol/L    ANION GAP 7 4 - 13 mmol/L    BUN 44 (H) 5 - 25 mg/dL    Creatinine 1.67 (H) 0.60 - 1.30 mg/dL    Glucose 104 65 - 140 mg/dL    Calcium 8.9 8.4 - 10.2 mg/dL    eGFR 54 ml/min/1.73sq m   High Sensitivity Troponin I Random   Result Value Ref Range    HS TnI random 4 (L) 8 - 18 ng/L   AM Magnesium   Result Value Ref Range    Magnesium 2.3 1.9 - 2.7 mg/dL   AM CMP   Result Value Ref Range    Sodium 135 135 - 147 mmol/L    Potassium 4.7 3.5 - 5.3 mmol/L    Chloride 107 96 - 108 mmol/L    CO2 22 21 - 32 mmol/L    ANION GAP 6 4 - 13 mmol/L    BUN 43 (H) 5 - 25 mg/dL    Creatinine 1.60 (H) 0.60 - 1.30 mg/dL    Glucose 94 65 - 140 mg/dL    Glucose, Fasting 94 65 - 99 mg/dL    Calcium 8.6 8.4 - 10.2 " mg/dL    Corrected Calcium 9.2 8.3 - 10.1 mg/dL    AST 16 13 - 39 U/L    ALT 24 7 - 52 U/L    Alkaline Phosphatase 71 34 - 104 U/L    Total Protein 7.5 6.4 - 8.4 g/dL    Albumin 3.3 (L) 3.5 - 5.0 g/dL    Total Bilirubin 0.63 0.20 - 1.00 mg/dL    eGFR 57 ml/min/1.73sq m   AM CBC and Diff   Result Value Ref Range    WBC 6.66 4.31 - 10.16 Thousand/uL    RBC 4.08 3.88 - 5.62 Million/uL    Hemoglobin 11.4 (L) 12.0 - 17.0 g/dL    Hematocrit 33.1 (L) 36.5 - 49.3 %    MCV 81 (L) 82 - 98 fL    MCH 27.9 26.8 - 34.3 pg    MCHC 34.4 31.4 - 37.4 g/dL    RDW 11.9 11.6 - 15.1 %    MPV 9.0 8.9 - 12.7 fL    Platelets 223 149 - 390 Thousands/uL    nRBC 0 /100 WBCs    Segmented % 65 43 - 75 %    Immature Grans % 0 0 - 2 %    Lymphocytes % 19 14 - 44 %    Monocytes % 14 (H) 4 - 12 %    Eosinophils Relative 1 0 - 6 %    Basophils Relative 1 0 - 1 %    Absolute Neutrophils 4.40 1.85 - 7.62 Thousands/µL    Absolute Immature Grans 0.02 0.00 - 0.20 Thousand/uL    Absolute Lymphocytes 1.23 0.60 - 4.47 Thousands/µL    Absolute Monocytes 0.91 0.17 - 1.22 Thousand/µL    Eosinophils Absolute 0.06 0.00 - 0.61 Thousand/µL    Basophils Absolute 0.04 0.00 - 0.10 Thousands/µL   AM Phosphorus   Result Value Ref Range    Phosphorus 4.9 (H) 2.7 - 4.5 mg/dL   Protein / creatinine ratio, urine   Result Value Ref Range    Creatinine, Ur <1.0 Reference range not established. mg/dL    Protein Urine Random <4 Reference range not established. mg/dL    Prot/Creat Ratio, Ur     Anti-neutrophilic cytoplasmic antibody   Result Value Ref Range    C-ANCA <1:20 Neg:<1:20 titer    Atypical pANCA <1:20 Neg:<1:20 titer    MPO AB <0.2 0.0 - 0.9 units    KS-3 AB <0.2 0.0 - 0.9 units    P-ANCA <1:20 Neg:<1:20 titer   Hepatitis B surface antigen   Result Value Ref Range    Hepatitis B Surface Ag Non-reactive Non-Reactive   Hepatitis C antibody   Result Value Ref Range    Hepatitis C Ab Non-reactive Non-Reactive   Anti-DNAse B antibody   Result Value Ref Range    DNase B Ab  487 (H) 0 - 120 U/mL   Glomerular basement membrane antibodies   Result Value Ref Range    GBM Ab <0.2 0.0 - 0.9 units   C3 complement   Result Value Ref Range    C3 Complement 27 (L) 87 - 200 mg/dL   C4 complement   Result Value Ref Range    C4, COMPLEMENT 14 (L) 19 - 52 mg/dL   UA (URINE) with reflex to Scope   Result Value Ref Range    Color, UA Light Yellow     Clarity, UA Clear     Specific Gravity, UA 1.019 1.003 - 1.030    pH, UA 6.0 4.5, 5.0, 5.5, 6.0, 6.5, 7.0, 7.5, 8.0    Leukocytes, UA Negative Negative    Nitrite, UA Negative Negative    Protein,  (3+) (A) Negative mg/dl    Glucose, UA Negative Negative mg/dl    Ketones, UA Negative Negative mg/dl    Urobilinogen, UA <2.0 <2.0 mg/dl mg/dl    Bilirubin, UA Negative Negative    Occult Blood, UA Large (A) Negative   CK   Result Value Ref Range    Total CK 44 39 - 308 U/L   Albumin / creatinine urine ratio   Result Value Ref Range    Creatinine, Ur 139.8 Reference range not established. mg/dL    Albumin,U,Random 3,033.6 (H) <20.0 mg/L    Albumin Creat Ratio 2,170 (H) 0 - 30 mg/g creatinine   Sodium, urine, random   Result Value Ref Range    Sodium, Ur 17 Reference range not established.   Urea nitrogen, urine   Result Value Ref Range    Urea Nitrogen, Ur 1,097 Reference range not established. mg/dL   Anti-DNA antibody, double-stranded   Result Value Ref Range    ds DNA Ab 1 0 - 9 IU/mL   Urine Microscopic   Result Value Ref Range    RBC, UA 10-20 (A) None Seen, 1-2 /hpf    WBC, UA 10-20 (A) None Seen, 1-2 /hpf    Epithelial Cells None Seen None Seen, Occasional /hpf    Bacteria, UA None Seen None Seen, Occasional /hpf    Hyaline Casts, UA 3-5 (A) None Seen /lpf   Procalcitonin   Result Value Ref Range    Procalcitonin 0.11 <=0.25 ng/ml   BMP AM Draw X 3 days starting day 2   Result Value Ref Range    Sodium 131 (L) 135 - 147 mmol/L    Potassium 4.9 3.5 - 5.3 mmol/L    Chloride 101 96 - 108 mmol/L    CO2 22 21 - 32 mmol/L    ANION GAP 8 4 - 13 mmol/L     BUN 38 (H) 5 - 25 mg/dL    Creatinine 1.51 (H) 0.60 - 1.30 mg/dL    Glucose 95 65 - 140 mg/dL    Glucose, Fasting 95 65 - 99 mg/dL    Calcium 8.7 8.4 - 10.2 mg/dL    eGFR 61 ml/min/1.73sq m   CBC and differential   Result Value Ref Range    WBC 6.58 4.31 - 10.16 Thousand/uL    RBC 4.35 3.88 - 5.62 Million/uL    Hemoglobin 12.0 12.0 - 17.0 g/dL    Hematocrit 35.8 (L) 36.5 - 49.3 %    MCV 82 82 - 98 fL    MCH 27.6 26.8 - 34.3 pg    MCHC 33.5 31.4 - 37.4 g/dL    RDW 11.9 11.6 - 15.1 %    MPV 8.8 (L) 8.9 - 12.7 fL    Platelets 226 149 - 390 Thousands/uL    nRBC 0 /100 WBCs    Segmented % 65 43 - 75 %    Immature Grans % 1 0 - 2 %    Lymphocytes % 17 14 - 44 %    Monocytes % 16 (H) 4 - 12 %    Eosinophils Relative 0 0 - 6 %    Basophils Relative 1 0 - 1 %    Absolute Neutrophils 4.37 1.85 - 7.62 Thousands/µL    Absolute Immature Grans 0.03 0.00 - 0.20 Thousand/uL    Absolute Lymphocytes 1.09 0.60 - 4.47 Thousands/µL    Absolute Monocytes 1.04 0.17 - 1.22 Thousand/µL    Eosinophils Absolute 0.02 0.00 - 0.61 Thousand/µL    Basophils Absolute 0.03 0.00 - 0.10 Thousands/µL   MALENA Screen w/ Reflex to Titer/Pattern   Result Value Ref Range    MALENA Negative Negative   Protein electrophoresis, serum   Result Value Ref Range    A/G Ratio 0.75 (L) 1.10 - 1.80    Albumin Electrophoresis 42.7 (L) 48.0 - 70.0 %    Albumin CONC 3.03 (L) 3.20 - 5.10 g/dl    Alpha 1 6.2 1.8 - 7.0 %    ALPHA 1 CONC 0.44 0.15 - 0.47 g/dL    Alpha 2 11.5 5.9 - 14.9 %    ALPHA 2 CONC 0.82 0.42 - 1.04 g/dL    Beta-1 5.2 4.7 - 7.7 %    BETA 1 CONC 0.37 0.31 - 0.57 g/dL    Beta-2 4.1 3.1 - 7.9 %    BETA 2 CONC 0.29 0.20 - 0.58 g/dL    Gamma Globulin 30.3 (H) 6.9 - 22.3 %    GAMMA CONC 2.15 (H) 0.40 - 1.66 g/dL    Total Protein 7.1 6.4 - 8.2 g/dL    SPEP Interpretation See Comment    Protein electrophoresis, urine   Result Value Ref Range    Urine Protein 265.5 mg/dL    Albumin ELP, Urine 80.0 %    Alpha 1, Urine 4.2 %    Alpha 2, Urine 3.2 %    Beta, Urine 4.8 %     Gamma Globulin, Urine 7.8 %    UPEP Interp See Comment    RF Screen w/ Reflex to Titer   Result Value Ref Range    Rheumatoid Factor Negative Negative   Protime-INR   Result Value Ref Range    Protime 15.3 (H) 11.6 - 14.5 seconds    INR 1.14 0.84 - 1.19   Babesia microti antibody, IgG & Igm   Result Value Ref Range    Babesia microti IgG <1:10 Neg:<1:10    Babesia microti IgM <1:10 Neg:<1:10    RESULT/COMMENT Comment    Lyme Total AB W Reflex to IGM/IGG   Result Value Ref Range    Lyme Total Antibodies Negative Negative   BMP AM Draw X 3 days starting day 2   Result Value Ref Range    Sodium 128 (L) 135 - 147 mmol/L    Potassium 4.5 3.5 - 5.3 mmol/L    Chloride 98 96 - 108 mmol/L    CO2 22 21 - 32 mmol/L    ANION GAP 8 4 - 13 mmol/L    BUN 32 (H) 5 - 25 mg/dL    Creatinine 1.19 0.60 - 1.30 mg/dL    Glucose 101 65 - 140 mg/dL    Calcium 8.0 (L) 8.4 - 10.2 mg/dL    eGFR 82 ml/min/1.73sq m   Protein / creatinine ratio, urine   Result Value Ref Range    Creatinine, Ur 14.4 Reference range not established. mg/dL    Protein Urine Random 68 Reference range not established. mg/dL    Prot/Creat Ratio, Ur 4.72 (H) 0.00 - 0.10   CBC   Result Value Ref Range    WBC 9.72 4.31 - 10.16 Thousand/uL    RBC 4.39 3.88 - 5.62 Million/uL    Hemoglobin 11.9 (L) 12.0 - 17.0 g/dL    Hematocrit 35.3 (L) 36.5 - 49.3 %    MCV 80 (L) 82 - 98 fL    MCH 27.1 26.8 - 34.3 pg    MCHC 33.7 31.4 - 37.4 g/dL    RDW 11.9 11.6 - 15.1 %    Platelets 278 149 - 390 Thousands/uL    MPV 8.8 (L) 8.9 - 12.7 fL   C-reactive protein   Result Value Ref Range    .3 (H) <3.0 mg/L   CBC   Result Value Ref Range    WBC 8.86 4.31 - 10.16 Thousand/uL    RBC 4.37 3.88 - 5.62 Million/uL    Hemoglobin 12.0 12.0 - 17.0 g/dL    Hematocrit 34.6 (L) 36.5 - 49.3 %    MCV 79 (L) 82 - 98 fL    MCH 27.5 26.8 - 34.3 pg    MCHC 34.7 31.4 - 37.4 g/dL    RDW 11.9 11.6 - 15.1 %    Platelets 256 149 - 390 Thousands/uL    MPV 8.6 (L) 8.9 - 12.7 fL   Immunofixation,  Serum(Reflex Only-Do Not Order)   Result Value Ref Range    Immunofixation Interpretation See Comment    BMP AM Draw X 3 days starting day 2   Result Value Ref Range    Sodium 128 (L) 135 - 147 mmol/L    Potassium 4.2 3.5 - 5.3 mmol/L    Chloride 97 96 - 108 mmol/L    CO2 24 21 - 32 mmol/L    ANION GAP 7 4 - 13 mmol/L    BUN 33 (H) 5 - 25 mg/dL    Creatinine 1.14 0.60 - 1.30 mg/dL    Glucose 99 65 - 140 mg/dL    Calcium 7.9 (L) 8.4 - 10.2 mg/dL    eGFR 86 ml/min/1.73sq m   CBC   Result Value Ref Range    WBC 10.14 4.31 - 10.16 Thousand/uL    RBC 4.02 3.88 - 5.62 Million/uL    Hemoglobin 11.1 (L) 12.0 - 17.0 g/dL    Hematocrit 31.7 (L) 36.5 - 49.3 %    MCV 79 (L) 82 - 98 fL    MCH 27.6 26.8 - 34.3 pg    MCHC 35.0 31.4 - 37.4 g/dL    RDW 11.9 11.6 - 15.1 %    Platelets 247 149 - 390 Thousands/uL    MPV 8.6 (L) 8.9 - 12.7 fL   Calcium, ionized   Result Value Ref Range    Calcium, Ionized 1.06 (L) 1.12 - 1.32 mmol/L   Magnesium   Result Value Ref Range    Magnesium 2.2 1.9 - 2.7 mg/dL   Phosphorus   Result Value Ref Range    Phosphorus 4.3 2.7 - 4.5 mg/dL   Immunofixation,Urine(Reflex Only-Do Not Order)   Result Value Ref Range    Immunofixation Interpretation See Comment    Chloride, urine, random   Result Value Ref Range    Chloride, Ur 32 Reference range not established. mmol/L   Osmolality, urine   Result Value Ref Range    Osmolality, Ur 404 250 - 900 mmol/KG   Potassium, urine, random   Result Value Ref Range    Potassium Urine  19.9 Reference range not established. mmol/L   Sodium, urine, random   Result Value Ref Range    Sodium, Ur 39 Reference range not established.   TSH, 3rd generation   Result Value Ref Range    TSH 3RD GENERATON 4.625 (H) 0.450 - 4.500 uIU/mL   Uric acid   Result Value Ref Range    Uric Acid 8.1 3.5 - 8.5 mg/dL   Bronchoalveolar Lavage (BAL) Differential   Result Value Ref Range    Neutrophil Count, Fluid 18 %    Lymphs, Fluid 21 %    Macrophage count 61 %    Total Counted 100    Blood  gas, arterial   Result Value Ref Range    pH, Arterial 7.361 7.350 - 7.450    pCO2, Arterial 41.8 36.0 - 44.0 mm Hg    pO2, Arterial 90.3 75.0 - 129.0 mm Hg    HCO3, Arterial 23.1 22.0 - 28.0 mmol/L    Base Excess, Arterial -2.2 mmol/L    O2 Content, Arterial 17.4 16.0 - 23.0 mL/dL    O2 HGB,Arterial  94.7 94.0 - 97.0 %    SOURCE Radial, Left     COLE TEST Yes     Vent Type- AC AC     AC Rate 16     Tidal Volume 400 ml    I-TIME .9     Inspired Air (FIO2) 100     PEEP 10    Basic metabolic panel   Result Value Ref Range    Sodium 129 (L) 135 - 147 mmol/L    Potassium 4.9 3.5 - 5.3 mmol/L    Chloride 100 96 - 108 mmol/L    CO2 20 (L) 21 - 32 mmol/L    ANION GAP 9 4 - 13 mmol/L    BUN 48 (H) 5 - 25 mg/dL    Creatinine 2.07 (H) 0.60 - 1.30 mg/dL    Glucose 153 (H) 65 - 140 mg/dL    Calcium 8.0 (L) 8.4 - 10.2 mg/dL    eGFR 42 ml/min/1.73sq m   Calcium, ionized   Result Value Ref Range    Calcium, Ionized 1.02 (L) 1.12 - 1.32 mmol/L   CBC and differential   Result Value Ref Range    WBC 9.68 4.31 - 10.16 Thousand/uL    RBC 3.28 (L) 3.88 - 5.62 Million/uL    Hemoglobin 9.1 (L) 12.0 - 17.0 g/dL    Hematocrit 26.2 (L) 36.5 - 49.3 %    MCV 80 (L) 82 - 98 fL    MCH 27.7 26.8 - 34.3 pg    MCHC 34.7 31.4 - 37.4 g/dL    RDW 11.8 11.6 - 15.1 %    MPV 9.0 8.9 - 12.7 fL    Platelets 238 149 - 390 Thousands/uL    nRBC 0 /100 WBCs    Segmented % 84 (H) 43 - 75 %    Immature Grans % 1 0 - 2 %    Lymphocytes % 9 (L) 14 - 44 %    Monocytes % 6 4 - 12 %    Eosinophils Relative 0 0 - 6 %    Basophils Relative 0 0 - 1 %    Absolute Neutrophils 8.12 (H) 1.85 - 7.62 Thousands/µL    Absolute Immature Grans 0.09 0.00 - 0.20 Thousand/uL    Absolute Lymphocytes 0.87 0.60 - 4.47 Thousands/µL    Absolute Monocytes 0.58 0.17 - 1.22 Thousand/µL    Eosinophils Absolute 0.00 0.00 - 0.61 Thousand/µL    Basophils Absolute 0.02 0.00 - 0.10 Thousands/µL   Magnesium   Result Value Ref Range    Magnesium 2.4 1.9 - 2.7 mg/dL   Phosphorus   Result Value Ref  Range    Phosphorus 6.4 (H) 2.7 - 4.5 mg/dL   Procalcitonin   Result Value Ref Range    Procalcitonin 0.42 (H) <=0.25 ng/ml   Blood gas, arterial   Result Value Ref Range    pH, Arterial 7.377 7.350 - 7.450    pCO2, Arterial 35.1 (L) 36.0 - 44.0 mm Hg    pO2, Arterial 81.1 75.0 - 129.0 mm Hg    HCO3, Arterial 20.2 (L) 22.0 - 28.0 mmol/L    Base Excess, Arterial -4.2 mmol/L    O2 Content, Arterial 18.5 16.0 - 23.0 mL/dL    O2 HGB,Arterial  93.7 (L) 94.0 - 97.0 %    SOURCE Radial, Left     COLE TEST Yes     Vent Type- AC AC     AC Rate 16     Tidal Volume 400 ml    I-TIME 0.9     [employer] (FIO2) 70     PEEP 10    Vancomycin, random   Result Value Ref Range    Vancomycin Rm 36.7 (H) 10.0 - 20.0 ug/mL   Basic metabolic panel   Result Value Ref Range    Sodium 131 (L) 135 - 147 mmol/L    Potassium 4.5 3.5 - 5.3 mmol/L    Chloride 102 96 - 108 mmol/L    CO2 20 (L) 21 - 32 mmol/L    ANION GAP 9 4 - 13 mmol/L    BUN 56 (H) 5 - 25 mg/dL    Creatinine 1.92 (H) 0.60 - 1.30 mg/dL    Glucose 157 (H) 65 - 140 mg/dL    Calcium 8.3 (L) 8.4 - 10.2 mg/dL    eGFR 46 ml/min/1.73sq m   Magnesium   Result Value Ref Range    Magnesium 2.6 1.9 - 2.7 mg/dL   Blood gas, arterial   Result Value Ref Range    pH, Arterial 7.394 7.350 - 7.450    pCO2, Arterial 34.6 (L) 36.0 - 44.0 mm Hg    pO2, Arterial 113.9 75.0 - 129.0 mm Hg    HCO3, Arterial 20.7 (L) 22.0 - 28.0 mmol/L    Base Excess, Arterial -3.7 mmol/L    O2 Content, Arterial 13.3 (L) 16.0 - 23.0 mL/dL    O2 HGB,Arterial  96.0 94.0 - 97.0 %    SOURCE Line, Arterial     Vent Type- AC AC     AC Rate 16     Tidal Volume 480 ml    I-TIME 1.00     Inspired Air (FIO2) 60     PEEP 10    Blood gas, arterial   Result Value Ref Range    pH, Arterial 7.407 7.350 - 7.450    pCO2, Arterial 33.6 (L) 36.0 - 44.0 mm Hg    pO2, Arterial 94.4 75.0 - 129.0 mm Hg    HCO3, Arterial 20.7 (L) 22.0 - 28.0 mmol/L    Base Excess, Arterial -3.5 mmol/L    O2 Content, Arterial 11.8 (L) 16.0 - 23.0 mL/dL    O2  HGB,Arterial  95.1 94.0 - 97.0 %    SOURCE Line, Arterial     Temperature 97.7 Degrees Fehrenheit    Vent Type- AC AC     AC Rate 16     Tidal Volume 480 ml    I-TIME 1.0     Inspired Air (FIO2) 50     PEEP 10    Blood gas, arterial   Result Value Ref Range    pH, Arterial 7.412 7.350 - 7.450    pCO2, Arterial 31.9 (L) 36.0 - 44.0 mm Hg    pO2, Arterial 85.7 75.0 - 129.0 mm Hg    HCO3, Arterial 19.9 (L) 22.0 - 28.0 mmol/L    Base Excess, Arterial -3.8 mmol/L    O2 Content, Arterial 17.0 16.0 - 23.0 mL/dL    O2 HGB,Arterial  94.3 94.0 - 97.0 %    SOURCE Line, Arterial     COLE TEST Yes     Vent Type- AC AC     AC Rate 16     Tidal Volume 480 ml    I-TIME 1     Inspired Air (FIO2) 40     PEEP 8    Vancomycin, random   Result Value Ref Range    Vancomycin Rm 18.3 10.0 - 20.0 ug/mL   Comprehensive metabolic panel   Result Value Ref Range    Sodium 133 (L) 135 - 147 mmol/L    Potassium 4.2 3.5 - 5.3 mmol/L    Chloride 104 96 - 108 mmol/L    CO2 20 (L) 21 - 32 mmol/L    ANION GAP 9 4 - 13 mmol/L    BUN 66 (H) 5 - 25 mg/dL    Creatinine 1.80 (H) 0.60 - 1.30 mg/dL    Glucose 213 (H) 65 - 140 mg/dL    Calcium 8.2 (L) 8.4 - 10.2 mg/dL    Corrected Calcium 9.2 8.3 - 10.1 mg/dL    AST 11 (L) 13 - 39 U/L    ALT 16 7 - 52 U/L    Alkaline Phosphatase 61 34 - 104 U/L    Total Protein 7.0 6.4 - 8.4 g/dL    Albumin 2.7 (L) 3.5 - 5.0 g/dL    Total Bilirubin 0.46 0.20 - 1.00 mg/dL    eGFR 49 ml/min/1.73sq m   CBC   Result Value Ref Range    WBC 8.63 4.31 - 10.16 Thousand/uL    RBC 3.14 (L) 3.88 - 5.62 Million/uL    Hemoglobin 8.7 (L) 12.0 - 17.0 g/dL    Hematocrit 24.8 (L) 36.5 - 49.3 %    MCV 79 (L) 82 - 98 fL    MCH 27.7 26.8 - 34.3 pg    MCHC 35.1 31.4 - 37.4 g/dL    RDW 12.0 11.6 - 15.1 %    Platelets 218 149 - 390 Thousands/uL    MPV 9.2 8.9 - 12.7 fL   Calcium, ionized   Result Value Ref Range    Calcium, Ionized 1.07 (L) 1.12 - 1.32 mmol/L   Magnesium   Result Value Ref Range    Magnesium 2.8 (H) 1.9 - 2.7 mg/dL   Phosphorus    Result Value Ref Range    Phosphorus 6.0 (H) 2.7 - 4.5 mg/dL   Procalcitonin   Result Value Ref Range    Procalcitonin 0.38 (H) <=0.25 ng/ml   Blood gas, arterial   Result Value Ref Range    pH, Arterial 7.444 7.350 - 7.450    pCO2, Arterial 28.6 (L) 36.0 - 44.0 mm Hg    pO2, Arterial 92.0 75.0 - 129.0 mm Hg    HCO3, Arterial 19.2 (L) 22.0 - 28.0 mmol/L    Base Excess, Arterial -4.0 mmol/L    O2 Content, Arterial 13.1 (L) 16.0 - 23.0 mL/dL    O2 HGB,Arterial  94.7 94.0 - 97.0 %    SOURCE Line, Arterial     COLE TEST Yes     Vent Type- AC AC     AC Rate 16     Tidal Volume 480 ml    I-TIME 1     Inspired Air (FIO2) 40     PEEP 8    Hemoglobin A1c w/EAG Estimation (Prechecked if no A1C within 90 days)   Result Value Ref Range    Hemoglobin A1C 5.9 (H) Normal 4.0-5.6%; PreDiabetic 5.7-6.4%; Diabetic >=6.5%; Glycemic control for adults with diabetes <7.0% %     mg/dl   Vancomycin, random   Result Value Ref Range    Vancomycin Rm 6.2 (L) 10.0 - 20.0 ug/mL   RAPID HIV 1/2 AB-AG COMBO for 12 years old and above   Result Value Ref Range    Rapid HIV 1 AND 2 Non-Reactive Non-Reactive, Invalid    HIV-1 P24 Ag Screen Non-Reactive Non-Reactive   CBC and differential   Result Value Ref Range    WBC 10.61 (H) 4.31 - 10.16 Thousand/uL    RBC 3.23 (L) 3.88 - 5.62 Million/uL    Hemoglobin 9.0 (L) 12.0 - 17.0 g/dL    Hematocrit 26.5 (L) 36.5 - 49.3 %    MCV 82 82 - 98 fL    MCH 27.9 26.8 - 34.3 pg    MCHC 34.0 31.4 - 37.4 g/dL    RDW 12.9 11.6 - 15.1 %    MPV 8.8 (L) 8.9 - 12.7 fL    Platelets 230 149 - 390 Thousands/uL    nRBC 0 /100 WBCs    Segmented % 79 (H) 43 - 75 %    Immature Grans % 2 0 - 2 %    Lymphocytes % 7 (L) 14 - 44 %    Monocytes % 12 4 - 12 %    Eosinophils Relative 0 0 - 6 %    Basophils Relative 0 0 - 1 %    Absolute Neutrophils 8.43 (H) 1.85 - 7.62 Thousands/µL    Absolute Immature Grans 0.17 0.00 - 0.20 Thousand/uL    Absolute Lymphocytes 0.75 0.60 - 4.47 Thousands/µL    Absolute Monocytes 1.25 (H) 0.17  - 1.22 Thousand/µL    Eosinophils Absolute 0.00 0.00 - 0.61 Thousand/µL    Basophils Absolute 0.01 0.00 - 0.10 Thousands/µL   Comprehensive metabolic panel   Result Value Ref Range    Sodium 139 135 - 147 mmol/L    Potassium 3.9 3.5 - 5.3 mmol/L    Chloride 109 (H) 96 - 108 mmol/L    CO2 23 21 - 32 mmol/L    ANION GAP 7 4 - 13 mmol/L    BUN 58 (H) 5 - 25 mg/dL    Creatinine 1.30 0.60 - 1.30 mg/dL    Glucose 146 (H) 65 - 140 mg/dL    Calcium 7.7 (L) 8.4 - 10.2 mg/dL    Corrected Calcium 8.7 8.3 - 10.1 mg/dL    AST 17 13 - 39 U/L    ALT 22 7 - 52 U/L    Alkaline Phosphatase 62 34 - 104 U/L    Total Protein 6.8 6.4 - 8.4 g/dL    Albumin 2.7 (L) 3.5 - 5.0 g/dL    Total Bilirubin 0.35 0.20 - 1.00 mg/dL    eGFR 73 ml/min/1.73sq m   Magnesium   Result Value Ref Range    Magnesium 2.9 (H) 1.9 - 2.7 mg/dL   Phosphorus   Result Value Ref Range    Phosphorus 4.6 (H) 2.7 - 4.5 mg/dL   Calcium, ionized   Result Value Ref Range    Calcium, Ionized 1.07 (L) 1.12 - 1.32 mmol/L   CBC   Result Value Ref Range    WBC 7.52 4.31 - 10.16 Thousand/uL    RBC 3.31 (L) 3.88 - 5.62 Million/uL    Hemoglobin 9.0 (L) 12.0 - 17.0 g/dL    Hematocrit 28.0 (L) 36.5 - 49.3 %    MCV 85 82 - 98 fL    MCH 27.2 26.8 - 34.3 pg    MCHC 32.1 31.4 - 37.4 g/dL    RDW 12.9 11.6 - 15.1 %    Platelets 233 149 - 390 Thousands/uL    MPV 8.9 8.9 - 12.7 fL   Basic metabolic panel   Result Value Ref Range    Sodium 141 135 - 147 mmol/L    Potassium 4.3 3.5 - 5.3 mmol/L    Chloride 110 (H) 96 - 108 mmol/L    CO2 26 21 - 32 mmol/L    ANION GAP 5 4 - 13 mmol/L    BUN 48 (H) 5 - 25 mg/dL    Creatinine 1.03 0.60 - 1.30 mg/dL    Glucose 129 65 - 140 mg/dL    Calcium 7.7 (L) 8.4 - 10.2 mg/dL    eGFR 97 ml/min/1.73sq m   ECG 12 lead   Result Value Ref Range    Ventricular Rate 81 BPM    Atrial Rate 81 BPM    LA Interval 140 ms    QRSD Interval 84 ms    QT Interval 346 ms    QTC Interval 401 ms    P Axis 47 degrees    QRS Axis 93 degrees    T Wave Axis 35 degrees   ECG 12  lead   Result Value Ref Range    Ventricular Rate 88 BPM    Atrial Rate 88 BPM    RI Interval 136 ms    QRSD Interval 76 ms    QT Interval 328 ms    QTC Interval 396 ms    P Axis 50 degrees    QRS Axis 85 degrees    T Wave Axis 33 degrees   ECG 12 lead   Result Value Ref Range    Ventricular Rate 90 BPM    Atrial Rate 90 BPM    RI Interval 136 ms    QRSD Interval 78 ms    QT Interval 326 ms    QTC Interval 398 ms    P Wilcox 55 degrees    QRS Axis 85 degrees    T Wave Axis 29 degrees   ECG 12 lead   Result Value Ref Range    Ventricular Rate 88 BPM    Atrial Rate 88 BPM    RI Interval 134 ms    QRSD Interval 84 ms    QT Interval 348 ms    QTC Interval 421 ms    P Axis 58 degrees    QRS Axis 72 degrees    T Wave Axis 27 degrees   Echo complete w/ contrast if indicated   Result Value Ref Range    BSA 2.3 m2    A4C EF 64 %    LVIDd 5.20 cm    LVIDS 3.40 cm    IVSd 0.90 cm    LVPWd 1.10 cm    FS 35 28 - 44    MV E' Tissue Velocity Septal 11 cm/s    LA Volume Index (BP) 21.3 mL/m2    E/A ratio 1.56     E wave deceleration time 166 ms    MV Peak E Luis 103 cm/s    MV Peak A Luis 0.66 m/s    RVID d 3.4 cm    Tricuspid annular plane systolic excursion 2.30 cm    LA size 4.3 cm    LA/Ao Ratio 2D 1.39     LA volume (BP) 49 mL    MV stenosis pressure 1/2 time 49 ms    MV valve area p 1/2 method 4.49     Ao root 3.10 cm    Asc Ao 3.1 cm    Left ventricular stroke volume (2D) 84.00 mL    IVS 0.9 cm    LEFT VENTRICLE SYSTOLIC VOLUME (MOD BIPLANE) 2D 46 mL    LV DIASTOLIC VOLUME (MOD BIPLANE) 2D 130 mL    Left Atrium Area-systolic Four Chamber 19.7 cm2    Left Atrium Area-systolic Apical Two Chamber 16 cm2    LVSV, 2D 84 mL    LV EF 60    Type and screen   Result Value Ref Range    ABO Grouping O     Rh Factor Positive     Antibody Screen Negative     Specimen Expiration Date 20240713    ABORh Recheck - Contact Blood Bank Prior to Collection   Result Value Ref Range    ABO Grouping O     Rh Factor Positive    Tissue Exam   Result  Value Ref Range    Case Report       Surgical Pathology Report                         Case: [id number]                                  Authorizing Provider:  Randy Wells MD            Collected:           07/09/2024 1031              Ordering Location:     [facility] Received:            07/09/2024 1040                                     3rd Floor Med Surg Unit                                                      Pathologist:           Star Ceron MD                                                                           Intraop:               Star Ceron MD                                                                           Specimen:    Kidney                                                                                     Final Diagnosis       KIDNEY, LEFT, RANDOM BIOPSIES (3):    - Renal cortical parenchyma, gross microscopic evaluation only.    - The entire specimen is sent, in appropriate fixative, for electron microscopy, light microscopy and immunofluorescence, to [facility] for expert renal pathologist evaluation.    - A full report from that institution will follow.         Additional Information       All reported additional testing was performed with appropriately reactive controls.  These tests were developed and their performance characteristics determined by [facility] or appropriate performing facility, though some tests may be performed on tissues which have not been validated for performance characteristics (such as staining performed on alcohol exposed cell blocks and decalcified tissues).  Results should be interpreted with caution and in the context of the patients’ clinical condition. These tests may not be cleared or approved by the U.S. Food  "and Drug Administration, though the FDA has determined that such clearance or approval is not necessary. These tests are used for clinical purposes and they should not be regarded as investigational or for research. This laboratory has been approved by CLIA 88, designated as a high-complexity laboratory and is qualified to perform these tests.    Interpretation performed at [address], [address].      Intraoperative Consultation       KIDNEY, LEFT, BIOPSIES:  Gross Microscopic Examination: Adequate biopsy; glomeruli present.     Results verbally communicated in person to Dr. LADD Ernie by Dr. WILBERTO Ceron on 07/09/2024, at 1032. Interpretation performed at [address], [address].      Gross Description       A. The specimen is received fresh, labeled with the patient's name and hospital number, and is designated \"left kidney\". The specimen consists of tan-red cores of soft tissue each measuring from 0.8-1.0 cm in length by 0.1 cm in diameter. For our laboratory, the specimen is for gross examination only, and no sections are submitted. The cores are distributed and placed in appropriate respective fixative for electron microscopy, light microscopy and immunofluorescence. [facility]       Pulmonary cytology   Result Value Ref Range    Case Report       Non-gynecologic Cytology                          Case: [id number]                                  Authorizing Provider:  Ofelia Corea MD          Collected:           07/10/2024 1421              Ordering Location:     [facility] Received:            07/11/2024 1134                                     Intensive Care Unit                                                          Pathologist:           Parviz Montes MD                                                             Specimens:   A) - Lung, Left Lower Lobe Bronchoalveolar Lavage                                 "                   B) - Lung, Left Lower Lobe Bronchoalveolar Lavage, Cell Block, LLL lavage                  Final Diagnosis       A-B. Lung, Left Lower Lobe Bronchoalveolar Lavage, (ThinPrep and cell block preparations):  Negative for malignancy.  Benign bronchial cells with macrophages, lymphocytes, and neutrophils.    Satisfactory for evaluation.         Gross Description       A. 20 mL, pale pink, mucoid, not received in CytoLyt     B. Minimal cell button, red, Due to the (size and/or consistency) of the specimen, it is questionable whether cell block will survive histological processing.         Clinical Information       Per clinical note:    HPI:  29-year-old male with hypertension, hypothyroidism.  She reportedly had been having some URI-like symptoms about 2 weeks ago.  His symptoms included subjective chills, body aches, sore throat.  He was taking over-the-counter medications for symptomatic management and did not seek any medical attention nor was he prescribed antibiotics.  Then over the last week he noted some increasing shortness of breath at home/dyspnea on exertion.  He also was having some increased cough as well.  On arrival he was afebrile.  Chest x-ray 7/6 noted low lung volumes but no acute pulmonary disease.  Creatinine on arrival was 1.76.  Complement levels were low he had significant proteinuria with hematuria raising suspicion for glomerulonephritis.  He underwent IR renal biopsy on 7/9.  After returning from the procedure patient had increased oxygen requirements and he was transferred to the ICU for further care.  Elio chest x-ray 7/9 noted progressive diffuse bilateral  opacities.  Show concern was for pulmonary edema.  He underwent bronchoscopy on 7/10 which was notable for minimal and thick secretions in the right upper lobe.  ET chest noted mucus threads within the distal trachea and extensive consolidative airspace opacities throughout the perihilar regions and bilateral lower  lobes, new compared to 7/6.  He was started on antibiotics with concern for pneumonia.  Renal biopsy report showing C3 glomerulopathy and he was started on IV Solu-Medrol.  BAL cultures are now growing a few colonies of group A streptococcus.      Additional Information       [employer] FDA approved ,  and ThinPrep Imaging Duo System are utilized with strict adherence to the 's instruction manual to prepare gynecologic and non-gynecologic cytology specimens for the production of ThinPrep slides as well as for gynecologic ThinPrep imaging. These processes have been validated by our laboratory and/or by the .    These tests were developed and their performance characteristics determined by [facility] or [email or web address]. They may not be cleared or approved by the U.S. Food and Drug Administration. The FDA has determined that such clearance or approval is not necessary. These tests are used for clinical purposes. They should not be regarded as investigational or for research. This laboratory has been approved by IA 88, designated as a high-complexity laboratory and is qualified to perform these tests.       Fingerstick Glucose (POCT)   Result Value Ref Range    POC Glucose 110 65 - 140 mg/dl   Fingerstick Glucose (POCT)   Result Value Ref Range    POC Glucose 133 65 - 140 mg/dl   Fingerstick Glucose (POCT)   Result Value Ref Range    POC Glucose 138 65 - 140 mg/dl   Fingerstick Glucose (POCT)   Result Value Ref Range    POC Glucose 168 (H) 65 - 140 mg/dl   Fingerstick Glucose (POCT)   Result Value Ref Range    POC Glucose 168 (H) 65 - 140 mg/dl   Fingerstick Glucose (POCT)   Result Value Ref Range    POC Glucose 136 65 - 140 mg/dl   Fingerstick Glucose (POCT)   Result Value Ref Range    POC Glucose 156 (H) 65 - 140 mg/dl   Fingerstick Glucose (POCT)   Result Value Ref Range    POC Glucose 179 (H) 65 - 140 mg/dl   Fingerstick Glucose (POCT)   Result Value  "Ref Range    POC Glucose 196 (H) 65 - 140 mg/dl   Fingerstick Glucose (POCT)   Result Value Ref Range    POC Glucose 127 65 - 140 mg/dl       ASSESSMENT and PLAN:      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  Patient is on prednisone which I will continue.  Patient got antibiotic as a part of the treatment for possible postop infection glomerulonephritis    I will continue prednisone.  Advised to see Dr. Reyes for her opinion on C3 glomerulonephritis    I will start him on losartan as part of the treatment for proteinuria    ROSI (acute kidney injury) (HCC)  Improved and normal at this point    Essential hypertension  Blood pressure is on the higher side.  Will start losartan 50 mg once a day as part of the management for that along with proteinuria    Persistent proteinuria  Likely related to C3 glomerulonephritis.  Will give losartan along with prednisone and monitor      Everything discussed at length with the patient and the family.  Will continue prednisone.  Will start losartan.  Patient will be seen by Dr. Reyes.  I will see him back in 3 months.  Will get monthly blood tests in between    Portions of the record may have been created with voice recognition software. Occasional wrong word or \"sound a like\" substitutions may have occurred due to the inherent limitations of voice recognition software. Read the chart carefully and recognize, using context, where substitutions have occurred.If you have any questions, please contact the dictating provider.   "

## 2024-07-16 NOTE — ASSESSMENT & PLAN NOTE
Blood pressure is on the higher side.  Will start losartan 50 mg once a day as part of the management for that along with proteinuria

## 2024-07-16 NOTE — ASSESSMENT & PLAN NOTE
By kidney biopsy.  Patient is on prednisone which I will continue.  Patient got antibiotic as a part of the treatment for possible postop infection glomerulonephritis    I will continue prednisone.  Advised to see Dr. Reyes for her opinion on C3 glomerulonephritis    I will start him on losartan as part of the treatment for proteinuria

## 2024-07-19 LAB — SCAN RESULT: NORMAL

## 2024-07-22 LAB
FUNGUS SPEC CULT: NORMAL
FUNGUS SPEC CULT: NORMAL
SCAN RESULT: NORMAL

## 2024-07-23 DIAGNOSIS — E03.9 HYPOTHYROIDISM, UNSPECIFIED TYPE: ICD-10-CM

## 2024-07-23 LAB
MYCOBACTERIUM SPEC CULT: NORMAL
RHODAMINE-AURAMINE STN SPEC: NORMAL

## 2024-07-23 RX ORDER — LEVOTHYROXINE SODIUM 0.05 MG/1
50 TABLET ORAL DAILY
Qty: 90 TABLET | Refills: 1 | Status: SHIPPED | OUTPATIENT
Start: 2024-07-23

## 2024-07-25 ENCOUNTER — OFFICE VISIT (OUTPATIENT)
Dept: FAMILY MEDICINE CLINIC | Facility: CLINIC | Age: 29
End: 2024-07-25
Payer: COMMERCIAL

## 2024-07-25 VITALS
SYSTOLIC BLOOD PRESSURE: 126 MMHG | OXYGEN SATURATION: 98 % | WEIGHT: 252 LBS | BODY MASS INDEX: 39.55 KG/M2 | HEIGHT: 67 IN | DIASTOLIC BLOOD PRESSURE: 80 MMHG | HEART RATE: 94 BPM

## 2024-07-25 DIAGNOSIS — Z11.3 SCREEN FOR STD (SEXUALLY TRANSMITTED DISEASE): ICD-10-CM

## 2024-07-25 DIAGNOSIS — E55.9 VITAMIN D DEFICIENCY: Primary | ICD-10-CM

## 2024-07-25 DIAGNOSIS — J18.9 PNEUMONIA DUE TO INFECTIOUS ORGANISM, UNSPECIFIED LATERALITY, UNSPECIFIED PART OF LUNG: ICD-10-CM

## 2024-07-25 DIAGNOSIS — I10 ESSENTIAL HYPERTENSION: ICD-10-CM

## 2024-07-25 LAB
SCAN RESULT: NORMAL
SCAN RESULT: NORMAL

## 2024-07-25 PROCEDURE — 99495 TRANSJ CARE MGMT MOD F2F 14D: CPT

## 2024-07-25 RX ORDER — FLUTICASONE PROPIONATE 50 MCG
1 BLISTER, WITH INHALATION DEVICE INHALATION 2 TIMES DAILY
Qty: 60 BLISTER | Refills: 0 | Status: SHIPPED | OUTPATIENT
Start: 2024-07-25 | End: 2024-08-24

## 2024-07-25 NOTE — ASSESSMENT & PLAN NOTE
Blood pressure well-controlled, some palpitations most likely due to steroids however follow-up is scheduled with cardiology.  With some lower extremity edema now but improving.  Continue with nephrology and cardiology.  Follow-up in 4 weeks for annual

## 2024-07-25 NOTE — PROGRESS NOTES
Assessment/Plan:     Chronic Problems:  Essential hypertension  Blood pressure well-controlled, some palpitations most likely due to steroids however follow-up is scheduled with cardiology.  With some lower extremity edema now but improving.  Continue with nephrology and cardiology.  Follow-up in 4 weeks for annual    Vitamin D deficiency  Continue supplement repeat lab work follow-up 4 weeks or sooner      Visit Diagnosis:  Diagnoses and all orders for this visit:    Vitamin D deficiency  -     Vitamin D 25 hydroxy; Future    Essential hypertension  -     Lipid panel; Future    Screen for STD (sexually transmitted disease)  Comments:  Request repeat herpes testing  Orders:  -     Herpes I/II IgG DARIAN w Reflex to HSV-2; Future    Pneumonia due to infectious organism, unspecified laterality, unspecified part of lung  Comments:  better, cough returned today, feels something in there he cannot get out.  Continue the steroids at Flovent rinse mouth after.  Keep follow-up with pulmonology.  Orders:  -     fluticasone (Flovent Diskus) 50 mcg/actuation diskus inhaler; Inhale 1 puff 2 (two) times a day Rinse mouth after use.        Subjective:     Patient ID: Ben Daniel is a 29 y.o. male.    Ben is here for [facility], he was admitted from 7/6-7/14 for pneumonia and was intubated and had kidney injury at that time. On 7/16 he weighed 271 he weighs 252 today. He will taper down the prednisone in 3 months. Repeat lab work in 3 months. He is feeling an itch in his chest today and today is the first day he is feeling it since he was discharged.  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        Active Problems    Patient Active Problem List   Diagnosis   • Essential hypertension   • Fatigue   • Cervical strain   • Scrotal pain   • Acute right-sided low back pain without sciatica   • Angiokeratoma of scrotum   • Anxiety   • Morbid obesity with BMI of 40.0-44.9, adult (HCC)   • Palpitations   • Eczema   • Insomnia   • Vitamin D deficiency    • Hypothyroidism   • Family history of cancer   • Other neutropenia (HCC)   • Nutritional anemia   • Abnormal white blood cell (WBC)   • Hypergammaglobulinemia   • ROSI (acute kidney injury) (HCC)   • SOB (shortness of breath)   • Acute respiratory failure with hypoxia (HCC)   • Hypotension   • ARDS (adult respiratory distress syndrome) (HCC)   • Hemoptysis   • Hyponatremia   • C3 glomerulonephritis   • Persistent proteinuria       Past Medical History     Past Medical History:   Diagnosis Date   • Anxiety    • Chronic kidney disease    • Hypertension        Surgical History    Past Surgical History:   Procedure Laterality Date   • ANKLE SURGERY     • FETAL SURGERY FOR CONGENITAL HERNIA     • IR BIOPSY KIDNEY RANDOM  7/9/2024       Current Meds       Current Outpatient Medications:   •  albuterol (PROVENTIL HFA,VENTOLIN HFA) 90 mcg/act inhaler, Inhale 2 puffs every 4 (four) hours as needed for wheezing or shortness of breath, Disp: 18 g, Rfl: 0  •  amLODIPine (NORVASC) 10 mg tablet, Take 1 tablet (10 mg total) by mouth daily Do not start before July 15, 2024., Disp: 30 tablet, Rfl: 0  •  fluticasone (Flovent Diskus) 50 mcg/actuation diskus inhaler, Inhale 1 puff 2 (two) times a day Rinse mouth after use., Disp: 60 blister, Rfl: 0  •  furosemide (LASIX) 20 mg tablet, Take 1 tablet (20 mg total) by mouth daily, Disp: 30 tablet, Rfl: 0  •  levothyroxine 50 mcg tablet, Take 1 tablet (50 mcg total) by mouth daily, Disp: 90 tablet, Rfl: 1  •  losartan (COZAAR) 50 mg tablet, Take 1 tablet (50 mg total) by mouth daily, Disp: 30 tablet, Rfl: 4  •  pantoprazole (PROTONIX) 40 mg tablet, Take 1 tablet (40 mg total) by mouth daily in the early morning Do not start before July 15, 2024., Disp: 30 tablet, Rfl: 0  •  predniSONE 20 mg tablet, Take 1 tablet (20 mg total) by mouth 2 (two) times a day with meals for 14 days, Disp: 28 tablet, Rfl: 0    Allergies    No Known Allergies    No images are attached to the  encounter.    Health Management    Health Maintenance   Topic Date Due   • Pneumococcal Vaccine: Pediatrics (0 to 5 Years) and At-Risk Patients (6 to 64 Years) (1 of 2 - PCV) Never done   • Zoster Vaccine (1 of 2) Never done   • DTaP,Tdap,and Td Vaccines (1 - Tdap) Never done   • COVID-19 Vaccine (2 - Pfizer risk series) 01/18/2022   • Annual Physical  01/09/2024   • Influenza Vaccine (1) 09/01/2024   • Depression Screening  07/25/2025   • RSV Vaccine Age 60+ Years (1 - 1-dose 60+ series) 02/02/2055   • HIV Screening  Completed   • Hepatitis C Screening  Completed   • RSV Vaccine age 0-20 Months  Aged Out   • HIB Vaccine  Aged Out   • IPV Vaccine  Aged Out   • Hepatitis A Vaccine  Aged Out   • Meningococcal ACWY Vaccine  Aged Out   • HPV Vaccine  Aged Out       CBC:   Results from last 6 Months   Lab Units 07/14/24  0603 07/13/24  0527   WBC Thousand/uL 7.52 10.61*   RBC Million/uL 3.31* 3.23*   HEMOGLOBIN g/dL 9.0* 9.0*   HEMATOCRIT % 28.0* 26.5*   MCV fL 85 82   MCH pg 27.2 27.9   MCHC g/dL 32.1 34.0   RDW % 12.9 12.9   MPV fL 8.9 8.8*   PLATELETS Thousands/uL 233 230   NRBC AUTO /100 WBCs  --  0   SEGS PCT %  --  79*   LYMPHO PCT %  --  7*   MONO PCT %  --  12   EOS PCT %  --  0   BASOS PCT %  --  0   TOTAL NEUT ABS Thousands/µL  --  8.43*   LYMPHS ABS Thousands/µL  --  0.75   MONOS ABS Thousand/µL  --  1.25*   EOS ABS Thousand/µL  --  0.00       Chemistry Profile:   Results from last 6 Months   Lab Units 07/14/24  0603 07/13/24  0527 07/09/24  0539 07/08/24  0510   POTASSIUM mmol/L 4.3 3.9   < > 4.9   CHLORIDE mmol/L 110* 109*   < > 101   CO2 mmol/L 26 23   < > 22   BUN mg/dL 48* 58*   < > 38*   CREATININE mg/dL 1.03 1.30   < > 1.51*   GLUCOSE FASTING mg/dL  --   --   --  95   CALCIUM mg/dL 7.7* 7.7*   < > 8.7   MAGNESIUM mg/dL  --  2.9*   < >  --    PHOSPHORUS mg/dL  --  4.6*   < >  --    AST U/L  --  17   < >  --    ALT U/L  --  22   < >  --    ALK PHOS U/L  --  62   < >  --    EGFR ml/min/1.73sq m 97 73   < >  61    < > = values in this interval not displayed.       Coagulation Studies:   Results from last 6 Months   Lab Units 07/08/24  1420   PROTIME seconds 15.3*   INR  1.14       Endocrine Studies:   Results from last 6 Months   Lab Units 07/12/24  1113 07/10/24  1017 01/30/24  1017   HEMOGLOBIN A1C % 5.9*  --  5.5   TSH 3RD GENERATON uIU/mL  --  4.625* 5.541*   FREE T4 ng/dL  --   --  0.98         Imaging: XR chest portable    Result Date: 7/14/2024  Narrative: XR CHEST PORTABLE INDICATION: Follow-up pneumonia. COMPARISON: Chest CT 7/11/2024, CXR 7/10/2024. FINDINGS: Marked improvement in bilateral consolidation. No pneumothorax or pleural effusion. Normal cardiomediastinal silhouette. Bones are unremarkable for age. Normal upper abdomen.     Impression: Marked improvement in bilateral consolidation. The rapid improvement suggest pulmonary edema. Superimposed pneumonia not excluded in the appropriate clinical setting. Workstation performed: [id number]     CT chest wo contrast    Result Date: 7/11/2024  Narrative: CT CHEST WITHOUT IV CONTRAST INDICATION: Hypoxia, ARDS. COMPARISON: CT chest 7/6/2024. TECHNIQUE: CT examination of the chest was performed without intravenous contrast. Multiplanar 2D reformatted images were created from the source data. This examination, like all CT scans performed in the [facility], was performed utilizing techniques to minimize radiation dose exposure, including the use of iterative reconstruction and automated exposure control. Radiation dose length product (DLP) for this visit: 1039 mGy-cm FINDINGS: LUNGS: Endotracheal tube terminates 2.6 cm above the leann in satisfactory position. Mucous threads within the distal trachea. Extensive consolidative airspace opacities throughout the perihilar regions and the bilateral lower lobes, new since 7/6/2024. PLEURA: Probable trace bilateral pleural effusion, although difficult to visualize without contrast. No pneumothorax.  HEART/GREAT VESSELS: Cardiomegaly. No thoracic aortic aneurysm. MEDIASTINUM AND BARTOLOME: Unremarkable. CHEST WALL AND LOWER NECK: Bilateral gynecomastia. VISUALIZED STRUCTURES IN THE UPPER ABDOMEN: Unremarkable. OSSEOUS STRUCTURES: No acute fracture or destructive osseous lesion.     Impression: Extensive consolidative airspace opacities throughout the perihilar regions and the bilateral lower lobes, new since 7/6/2024. Findings likely represent a combination of pulmonary edema and multifocal pneumonia in a pattern typical for aspiration pneumonia. Recommend short-term follow-up noncontrast chest CT in 3 months to assess for resolution. Mild cardiomegaly. Endotracheal tube in satisfactory position. The study was marked in EPIC for immediate notification. Workstation performed: [id number]     XR chest portable ICU    Result Date: 7/10/2024  Narrative: XR CHEST PORTABLE ICU INDICATION: post-bronch. COMPARISON: Earlier today FINDINGS: Tip of endotracheal tube is above the leann. Low lung volumes noted. Cardiac monitor leads on the chest. Extensive bilateral pulmonary consolidations unchanged from most recent prior. No pneumothorax or pleural effusion. Normal cardiomediastinal silhouette. There is mild bone spurring at the distal aspect of the right clavicle Normal upper abdomen.     Impression: Satisfactory endotracheal tube positioning. Extensive bilateral infiltrates without significant change from recent prior Workstation performed: [id number]     XR chest portable ICU    Result Date: 7/10/2024  Narrative: XR CHEST PORTABLE ICU INDICATION: follow up opacities. COMPARISON: Previous day FINDINGS: Extensive bilateral pulmonary consolidations are redemonstrated. These actually appear more prominent than on the prior study. No pneumothorax or pleural effusion. Normal cardiomediastinal silhouette. Bones are unremarkable for age. Normal upper abdomen.     Impression: Increased prominence of bilateral pulmonary consolidations  Workstation performed: [id number]     Bronchoscopy    Result Date: 7/10/2024  Narrative: Table formatting from the original result was not included.  [facility] [address] [facility] [id number] [phone] DATE OF SERVICE: 7/10/24 PHYSICIAN(S): Attending: Ofelia Corea Fellow: No Staff Documented INDICATION: Abnormal chest x-ray mild hemoptysis POST-OP DIAGNOSIS: See the impression below. PREPROCEDURE: Standard airway preparation completed per respiratory therapy protocol.  Informed consent was obtained. Images reviewed prior to the procedure.  A Time Out was performed. No suspicion or identified risk for TB or other airborne infectious disease; bronchoscopy procedure being performed for diagnostic purposes. PROCEDURE: Bronchoscopy DETAILS OF PROCEDURE: Patient was taken to the procedure room where a time out was performed to confirm correct patient and correct procedure. The patient underwent general anesthesia, which was administered by an anesthesia professional. The patient's blood pressure, heart rate, level of consciousness and oxygen were monitored throughout the procedure. The patient experienced no blood loss. The scope was introduced through the endotracheal tube. The procedure was not difficult. The patient tolerated the procedure well. There were no apparent adverse events. The bronchoscope was passed through the endotracheal tube, thick dark brown/bloody secretions noted in the trachea, the endotracheal tube above the leann, lidocaine was given for the leann leann was sharp and the bronchoscope was then passed through the right mainstem bronchus and right upper lobe thick dark brown/bloody secretions in the right upper lobe easily cleared with 5 mL of saline underlying mucosa seen clear no endobronchial lesion, due to hypoxemia bronchoscope had to be pulled out, and after a few minutes with the patient oxygen saturation recovered good both go back in, the  bronchoscope was then passed through the right intermedius bronchus right middle lobe clear and then passed through the right lower lobe, was significant edematous mucosa in the right lower lobe no evidence of any hemorrhage or blood noted or secretions noted in the right lower lobe given the significant erythematous mucosa, BAL x 2 with moderate return return was clear and the scope was then pulled pulled out again because of hypoxemia after a few minutes after his saturations recovered could go back in and the scope was then passed through the left mainstem bronchus left upper lobe and the lingula all segments identified no evidence of any secretions no hemorrhage no endobronchial lesion, left lower lobe edematous mucosa, BAL x 2 with good return which was clear, all segments bilaterally no evidence of any endobronchial lesion no hemorrhage noted.  The scope was then pulled back suctioning.  Patient remained intubated and was transported to the ICU by the anesthesia personnel ANESTHESIA INFORMATION: ASA: IV Anesthesia Type: IV Sedation with Anesthesia FINDINGS: Mild edematous and friable mucosa in the RUL and right lower lobar bronchus Minimal and thick secretions present in the right upper lobar bronchus; secretions were easily removed and the airway was cleared; performed washing Bronchoalveolar lavage was performed x2 in the right lower lobar bronchus with 120 mL of saline instilled and a total return of 50 mL. The fluid appeared clear. Mild edematous and erythematous mucosa in the right upper lobar bronchus; performed washing Mild, localized edematous mucosa in the left lower lobar bronchus; performed washing Bronchoalveolar lavage was performed x2 in the left lower lobar bronchus with 120 mL of saline instilled and a total return of 60 mL. The fluid appeared clear. SPECIMENS: * No specimens in log *      Impression: Mild edematous, friable mucosa in the RUL and right lower lobar bronchus Minimal and thick  secretions present in the right upper lobar bronchus; performed washing Bronchoalveolar lavage was performed x2 Mild edematous, erythematous mucosa in the right upper lobar bronchus; performed washing Mild edematous mucosa in the left lower lobar bronchus; performed washing RECOMMENDATION: Follow-up BAL cultures bacterial viral and fungal cultures cytology and differential cell count microbiology for follow-up     XR chest portable    Result Date: 7/10/2024  Narrative: XR CHEST PORTABLE INDICATION: Hypoxemia. COMPARISON: 7/6/2024 FINDINGS: Progressive diffuse bilateral opacity likely representing multifocal pneumonia and possible pulmonary edema No pneumothorax or pleural effusion. Normal cardiomediastinal silhouette. Bones are unremarkable for age. Normal upper abdomen.     Impression: Progressive diffuse bilateral opacities likely representing multifocal pneumonia with possible superimposed pulmonary edema Workstation performed: [id number]     IR biopsy kidney random    Result Date: 7/9/2024  Narrative: PROCEDURE: CT-guided left kidney biopsy Procedural Personnel Attending physician(s): Dr. Klein Pre-procedure diagnosis: Proteinuria Post-procedure diagnosis: Same Indication: Acute kidney injury and proteinuria. PROCEDURE SUMMARY: - Percutaneous CT-guided left kidney biopsy PROCEDURE DETAILS: Pre-procedure Consent: Informed consent for the procedure including risks, benefits and alternatives was obtained and time-out was performed prior to the procedure. Preparation: The site was prepared and draped using maximal sterile barrier technique including cutaneous antisepsis. Anesthesia/sedation Level of anesthesia/sedation: Moderate sedation (conscious sedation) Anesthesia/sedation administered by: IR nurse under attending supervision with continuous monitoring of the patient's level of consciousness and physiologic status Total intra-service sedation time (minutes): 15 Biopsy Local anesthesia was administered. Under CT  guidance, 17-gauge coaxial series or needle was advanced into the midpole left renal cortex. 18-gauge core biopsy needle was used to obtain 3 core needle samples which were provided to pathologist to be placed into Benzie kit. D-Stat hemostatic agent was used to perform tract embolization during needle removal. Postprocedure CT showed mild nonexpanding perinephric hematoma. Radiation Dose CT dose length product (mGy-cm): 874.84 Additional Details Specimens removed: 3 core needle samples placed into Benzie kit. Estimated blood loss (mL): 1 Complications: No immediate complications.     Impression: CT-guided biopsy of midpole left renal cortex. Plan: Specimens sent for evaluation. Workstation performed: [id number]     Echo complete w/ contrast if indicated    Result Date: 7/8/2024  Narrative: •  Technically difficult study. •  Left Ventricle: Left ventricular cavity size is normal. Wall thickness is normal. The left ventricular ejection fraction is 60%. Systolic function is normal. Wall motion is normal. Diastolic function is normal. •  No significant change since prior echo 9/27/2021.     US kidney and bladder    Result Date: 7/7/2024  Narrative: RENAL ULTRASOUND INDICATION: ROSI with microscopic hematuria. COMPARISON: None TECHNIQUE: Ultrasound of the retroperitoneum was performed with a curvilinear transducer utilizing volumetric sweeps and still imaging techniques. FINDINGS: KIDNEYS: Symmetric and normal size. Right kidney: 11.7 x 5.5 x 6.0 cm. Volume 203.7 mL Left kidney: 14.0 x 7.6 x 6.3 cm. Volume 348.3 mL Right kidney Normal echogenicity and contour. No mass is identified. No hydronephrosis. No shadowing calculi. No perinephric fluid collections. Left kidney Normal echogenicity and contour. No mass is identified. No hydronephrosis. No shadowing calculi. No perinephric fluid collections. URETERS: Nonvisualized. BLADDER: Bladder is partially distended No focal thickening or mass lesions. Bilateral ureteral  jets detected.     Impression: Unremarkable renal sonogram No hydronephrosis Additional imaging to be based on clinical evaluation Workstation performed: [id number]     CT chest wo contrast    Result Date: 7/6/2024  Narrative: CT CHEST WITHOUT IV CONTRAST INDICATION: infilterate. COMPARISON: None. TECHNIQUE: CT examination of the chest was performed without intravenous contrast. Multiplanar 2D reformatted images were created from the source data. This examination, like all CT scans performed in the [facility], was performed utilizing techniques to minimize radiation dose exposure, including the use of iterative reconstruction and automated exposure control. Radiation dose length product (DLP) for this visit: 712 mGy-cm FINDINGS: LUNGS: Mild interlobular septal thickening and groundglass opacification in the bilateral lower greater than upper lobes. Numerous 1 to 5 mm solid nodules in a similar distribution. There is no tracheal or endobronchial lesion. PLEURA: Trace bilateral pleural effusions. HEART/GREAT VESSELS: Heart is unremarkable for patient's age. No thoracic aortic aneurysm. MEDIASTINUM AND BARTOLOME: Unremarkable. CHEST WALL AND LOWER NECK: Unremarkable. VISUALIZED STRUCTURES IN THE UPPER ABDOMEN: Unremarkable. OSSEOUS STRUCTURES: No acute fracture or destructive osseous lesion.     Impression: Mild pulmonary edema and trace bilateral pleural effusions. Numerous 1 to 5 mm solid nodules in a similar distribution to the pulmonary edema, which can rarely be seen as a manifestation of pulmonary edema. Findings could also be infectious/inflammatory. Metastatic disease is unlikely given the patient's age and lack of known malignancy. Follow-up chest CT in 3 months is recommended. The study was marked in EPIC for immediate notification. Workstation performed: [id number]     XR chest 2 views    Result Date: 7/6/2024  Narrative: XR CHEST PA & LATERAL INDICATION: sob. Shortness of breath for 2 days, chest  pressure began this morning. COMPARISON: CXR 2/8/2019. FINDINGS: Low lung volumes producing vascular crowding. No acute disease. No pneumothorax or pleural effusion. Normal cardiomediastinal silhouette. Bones are unremarkable for age. Normal upper abdomen.     Impression: Low lung volumes producing vascular crowding with no acute disease. Workstation performed: [id number]         Review of Systems   Constitutional:  Negative for chills, diaphoresis and fever.   HENT:  Positive for sore throat. Negative for congestion, ear pain, rhinorrhea, sinus pressure and sinus pain.    Respiratory:  Positive for cough. Negative for chest tightness, shortness of breath and wheezing.    Cardiovascular:  Positive for palpitations. Negative for chest pain.   Gastrointestinal:  Negative for abdominal pain, constipation, diarrhea, nausea and vomiting.   Genitourinary:  Positive for frequency. Negative for dysuria, hematuria and urgency.   Musculoskeletal:  Negative for arthralgias, back pain and myalgias.   Neurological:  Negative for dizziness, seizures, syncope, light-headedness and headaches.   Psychiatric/Behavioral:  Negative for dysphoric mood and sleep disturbance. The patient is not nervous/anxious.    All other systems reviewed and are negative.        Objective:     Physical Exam  Vitals and nursing note reviewed.   Constitutional:       General: He is not in acute distress.     Appearance: Normal appearance. He is not ill-appearing.   HENT:      Head: Normocephalic.      Right Ear: Tympanic membrane, ear canal and external ear normal. There is no impacted cerumen.      Left Ear: Tympanic membrane, ear canal and external ear normal. There is no impacted cerumen.      Nose: Nose normal.      Mouth/Throat:      Mouth: Mucous membranes are moist.      Pharynx: No posterior oropharyngeal erythema.   Eyes:      General:         Right eye: No discharge.         Left eye: No discharge.      Conjunctiva/sclera: Conjunctivae normal.  "  Cardiovascular:      Rate and Rhythm: Normal rate and regular rhythm.      Pulses: Normal pulses.      Heart sounds: Normal heart sounds. No murmur heard.  Pulmonary:      Effort: Pulmonary effort is normal. No respiratory distress.      Breath sounds: Normal breath sounds. No wheezing.   Abdominal:      General: Abdomen is flat. Bowel sounds are normal.   Musculoskeletal:         General: Normal range of motion.      Cervical back: Normal range of motion.      Right lower leg: Edema present.      Left lower leg: Edema present.   Skin:     General: Skin is warm and dry.   Neurological:      Mental Status: He is alert and oriented to person, place, and time.   Psychiatric:         Mood and Affect: Mood normal.         Behavior: Behavior normal.           /80   Pulse 94   Ht 5' 6.5\" (1.689 m)   Wt 114 kg (252 lb)   SpO2 98%   BMI 40.06 kg/m²     Vitals:    07/25/24 1100   BP: 126/80   Pulse: 94   SpO2: 98%   Weight: 114 kg (252 lb)   Height: 5' 6.5\" (1.689 m)       Transitional Care Management Review:  Ben Daniel is a 29 y.o. male here for TCM follow up.     During the TCM phone call patient stated:    TCM Call     Date and time call was made  7/15/2024  9:00 AM    Hospital care reviewed  Records reviewed    Patient was hospitialized at  [facility]    Date of Admission  07/06/24    Date of discharge  07/14/24    Diagnosis  ROSI    Disposition  Home      TCM Call     Post hospital issues  Reduced activity    Should patient be enrolled in anticoag monitoring?  No    Scheduled for follow up?  Yes    I have advised the patient to call PCP with any new or worsening symptoms  Aminata Vasquez/ Practice Coordinator    Counseling  Patient                      MARTIN Shearer      "

## 2024-07-26 ENCOUNTER — TELEPHONE (OUTPATIENT)
Dept: NEPHROLOGY | Facility: CLINIC | Age: 29
End: 2024-07-26

## 2024-07-26 DIAGNOSIS — N05.8 C3 GLOMERULONEPHRITIS: ICD-10-CM

## 2024-07-26 RX ORDER — PREDNISONE 20 MG/1
20 TABLET ORAL DAILY
Qty: 5 TABLET | Refills: 0 | Status: SHIPPED | OUTPATIENT
Start: 2024-07-26 | End: 2024-07-29 | Stop reason: SDUPTHER

## 2024-07-26 NOTE — TELEPHONE ENCOUNTER
Called spoke with patient's spouse marco antonio, advised that per Dr.Jwalant Yariel MD. has reviewed Dr. Wells’s office note and is not sure what the plan is for the medication  prednisone. advised marco antonio message will be forwarded to  and he will call her once he comes into office on monday. Marco Antonio verbalized understanding and is okay with it.

## 2024-07-26 NOTE — TELEPHONE ENCOUNTER
Good Afternoon Dr. Saldivar    Patient 's wife Pam stopped by the office with patient stating  that his PCP advised patient to stop his Prednisone 20 mg patient stated that as per Dr. Wells he wanted patient to continue taking the medication and Dr. Wells was going to have patient  whine off of it.      Pam's Cell phone number is [phone]    Please Advise!!!!!

## 2024-07-29 DIAGNOSIS — N05.8 C3 GLOMERULONEPHRITIS: ICD-10-CM

## 2024-07-29 LAB
FUNGUS SPEC CULT: NORMAL
FUNGUS SPEC CULT: NORMAL

## 2024-07-29 RX ORDER — PREDNISONE 20 MG/1
20 TABLET ORAL 2 TIMES DAILY WITH MEALS
Qty: 5 TABLET | Refills: 2 | Status: SHIPPED | OUTPATIENT
Start: 2024-07-29 | End: 2024-07-30 | Stop reason: SDUPTHER

## 2024-07-29 NOTE — TELEPHONE ENCOUNTER
Patient spouse called for refill on prednisone 20mg BID sent to local pharmacy [facility]

## 2024-07-30 ENCOUNTER — TELEPHONE (OUTPATIENT)
Age: 29
End: 2024-07-30

## 2024-07-30 LAB
MYCOBACTERIUM SPEC CULT: NORMAL
RHODAMINE-AURAMINE STN SPEC: NORMAL

## 2024-07-30 RX ORDER — PREDNISONE 20 MG/1
20 TABLET ORAL 2 TIMES DAILY WITH MEALS
Qty: 20 TABLET | Refills: 0 | Status: SHIPPED | OUTPATIENT
Start: 2024-07-30 | End: 2024-08-09

## 2024-07-30 NOTE — TELEPHONE ENCOUNTER
Wm called about prednisone script. The script is written to give 5 tablets but the directions state patient should take 2 tablets a day for 10 days which would indicate 20 tablets. Can you send a new script to the pharmacy? Thank you

## 2024-07-31 NOTE — TELEPHONE ENCOUNTER
Advised patient's spouse marco antonio medication has been ordered and sent to pharmacy per Dr.Umesh Priscilla MD. also advised patient's spouse to let office know if there is an issue picking up medication and he will resend. Marco Antonio verbalized understanding and is okay with it.

## 2024-08-01 RX ORDER — PREDNISONE 20 MG/1
20 TABLET ORAL DAILY
Qty: 5 TABLET | Refills: 0 | Status: SHIPPED | OUTPATIENT
Start: 2024-08-01 | End: 2024-08-06

## 2024-08-05 LAB
FUNGUS SPEC CULT: NORMAL
FUNGUS SPEC CULT: NORMAL

## 2024-08-06 LAB
MYCOBACTERIUM SPEC CULT: NORMAL
RHODAMINE-AURAMINE STN SPEC: NORMAL

## 2024-08-07 ENCOUNTER — OFFICE VISIT (OUTPATIENT)
Age: 29
End: 2024-08-07
Payer: COMMERCIAL

## 2024-08-07 VITALS
DIASTOLIC BLOOD PRESSURE: 82 MMHG | HEIGHT: 67 IN | RESPIRATION RATE: 16 BRPM | TEMPERATURE: 98.2 F | HEART RATE: 84 BPM | SYSTOLIC BLOOD PRESSURE: 128 MMHG | OXYGEN SATURATION: 98 % | BODY MASS INDEX: 40.81 KG/M2 | WEIGHT: 260 LBS

## 2024-08-07 DIAGNOSIS — J15.3 PNEUMONIA OF BOTH LUNGS DUE TO GROUP B STREPTOCOCCUS, UNSPECIFIED PART OF LUNG (HCC): Primary | ICD-10-CM

## 2024-08-07 DIAGNOSIS — N05.8 C3 GLOMERULONEPHRITIS: ICD-10-CM

## 2024-08-07 DIAGNOSIS — J80 ARDS (ADULT RESPIRATORY DISTRESS SYNDROME) (HCC): ICD-10-CM

## 2024-08-07 PROCEDURE — 99214 OFFICE O/P EST MOD 30 MIN: CPT | Performed by: PHYSICIAN ASSISTANT

## 2024-08-07 NOTE — PROGRESS NOTES
"Assessment/Plan:   Diagnoses and all orders for this visit:    Pneumonia of both lungs due to group B Streptococcus, unspecified part of lung (HCC)  -     CT chest without contrast; Future    ARDS (adult respiratory distress syndrome) (HCC)    C3 glomerulonephritis        Patient is here today for follow-up.  He is doing much better from his recent hospitalization.  He was admitted with respiratory failure found to have C3, nephritis, group B strep pneumonia.  This is all thought to be related to prior viral illness.  He was titrated off O2 prior to discharge.  He remains on steroids and is following closely with nephrology.    Will have him repeat a CT scan to follow-up for resolution of the opacities seen on previous imaging during hospitalization.  He does have bronchodilators but does not feel that he needs them, does not need them at this time.  He has no prior pulmonary history.    He will follow-up with us in October after his CT scan, sooner if necessary.  Return in about 2 months (around 10/7/2024).  All questions are answered to the patient's satisfaction and understanding.  He verbalizes understanding.  He is encouraged to call with any further questions or concerns.    Portions of the record may have been created with voice recognition software.  Occasional wrong word or \"sound a like\" substitutions may have occurred due to the inherent limitations of voice recognition software.  Read the chart carefully and recognize, using context, where substitutions have occurred.    Electronically Signed by Charlie Castano PA-C    ______________________________________________________________________    Chief Complaint:   Chief Complaint   Patient presents with    Follow-up       Patient ID: Ben is a 29 y.o. y.o. male has a past medical history of Anxiety, Chronic kidney disease, Hypertension, and Pneumonia (7/6/24).    8/7/2024  Patient presents today for follow-up visit.  Patient is a 29-year-old male " non-smoker with past medical history of anxiety, hypertension.  He was recently hospitalized with shortness of breath/dyspnea on exertion and hemoptysis.  He had a prolonged hospitalization for respiratory failure.  He did undergo bronchoscopy and remained intubated for short time afterwards.  He was also found to have renal failure, C3 glomerulonephritis.  He underwent bronchoscopy during hospitalization and grew group B strep.  He was treated with antibiotics and was started on steroids.    He is here today for follow-up.  He is doing much better from his hospitalization.  Denies any shortness of breath or cough.  He remains on steroids and is following closely with nephrology.  He has no previous pulmonary history, was given bronchodilators by his PCP but has not felt that he needed them.        Review of Systems   Constitutional: Negative.    HENT: Negative.     Respiratory: Negative.     Cardiovascular: Negative.    Gastrointestinal: Negative.    Genitourinary: Negative.    Musculoskeletal: Negative.    Skin: Negative.    Allergic/Immunologic: Negative.    Neurological: Negative.    Psychiatric/Behavioral: Negative.         Smoking history: He reports that he has never smoked. He has never been exposed to tobacco smoke. He has never used smokeless tobacco.    The following portions of the patient's history were reviewed and updated as appropriate: allergies, current medications, past family history, past medical history, past social history, past surgical history, and problem list.    Immunization History   Administered Date(s) Administered    COVID-19 PFIZER VACCINE 0.3 ML IM 12/28/2021     Current Outpatient Medications   Medication Sig Dispense Refill    albuterol (PROVENTIL HFA,VENTOLIN HFA) 90 mcg/act inhaler Inhale 2 puffs every 4 (four) hours as needed for wheezing or shortness of breath 18 g 0    amLODIPine (NORVASC) 10 mg tablet Take 1 tablet (10 mg total) by mouth daily Do not start before July 15,  "2024. 30 tablet 0    fluticasone (Flovent Diskus) 50 mcg/actuation diskus inhaler Inhale 1 puff 2 (two) times a day Rinse mouth after use. 60 blister 0    furosemide (LASIX) 20 mg tablet Take 1 tablet (20 mg total) by mouth daily 30 tablet 0    levothyroxine 50 mcg tablet Take 1 tablet (50 mcg total) by mouth daily 90 tablet 1    losartan (COZAAR) 50 mg tablet Take 1 tablet (50 mg total) by mouth daily 30 tablet 4    pantoprazole (PROTONIX) 40 mg tablet Take 1 tablet (40 mg total) by mouth daily in the early morning Do not start before July 15, 2024. 30 tablet 0    predniSONE 20 mg tablet Take 1 tablet (20 mg total) by mouth 2 (two) times a day with meals for 10 days 20 tablet 0     No current facility-administered medications for this visit.     Allergies: Patient has no known allergies.    Objective:  Vitals:    08/07/24 1522 08/07/24 1524   BP: 128/82    BP Location: Left arm    Patient Position: Sitting    Cuff Size: Large    Pulse: 84    Resp: 16    Temp: 98.2 °F (36.8 °C)    SpO2: 98% 98%   Weight: 118 kg (260 lb)    Height: 5' 6.5\" (1.689 m)    Oxygen Therapy  SpO2: 98 %  Oxygen Therapy: None (Room air)  .  Wt Readings from Last 3 Encounters:   08/07/24 118 kg (260 lb)   07/25/24 114 kg (252 lb)   07/16/24 123 kg (271 lb 9.6 oz)     Body mass index is 41.34 kg/m².    Physical Exam  Constitutional:       General: He is not in acute distress.     Appearance: Normal appearance. He is well-developed. He is not ill-appearing.   HENT:      Head: Normocephalic and atraumatic.      Mouth/Throat:      Pharynx: Oropharynx is clear.   Eyes:      Pupils: Pupils are equal, round, and reactive to light.   Cardiovascular:      Rate and Rhythm: Normal rate and regular rhythm.   Pulmonary:      Effort: Pulmonary effort is normal. No respiratory distress.      Breath sounds: Normal breath sounds. No decreased breath sounds, wheezing, rhonchi or rales.   Abdominal:      General: Abdomen is flat. There is no distension. "   Musculoskeletal:         General: Normal range of motion.      Cervical back: Normal range of motion.      Right lower leg: No edema.      Left lower leg: No edema.   Skin:     General: Skin is warm and dry.      Findings: No rash.   Neurological:      Mental Status: He is alert and oriented to person, place, and time.   Psychiatric:         Mood and Affect: Mood normal.         Behavior: Behavior normal.         Lab Review:   Lab Results   Component Value Date    K 4.3 07/14/2024     (H) 07/14/2024    CO2 26 07/14/2024    BUN 48 (H) 07/14/2024    CREATININE 1.03 07/14/2024    CALCIUM 7.7 (L) 07/14/2024     Lab Results   Component Value Date    WBC 7.52 07/14/2024    HGB 9.0 (L) 07/14/2024    HCT 28.0 (L) 07/14/2024    MCV 85 07/14/2024     07/14/2024       Diagnostics:  I have personally reviewed pertinent reports.   and I have personally reviewed pertinent films in PACS  Reviewed hospital imaging  Office Spirometry Results:     ESS:    XR chest portable    Result Date: 7/14/2024  Narrative: XR CHEST PORTABLE INDICATION: Follow-up pneumonia. COMPARISON: Chest CT 7/11/2024, CXR 7/10/2024. FINDINGS: Marked improvement in bilateral consolidation. No pneumothorax or pleural effusion. Normal cardiomediastinal silhouette. Bones are unremarkable for age. Normal upper abdomen.     Impression: Marked improvement in bilateral consolidation. The rapid improvement suggest pulmonary edema. Superimposed pneumonia not excluded in the appropriate clinical setting. Workstation performed: [id number]     CT chest wo contrast    Result Date: 7/11/2024  Narrative: CT CHEST WITHOUT IV CONTRAST INDICATION: Hypoxia, ARDS. COMPARISON: CT chest 7/6/2024. TECHNIQUE: CT examination of the chest was performed without intravenous contrast. Multiplanar 2D reformatted images were created from the source data. This examination, like all CT scans performed in the [facility], was performed utilizing techniques to  minimize radiation dose exposure, including the use of iterative reconstruction and automated exposure control. Radiation dose length product (DLP) for this visit: 1039 mGy-cm FINDINGS: LUNGS: Endotracheal tube terminates 2.6 cm above the leann in satisfactory position. Mucous threads within the distal trachea. Extensive consolidative airspace opacities throughout the perihilar regions and the bilateral lower lobes, new since 7/6/2024. PLEURA: Probable trace bilateral pleural effusion, although difficult to visualize without contrast. No pneumothorax. HEART/GREAT VESSELS: Cardiomegaly. No thoracic aortic aneurysm. MEDIASTINUM AND BARTOLOME: Unremarkable. CHEST WALL AND LOWER NECK: Bilateral gynecomastia. VISUALIZED STRUCTURES IN THE UPPER ABDOMEN: Unremarkable. OSSEOUS STRUCTURES: No acute fracture or destructive osseous lesion.     Impression: Extensive consolidative airspace opacities throughout the perihilar regions and the bilateral lower lobes, new since 7/6/2024. Findings likely represent a combination of pulmonary edema and multifocal pneumonia in a pattern typical for aspiration pneumonia. Recommend short-term follow-up noncontrast chest CT in 3 months to assess for resolution. Mild cardiomegaly. Endotracheal tube in satisfactory position. The study was marked in EPIC for immediate notification. Workstation performed: [id number]     XR chest portable ICU    Result Date: 7/10/2024  Narrative: XR CHEST PORTABLE ICU INDICATION: post-bronch. COMPARISON: Earlier today FINDINGS: Tip of endotracheal tube is above the leann. Low lung volumes noted. Cardiac monitor leads on the chest. Extensive bilateral pulmonary consolidations unchanged from most recent prior. No pneumothorax or pleural effusion. Normal cardiomediastinal silhouette. There is mild bone spurring at the distal aspect of the right clavicle Normal upper abdomen.     Impression: Satisfactory endotracheal tube positioning. Extensive bilateral infiltrates  without significant change from recent prior Workstation performed: [id number]     XR chest portable ICU    Result Date: 7/10/2024  Narrative: XR CHEST PORTABLE ICU INDICATION: follow up opacities. COMPARISON: Previous day FINDINGS: Extensive bilateral pulmonary consolidations are redemonstrated. These actually appear more prominent than on the prior study. No pneumothorax or pleural effusion. Normal cardiomediastinal silhouette. Bones are unremarkable for age. Normal upper abdomen.     Impression: Increased prominence of bilateral pulmonary consolidations Workstation performed: [id number]     Bronchoscopy    Result Date: 7/10/2024  Narrative: Table formatting from the original result was not included.  Formerly [facility], later [facility] [address] [facility] [id number] [phone] DATE OF SERVICE: 7/10/24 PHYSICIAN(S): Attending: Ofelia Corea Fellow: No Staff Documented INDICATION: Abnormal chest x-ray mild hemoptysis POST-OP DIAGNOSIS: See the impression below. PREPROCEDURE: Standard airway preparation completed per respiratory therapy protocol.  Informed consent was obtained. Images reviewed prior to the procedure.  A Time Out was performed. No suspicion or identified risk for TB or other airborne infectious disease; bronchoscopy procedure being performed for diagnostic purposes. PROCEDURE: Bronchoscopy DETAILS OF PROCEDURE: Patient was taken to the procedure room where a time out was performed to confirm correct patient and correct procedure. The patient underwent general anesthesia, which was administered by an anesthesia professional. The patient's blood pressure, heart rate, level of consciousness and oxygen were monitored throughout the procedure. The patient experienced no blood loss. The scope was introduced through the endotracheal tube. The procedure was not difficult. The patient tolerated the procedure well. There were no apparent adverse events. The bronchoscope was passed through the  endotracheal tube, thick dark brown/bloody secretions noted in the trachea, the endotracheal tube above the leann, lidocaine was given for the leann leann was sharp and the bronchoscope was then passed through the right mainstem bronchus and right upper lobe thick dark brown/bloody secretions in the right upper lobe easily cleared with 5 mL of saline underlying mucosa seen clear no endobronchial lesion, due to hypoxemia bronchoscope had to be pulled out, and after a few minutes with the patient oxygen saturation recovered good both go back in, the bronchoscope was then passed through the right intermedius bronchus right middle lobe clear and then passed through the right lower lobe, was significant edematous mucosa in the right lower lobe no evidence of any hemorrhage or blood noted or secretions noted in the right lower lobe given the significant erythematous mucosa, BAL x 2 with moderate return return was clear and the scope was then pulled pulled out again because of hypoxemia after a few minutes after his saturations recovered could go back in and the scope was then passed through the left mainstem bronchus left upper lobe and the lingula all segments identified no evidence of any secretions no hemorrhage no endobronchial lesion, left lower lobe edematous mucosa, BAL x 2 with good return which was clear, all segments bilaterally no evidence of any endobronchial lesion no hemorrhage noted.  The scope was then pulled back suctioning.  Patient remained intubated and was transported to the ICU by the anesthesia personnel ANESTHESIA INFORMATION: ASA: IV Anesthesia Type: IV Sedation with Anesthesia FINDINGS: Mild edematous and friable mucosa in the RUL and right lower lobar bronchus Minimal and thick secretions present in the right upper lobar bronchus; secretions were easily removed and the airway was cleared; performed washing Bronchoalveolar lavage was performed x2 in the right lower lobar bronchus with 120 mL  of saline instilled and a total return of 50 mL. The fluid appeared clear. Mild edematous and erythematous mucosa in the right upper lobar bronchus; performed washing Mild, localized edematous mucosa in the left lower lobar bronchus; performed washing Bronchoalveolar lavage was performed x2 in the left lower lobar bronchus with 120 mL of saline instilled and a total return of 60 mL. The fluid appeared clear. SPECIMENS: * No specimens in log *      Impression: Mild edematous, friable mucosa in the RUL and right lower lobar bronchus Minimal and thick secretions present in the right upper lobar bronchus; performed washing Bronchoalveolar lavage was performed x2 Mild edematous, erythematous mucosa in the right upper lobar bronchus; performed washing Mild edematous mucosa in the left lower lobar bronchus; performed washing RECOMMENDATION: Follow-up BAL cultures bacterial viral and fungal cultures cytology and differential cell count microbiology for follow-up     XR chest portable    Result Date: 7/10/2024  Narrative: XR CHEST PORTABLE INDICATION: Hypoxemia. COMPARISON: 7/6/2024 FINDINGS: Progressive diffuse bilateral opacity likely representing multifocal pneumonia and possible pulmonary edema No pneumothorax or pleural effusion. Normal cardiomediastinal silhouette. Bones are unremarkable for age. Normal upper abdomen.     Impression: Progressive diffuse bilateral opacities likely representing multifocal pneumonia with possible superimposed pulmonary edema Workstation performed: [id number]     IR biopsy kidney random    Result Date: 7/9/2024  Narrative: PROCEDURE: CT-guided left kidney biopsy Procedural Personnel Attending physician(s): Dr. Klein Pre-procedure diagnosis: Proteinuria Post-procedure diagnosis: Same Indication: Acute kidney injury and proteinuria. PROCEDURE SUMMARY: - Percutaneous CT-guided left kidney biopsy PROCEDURE DETAILS: Pre-procedure Consent: Informed consent for the procedure including risks,  benefits and alternatives was obtained and time-out was performed prior to the procedure. Preparation: The site was prepared and draped using maximal sterile barrier technique including cutaneous antisepsis. Anesthesia/sedation Level of anesthesia/sedation: Moderate sedation (conscious sedation) Anesthesia/sedation administered by: IR nurse under attending supervision with continuous monitoring of the patient's level of consciousness and physiologic status Total intra-service sedation time (minutes): 15 Biopsy Local anesthesia was administered. Under CT guidance, 17-gauge coaxial series or needle was advanced into the midpole left renal cortex. 18-gauge core biopsy needle was used to obtain 3 core needle samples which were provided to pathologist to be placed into Linn kit. D-Stat hemostatic agent was used to perform tract embolization during needle removal. Postprocedure CT showed mild nonexpanding perinephric hematoma. Radiation Dose CT dose length product (mGy-cm): 874.84 Additional Details Specimens removed: 3 core needle samples placed into Linn kit. Estimated blood loss (mL): 1 Complications: No immediate complications.     Impression: CT-guided biopsy of midpole left renal cortex. Plan: Specimens sent for evaluation. Workstation performed: [id number]

## 2024-08-10 DIAGNOSIS — N05.8 C3 GLOMERULONEPHRITIS: ICD-10-CM

## 2024-08-10 DIAGNOSIS — R80.1 PERSISTENT PROTEINURIA: ICD-10-CM

## 2024-08-10 DIAGNOSIS — I10 ESSENTIAL HYPERTENSION: ICD-10-CM

## 2024-08-11 RX ORDER — LOSARTAN POTASSIUM 50 MG/1
50 TABLET ORAL DAILY
Qty: 30 TABLET | Refills: 5 | Status: SHIPPED | OUTPATIENT
Start: 2024-08-11

## 2024-08-12 DIAGNOSIS — R80.1 PERSISTENT PROTEINURIA: ICD-10-CM

## 2024-08-12 DIAGNOSIS — N05.8 C3 GLOMERULONEPHRITIS: Primary | ICD-10-CM

## 2024-08-12 LAB
FUNGUS SPEC CULT: NORMAL
FUNGUS SPEC CULT: NORMAL

## 2024-08-12 RX ORDER — FUROSEMIDE 20 MG
20 TABLET ORAL DAILY
Qty: 30 TABLET | Refills: 0 | Status: SHIPPED | OUTPATIENT
Start: 2024-08-12 | End: 2024-09-11

## 2024-08-12 RX ORDER — PANTOPRAZOLE SODIUM 40 MG/1
40 TABLET, DELAYED RELEASE ORAL
Qty: 30 TABLET | Refills: 0 | Status: SHIPPED | OUTPATIENT
Start: 2024-08-12 | End: 2024-09-11

## 2024-08-12 RX ORDER — AMLODIPINE BESYLATE 10 MG/1
10 TABLET ORAL DAILY
Qty: 30 TABLET | Refills: 0 | Status: SHIPPED | OUTPATIENT
Start: 2024-08-12 | End: 2024-09-11

## 2024-08-12 RX ORDER — PREDNISONE 20 MG/1
20 TABLET ORAL 2 TIMES DAILY
Qty: 60 TABLET | Refills: 3 | Status: SHIPPED | OUTPATIENT
Start: 2024-08-12

## 2024-08-13 LAB
MYCOBACTERIUM SPEC CULT: NORMAL
RHODAMINE-AURAMINE STN SPEC: NORMAL

## 2024-08-14 ENCOUNTER — OFFICE VISIT (OUTPATIENT)
Dept: CARDIOLOGY CLINIC | Facility: CLINIC | Age: 29
End: 2024-08-14
Payer: COMMERCIAL

## 2024-08-14 VITALS
WEIGHT: 262 LBS | DIASTOLIC BLOOD PRESSURE: 78 MMHG | OXYGEN SATURATION: 99 % | HEART RATE: 87 BPM | BODY MASS INDEX: 42.11 KG/M2 | HEIGHT: 66 IN | SYSTOLIC BLOOD PRESSURE: 130 MMHG | RESPIRATION RATE: 16 BRPM

## 2024-08-14 DIAGNOSIS — N05.8 C3 GLOMERULONEPHRITIS: ICD-10-CM

## 2024-08-14 DIAGNOSIS — E03.9 HYPOTHYROIDISM, UNSPECIFIED TYPE: ICD-10-CM

## 2024-08-14 DIAGNOSIS — I10 ESSENTIAL HYPERTENSION: Primary | ICD-10-CM

## 2024-08-14 DIAGNOSIS — E78.1 HYPERTRIGLYCERIDEMIA: ICD-10-CM

## 2024-08-14 PROCEDURE — 99214 OFFICE O/P EST MOD 30 MIN: CPT

## 2024-08-14 NOTE — PROGRESS NOTES
[facility] Cardiology   Office Visit    Ben Daniel 29 y.o. male MRN: [id number]    08/14/24        Assessment/Plan:  1.  Hypertension  BP is well-controlled.  Continue losartan, amlodipine, and Lasix.  Encourage ambulatory monitoring and low-sodium diet.    2.  Hypertriglyceridemia  Repeat FLP pending for surveillance.  Continue dietary control.    3.  Anemia  4.  Hypothyroidism  5.  C3 glomerulonephritis - follows with nephrology  6.  Group B strep pneumonia - follows with pulmonology        Interval history: Ben Daniel is a very pleasant 29 y.o. year old male with history of hypertension, hypertriglyceridemia, anemia, hypothyroidism, C3 glomerulonephritis, and recent group B strep pneumonia infection who presents for office visit.  Patient was recently admitted to [facility] for acute hypoxic respiratory failure in the setting of group B strep pneumonia infection.  Patient reports he has otherwise been well overall from a cardiac standpoint.  Endorses compliance to medications.  Denies adverse effects to current medications.  Endorses gradual improvement of SOB.  Denies any new complaints at this time.  Family history is significant for grandmother with open heart surgery, grandfather with 3 MIs, mother with CAD/HTN/lupus, and father with murmur.  Follows closely with nephrology and pulmonology.  Follows with Dr. Fitch as primary cardiologist.    TTE on 7/8/2024 appeared unremarkable with EF 60%.      Review of Systems:  Review of Systems   Constitutional:  Negative for chills and fever.   HENT:  Negative for ear pain and sore throat.    Eyes:  Negative for pain and visual disturbance.   Respiratory:  Negative for cough. Shortness of breath: mild.   Cardiovascular:  Negative for chest pain, palpitations and leg swelling.   Gastrointestinal:  Negative for abdominal pain and vomiting.   Genitourinary:  Negative for dysuria and hematuria.   Musculoskeletal:  Negative for arthralgias and back pain.  "  Skin:  Negative for color change and rash.   Neurological:  Negative for seizures and syncope.   All other systems reviewed and are negative.      PHYSICAL EXAM:  Vitals:   Vitals:    08/14/24 1555   BP: 130/78   BP Location: Left arm   Patient Position: Sitting   Cuff Size: Standard   Pulse: 87   Resp: 16   SpO2: 99%   Weight: 119 kg (262 lb)   Height: 5' 6\" (1.676 m)        Physical Exam:  GEN: Alert and oriented x 3, in no acute distress.  Well appearing and well nourished.   HEENT: Sclera anicteric, conjunctivae pink, mucous membranes moist. Oropharynx clear.   NECK: Supple, no carotid bruits, no significant JVD. Trachea midline, no thyromegaly.   HEART: Regular rhythm, normal S1 and S2, no murmurs, clicks, gallops or rubs. PMI nondisplaced, no thrills.   LUNGS: Clear to auscultation bilaterally; no wheezes, rales, or rhonchi. No increased work of breathing or signs of respiratory distress.   ABDOMEN: Soft, nontender, nondistended, normoactive bowel sounds.   EXTREMITIES: Skin warm and well perfused, no clubbing, cyanosis, or edema.  NEURO: No focal findings. Normal speech. Mood and affect normal.   SKIN: Normal without suspicious lesions on exposed skin.    Follow up: 6 months or sooner as needed    No Known Allergies      Current Outpatient Medications:     amLODIPine (NORVASC) 10 mg tablet, Take 1 tablet (10 mg total) by mouth daily, Disp: 30 tablet, Rfl: 0    fluticasone (Flovent Diskus) 50 mcg/actuation diskus inhaler, Inhale 1 puff 2 (two) times a day Rinse mouth after use., Disp: 60 blister, Rfl: 0    furosemide (LASIX) 20 mg tablet, Take 1 tablet (20 mg total) by mouth daily, Disp: 30 tablet, Rfl: 0    levothyroxine 50 mcg tablet, Take 1 tablet (50 mcg total) by mouth daily, Disp: 90 tablet, Rfl: 1    losartan (COZAAR) 50 mg tablet, Take 1 tablet (50 mg total) by mouth daily, Disp: 30 tablet, Rfl: 5    pantoprazole (PROTONIX) 40 mg tablet, Take 1 tablet (40 mg total) by mouth daily in the early " morning, Disp: 30 tablet, Rfl: 0    predniSONE 20 mg tablet, Take 1 tablet (20 mg total) by mouth 2 (two) times a day, Disp: 60 tablet, Rfl: 3    Past Medical History:   Diagnosis Date    Anxiety     Chronic kidney disease     Hypertension     Pneumonia 7/6/24       Family History   Problem Relation Age of Onset    Heart failure Maternal Grandmother     Heart failure Maternal Grandfather     Diabetes Maternal Grandfather     Hypertension Maternal Grandfather     Hypertension Mother     Cancer Paternal Grandfather     Cancer Paternal Grandmother         Lung cancer    Lung cancer Paternal Grandmother     Hypertension Brother        Past Medical History:   Diagnosis Date    Anxiety     Chronic kidney disease     Hypertension     Pneumonia 7/6/24       Past Surgical History:   Procedure Laterality Date    ANKLE SURGERY      BRONCHOSCOPY  7/9/24    FETAL SURGERY FOR CONGENITAL HERNIA      IR BIOPSY KIDNEY RANDOM  07/09/2024       Social History     Socioeconomic History    Marital status: Single     Spouse name: Not on file    Number of children: Not on file    Years of education: Not on file    Highest education level: Not on file   Occupational History    Not on file   Tobacco Use    Smoking status: Never     Passive exposure: Never    Smokeless tobacco: Never   Vaping Use    Vaping status: Never Used   Substance and Sexual Activity    Alcohol use: Not Currently     Alcohol/week: 1.0 standard drink of alcohol     Comment: occ    Drug use: No    Sexual activity: Yes     Partners: Female     Birth control/protection: Condom Male   Other Topics Concern    Not on file   Social History Narrative    Not on file     Social Determinants of Health     Financial Resource Strain: Not on file   Food Insecurity: No Food Insecurity (7/8/2024)    Hunger Vital Sign     Worried About Running Out of Food in the Last Year: Never true     Ran Out of Food in the Last Year: Never true   Transportation Needs: No Transportation Needs  "(7/8/2024)    PRAPARE - Transportation     Lack of Transportation (Medical): No     Lack of Transportation (Non-Medical): No   Physical Activity: Not on file   Stress: Not on file   Social Connections: Not on file   Intimate Partner Violence: Not on file   Housing Stability: Low Risk  (7/8/2024)    Housing Stability Vital Sign     Unable to Pay for Housing in the Last Year: No     Number of Times Moved in the Last Year: 0     Homeless in the Last Year: No       LABORATORY RESULTS:    Lab Results   Component Value Date    WBC 7.52 07/14/2024    HGB 9.0 (L) 07/14/2024    HCT 28.0 (L) 07/14/2024    MCV 85 07/14/2024     07/14/2024     Lab Results   Component Value Date    CALCIUM 7.7 (L) 07/14/2024    K 4.3 07/14/2024    CO2 26 07/14/2024     (H) 07/14/2024    BUN 48 (H) 07/14/2024    CREATININE 1.03 07/14/2024     Lab Results   Component Value Date    HGBA1C 5.9 (H) 07/12/2024       Lipid Profile:   No results found for: \"CHOL\"  Lab Results   Component Value Date    HDL 31 (L) 12/04/2023     Lab Results   Component Value Date    LDLCALC 77 12/04/2023     Lab Results   Component Value Date    TRIG 261 (H) 12/04/2023       The ASCVD Risk score (Angela DK, et al., 2019) failed to calculate for the following reasons:    The 2019 ASCVD risk score is only valid for ages 40 to 79    1. Essential hypertension        2. Hypertriglyceridemia        3. C3 glomerulonephritis        4. Hypothyroidism, unspecified type            Imaging: I have personally reviewed pertinent reports.        Recommend aggressive risk factor modification and therapeutic lifestyle changes.  Low-salt, low-calorie, low-fat, low-cholesterol diet with regular exercise and to optimize weight.    Discussed concepts of atherosclerosis, including signs and symptoms of cardiac disease.    Medications reviewed and possible side effects discussed.  Previous studies were reviewed.    Safety measures were reviewed.  All questions and concerns " addressed.  Patient was advised to report any problems requiring medical attention.    Follow-up with PCP and appropriate specialist and lab work as discussed.    Return for follow up visit as scheduled or earlier, if needed.  Thank you for allowing me to participate in the care and evaluation of your patient.  Should you have any questions, please feel free to contact me.    Manny Bruno PA-C  8/14/2024,5:44 PM

## 2024-08-16 ENCOUNTER — APPOINTMENT (OUTPATIENT)
Dept: LAB | Facility: HOSPITAL | Age: 29
End: 2024-08-16
Attending: INTERNAL MEDICINE
Payer: COMMERCIAL

## 2024-08-16 ENCOUNTER — PATIENT MESSAGE (OUTPATIENT)
Dept: HEMATOLOGY ONCOLOGY | Facility: CLINIC | Age: 29
End: 2024-08-16

## 2024-08-16 DIAGNOSIS — R80.1 PERSISTENT PROTEINURIA: ICD-10-CM

## 2024-08-16 DIAGNOSIS — D70.8 OTHER NEUTROPENIA (HCC): ICD-10-CM

## 2024-08-16 DIAGNOSIS — R73.09 ELEVATED HEMOGLOBIN A1C: ICD-10-CM

## 2024-08-16 DIAGNOSIS — I10 ESSENTIAL HYPERTENSION: ICD-10-CM

## 2024-08-16 DIAGNOSIS — D53.9 NUTRITIONAL ANEMIA: ICD-10-CM

## 2024-08-16 DIAGNOSIS — Z11.3 SCREEN FOR STD (SEXUALLY TRANSMITTED DISEASE): ICD-10-CM

## 2024-08-16 DIAGNOSIS — E55.9 VITAMIN D DEFICIENCY: ICD-10-CM

## 2024-08-16 DIAGNOSIS — E03.9 HYPOTHYROIDISM, UNSPECIFIED TYPE: ICD-10-CM

## 2024-08-16 DIAGNOSIS — N05.8 C3 GLOMERULONEPHRITIS: ICD-10-CM

## 2024-08-16 DIAGNOSIS — D47.2 GAMMOPATHY: ICD-10-CM

## 2024-08-16 LAB
ANA SER QL IA: NEGATIVE
BACTERIA UR QL AUTO: ABNORMAL /HPF
BILIRUB UR QL STRIP: NEGATIVE
CLARITY UR: CLEAR
COLOR UR: ABNORMAL
CREAT UR-MCNC: 167.7 MG/DL
GLUCOSE UR STRIP-MCNC: NEGATIVE MG/DL
HGB UR QL STRIP.AUTO: ABNORMAL
KETONES UR STRIP-MCNC: NEGATIVE MG/DL
LEUKOCYTE ESTERASE UR QL STRIP: NEGATIVE
MUCOUS THREADS UR QL AUTO: ABNORMAL
NITRITE UR QL STRIP: NEGATIVE
NON-SQ EPI CELLS URNS QL MICRO: ABNORMAL /HPF
PH UR STRIP.AUTO: 6 [PH]
PROT UR STRIP-MCNC: ABNORMAL MG/DL
PROT UR-MCNC: 21.1 MG/DL
PROT/CREAT UR: 0.1 MG/G{CREAT} (ref 0–0.1)
RBC #/AREA URNS AUTO: ABNORMAL /HPF
SP GR UR STRIP.AUTO: 1.03 (ref 1–1.03)
UROBILINOGEN UR STRIP-ACNC: <2 MG/DL
WBC #/AREA URNS AUTO: ABNORMAL /HPF

## 2024-08-16 PROCEDURE — 84156 ASSAY OF PROTEIN URINE: CPT

## 2024-08-16 PROCEDURE — 36415 COLL VENOUS BLD VENIPUNCTURE: CPT

## 2024-08-16 PROCEDURE — 82570 ASSAY OF URINE CREATININE: CPT

## 2024-08-16 PROCEDURE — 81001 URINALYSIS AUTO W/SCOPE: CPT

## 2024-08-16 PROCEDURE — 86038 ANTINUCLEAR ANTIBODIES: CPT

## 2024-08-17 ENCOUNTER — APPOINTMENT (OUTPATIENT)
Dept: LAB | Facility: HOSPITAL | Age: 29
End: 2024-08-17
Payer: COMMERCIAL

## 2024-08-17 DIAGNOSIS — I10 ESSENTIAL HYPERTENSION: ICD-10-CM

## 2024-08-17 DIAGNOSIS — R80.1 PERSISTENT PROTEINURIA: ICD-10-CM

## 2024-08-17 DIAGNOSIS — N05.8 C3 GLOMERULONEPHRITIS: ICD-10-CM

## 2024-08-17 LAB
25(OH)D3 SERPL-MCNC: 12.2 NG/ML (ref 30–100)
ALBUMIN SERPL BCG-MCNC: 4.2 G/DL (ref 3.5–5)
ALP SERPL-CCNC: 54 U/L (ref 34–104)
ALT SERPL W P-5'-P-CCNC: 22 U/L (ref 7–52)
ANION GAP SERPL CALCULATED.3IONS-SCNC: 7 MMOL/L (ref 4–13)
AST SERPL W P-5'-P-CCNC: 11 U/L (ref 13–39)
BACTERIA UR QL AUTO: ABNORMAL /HPF
BASOPHILS # BLD AUTO: 0.01 THOUSANDS/ÂΜL (ref 0–0.1)
BASOPHILS NFR BLD AUTO: 0 % (ref 0–1)
BILIRUB SERPL-MCNC: 0.58 MG/DL (ref 0.2–1)
BILIRUB UR QL STRIP: NEGATIVE
BUN SERPL-MCNC: 20 MG/DL (ref 5–25)
CALCIUM SERPL-MCNC: 9.2 MG/DL (ref 8.4–10.2)
CHLORIDE SERPL-SCNC: 103 MMOL/L (ref 96–108)
CHOLEST SERPL-MCNC: 177 MG/DL
CLARITY UR: CLEAR
CO2 SERPL-SCNC: 27 MMOL/L (ref 21–32)
COLOR UR: YELLOW
CREAT SERPL-MCNC: 0.82 MG/DL (ref 0.6–1.3)
CREAT UR-MCNC: 215.2 MG/DL
CRP SERPL QL: 4.8 MG/L
EOSINOPHIL # BLD AUTO: 0.03 THOUSAND/ÂΜL (ref 0–0.61)
EOSINOPHIL NFR BLD AUTO: 0 % (ref 0–6)
ERYTHROCYTE [DISTWIDTH] IN BLOOD BY AUTOMATED COUNT: 14.3 % (ref 11.6–15.1)
EST. AVERAGE GLUCOSE BLD GHB EST-MCNC: 100 MG/DL
FOLATE SERPL-MCNC: 18.4 NG/ML
GFR SERPL CREATININE-BSD FRML MDRD: 119 ML/MIN/1.73SQ M
GLUCOSE P FAST SERPL-MCNC: 82 MG/DL (ref 65–99)
GLUCOSE UR STRIP-MCNC: NEGATIVE MG/DL
HBA1C MFR BLD: 5.1 %
HCT VFR BLD AUTO: 37.7 % (ref 36.5–49.3)
HDLC SERPL-MCNC: 48 MG/DL
HGB BLD-MCNC: 12.7 G/DL (ref 12–17)
HGB UR QL STRIP.AUTO: ABNORMAL
IMM GRANULOCYTES # BLD AUTO: 0.09 THOUSAND/UL (ref 0–0.2)
IMM GRANULOCYTES NFR BLD AUTO: 1 % (ref 0–2)
KETONES UR STRIP-MCNC: NEGATIVE MG/DL
LDH SERPL-CCNC: 165 U/L (ref 140–271)
LDLC SERPL CALC-MCNC: 105 MG/DL (ref 0–100)
LEUKOCYTE ESTERASE UR QL STRIP: NEGATIVE
LYMPHOCYTES # BLD AUTO: 2.71 THOUSANDS/ÂΜL (ref 0.6–4.47)
LYMPHOCYTES NFR BLD AUTO: 32 % (ref 14–44)
MCH RBC QN AUTO: 28.7 PG (ref 26.8–34.3)
MCHC RBC AUTO-ENTMCNC: 33.7 G/DL (ref 31.4–37.4)
MCV RBC AUTO: 85 FL (ref 82–98)
MONOCYTES # BLD AUTO: 0.77 THOUSAND/ÂΜL (ref 0.17–1.22)
MONOCYTES NFR BLD AUTO: 9 % (ref 4–12)
MUCOUS THREADS UR QL AUTO: ABNORMAL
NEUTROPHILS # BLD AUTO: 4.79 THOUSANDS/ÂΜL (ref 1.85–7.62)
NEUTS SEG NFR BLD AUTO: 58 % (ref 43–75)
NITRITE UR QL STRIP: NEGATIVE
NON-SQ EPI CELLS URNS QL MICRO: ABNORMAL /HPF
NONHDLC SERPL-MCNC: 129 MG/DL
NRBC BLD AUTO-RTO: 0 /100 WBCS
PH UR STRIP.AUTO: 6 [PH]
PLATELET # BLD AUTO: 243 THOUSANDS/UL (ref 149–390)
PMV BLD AUTO: 8.9 FL (ref 8.9–12.7)
POTASSIUM SERPL-SCNC: 3.5 MMOL/L (ref 3.5–5.3)
PROT SERPL-MCNC: 7.1 G/DL (ref 6.4–8.4)
PROT UR STRIP-MCNC: ABNORMAL MG/DL
PROT UR-MCNC: 23.4 MG/DL
PROT/CREAT UR: 0.1 MG/G{CREAT} (ref 0–0.1)
RBC # BLD AUTO: 4.42 MILLION/UL (ref 3.88–5.62)
RBC #/AREA URNS AUTO: ABNORMAL /HPF
SODIUM SERPL-SCNC: 137 MMOL/L (ref 135–147)
SP GR UR STRIP.AUTO: 1.03 (ref 1–1.03)
TRIGL SERPL-MCNC: 121 MG/DL
TSH SERPL DL<=0.05 MIU/L-ACNC: 3.12 UIU/ML (ref 0.45–4.5)
UROBILINOGEN UR STRIP-ACNC: <2 MG/DL
VIT B12 SERPL-MCNC: 272 PG/ML (ref 180–914)
WBC # BLD AUTO: 8.4 THOUSAND/UL (ref 4.31–10.16)
WBC #/AREA URNS AUTO: ABNORMAL /HPF

## 2024-08-17 PROCEDURE — 82746 ASSAY OF FOLIC ACID SERUM: CPT

## 2024-08-17 PROCEDURE — 81001 URINALYSIS AUTO W/SCOPE: CPT

## 2024-08-17 PROCEDURE — 80053 COMPREHEN METABOLIC PANEL: CPT

## 2024-08-17 PROCEDURE — 84156 ASSAY OF PROTEIN URINE: CPT

## 2024-08-17 PROCEDURE — 82607 VITAMIN B-12: CPT

## 2024-08-17 PROCEDURE — 82570 ASSAY OF URINE CREATININE: CPT

## 2024-08-17 PROCEDURE — 82306 VITAMIN D 25 HYDROXY: CPT

## 2024-08-17 PROCEDURE — 83036 HEMOGLOBIN GLYCOSYLATED A1C: CPT

## 2024-08-17 PROCEDURE — 84443 ASSAY THYROID STIM HORMONE: CPT

## 2024-08-17 PROCEDURE — 85025 COMPLETE CBC W/AUTO DIFF WBC: CPT

## 2024-08-17 PROCEDURE — 83615 LACTATE (LD) (LDH) ENZYME: CPT

## 2024-08-17 PROCEDURE — 36415 COLL VENOUS BLD VENIPUNCTURE: CPT

## 2024-08-17 PROCEDURE — 80061 LIPID PANEL: CPT

## 2024-08-17 PROCEDURE — 84165 PROTEIN E-PHORESIS SERUM: CPT

## 2024-08-17 PROCEDURE — 86695 HERPES SIMPLEX TYPE 1 TEST: CPT

## 2024-08-17 PROCEDURE — 86140 C-REACTIVE PROTEIN: CPT

## 2024-08-17 PROCEDURE — 86696 HERPES SIMPLEX TYPE 2 TEST: CPT

## 2024-08-19 LAB
ALBUMIN SERPL ELPH-MCNC: 4.1 G/DL (ref 3.2–5.1)
ALBUMIN SERPL ELPH-MCNC: 58.6 % (ref 48–70)
ALPHA1 GLOB SERPL ELPH-MCNC: 0.28 G/DL (ref 0.15–0.47)
ALPHA1 GLOB SERPL ELPH-MCNC: 4 % (ref 1.8–7)
ALPHA2 GLOB SERPL ELPH-MCNC: 0.74 G/DL (ref 0.42–1.04)
ALPHA2 GLOB SERPL ELPH-MCNC: 10.5 % (ref 5.9–14.9)
BETA GLOB ABNORMAL SERPL ELPH-MCNC: 0.46 G/DL (ref 0.31–0.57)
BETA1 GLOB SERPL ELPH-MCNC: 6.5 % (ref 4.7–7.7)
BETA2 GLOB SERPL ELPH-MCNC: 4.7 % (ref 3.1–7.9)
BETA2+GAMMA GLOB SERPL ELPH-MCNC: 0.33 G/DL (ref 0.2–0.58)
GAMMA GLOB ABNORMAL SERPL ELPH-MCNC: 1.1 G/DL (ref 0.4–1.66)
GAMMA GLOB SERPL ELPH-MCNC: 15.7 % (ref 6.9–22.3)
IGG/ALB SER: 1.42 {RATIO} (ref 1.1–1.8)
PROT PATTERN SERPL ELPH-IMP: NORMAL
PROT SERPL-MCNC: 7 G/DL (ref 6.4–8.2)

## 2024-08-19 PROCEDURE — 84165 PROTEIN E-PHORESIS SERUM: CPT | Performed by: PATHOLOGY

## 2024-08-20 LAB
HSV2 IGG SERPL QL IA: NEGATIVE
HSV2 IGG SERPL QL IA: POSITIVE
MYCOBACTERIUM SPEC CULT: NORMAL
RHODAMINE-AURAMINE STN SPEC: NORMAL

## 2024-08-21 ENCOUNTER — TELEPHONE (OUTPATIENT)
Dept: HEMATOLOGY ONCOLOGY | Facility: CLINIC | Age: 29
End: 2024-08-21

## 2024-08-21 ENCOUNTER — OFFICE VISIT (OUTPATIENT)
Age: 29
End: 2024-08-21
Payer: COMMERCIAL

## 2024-08-21 VITALS
HEART RATE: 87 BPM | TEMPERATURE: 97.5 F | OXYGEN SATURATION: 98 % | WEIGHT: 267.4 LBS | RESPIRATION RATE: 18 BRPM | BODY MASS INDEX: 43.16 KG/M2 | DIASTOLIC BLOOD PRESSURE: 74 MMHG | SYSTOLIC BLOOD PRESSURE: 144 MMHG

## 2024-08-21 DIAGNOSIS — R05.9 COUGH, UNSPECIFIED TYPE: Primary | ICD-10-CM

## 2024-08-21 DIAGNOSIS — J06.9 ACUTE URI: ICD-10-CM

## 2024-08-21 LAB
SARS-COV-2 AG UPPER RESP QL IA: NEGATIVE
VALID CONTROL: NORMAL

## 2024-08-21 PROCEDURE — 99203 OFFICE O/P NEW LOW 30 MIN: CPT | Performed by: PHYSICIAN ASSISTANT

## 2024-08-21 PROCEDURE — 87811 SARS-COV-2 COVID19 W/OPTIC: CPT | Performed by: PHYSICIAN ASSISTANT

## 2024-08-21 RX ORDER — GUAIFENESIN, PSEUDOEPHEDRINE HYDROCHLORIDE 600; 60 MG/1; MG/1
1 TABLET, EXTENDED RELEASE ORAL EVERY 12 HOURS
Qty: 10 TABLET | Refills: 0 | Status: SHIPPED | OUTPATIENT
Start: 2024-08-21 | End: 2024-08-29

## 2024-08-21 NOTE — LETTER
August 21, 2024     Patient: Ben Daniel   YOB: 1995   Date of Visit: 8/21/2024       To Whom it May Concern:    Ben Daniel was seen in my clinic on 8/21/2024. He may return to work on 8/22/2024 .    If you have any questions or concerns, please don't hesitate to call.         Sincerely,          Wm Asencio PA-C        CC: No Recipients

## 2024-08-21 NOTE — TELEPHONE ENCOUNTER
Attempted to have a video visit with patient, invited through the epic system for patient to join, got voicemail, unable to leave a message.  Patient will have the call back to reschedule his appointment.

## 2024-08-21 NOTE — PATIENT INSTRUCTIONS
"Patient Education     Cough, runny nose, and the common cold   The Basics   Written by the doctors and editors at [facility]   What causes cough, runny nose, and other symptoms of the common cold? -- These symptoms are usually caused by a virus. Doctors also use the term \"viral upper respiratory infection\" or \"viral URI.\" Lots of different viruses can get into your nose, mouth, throat, or airways and cause cold symptoms.  Most people get better from a cold without any lasting problems. Even so, having a cold can be uncomfortable.  What are the symptoms of the common cold? -- Symptoms can include:   Sneezing   Coughing   Sniffling and runny nose   Sore throat   Chest congestion  In children, the common cold can also cause a fever. But adults do not usually get a fever when they have a cold.  Colds usually last about 3 to 7 days in adults and 10 days in children. But some people have symptoms for up to 2 weeks.  How can I tell if I have a cold or something else? -- Sometimes, it can be hard to tell if you have a cold or something else. Some cold symptoms can also be caused by other illnesses, such as COVID-19, the flu, or strep throat.  There are sometimes clues that can help you tell the difference:   COVID-19 often starts out very similar to a cold, although it can also cause a fever. If you have cold symptoms and have been around someone with COVID-19, you should get a test to find out if you have it, too.   The flu is more likely to cause fever, body aches, and extreme tiredness than a cold.   Strep throat usually causes severe throat pain. It can also cause a fever and swollen glands in the neck. People with strep throat usually do not have other cold symptoms like a stuffy nose or cough.  If you think that you might have an illness other than the common cold, call your doctor or nurse. They can tell you what to do.  Can medicine help with a cold? -- Usually, a cold gets better on its own and does not need " treatment. Because colds are usually caused by viruses, antibiotics will not help.  If you are a teen or an adult, you can try cough and cold medicines that you can get without a prescription. These medicines might help with your symptoms. But they can't cure your cold, or help you get well faster.  If you decide to try non-prescription cold medicines:   Read the directions on the label, and follow them carefully.   Do not combine 2 or more medicines that have acetaminophen in them. If you take too much acetaminophen, it can damage your liver.   If you have a heart condition, have high blood pressure, or take any prescription medicines, talk to your doctor or pharmacist before taking cold medicine. They can tell you which medicines are safe.  Some medicines are not safe for children:   If your child is younger than 6, do not give them any cold medicines. These medicines are not safe for young children. Even if your child is older than 6, cough and cold medicines are unlikely to help.   Never give aspirin to any child younger than 18 years old. In children, aspirin can cause a life-threatening condition called Reye syndrome.   When giving your child acetaminophen or other non-prescription medicines, never give more than the recommended dose.  Is there anything I can do on my own to feel better? -- Yes. You can:   Get plenty of rest.   Drink lots of fluids (water, juice, or broth) to stay hydrated. This will help replace any fluids lost if you have a runny nose or sweating from a fever. Warm tea or soup can help soothe a sore throat.   If the air in your home feels dry, use a cool-mist humidifier. This can help a stuffy nose and make it easier to breathe.   Use saline nose drops or spray to relieve stuffiness.   Avoid smoking, and stay away from places where people are smoking.  Can the common cold lead to more serious problems? -- In some cases, yes. In some people, having a cold can lead to:   Ear infections   Worse  asthma symptoms   Sinus infections   Pneumonia or bronchitis (infections of the lungs)  Can colds be prevented? -- There are some things you can do to keep germs from spreading:   Wash your hands with soap and water often (figure 1) - This can also help prevent the spread of other illnesses like the flu and COVID-19.   Cover your cough - Cough into your elbow instead of your hands. Teach children to do this, too. Throw away used tissues right away.   Clean surfaces - The germs that cause the common cold can live on tables, door handles, and other surfaces for at least 2 hours.   Stay home if you are sick - When you do need to be around other people, consider wearing a face mask until you are feeling better.  When should I call the doctor? -- Contact your doctor or nurse if you:   Lose your sense of taste or smell   Have a fever of more than 100.4°F (38°C) that comes with shaking chills, loss of appetite, or trouble breathing   Have a very bad sore throat   Have a fever and also have lung disease, such as emphysema or asthma   Have a cough that lasts longer than 10 days or starts getting worse   Have chest pain when you cough or breathe deeply, have trouble breathing, or cough up blood  If you are older than 65, or if you have any chronic medical conditions such as diabetes, contact your doctor or nurse any time you get a long-lasting cough.  Take your child to the emergency department if they:   Become confused or stop responding to you   Have trouble breathing or have to work hard to breathe  Contact your child's doctor or nurse if the child:   Loses their sense of taste or smell or won't eat foods that they ate before   Has a very bad sore throat   Refuses to drink anything for a long time   Is younger than 4 months   Has a fever and is not acting like themselves   Has a cough that lasts for more than 2 weeks and is not getting any better or is getting worse   Has a stuffed or runny nose that gets worse or does  not get any better after 10 days   Has red eyes or yellow goop coming out of their eyes   Has ear pain, pulls at their ears, or shows other signs of having an ear infection  All topics are updated as new evidence becomes available and our peer review process is complete.  This topic retrieved from [employer] on: Feb 26, 2024.  Topic 84131 Version 30.0  Release: 32.2.4 - C32.56  © 2024 UpToDate, Inc. and/or its affiliates. All rights reserved.  figure 1: How to wash your hands     Wet your hands with clean water, and apply a small amount of soap. Lather and rub hands together for at least 20 seconds. Clean your wrists, palms, backs of your hands, between your fingers, tips of your fingers, thumbs, and under and around your nails. Rinse well, and dry your hands using a clean towel.  Graphic 089351 Version 7.0  Consumer Information Use and Disclaimer   Disclaimer: This generalized information is a limited summary of diagnosis, treatment, and/or medication information. It is not meant to be comprehensive and should be used as a tool to help the user understand and/or assess potential diagnostic and treatment options. It does NOT include all information about conditions, treatments, medications, side effects, or risks that may apply to a specific patient. It is not intended to be medical advice or a substitute for the medical advice, diagnosis, or treatment of a health care provider based on the health care provider's examination and assessment of a patient's specific and unique circumstances. Patients must speak with a health care provider for complete information about their health, medical questions, and treatment options, including any risks or benefits regarding use of medications. This information does not endorse any treatments or medications as safe, effective, or approved for treating a specific patient. UpToDate, Inc. and its affiliates disclaim any warranty or liability relating to this information or the use  thereof.The use of this information is governed by the Terms of Use, available at https://www.wolterskluwer.com/en/know/clinical-effectiveness-terms. 2024© UpToDate, Inc. and its affiliates and/or licensors. All rights reserved.  Copyright   © 2024 "[employer] Inc. and/or its affiliates. All rights reserved.

## 2024-08-21 NOTE — PROGRESS NOTES
[facility]        NAME: Ben Daniel is a 29 y.o. male  : 1995    MRN: [id number]  DATE: 2024  TIME: 10:53 AM    Assessment and Plan   Cough, unspecified type [R05.9]  1. Cough, unspecified type  Poct Covid 19 Rapid Antigen Test    pseudoephedrine-guaifenesin (MUCINEX D)  MG per tablet      2. Acute URI              Patient Instructions     Rapid COVID was negative  I suspect viral URI    Mucinex D 1 tablet twice daily for 5 to 7 days  Tylenol 500 mg 1 tablet every 4 hours as needed  Drink plenty of fluids and remain well-hydrated  Rest    Follow up with PCP in 3-5 days.  Proceed to  ER if symptoms worsen.    If tests are performed, our office will contact you with results only if changes need to made to the care plan discussed with you at the visit. You can review your full results on [facility].    Chief Complaint     Chief Complaint   Patient presents with    Cold Like Symptoms     Symptoms started yesterday.C/o congestion, runny nose, cough and light green phlegm.          History of Present Illness       Cough  This is a new problem. The current episode started yesterday. The problem has been gradually worsening. The problem occurs every few hours. The cough is Non-productive. Associated symptoms include nasal congestion, postnasal drip and rhinorrhea. Pertinent negatives include no chest pain, chills, ear congestion, ear pain, fever, headaches, heartburn, hemoptysis, myalgias, rash, sore throat, shortness of breath, sweats or wheezing. The symptoms are aggravated by lying down. He has tried nothing for the symptoms. The treatment provided no relief. His past medical history is significant for pneumonia. There is no history of asthma, COPD or emphysema.       Review of Systems   Review of Systems   Constitutional:  Negative for chills and fever.   HENT:  Positive for postnasal drip and rhinorrhea. Negative for ear pain and sore throat.    Respiratory:  Positive for  cough. Negative for hemoptysis, shortness of breath and wheezing.    Cardiovascular:  Negative for chest pain.   Gastrointestinal:  Negative for heartburn.   Musculoskeletal:  Negative for myalgias.   Skin:  Negative for rash.   Neurological:  Negative for headaches.         Current Medications       Current Outpatient Medications:     pseudoephedrine-guaifenesin (MUCINEX D)  MG per tablet, Take 1 tablet by mouth every 12 (twelve) hours, Disp: 10 tablet, Rfl: 0    amLODIPine (NORVASC) 10 mg tablet, Take 1 tablet (10 mg total) by mouth daily, Disp: 30 tablet, Rfl: 0    fluticasone (Flovent Diskus) 50 mcg/actuation diskus inhaler, Inhale 1 puff 2 (two) times a day Rinse mouth after use., Disp: 60 blister, Rfl: 0    furosemide (LASIX) 20 mg tablet, Take 1 tablet (20 mg total) by mouth daily, Disp: 30 tablet, Rfl: 0    levothyroxine 50 mcg tablet, Take 1 tablet (50 mcg total) by mouth daily, Disp: 90 tablet, Rfl: 1    losartan (COZAAR) 50 mg tablet, Take 1 tablet (50 mg total) by mouth daily, Disp: 30 tablet, Rfl: 5    pantoprazole (PROTONIX) 40 mg tablet, Take 1 tablet (40 mg total) by mouth daily in the early morning, Disp: 30 tablet, Rfl: 0    predniSONE 20 mg tablet, Take 1 tablet (20 mg total) by mouth 2 (two) times a day, Disp: 60 tablet, Rfl: 3    Current Allergies     Allergies as of 08/21/2024    (No Known Allergies)            The following portions of the patient's history were reviewed and updated as appropriate: allergies, current medications, past family history, past medical history, past social history, past surgical history and problem list.     Past Medical History:   Diagnosis Date    Anxiety     Chronic kidney disease     Hypertension     Pneumonia 7/6/24       Past Surgical History:   Procedure Laterality Date    ANKLE SURGERY      BRONCHOSCOPY  7/9/24    FETAL SURGERY FOR CONGENITAL HERNIA      IR BIOPSY KIDNEY RANDOM  07/09/2024       Family History   Problem Relation Age of Onset    Heart  failure Maternal Grandmother     Heart failure Maternal Grandfather     Diabetes Maternal Grandfather     Hypertension Maternal Grandfather     Hypertension Mother     Cancer Paternal Grandfather     Cancer Paternal Grandmother         Lung cancer    Lung cancer Paternal Grandmother     Hypertension Brother          Medications have been verified.        Objective   /74   Pulse 87   Temp 97.5 °F (36.4 °C)   Resp 18   Wt 121 kg (267 lb 6.4 oz)   SpO2 98%   BMI 43.16 kg/m²        Physical Exam     Physical Exam  Vitals and nursing note reviewed.   Constitutional:       General: He is not in acute distress.     Appearance: Normal appearance. He is not ill-appearing, toxic-appearing or diaphoretic.   HENT:      Right Ear: Tympanic membrane, ear canal and external ear normal.      Left Ear: Tympanic membrane, ear canal and external ear normal.      Nose: Congestion and rhinorrhea present.      Mouth/Throat:      Mouth: Mucous membranes are moist.      Pharynx: Oropharynx is clear. No oropharyngeal exudate.   Eyes:      General: No scleral icterus.     Extraocular Movements: Extraocular movements intact.      Conjunctiva/sclera: Conjunctivae normal.      Pupils: Pupils are equal, round, and reactive to light.   Cardiovascular:      Rate and Rhythm: Normal rate and regular rhythm.      Pulses: Normal pulses.      Heart sounds: Normal heart sounds.   Pulmonary:      Effort: Pulmonary effort is normal. No respiratory distress.      Breath sounds: Normal breath sounds. No wheezing, rhonchi or rales.   Musculoskeletal:         General: Normal range of motion.      Cervical back: Normal range of motion and neck supple. No tenderness.   Lymphadenopathy:      Cervical: No cervical adenopathy.   Skin:     General: Skin is warm and dry.      Findings: No rash.   Neurological:      General: No focal deficit present.      Mental Status: He is alert and oriented to person, place, and time.      Coordination: Coordination  normal.      Gait: Gait normal.   Psychiatric:         Mood and Affect: Mood normal.         Behavior: Behavior normal.         Thought Content: Thought content normal.         Judgment: Judgment normal.

## 2024-08-22 DIAGNOSIS — E55.9 VITAMIN D DEFICIENCY: Primary | ICD-10-CM

## 2024-08-22 RX ORDER — ERGOCALCIFEROL 1.25 MG/1
50000 CAPSULE, LIQUID FILLED ORAL WEEKLY
Qty: 16 CAPSULE | Refills: 1 | Status: SHIPPED | OUTPATIENT
Start: 2024-08-22

## 2024-08-27 LAB
MYCOBACTERIUM SPEC CULT: NORMAL
RHODAMINE-AURAMINE STN SPEC: NORMAL

## 2024-08-29 ENCOUNTER — OFFICE VISIT (OUTPATIENT)
Dept: NEPHROLOGY | Facility: CLINIC | Age: 29
End: 2024-08-29
Payer: COMMERCIAL

## 2024-08-29 ENCOUNTER — CLINICAL SUPPORT (OUTPATIENT)
Dept: NEPHROLOGY | Facility: CLINIC | Age: 29
End: 2024-08-29
Payer: COMMERCIAL

## 2024-08-29 VITALS
WEIGHT: 266 LBS | DIASTOLIC BLOOD PRESSURE: 90 MMHG | BODY MASS INDEX: 42.75 KG/M2 | SYSTOLIC BLOOD PRESSURE: 140 MMHG | HEIGHT: 66 IN

## 2024-08-29 DIAGNOSIS — I10 ESSENTIAL HYPERTENSION: ICD-10-CM

## 2024-08-29 DIAGNOSIS — N03.A CHRONIC NEPHRITIC SYNDROME WITH C3 GLOMERULONEPHRITIS: ICD-10-CM

## 2024-08-29 DIAGNOSIS — N17.9 AKI (ACUTE KIDNEY INJURY) (HCC): ICD-10-CM

## 2024-08-29 DIAGNOSIS — N05.8 C3 GLOMERULONEPHRITIS: ICD-10-CM

## 2024-08-29 DIAGNOSIS — E87.3 METABOLIC ALKALOSIS: ICD-10-CM

## 2024-08-29 DIAGNOSIS — E66.01 CLASS 3 SEVERE OBESITY DUE TO EXCESS CALORIES WITHOUT SERIOUS COMORBIDITY WITH BODY MASS INDEX (BMI) OF 40.0 TO 44.9 IN ADULT (HCC): ICD-10-CM

## 2024-08-29 DIAGNOSIS — N05.8 C3 GLOMERULONEPHRITIS: Primary | ICD-10-CM

## 2024-08-29 DIAGNOSIS — N05.9 POST-INFECTIOUS GLOMERULONEPHRITIS: Primary | ICD-10-CM

## 2024-08-29 DIAGNOSIS — D84.9 IMMUNOSUPPRESSION (HCC): ICD-10-CM

## 2024-08-29 DIAGNOSIS — J15.3 PNEUMONIA DUE TO GROUP B STREPTOCOCCUS, UNSPECIFIED LATERALITY, UNSPECIFIED PART OF LUNG (HCC): ICD-10-CM

## 2024-08-29 PROCEDURE — 99214 OFFICE O/P EST MOD 30 MIN: CPT | Performed by: STUDENT IN AN ORGANIZED HEALTH CARE EDUCATION/TRAINING PROGRAM

## 2024-08-29 NOTE — PATIENT INSTRUCTIONS
Thank you for coming to your visit today. As we discussed you kidney function is back to normal. You don't have proteins in the urine but you still have blood in the urine. Please follow the recommendations below       Recommend low sodium (salt) food    Avoid nonsteroidal anti-inflammatory drugs such as Naprosyn, ibuprofen, Aleve, Advil, Celebrex, Meloxicam (Mobic) etc.  You can use Tylenol as needed if you do not have any liver condition to be concerned about    Try to exercise at least 30 minutes 3 days a week to begin with with an ultimate goal of 5 days a week for at least 30 minutes    Try to lose 5-10 lb by your next visit    Prednisone taper:  30mg for 1 week  20 mg for 1 week  10 mg for 1 week   5mg for 1 week and then stop       Joselyn Reyes Bahamonde, MD  Nephrology Attending

## 2024-08-29 NOTE — PROGRESS NOTES
GLOMERULAR DISEASE OUTPATIENT PROGRESS NOTE   Ben Daniel 29 y.o. male MRN: [id number]  DATE: 8/29/2024    Reason for visit: No chief complaint on file.      ASSESSMENT and PLAN:  30 yo man with PMH of obesity , hypertension.  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  Patient follow-up with Dr. 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  You can use Tylenol as needed if you do not have any liver condition to be concerned about  C3 27  C4 14  Cryoglobulin x 1 negative  CRP 4.8  If C3 GN we will consider MMF if further flare      #Immunosuppressive Medications  First IS treatment : Methylprednisolone, prednisone  Past cycles  Current IS meds : Currently on prednisone 60 mg  Plan:  Prednisone taper:  30mg for 1 week  20 mg for 1 week  10 mg for 1 week   5mg for 1 week and then stop     #Prophylaxis  Gastric Protection: Pantoprazole, can discontinue after prednisone taper  Calcium/vit D  PCP prophy : Patient was not taking Bactrim, plan to taper down prednisone therefore were not started at this time      #Volume status/hypertension:  Volume: Euvolemic on exam  Blood pressure: Borderline hypertension, /90, goal less than 140/90  Recommend:  Low-sodium diet  Amlodipine 10 mg  Lasix 20 mg  Losartan 50 mg daily  Advised to maintain a good BP control to prevent progression of CKD     #Non-Oliguric KDIGO ROSI stage :    Etiology: Secondary to 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  Disease is suspicious for C3 GN  Genetic testing done in the office today  Treatment:  No indication of dialysis, kidney function back to baseline  Maintain MAP:  Over 65 mmHg if possible/avoid hypoperfusion:  Hold parameters on blood pressure medications  Avoid nephrotoxic agents such as NSAIDs, and IV contrast if possible. Avoid opioids   Adjust medications to GFR      #Acid-base Disorder  serum HCO3 27 mmol/L  Metabolic alkalosis likely secondary to vascular contraction in the settings of diuretics       #Anemia:  Current hemoglobin: 12.7 mg/dL  Anemia resolved    #Obesity   BMI 42.93  Recommend weight loss , heathy diet  Lifestyle modification        PREVIOUS BIOPSIES  LM:   32 glomeruli  None are globally or segmentally sclerosed  Glomeruli are enlarged, hypercellular and have a lobular appearance  To glomeruli with active cellular crescents  The glomerular capillary loops are irregularly thickened with few early double contours  Global endocapillary proliferative is seen in most glomeruli, severe glomerular capillary loops containing neutrophils with exudative fissures  Mesangium is mildly expanded and by extracellular matrix, increased numbers of mesangial cells  Tubules:  Epithelium is diffusely flattened, no loss of the brush border, PAS positive hyaline casts  Interstitial and with i mild nflammation with mononuclear cells.  No significant tubular atrophy or interstitial fibrosis    IF:  Several are granular Mendoza glomerular capillary wall and mesangial deposits with 2-3 plans for C3 only    EM:  4 glomeruli  1 with active cellular crescents  Mesangial finely granular electron dense deposits 2-3+.  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  Feels well, no shortness of breath, taking prednisone    REVIEW OF SYSTEMS:  More than 10 point review of systems were obtained and discussed in length with the patient. Complete review of systems were negative / unremarkable except mentioned above.     Review of Systems - Psychological ROS: negative  Ophthalmic ROS: negative  ENT ROS: negative  Hematological and Lymphatic ROS: negative  Endocrine ROS: negative  Respiratory ROS: no cough, shortness of breath, or wheezing  Cardiovascular ROS: no chest pain or dyspnea on exertion  Gastrointestinal ROS: no abdominal pain, change in bowel habits, or black or bloody stools  Genito-Urinary ROS: no dysuria, trouble voiding, or hematuria  Musculoskeletal ROS: negative  Neurological ROS: no TIA or stroke symptoms  Dermatological ROS: negative       PHYSICAL EXAM:  There were no vitals filed for this visit.  There is no height or weight on file to calculate BMI.    Physical Exam\.  General:  no acute distress at this time  Skin:  No acute rash  Eyes:  No scleral icterus and noninjected  ENT:  mucous membranes moist  Neck:  no carotid bruits  Chest:  Clear to auscultation percussion, good respiratory effort, no use of accessory respiratory muscles  CVS:  Regular rate and rhythm without rub   Abdomen:  soft and nontender   Extremities: no significant lower extremity edema  Neuro:  No gross focality  Psych:  Alert , cooperative         PAST MEDICAL HISTORY:  Past Medical History:   Diagnosis Date    Anxiety     Chronic kidney disease     Hypertension     Pneumonia 7/6/24       PAST SURGICAL HISTORY:  Past Surgical History:   Procedure Laterality Date    ANKLE SURGERY      BRONCHOSCOPY  7/9/24    FETAL  SURGERY FOR CONGENITAL HERNIA      IR BIOPSY KIDNEY RANDOM  07/09/2024       SOCIAL HISTORY:  Social History     Substance and Sexual Activity   Alcohol Use Not Currently    Alcohol/week: 1.0 standard drink of alcohol    Comment: occ     Social History     Substance and Sexual Activity   Drug Use No     Social History     Tobacco Use   Smoking Status Never    Passive exposure: Never   Smokeless Tobacco Never       FAMILY HISTORY:  Family History   Problem Relation Age of Onset    Heart failure Maternal Grandmother     Heart failure Maternal Grandfather     Diabetes Maternal Grandfather     Hypertension Maternal Grandfather     Hypertension Mother     Cancer Paternal Grandfather     Cancer Paternal Grandmother         Lung cancer    Lung cancer Paternal Grandmother     Hypertension Brother        MEDICATIONS:    Current Outpatient Medications:     amLODIPine (NORVASC) 10 mg tablet, Take 1 tablet (10 mg total) by mouth daily, Disp: 30 tablet, Rfl: 0    ergocalciferol (VITAMIN D2) 50,000 units, Take 1 capsule (50,000 Units total) by mouth once a week, Disp: 16 capsule, Rfl: 1    fluticasone (Flovent Diskus) 50 mcg/actuation diskus inhaler, Inhale 1 puff 2 (two) times a day Rinse mouth after use., Disp: 60 blister, Rfl: 0    furosemide (LASIX) 20 mg tablet, Take 1 tablet (20 mg total) by mouth daily, Disp: 30 tablet, Rfl: 0    levothyroxine 50 mcg tablet, Take 1 tablet (50 mcg total) by mouth daily, Disp: 90 tablet, Rfl: 1    losartan (COZAAR) 50 mg tablet, Take 1 tablet (50 mg total) by mouth daily, Disp: 30 tablet, Rfl: 5    pantoprazole (PROTONIX) 40 mg tablet, Take 1 tablet (40 mg total) by mouth daily in the early morning, Disp: 30 tablet, Rfl: 0    predniSONE 20 mg tablet, Take 1 tablet (20 mg total) by mouth 2 (two) times a day, Disp: 60 tablet, Rfl: 3    pseudoephedrine-guaifenesin (MUCINEX D)  MG per tablet, Take 1 tablet by mouth every 12 (twelve) hours, Disp: 10 tablet, Rfl: 0    Lab Results:   Results  for orders placed or performed in visit on 08/21/24   Poct Covid 19 Rapid Antigen Test   Result Value Ref Range    POCT SARS-CoV-2 Ag Negative Negative    VALID CONTROL Valid

## 2024-08-29 NOTE — PATIENT INSTRUCTIONS
Patient briefed on genetic testing  Patient verbally acknowledged understanding and all questions answered.  Patient signed acknowledged form  Patient swabbed 10 x each inner bottom check/gum

## 2024-09-03 ENCOUNTER — TELEPHONE (OUTPATIENT)
Dept: NEPHROLOGY | Facility: CLINIC | Age: 29
End: 2024-09-03

## 2024-09-03 PROBLEM — E66.813 CLASS 3 SEVERE OBESITY DUE TO EXCESS CALORIES WITHOUT SERIOUS COMORBIDITY WITH BODY MASS INDEX (BMI) OF 40.0 TO 44.9 IN ADULT (HCC): Status: ACTIVE | Noted: 2022-04-18

## 2024-09-03 PROBLEM — N05.9 POST-INFECTIOUS GLOMERULONEPHRITIS: Status: ACTIVE | Noted: 2024-09-03

## 2024-09-03 PROBLEM — D84.9 IMMUNOSUPPRESSION (HCC): Status: ACTIVE | Noted: 2024-09-03

## 2024-09-03 PROBLEM — E87.3 METABOLIC ALKALOSIS: Status: ACTIVE | Noted: 2024-09-03

## 2024-09-03 NOTE — TELEPHONE ENCOUNTER
Called patient and left a VM to let him know that for Arlen júnior unfortunately unable to generate accurate conclusive result. A redraw will be required. Can you please repeat Arlen kit at  [facility]. Please call them to schedule a appt to complete Arlen at [phone]

## 2024-09-03 NOTE — TELEPHONE ENCOUNTER
Called [facility] to let them know that patient will go there to do the Arlen kit. Dr. Reyes [place] office stated if you can please add a referral.

## 2024-09-03 NOTE — TELEPHONE ENCOUNTER
Received a fax from [employer] to process the [facility] kidney gene panel test for patient. The collected on 8/29/24 reference Arlen case id [id number]. Arlen is reaching out to notify Dr. Reyes that with the sample provide, arlen were unfortunately unable to generate accurate conclusive result. No patient identifiers on the patient sample. A redraw will be required to continue processing.       Sent letter to patient chart.

## 2024-09-04 ENCOUNTER — TELEPHONE (OUTPATIENT)
Age: 29
End: 2024-09-04

## 2024-09-04 NOTE — TELEPHONE ENCOUNTER
Called [facility] and POD transferred a call to Mabel . Spoke with Mabel of patient Genetic testing and she told me that Dr. Reyes no need to add a referral. They will call patient to schedule a appt to go to [facility] to repeat genetic testing.

## 2024-09-04 NOTE — TELEPHONE ENCOUNTER
Finn Nguyen, Spoke to Dr Wells regarding genetic test. Dr Wells stated that we will repeat that test when pt comes for his next appt on 10/09.

## 2024-09-04 NOTE — TELEPHONE ENCOUNTER
Elaina from [facility] with question for Nephrology Lena - Transferred to Mabel for further assistance regarding Genetic testing

## 2024-09-12 DIAGNOSIS — N05.8 C3 GLOMERULONEPHRITIS: ICD-10-CM

## 2024-09-12 RX ORDER — FUROSEMIDE 20 MG
20 TABLET ORAL DAILY
Qty: 90 TABLET | Refills: 1 | Status: SHIPPED | OUTPATIENT
Start: 2024-09-12 | End: 2025-03-11

## 2024-09-12 RX ORDER — PANTOPRAZOLE SODIUM 40 MG/1
40 TABLET, DELAYED RELEASE ORAL
Qty: 90 TABLET | Refills: 1 | Status: SHIPPED | OUTPATIENT
Start: 2024-09-12 | End: 2025-03-11

## 2024-09-12 RX ORDER — AMLODIPINE BESYLATE 10 MG/1
10 TABLET ORAL DAILY
Qty: 90 TABLET | Refills: 1 | Status: SHIPPED | OUTPATIENT
Start: 2024-09-12 | End: 2025-03-11

## 2024-09-14 ENCOUNTER — APPOINTMENT (OUTPATIENT)
Dept: LAB | Facility: HOSPITAL | Age: 29
End: 2024-09-14
Payer: COMMERCIAL

## 2024-09-14 DIAGNOSIS — N05.8 C3 GLOMERULONEPHRITIS: ICD-10-CM

## 2024-09-14 DIAGNOSIS — I10 ESSENTIAL HYPERTENSION: ICD-10-CM

## 2024-09-14 DIAGNOSIS — R80.1 PERSISTENT PROTEINURIA: ICD-10-CM

## 2024-09-14 LAB
ALBUMIN SERPL BCG-MCNC: 4 G/DL (ref 3.5–5)
ALP SERPL-CCNC: 59 U/L (ref 34–104)
ALT SERPL W P-5'-P-CCNC: 23 U/L (ref 7–52)
ANION GAP SERPL CALCULATED.3IONS-SCNC: 7 MMOL/L (ref 4–13)
AST SERPL W P-5'-P-CCNC: 14 U/L (ref 13–39)
BACTERIA UR QL AUTO: ABNORMAL /HPF
BASOPHILS # BLD AUTO: 0.02 THOUSANDS/ΜL (ref 0–0.1)
BASOPHILS NFR BLD AUTO: 0 % (ref 0–1)
BILIRUB SERPL-MCNC: 0.72 MG/DL (ref 0.2–1)
BILIRUB UR QL STRIP: NEGATIVE
BUN SERPL-MCNC: 15 MG/DL (ref 5–25)
CALCIUM SERPL-MCNC: 9.1 MG/DL (ref 8.4–10.2)
CHLORIDE SERPL-SCNC: 102 MMOL/L (ref 96–108)
CLARITY UR: CLEAR
CO2 SERPL-SCNC: 28 MMOL/L (ref 21–32)
COLOR UR: YELLOW
CREAT SERPL-MCNC: 0.79 MG/DL (ref 0.6–1.3)
CREAT UR-MCNC: 174 MG/DL
EOSINOPHIL # BLD AUTO: 0.1 THOUSAND/ΜL (ref 0–0.61)
EOSINOPHIL NFR BLD AUTO: 2 % (ref 0–6)
ERYTHROCYTE [DISTWIDTH] IN BLOOD BY AUTOMATED COUNT: 12.8 % (ref 11.6–15.1)
GFR SERPL CREATININE-BSD FRML MDRD: 121 ML/MIN/1.73SQ M
GLUCOSE P FAST SERPL-MCNC: 90 MG/DL (ref 65–99)
GLUCOSE UR STRIP-MCNC: NEGATIVE MG/DL
HCT VFR BLD AUTO: 40.8 % (ref 36.5–49.3)
HGB BLD-MCNC: 13.4 G/DL (ref 12–17)
HGB UR QL STRIP.AUTO: ABNORMAL
IMM GRANULOCYTES # BLD AUTO: 0.06 THOUSAND/UL (ref 0–0.2)
IMM GRANULOCYTES NFR BLD AUTO: 1 % (ref 0–2)
KETONES UR STRIP-MCNC: NEGATIVE MG/DL
LEUKOCYTE ESTERASE UR QL STRIP: NEGATIVE
LYMPHOCYTES # BLD AUTO: 1.79 THOUSANDS/ΜL (ref 0.6–4.47)
LYMPHOCYTES NFR BLD AUTO: 36 % (ref 14–44)
MCH RBC QN AUTO: 28 PG (ref 26.8–34.3)
MCHC RBC AUTO-ENTMCNC: 32.8 G/DL (ref 31.4–37.4)
MCV RBC AUTO: 85 FL (ref 82–98)
MONOCYTES # BLD AUTO: 0.57 THOUSAND/ΜL (ref 0.17–1.22)
MONOCYTES NFR BLD AUTO: 11 % (ref 4–12)
MUCOUS THREADS UR QL AUTO: ABNORMAL
NEUTROPHILS # BLD AUTO: 2.44 THOUSANDS/ΜL (ref 1.85–7.62)
NEUTS SEG NFR BLD AUTO: 50 % (ref 43–75)
NITRITE UR QL STRIP: NEGATIVE
NON-SQ EPI CELLS URNS QL MICRO: ABNORMAL /HPF
NRBC BLD AUTO-RTO: 0 /100 WBCS
PH UR STRIP.AUTO: 5.5 [PH]
PLATELET # BLD AUTO: 257 THOUSANDS/UL (ref 149–390)
PMV BLD AUTO: 9 FL (ref 8.9–12.7)
POTASSIUM SERPL-SCNC: 4 MMOL/L (ref 3.5–5.3)
PROT SERPL-MCNC: 7.1 G/DL (ref 6.4–8.4)
PROT UR STRIP-MCNC: ABNORMAL MG/DL
PROT UR-MCNC: 13.8 MG/DL
PROT/CREAT UR: 0.1 MG/G{CREAT} (ref 0–0.1)
RBC # BLD AUTO: 4.78 MILLION/UL (ref 3.88–5.62)
RBC #/AREA URNS AUTO: ABNORMAL /HPF
SODIUM SERPL-SCNC: 137 MMOL/L (ref 135–147)
SP GR UR STRIP.AUTO: 1.02 (ref 1–1.03)
UROBILINOGEN UR STRIP-ACNC: <2 MG/DL
WBC # BLD AUTO: 4.98 THOUSAND/UL (ref 4.31–10.16)
WBC #/AREA URNS AUTO: ABNORMAL /HPF

## 2024-09-14 PROCEDURE — 84156 ASSAY OF PROTEIN URINE: CPT

## 2024-09-14 PROCEDURE — 82570 ASSAY OF URINE CREATININE: CPT

## 2024-09-14 PROCEDURE — 80053 COMPREHEN METABOLIC PANEL: CPT

## 2024-09-14 PROCEDURE — 81001 URINALYSIS AUTO W/SCOPE: CPT

## 2024-09-14 PROCEDURE — 85025 COMPLETE CBC W/AUTO DIFF WBC: CPT

## 2024-09-14 PROCEDURE — 36415 COLL VENOUS BLD VENIPUNCTURE: CPT

## 2024-09-14 PROCEDURE — 86160 COMPLEMENT ANTIGEN: CPT

## 2024-09-15 LAB
C3 SERPL-MCNC: 164 MG/DL (ref 87–200)
C4 SERPL-MCNC: 37 MG/DL (ref 19–52)

## 2024-09-20 ENCOUNTER — TELEPHONE (OUTPATIENT)
Dept: NEPHROLOGY | Facility: CLINIC | Age: 29
End: 2024-09-20

## 2024-09-20 NOTE — TELEPHONE ENCOUNTER
Spoke with patient's spouse marco antonio in person. advised per Dr.Umesh Wells. that he will like patient to increase furosemide BID. marco antonio verbalized understanding and is okay with it.

## 2024-09-20 NOTE — TELEPHONE ENCOUNTER
Patient spouse marco antonio, will like to advised , that patient has been having increased leg swelling for about a week now. Marco Antonio will like advice on what she needs to do.  Please Advise.

## 2024-09-23 ENCOUNTER — OFFICE VISIT (OUTPATIENT)
Dept: FAMILY MEDICINE CLINIC | Facility: CLINIC | Age: 29
End: 2024-09-23
Payer: COMMERCIAL

## 2024-09-23 VITALS
HEART RATE: 94 BPM | SYSTOLIC BLOOD PRESSURE: 138 MMHG | WEIGHT: 276 LBS | OXYGEN SATURATION: 97 % | BODY MASS INDEX: 44.36 KG/M2 | HEIGHT: 66 IN | DIASTOLIC BLOOD PRESSURE: 80 MMHG

## 2024-09-23 DIAGNOSIS — R09.89 CHEST CONGESTION: ICD-10-CM

## 2024-09-23 DIAGNOSIS — R80.1 PERSISTENT PROTEINURIA: ICD-10-CM

## 2024-09-23 DIAGNOSIS — E66.01 CLASS 3 SEVERE OBESITY DUE TO EXCESS CALORIES WITHOUT SERIOUS COMORBIDITY WITH BODY MASS INDEX (BMI) OF 40.0 TO 44.9 IN ADULT (HCC): ICD-10-CM

## 2024-09-23 DIAGNOSIS — I10 ESSENTIAL HYPERTENSION: Primary | ICD-10-CM

## 2024-09-23 DIAGNOSIS — E66.813 CLASS 3 SEVERE OBESITY DUE TO EXCESS CALORIES WITHOUT SERIOUS COMORBIDITY WITH BODY MASS INDEX (BMI) OF 40.0 TO 44.9 IN ADULT (HCC): ICD-10-CM

## 2024-09-23 DIAGNOSIS — R31.9 HEMATURIA, UNSPECIFIED TYPE: ICD-10-CM

## 2024-09-23 PROCEDURE — 99214 OFFICE O/P EST MOD 30 MIN: CPT

## 2024-09-23 RX ORDER — LOSARTAN POTASSIUM 100 MG/1
100 TABLET ORAL DAILY
Qty: 90 TABLET | Refills: 0 | Status: SHIPPED | OUTPATIENT
Start: 2024-09-23

## 2024-09-23 NOTE — PROGRESS NOTES
Assessment/Plan:         Problem List Items Addressed This Visit          Cardiovascular and Mediastinum    Essential hypertension - Primary     Will increase losartan, monitor blood pressure at home.  Follow-up in 2 weeks or sooner if needed.  Call with any side effects which would most likely be like dizziness low blood pressure.  Given information for DASH diet please have lab work.  Will consider adding potassium as nephrology increased the Lasix.         Relevant Medications    losartan (COZAAR) 100 MG tablet    Other Relevant Orders    VAS renal artery complete    Comprehensive metabolic panel    CBC and differential    Magnesium       Other    Class 3 severe obesity due to excess calories without serious comorbidity with body mass index (BMI) of 40.0 to 44.9 in adult (HCC)     Will treat water retention, recommend DASH diet or my plate.gov.  Do recommend exercising 30 minutes most days.  Will start Wegovy follow-up in 2 week or sooner if needed         Relevant Medications    Semaglutide-Weight Management (WEGOVY) 0.25 MG/0.5ML    Persistent proteinuria     Reviewed labs, continue to increase water intake.  Follow-up with nephrology.         Relevant Medications    losartan (COZAAR) 100 MG tablet    Hematuria     Have ordered imaging, please schedule follow-up with urology.  Reviewed previous lab work and imaging.         Relevant Orders    Urinalysis with microscopic    CT cystogram    Ambulatory Referral to Urology     Other Visit Diagnoses       Chest congestion        Will treat symptomatically for chest congestion and cough recommend vitamin C vitamin D zinc chest x-ray if symptoms do not improve please do home COVID    Relevant Orders    XR chest pa and lateral              Subjective:      Patient ID: Ben Daniel is a 29 y.o. male.    Ben is here for follow up, he is retaining a lot of water. He has gained 20 or more lbs since last visit.         The following portions of the patient's history were  reviewed and updated as appropriate:   Past Medical History:  He has a past medical history of Anxiety, Chronic kidney disease, Hypertension, and Pneumonia (7/6/24).,  _______________________________________________________________________  Medical Problems:  does not have any pertinent problems on file.,  _______________________________________________________________________  Past Surgical History:   has a past surgical history that includes Ankle surgery; Fetal surgery for congenital hernia; IR biopsy kidney random (07/09/2024); and Bronchoscopy (7/9/24).,  _______________________________________________________________________  Family History:  family history includes Cancer in his paternal grandfather and paternal grandmother; Diabetes in his maternal grandfather; Heart failure in his maternal grandfather and maternal grandmother; Hypertension in his brother, maternal grandfather, and mother; Lung cancer in his paternal grandmother.,  _______________________________________________________________________  Social History:   reports that he has never smoked. He has never been exposed to tobacco smoke. He has never used smokeless tobacco. He reports that he does not currently use alcohol after a past usage of about 1.0 standard drink of alcohol per week. He reports that he does not use drugs.,  _______________________________________________________________________  Allergies:  has No Known Allergies..  _______________________________________________________________________  Current Outpatient Medications   Medication Sig Dispense Refill    amLODIPine (NORVASC) 10 mg tablet Take 1 tablet (10 mg total) by mouth daily 90 tablet 1    ergocalciferol (VITAMIN D2) 50,000 units Take 1 capsule (50,000 Units total) by mouth once a week 16 capsule 1    furosemide (LASIX) 20 mg tablet Take 1 tablet (20 mg total) by mouth daily 90 tablet 1    levothyroxine 50 mcg tablet Take 1 tablet (50 mcg total) by mouth daily 90 tablet 1  "   losartan (COZAAR) 100 MG tablet Take 1 tablet (100 mg total) by mouth daily 90 tablet 0    pantoprazole (PROTONIX) 40 mg tablet Take 1 tablet (40 mg total) by mouth daily in the early morning 90 tablet 1    predniSONE 20 mg tablet Take 1 tablet (20 mg total) by mouth 2 (two) times a day 60 tablet 3    Semaglutide-Weight Management (WEGOVY) 0.25 MG/0.5ML Inject 0.5 mL (0.25 mg total) under the skin once a week 2 mL 0    fluticasone (Flovent Diskus) 50 mcg/actuation diskus inhaler Inhale 1 puff 2 (two) times a day Rinse mouth after use. 60 blister 0     No current facility-administered medications for this visit.     _______________________________________________________________________  Review of Systems   Constitutional:  Negative for chills, diaphoresis and fever.   HENT:  Positive for congestion, rhinorrhea and voice change. Negative for ear pain, sinus pressure, sinus pain and sore throat.    Respiratory:  Positive for cough. Negative for chest tightness, shortness of breath and wheezing.    Cardiovascular:  Negative for chest pain and palpitations.   Gastrointestinal:  Negative for abdominal pain, constipation, diarrhea, nausea and vomiting.   Genitourinary:  Negative for dysuria, frequency and hematuria.   Musculoskeletal:  Negative for arthralgias, back pain and myalgias.   Neurological:  Positive for headaches. Negative for dizziness, seizures, syncope and light-headedness.   Psychiatric/Behavioral:  Negative for dysphoric mood and sleep disturbance. The patient is not nervous/anxious.    All other systems reviewed and are negative.        Objective:  Vitals:    09/23/24 0938   BP: 138/80   BP Location: Left arm   Patient Position: Sitting   Cuff Size: Large   Pulse: 94   SpO2: 97%   Weight: 125 kg (276 lb)   Height: 5' 6\" (1.676 m)     Body mass index is 44.55 kg/m².     Physical Exam  Vitals and nursing note reviewed.   Constitutional:       Appearance: Normal appearance. He is not ill-appearing. "   HENT:      Head: Normocephalic.      Right Ear: Tympanic membrane, ear canal and external ear normal. There is no impacted cerumen.      Left Ear: Tympanic membrane, ear canal and external ear normal. There is no impacted cerumen.      Nose: Nose normal. No congestion.      Mouth/Throat:      Mouth: Mucous membranes are moist.   Eyes:      Extraocular Movements: Extraocular movements intact.      Conjunctiva/sclera: Conjunctivae normal.      Pupils: Pupils are equal, round, and reactive to light.   Cardiovascular:      Rate and Rhythm: Normal rate and regular rhythm.      Heart sounds: Normal heart sounds. No murmur heard.  Pulmonary:      Effort: Pulmonary effort is normal.      Breath sounds: Normal breath sounds. No wheezing.   Abdominal:      Palpations: Abdomen is soft.      Tenderness: There is no abdominal tenderness.   Musculoskeletal:      Cervical back: Normal range of motion. No tenderness.      Right lower leg: Edema present.      Left lower leg: Edema present.   Skin:     General: Skin is warm and dry.   Neurological:      General: No focal deficit present.      Mental Status: He is alert.   Psychiatric:         Mood and Affect: Mood normal.         Behavior: Behavior normal.

## 2024-09-23 NOTE — ASSESSMENT & PLAN NOTE
Will treat water retention, recommend DASH diet or my [email or web address].gov.  Do recommend exercising 30 minutes most days.  Will start Wegovy follow-up in 2 week or sooner if needed

## 2024-09-23 NOTE — ASSESSMENT & PLAN NOTE
Have ordered imaging, please schedule follow-up with urology.  Reviewed previous lab work and imaging.

## 2024-09-23 NOTE — ASSESSMENT & PLAN NOTE
Will increase losartan, monitor blood pressure at home.  Follow-up in 2 weeks or sooner if needed.  Call with any side effects which would most likely be like dizziness low blood pressure.  Given information for DASH diet please have lab work.  Will consider adding potassium as nephrology increased the Lasix.

## 2024-09-24 ENCOUNTER — TELEPHONE (OUTPATIENT)
Dept: NEPHROLOGY | Facility: CLINIC | Age: 29
End: 2024-09-24

## 2024-09-24 NOTE — TELEPHONE ENCOUNTER
Spoke wit patient spouse marco antonio advised patient need to get fasting labs done 1 week prior to 10/09/24 scheduled follow-up appointment with Dr.Umesh Wells. Marco Antonio verbalized understanding and is okay with it.

## 2024-09-25 ENCOUNTER — TELEPHONE (OUTPATIENT)
Age: 29
End: 2024-09-25

## 2024-09-25 NOTE — TELEPHONE ENCOUNTER
PA for (WEGOVY) 0.25 MG/0.5ML SUBMITTED     via    []CMM-KEY:   [x]Surescripts  []Faxed to plan   []Other website   []Phone call Case ID #     Office notes sent, clinical questions answered. Awaiting determination    Turnaround time for your insurance to make a decision on your Prior Authorization can take 7-21 business days.

## 2024-09-25 NOTE — TELEPHONE ENCOUNTER
PA for Wegovy 0.25 mg  APPROVED     Date(s) approved September 20, 2024 to March 25, 2025     Case #[id number]     Patient advised by          []MyChart Message  [x]Phone call   []LMOM  []L/M to call office as no active Communication consent on file  []Unable to leave detailed message as VM not approved on Communication consent       Pharmacy advised by    [x]Fax  []Phone call    Approval letter scanned into Media Yes

## 2024-10-04 ENCOUNTER — APPOINTMENT (OUTPATIENT)
Dept: LAB | Facility: HOSPITAL | Age: 29
End: 2024-10-04
Payer: COMMERCIAL

## 2024-10-04 ENCOUNTER — OFFICE VISIT (OUTPATIENT)
Dept: UROLOGY | Facility: CLINIC | Age: 29
End: 2024-10-04
Payer: COMMERCIAL

## 2024-10-04 VITALS
OXYGEN SATURATION: 97 % | BODY MASS INDEX: 44.2 KG/M2 | TEMPERATURE: 97.8 F | DIASTOLIC BLOOD PRESSURE: 82 MMHG | HEART RATE: 87 BPM | SYSTOLIC BLOOD PRESSURE: 124 MMHG | WEIGHT: 275 LBS | HEIGHT: 66 IN

## 2024-10-04 DIAGNOSIS — R31.9 HEMATURIA, UNSPECIFIED TYPE: ICD-10-CM

## 2024-10-04 DIAGNOSIS — R31.29 MICROHEMATURIA: Primary | ICD-10-CM

## 2024-10-04 DIAGNOSIS — I10 ESSENTIAL HYPERTENSION: ICD-10-CM

## 2024-10-04 DIAGNOSIS — N05.8 C3 GLOMERULONEPHRITIS: ICD-10-CM

## 2024-10-04 DIAGNOSIS — R80.1 PERSISTENT PROTEINURIA: ICD-10-CM

## 2024-10-04 LAB
ALBUMIN SERPL BCG-MCNC: 4.1 G/DL (ref 3.5–5)
ALP SERPL-CCNC: 61 U/L (ref 34–104)
ALT SERPL W P-5'-P-CCNC: 17 U/L (ref 7–52)
ANION GAP SERPL CALCULATED.3IONS-SCNC: 6 MMOL/L (ref 4–13)
AST SERPL W P-5'-P-CCNC: 16 U/L (ref 13–39)
BACTERIA UR QL AUTO: ABNORMAL /HPF
BACTERIA UR QL AUTO: NORMAL /HPF
BASOPHILS # BLD AUTO: 0.02 THOUSANDS/ΜL (ref 0–0.1)
BASOPHILS NFR BLD AUTO: 0 % (ref 0–1)
BILIRUB SERPL-MCNC: 0.6 MG/DL (ref 0.2–1)
BILIRUB UR QL STRIP: NEGATIVE
BILIRUB UR QL STRIP: NEGATIVE
BUN SERPL-MCNC: 13 MG/DL (ref 5–25)
CALCIUM SERPL-MCNC: 9.2 MG/DL (ref 8.4–10.2)
CHLORIDE SERPL-SCNC: 100 MMOL/L (ref 96–108)
CLARITY UR: CLEAR
CLARITY UR: CLEAR
CO2 SERPL-SCNC: 31 MMOL/L (ref 21–32)
COLOR UR: COLORLESS
COLOR UR: COLORLESS
CREAT SERPL-MCNC: 0.79 MG/DL (ref 0.6–1.3)
CREAT UR-MCNC: 62.7 MG/DL
EOSINOPHIL # BLD AUTO: 0.14 THOUSAND/ΜL (ref 0–0.61)
EOSINOPHIL NFR BLD AUTO: 3 % (ref 0–6)
ERYTHROCYTE [DISTWIDTH] IN BLOOD BY AUTOMATED COUNT: 11.9 % (ref 11.6–15.1)
GFR SERPL CREATININE-BSD FRML MDRD: 121 ML/MIN/1.73SQ M
GLUCOSE P FAST SERPL-MCNC: 92 MG/DL (ref 65–99)
GLUCOSE UR STRIP-MCNC: NEGATIVE MG/DL
GLUCOSE UR STRIP-MCNC: NEGATIVE MG/DL
HCT VFR BLD AUTO: 41.1 % (ref 36.5–49.3)
HGB BLD-MCNC: 13.9 G/DL (ref 12–17)
HGB UR QL STRIP.AUTO: ABNORMAL
HGB UR QL STRIP.AUTO: NEGATIVE
IMM GRANULOCYTES # BLD AUTO: 0.02 THOUSAND/UL (ref 0–0.2)
IMM GRANULOCYTES NFR BLD AUTO: 0 % (ref 0–2)
KETONES UR STRIP-MCNC: NEGATIVE MG/DL
KETONES UR STRIP-MCNC: NEGATIVE MG/DL
LEUKOCYTE ESTERASE UR QL STRIP: NEGATIVE
LEUKOCYTE ESTERASE UR QL STRIP: NEGATIVE
LYMPHOCYTES # BLD AUTO: 1.67 THOUSANDS/ΜL (ref 0.6–4.47)
LYMPHOCYTES NFR BLD AUTO: 35 % (ref 14–44)
MAGNESIUM SERPL-MCNC: 2 MG/DL (ref 1.9–2.7)
MCH RBC QN AUTO: 28.3 PG (ref 26.8–34.3)
MCHC RBC AUTO-ENTMCNC: 33.8 G/DL (ref 31.4–37.4)
MCV RBC AUTO: 84 FL (ref 82–98)
MONOCYTES # BLD AUTO: 0.62 THOUSAND/ΜL (ref 0.17–1.22)
MONOCYTES NFR BLD AUTO: 13 % (ref 4–12)
NEUTROPHILS # BLD AUTO: 2.32 THOUSANDS/ΜL (ref 1.85–7.62)
NEUTS SEG NFR BLD AUTO: 49 % (ref 43–75)
NITRITE UR QL STRIP: NEGATIVE
NITRITE UR QL STRIP: NEGATIVE
NON-SQ EPI CELLS URNS QL MICRO: ABNORMAL /HPF
NON-SQ EPI CELLS URNS QL MICRO: NORMAL /HPF
NRBC BLD AUTO-RTO: 0 /100 WBCS
PH UR STRIP.AUTO: 6 [PH]
PH UR STRIP.AUTO: 6.5 [PH]
PLATELET # BLD AUTO: 287 THOUSANDS/UL (ref 149–390)
PMV BLD AUTO: 9.2 FL (ref 8.9–12.7)
POST-VOID RESIDUAL VOLUME, ML POC: 44 ML
POTASSIUM SERPL-SCNC: 3.7 MMOL/L (ref 3.5–5.3)
PROT SERPL-MCNC: 7.4 G/DL (ref 6.4–8.4)
PROT UR STRIP-MCNC: NEGATIVE MG/DL
PROT UR STRIP-MCNC: NEGATIVE MG/DL
PROT UR-MCNC: 4 MG/DL
PROT/CREAT UR: 0.1 MG/G{CREAT} (ref 0–0.1)
RBC # BLD AUTO: 4.91 MILLION/UL (ref 3.88–5.62)
RBC #/AREA URNS AUTO: ABNORMAL /HPF
RBC #/AREA URNS AUTO: NORMAL /HPF
SL AMB  POCT GLUCOSE, UA: NORMAL
SL AMB LEUKOCYTE ESTERASE,UA: NORMAL
SL AMB POCT BILIRUBIN,UA: NORMAL
SL AMB POCT BLOOD,UA: NORMAL
SL AMB POCT CLARITY,UA: CLEAR
SL AMB POCT COLOR,UA: YELLOW
SL AMB POCT KETONES,UA: NORMAL
SL AMB POCT NITRITE,UA: NORMAL
SL AMB POCT PH,UA: 6
SL AMB POCT SPECIFIC GRAVITY,UA: 1
SL AMB POCT URINE PROTEIN: NORMAL
SL AMB POCT UROBILINOGEN: 0.2
SODIUM SERPL-SCNC: 137 MMOL/L (ref 135–147)
SP GR UR STRIP.AUTO: 1.01 (ref 1–1.03)
SP GR UR STRIP.AUTO: 1.01 (ref 1–1.03)
UROBILINOGEN UR STRIP-ACNC: <2 MG/DL
UROBILINOGEN UR STRIP-ACNC: <2 MG/DL
WBC # BLD AUTO: 4.79 THOUSAND/UL (ref 4.31–10.16)
WBC #/AREA URNS AUTO: ABNORMAL /HPF
WBC #/AREA URNS AUTO: NORMAL /HPF

## 2024-10-04 PROCEDURE — 81002 URINALYSIS NONAUTO W/O SCOPE: CPT | Performed by: PHYSICIAN ASSISTANT

## 2024-10-04 PROCEDURE — 99214 OFFICE O/P EST MOD 30 MIN: CPT | Performed by: PHYSICIAN ASSISTANT

## 2024-10-04 PROCEDURE — 84156 ASSAY OF PROTEIN URINE: CPT

## 2024-10-04 PROCEDURE — 82570 ASSAY OF URINE CREATININE: CPT

## 2024-10-04 PROCEDURE — 80053 COMPREHEN METABOLIC PANEL: CPT

## 2024-10-04 PROCEDURE — 51798 US URINE CAPACITY MEASURE: CPT | Performed by: PHYSICIAN ASSISTANT

## 2024-10-04 PROCEDURE — 83735 ASSAY OF MAGNESIUM: CPT

## 2024-10-04 PROCEDURE — 81001 URINALYSIS AUTO W/SCOPE: CPT

## 2024-10-04 PROCEDURE — 81001 URINALYSIS AUTO W/SCOPE: CPT | Performed by: PHYSICIAN ASSISTANT

## 2024-10-04 PROCEDURE — 36415 COLL VENOUS BLD VENIPUNCTURE: CPT

## 2024-10-04 PROCEDURE — 85025 COMPLETE CBC W/AUTO DIFF WBC: CPT

## 2024-10-04 NOTE — PROGRESS NOTES
10/4/2024      Chief Complaint   Patient presents with    Microhematuria         Assessment and Plan    Microhematuria  Glomerulonephritis   - US kidney bladder from 7/7/24 - Unremarkable renal sonogram. No hydronephrosis  - UA micros on file showing 4-10 and 10-20 RBCs, noted prior to kidney biopsy   - Most recent UA micro from 10/2/24 showing only 1-2 RBCs  - Advised microhematuria likely in the setting of kidney issues  - Can complete work-up with cystoscopy. CT urogram ordered for further assessment, can cancel CT cystogram     History of Present Illness  Ben Daniel is a 29 y.o. male here for follow up evaluation of new issue of microhematuria. 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 Underwent kidney biopsy while hospitalized in July. Urine testing showing some persistent microhematuria. Prior renal bladder US negative. He denies any episodes of gross hematuria or bothersome urinary complaints. No prior  surgical manipulation aside from recent kidney biopsy. No family history of  malignancy. No smoking history.         Review of Systems   Constitutional:  Negative for chills and fever.   Respiratory:  Negative for shortness of breath.    Cardiovascular:  Negative for chest pain.   Gastrointestinal:  Negative for abdominal pain.   Genitourinary:  Negative for difficulty urinating, dysuria, flank pain, frequency, hematuria and urgency.   Neurological:  Negative for dizziness.           AUA SYMPTOM SCORE      Flowsheet Row Most Recent Value   AUA SYMPTOM SCORE    How often have you had a sensation of not emptying your bladder completely after you finished urinating? 0 (P)     How often have you had to urinate again less than two hours after you finished urinating? 3 (P)     How often have you found you stopped and started again several times when you urinate? 0 (P)     How often have you found it difficult to postpone urination? 2 (P)      How often have you had a weak urinary stream? 0 (P)     How often have you had to push or strain to begin urination? 0 (P)     How many times did you most typically get up to urinate from the time you went to bed at night until the time you got up in the morning? 2 (P)     Quality of Life: If you were to spend the rest of your life with your urinary condition just the way it is now, how would you feel about that? 0 (P)     AUA SYMPTOM SCORE 7 (P)                 Past Medical History  Past Medical History:   Diagnosis Date    Anxiety     Chronic kidney disease     Hypertension     Pneumonia 7/6/24       Past Social History  Past Surgical History:   Procedure Laterality Date    ANKLE SURGERY      BRONCHOSCOPY  7/9/24    FETAL SURGERY FOR CONGENITAL HERNIA      IR BIOPSY KIDNEY RANDOM  07/09/2024     Social History     Tobacco Use   Smoking Status Never    Passive exposure: Never   Smokeless Tobacco Never       Past Family History  Family History   Problem Relation Age of Onset    Heart failure Maternal Grandmother     Heart failure Maternal Grandfather     Diabetes Maternal Grandfather     Hypertension Maternal Grandfather     Hypertension Mother     Cancer Paternal Grandfather     Cancer Paternal Grandmother         Lung cancer    Lung cancer Paternal Grandmother     Hypertension Brother        Past Social history  Social History     Socioeconomic History    Marital status: Single     Spouse name: Not on file    Number of children: Not on file    Years of education: Not on file    Highest education level: Not on file   Occupational History    Not on file   Tobacco Use    Smoking status: Never     Passive exposure: Never    Smokeless tobacco: Never   Vaping Use    Vaping status: Never Used   Substance and Sexual Activity    Alcohol use: Not Currently     Alcohol/week: 1.0 standard drink of alcohol     Comment: occ    Drug use: No    Sexual activity: Yes     Partners: Female     Birth control/protection: Condom Male    Other Topics Concern    Not on file   Social History Narrative    Not on file     Social Determinants of Health     Financial Resource Strain: Not on file   Food Insecurity: No Food Insecurity (7/8/2024)    Hunger Vital Sign     Worried About Running Out of Food in the Last Year: Never true     Ran Out of Food in the Last Year: Never true   Transportation Needs: No Transportation Needs (7/8/2024)    PRAPARE - Transportation     Lack of Transportation (Medical): No     Lack of Transportation (Non-Medical): No   Physical Activity: Not on file   Stress: Not on file   Social Connections: Not on file   Intimate Partner Violence: Not on file   Housing Stability: Low Risk  (7/8/2024)    Housing Stability Vital Sign     Unable to Pay for Housing in the Last Year: No     Number of Times Moved in the Last Year: 0     Homeless in the Last Year: No       Current Medications  Current Outpatient Medications   Medication Sig Dispense Refill    amLODIPine (NORVASC) 10 mg tablet Take 1 tablet (10 mg total) by mouth daily 90 tablet 1    ergocalciferol (VITAMIN D2) 50,000 units Take 1 capsule (50,000 Units total) by mouth once a week 16 capsule 1    furosemide (LASIX) 20 mg tablet Take 1 tablet (20 mg total) by mouth daily 90 tablet 1    levothyroxine 50 mcg tablet Take 1 tablet (50 mcg total) by mouth daily 90 tablet 1    losartan (COZAAR) 100 MG tablet Take 1 tablet (100 mg total) by mouth daily 90 tablet 0    pantoprazole (PROTONIX) 40 mg tablet Take 1 tablet (40 mg total) by mouth daily in the early morning 90 tablet 1    Semaglutide-Weight Management (WEGOVY) 0.25 MG/0.5ML Inject 0.5 mL (0.25 mg total) under the skin once a week 2 mL 0    fluticasone (Flovent Diskus) 50 mcg/actuation diskus inhaler Inhale 1 puff 2 (two) times a day Rinse mouth after use. 60 blister 0    predniSONE 20 mg tablet Take 1 tablet (20 mg total) by mouth 2 (two) times a day 60 tablet 3     No current facility-administered medications for this visit.  "      Allergies  No Known Allergies      The following portions of the patient's history were reviewed and updated as appropriate: allergies, current medications, past medical history, past social history, past surgical history and problem list.      Vitals  Vitals:    10/04/24 1029   Weight: 125 kg (275 lb)   Height: 5' 6\" (1.676 m)           Physical Exam  Physical Exam  Constitutional:       Appearance: Normal appearance.   HENT:      Head: Normocephalic and atraumatic.      Right Ear: External ear normal.      Left Ear: External ear normal.      Nose: Nose normal.   Eyes:      General: No scleral icterus.     Conjunctiva/sclera: Conjunctivae normal.   Cardiovascular:      Pulses: Normal pulses.   Pulmonary:      Effort: Pulmonary effort is normal.   Musculoskeletal:         General: Normal range of motion.      Cervical back: Normal range of motion.   Neurological:      General: No focal deficit present.      Mental Status: He is alert and oriented to person, place, and time.   Psychiatric:         Mood and Affect: Mood normal.         Behavior: Behavior normal.         Thought Content: Thought content normal.         Judgment: Judgment normal.           Results  No results found for this or any previous visit (from the past 1 hour(s)).  ]  No results found for: \"PSA\"  Lab Results   Component Value Date    CALCIUM 9.2 10/04/2024    K 3.7 10/04/2024    CO2 31 10/04/2024     10/04/2024    BUN 13 10/04/2024    CREATININE 0.79 10/04/2024     Lab Results   Component Value Date    WBC 4.79 10/04/2024    HGB 13.9 10/04/2024    HCT 41.1 10/04/2024    MCV 84 10/04/2024     10/04/2024           Orders  No orders of the defined types were placed in this encounter.    Catia Quintero      "

## 2024-10-07 ENCOUNTER — TELEPHONE (OUTPATIENT)
Dept: NEPHROLOGY | Facility: CLINIC | Age: 29
End: 2024-10-07

## 2024-10-07 ENCOUNTER — DOCUMENTATION (OUTPATIENT)
Dept: NEPHROLOGY | Facility: CLINIC | Age: 29
End: 2024-10-07

## 2024-10-07 ENCOUNTER — OFFICE VISIT (OUTPATIENT)
Dept: NEPHROLOGY | Facility: CLINIC | Age: 29
End: 2024-10-07
Payer: COMMERCIAL

## 2024-10-07 VITALS
BODY MASS INDEX: 45.06 KG/M2 | RESPIRATION RATE: 18 BRPM | OXYGEN SATURATION: 98 % | HEIGHT: 66 IN | WEIGHT: 280.4 LBS | TEMPERATURE: 97.7 F | HEART RATE: 97 BPM

## 2024-10-07 DIAGNOSIS — E66.813 CLASS 3 SEVERE OBESITY DUE TO EXCESS CALORIES WITHOUT SERIOUS COMORBIDITY WITH BODY MASS INDEX (BMI) OF 40.0 TO 44.9 IN ADULT (HCC): ICD-10-CM

## 2024-10-07 DIAGNOSIS — N17.9 AKI (ACUTE KIDNEY INJURY) (HCC): Primary | ICD-10-CM

## 2024-10-07 DIAGNOSIS — R80.1 PERSISTENT PROTEINURIA: ICD-10-CM

## 2024-10-07 DIAGNOSIS — I10 ESSENTIAL HYPERTENSION: ICD-10-CM

## 2024-10-07 DIAGNOSIS — R31.9 HEMATURIA, UNSPECIFIED TYPE: ICD-10-CM

## 2024-10-07 DIAGNOSIS — E66.01 CLASS 3 SEVERE OBESITY DUE TO EXCESS CALORIES WITHOUT SERIOUS COMORBIDITY WITH BODY MASS INDEX (BMI) OF 40.0 TO 44.9 IN ADULT (HCC): ICD-10-CM

## 2024-10-07 DIAGNOSIS — N05.8 C3 GLOMERULONEPHRITIS: ICD-10-CM

## 2024-10-07 PROCEDURE — 99214 OFFICE O/P EST MOD 30 MIN: CPT | Performed by: INTERNAL MEDICINE

## 2024-10-07 RX ORDER — AMLODIPINE BESYLATE 5 MG/1
5 TABLET ORAL DAILY
Qty: 30 TABLET | Refills: 4 | Status: SHIPPED | OUTPATIENT
Start: 2024-10-07 | End: 2025-04-05

## 2024-10-07 NOTE — TELEPHONE ENCOUNTER
I called [facility] @ [phone] and spoke to Aminah FAYE ) to check eligible for the patient and as of 10/7/2024 and according to Aminah FAYE () this patient plan was effective from 1/1/2020 and terminated on 12/31/2020. Also according to Aminah FAYE () [facility] is Out-of-Network and is Non-Participating with this plan. The reference number for this call is: [id number]. Marie Sahu, .

## 2024-10-07 NOTE — ASSESSMENT & PLAN NOTE
Does not have any more protein in the urine after prednisone treatment.  Will continue to monitor by monthly blood test

## 2024-10-07 NOTE — ASSESSMENT & PLAN NOTE
Seems to responding to prednisone very well with normal creatinine and improvement in proteinuria.  Patient is off prednisone.  Will continue to monitor closely

## 2024-10-07 NOTE — PROGRESS NOTES
[facility] # [id number] 1-2CX    Lot #  Exp: 04/16/2026

## 2024-10-07 NOTE — ASSESSMENT & PLAN NOTE
Blood pressure still high.  Patient is on prednisone 10 mg which is likely causing leg swelling.  I advised him to cut back prednisone to 5 mg and eventually stop it.  Patient is on losartan which I will continue.  If necessary will add beta-blocker or hydrochlorothiazide as part of the management

## 2024-10-07 NOTE — PROGRESS NOTES
NEPHROLOGY OFFICE FOLLOW UP  Ben Daniel 29 y.o. male MRN: [id number]    Encounter: [id number] 10/7/2024    REASON FOR VISIT: Ben Daniel is a 29 y.o. male who is here on 10/7/2024 for Acute Kidney Injury and Follow-up  .    HPI:    Tylor came in today for follow-up of glomerulonephritis.  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  Patient was given prednisone which remarkable recovery    Patient also seen by Dr. Reyes for glomerulonephritis we agree with present management and order genetic testing for possible C3 glomerulonephritis    Patient overall doing well    Complaining of leg swelling but no other acute complaint    No chest pain no palpitation    Patient does have hematuria for which he is being seen by urologist with possible cystoscopy    No other acute complaint        REVIEW OF SYSTEMS:    Review of Systems   Constitutional:  Negative for fatigue.   HENT:  Negative for congestion.    Eyes:  Negative for photophobia and pain.   Respiratory:  Negative for chest tightness and shortness of breath.    Cardiovascular:  Positive for leg swelling. Negative for chest pain and palpitations.   Gastrointestinal:  Negative for abdominal distention, abdominal pain and blood in stool.   Endocrine: Negative for polydipsia.   Genitourinary:  Negative for difficulty urinating, dysuria, flank pain, hematuria and urgency.   Musculoskeletal:  Negative for arthralgias and back pain.   Skin:  Negative for rash.   Neurological:  Negative for dizziness, light-headedness and headaches.   Hematological:  Does not bruise/bleed easily.   Psychiatric/Behavioral:  Negative for behavioral problems. The patient is not nervous/anxious.          PAST MEDICAL HISTORY:  Past Medical History:   Diagnosis Date    Anxiety     Chronic kidney disease     Hypertension     Pneumonia 7/6/24       PAST SURGICAL HISTORY:  Past Surgical History:   Procedure Laterality Date    ANKLE SURGERY      BRONCHOSCOPY   "7/9/24    FETAL SURGERY FOR CONGENITAL HERNIA      IR BIOPSY KIDNEY RANDOM  07/09/2024    RENAL BIOPSY  July 9th       SOCIAL HISTORY:  Social History     Substance and Sexual Activity   Alcohol Use Yes    Alcohol/week: 1.0 standard drink of alcohol    Comment: Rare occ     Social History     Substance and Sexual Activity   Drug Use No     Social History     Tobacco Use   Smoking Status Never    Passive exposure: Never   Smokeless Tobacco Never       FAMILY HISTORY:  Family History   Problem Relation Age of Onset    Heart failure Maternal Grandmother     Heart failure Maternal Grandfather     Diabetes Maternal Grandfather     Hypertension Maternal Grandfather     Hypertension Mother     Cancer Paternal Grandfather     Cancer Paternal Grandmother         Lung cancer    Lung cancer Paternal Grandmother     Hypertension Brother        MEDICATIONS:    Current Outpatient Medications:     amLODIPine (NORVASC) 5 mg tablet, Take 1 tablet (5 mg total) by mouth daily, Disp: 30 tablet, Rfl: 4    ergocalciferol (VITAMIN D2) 50,000 units, Take 1 capsule (50,000 Units total) by mouth once a week, Disp: 16 capsule, Rfl: 1    furosemide (LASIX) 20 mg tablet, Take 1 tablet (20 mg total) by mouth daily, Disp: 90 tablet, Rfl: 1    levothyroxine 50 mcg tablet, Take 1 tablet (50 mcg total) by mouth daily, Disp: 90 tablet, Rfl: 1    losartan (COZAAR) 100 MG tablet, Take 1 tablet (100 mg total) by mouth daily, Disp: 90 tablet, Rfl: 0    pantoprazole (PROTONIX) 40 mg tablet, Take 1 tablet (40 mg total) by mouth daily in the early morning, Disp: 90 tablet, Rfl: 1    Semaglutide-Weight Management (WEGOVY) 0.25 MG/0.5ML, Inject 0.5 mL (0.25 mg total) under the skin once a week, Disp: 2 mL, Rfl: 0    PHYSICAL EXAM:  Vitals:    10/07/24 1603   Pulse: 97   Resp: 18   Temp: 97.7 °F (36.5 °C)   TempSrc: Temporal   SpO2: 98%   Weight: 127 kg (280 lb 6.4 oz)   Height: 5' 6\" (1.676 m)     Body mass index is 45.26 kg/m².    Physical " Exam  Constitutional:       General: He is not in acute distress.     Appearance: He is well-developed. He is obese.   HENT:      Head: Normocephalic.      Mouth/Throat:      Mouth: Mucous membranes are moist.   Eyes:      General: No scleral icterus.     Conjunctiva/sclera: Conjunctivae normal.   Neck:      Vascular: No JVD.   Cardiovascular:      Rate and Rhythm: Normal rate.      Heart sounds: Normal heart sounds.   Pulmonary:      Effort: Pulmonary effort is normal.      Breath sounds: No wheezing.   Abdominal:      Palpations: Abdomen is soft.      Tenderness: There is no abdominal tenderness.   Musculoskeletal:         General: Normal range of motion.      Cervical back: Neck supple.   Skin:     General: Skin is warm.      Findings: No rash.   Neurological:      Mental Status: He is alert and oriented to person, place, and time.   Psychiatric:         Behavior: Behavior normal.         LAB RESULTS:  Results for orders placed or performed in visit on 10/04/24   POCT urine dip    Collection Time: 10/04/24 10:33 AM   Result Value Ref Range    LEUKOCYTE ESTERASE,UA -     NITRITE,UA -     SL AMB POCT UROBILINOGEN 0.2     POCT URINE PROTEIN +      PH,UA 6.0     BLOOD,UA ++     SPECIFIC GRAVITY,UA 1.0     KETONES,UA -     BILIRUBIN,UA -     GLUCOSE, UA -      COLOR,UA yellow     CLARITY,UA clear    POCT Measure PVR    Collection Time: 10/04/24 10:35 AM   Result Value Ref Range    POST-VOID RESIDUAL VOLUME, ML POC 44 mL   Urinalysis with microscopic    Collection Time: 10/04/24 11:02 AM   Result Value Ref Range    Color, UA Colorless     Clarity, UA Clear     Specific Gravity, UA 1.011 1.003 - 1.030    pH, UA 6.5 4.5, 5.0, 5.5, 6.0, 6.5, 7.0, 7.5, 8.0    Leukocytes, UA Negative Negative    Nitrite, UA Negative Negative    Protein, UA Negative Negative mg/dl    Glucose, UA Negative Negative mg/dl    Ketones, UA Negative Negative mg/dl    Urobilinogen, UA <2.0 <2.0 mg/dl mg/dl    Bilirubin, UA Negative Negative     "Occult Blood, UA Negative Negative    RBC, UA None Seen None Seen, 1-2 /hpf    WBC, UA None Seen None Seen, 1-2 /hpf    Epithelial Cells None Seen None Seen, Occasional /hpf    Bacteria, UA None Seen None Seen, Occasional /hpf       ASSESSMENT and PLAN:      C3 glomerulonephritis  Seems to responding to prednisone very well with normal creatinine and improvement in proteinuria.  Patient is off prednisone.  Will continue to monitor closely    ROSI (acute kidney injury) ([facility])  Improved and normal at this point    Class 3 severe obesity due to excess calories without serious comorbidity with body mass index (BMI) of 40.0 to 44.9 in adult ([facility])  Need to reduce weight and he is well aware of it    Persistent proteinuria  Does not have any more protein in the urine after prednisone treatment.  Will continue to monitor by monthly blood test    Essential hypertension  Blood pressure still high.  Patient is on prednisone 10 mg which is likely causing leg swelling.  I advised him to cut back prednisone to 5 mg and eventually stop it.  Patient is on losartan which I will continue.  If necessary will add beta-blocker or hydrochlorothiazide as part of the management      Everything discussed at length with the patient.  I will see him back in 3 months but he will get monthly blood test as part of the management        Portions of the record may have been created with voice recognition software. Occasional wrong word or \"sound a like\" substitutions may have occurred due to the inherent limitations of voice recognition software. Read the chart carefully and recognize, using context, where substitutions have occurred.If you have any questions, please contact the dictating provider.   "

## 2024-10-15 ENCOUNTER — HOSPITAL ENCOUNTER (OUTPATIENT)
Dept: CT IMAGING | Facility: CLINIC | Age: 29
Discharge: HOME | End: 2024-10-15
Payer: COMMERCIAL

## 2024-10-15 DIAGNOSIS — J15.3 PNEUMONIA OF BOTH LUNGS DUE TO GROUP B STREPTOCOCCUS, UNSPECIFIED PART OF LUNG (HCC): ICD-10-CM

## 2024-10-15 PROCEDURE — 71250 CT THORAX DX C-: CPT

## 2024-10-18 ENCOUNTER — OFFICE VISIT (OUTPATIENT)
Age: 29
End: 2024-10-18
Payer: COMMERCIAL

## 2024-10-18 VITALS
HEIGHT: 66 IN | DIASTOLIC BLOOD PRESSURE: 78 MMHG | BODY MASS INDEX: 44.84 KG/M2 | TEMPERATURE: 98.2 F | SYSTOLIC BLOOD PRESSURE: 128 MMHG | RESPIRATION RATE: 16 BRPM | WEIGHT: 279 LBS | HEART RATE: 98 BPM | OXYGEN SATURATION: 97 %

## 2024-10-18 DIAGNOSIS — J80 ARDS (ADULT RESPIRATORY DISTRESS SYNDROME) (HCC): Primary | ICD-10-CM

## 2024-10-18 DIAGNOSIS — E66.813 CLASS 3 SEVERE OBESITY DUE TO EXCESS CALORIES WITHOUT SERIOUS COMORBIDITY WITH BODY MASS INDEX (BMI) OF 40.0 TO 44.9 IN ADULT (HCC): ICD-10-CM

## 2024-10-18 DIAGNOSIS — J15.3 PNEUMONIA OF BOTH LUNGS DUE TO GROUP B STREPTOCOCCUS, UNSPECIFIED PART OF LUNG (HCC): ICD-10-CM

## 2024-10-18 DIAGNOSIS — E66.813 CLASS 3 SEVERE OBESITY DUE TO EXCESS CALORIES WITHOUT SERIOUS COMORBIDITY WITH BODY MASS INDEX (BMI) OF 40.0 TO 44.9 IN ADULT (HCC): Primary | ICD-10-CM

## 2024-10-18 DIAGNOSIS — E66.01 CLASS 3 SEVERE OBESITY DUE TO EXCESS CALORIES WITHOUT SERIOUS COMORBIDITY WITH BODY MASS INDEX (BMI) OF 40.0 TO 44.9 IN ADULT (HCC): ICD-10-CM

## 2024-10-18 DIAGNOSIS — R06.83 SNORING: ICD-10-CM

## 2024-10-18 DIAGNOSIS — E66.01 CLASS 3 SEVERE OBESITY DUE TO EXCESS CALORIES WITHOUT SERIOUS COMORBIDITY WITH BODY MASS INDEX (BMI) OF 40.0 TO 44.9 IN ADULT (HCC): Primary | ICD-10-CM

## 2024-10-18 PROCEDURE — 99213 OFFICE O/P EST LOW 20 MIN: CPT | Performed by: PHYSICIAN ASSISTANT

## 2024-10-18 NOTE — PROGRESS NOTES
"Assessment/Plan:   Diagnoses and all orders for this visit:    ARDS (adult respiratory distress syndrome) (HCC)    Pneumonia of both lungs due to group B Streptococcus, unspecified part of lung (HCC)        Patient is here today for follow up. He is doing very well with his breathing. CT scan not read yet, I did review images - upon my review, opacities look completely resolved.  Will await final radiology reading.     He can follow up with us as needed.   No follow-ups on file.  All questions are answered to the patient's satisfaction and understanding.  He verbalizes understanding.  He is encouraged to call with any further questions or concerns.    Portions of the record may have been created with voice recognition software.  Occasional wrong word or \"sound a like\" substitutions may have occurred due to the inherent limitations of voice recognition software.  Read the chart carefully and recognize, using context, where substitutions have occurred.    Electronically Signed by Charlie Castano PA-C    ______________________________________________________________________    Chief Complaint:   Chief Complaint   Patient presents with    Follow-up       Patient ID: Ben is a 29 y.o. y.o. male has a past medical history of Anxiety, Chronic kidney disease, Hypertension, and Pneumonia (7/6/24).    10/18/2024  Patient presents today for follow-up visit.  Patient is a 29-year-old male non-smoker with past medical history of anxiety, hypertension.  He was hospitalized in July with shortness of breath/dyspnea on exertion and hemoptysis.  He had a prolonged hospitalization for respiratory failure.  He did undergo bronchoscopy and remained intubated for short time afterwards.  He was also found to have renal failure, C3 glomerulonephritis.  He underwent bronchoscopy during hospitalization and grew group B strep.  He was treated with antibiotics and was started on steroids.    He is here today for follow-up.  He continues to " do well with his breathing. Has not needed his inhaler.    primary symptoms  Associated symptoms include myalgias. Pertinent negatives include no chest pain, fever, headaches or sore throat.       Review of Systems   Constitutional:  Positive for appetite change. Negative for fever.   HENT: Negative.  Negative for ear pain, postnasal drip, rhinorrhea, sneezing, sore throat and trouble swallowing.    Respiratory: Negative.     Cardiovascular: Negative.  Negative for chest pain.   Gastrointestinal: Negative.    Genitourinary: Negative.    Musculoskeletal:  Positive for myalgias.   Skin: Negative.    Allergic/Immunologic: Negative.    Neurological: Negative.  Negative for headaches.   Psychiatric/Behavioral: Negative.         Smoking history: He reports that he has never smoked. He has never been exposed to tobacco smoke. He has never used smokeless tobacco.    The following portions of the patient's history were reviewed and updated as appropriate: allergies, current medications, past family history, past medical history, past social history, past surgical history, and problem list.    Immunization History   Administered Date(s) Administered    COVID-19 PFIZER VACCINE 0.3 ML IM 12/28/2021     Current Outpatient Medications   Medication Sig Dispense Refill    amLODIPine (NORVASC) 5 mg tablet Take 1 tablet (5 mg total) by mouth daily 30 tablet 4    ergocalciferol (VITAMIN D2) 50,000 units Take 1 capsule (50,000 Units total) by mouth once a week 16 capsule 1    furosemide (LASIX) 20 mg tablet Take 1 tablet (20 mg total) by mouth daily 90 tablet 1    levothyroxine 50 mcg tablet Take 1 tablet (50 mcg total) by mouth daily 90 tablet 1    losartan (COZAAR) 100 MG tablet Take 1 tablet (100 mg total) by mouth daily 90 tablet 0    pantoprazole (PROTONIX) 40 mg tablet Take 1 tablet (40 mg total) by mouth daily in the early morning 90 tablet 1    Semaglutide-Weight Management (WEGOVY) 0.25 MG/0.5ML Inject 0.5 mL (0.25 mg total)  "under the skin once a week 2 mL 0     No current facility-administered medications for this visit.     Allergies: Patient has no known allergies.    Objective:  Vitals:    10/18/24 1252 10/18/24 1253   BP: 128/78    BP Location: Right arm    Patient Position: Sitting    Cuff Size: Large    Pulse: 98    Resp: 16    Temp: 98.2 °F (36.8 °C)    SpO2: 97% 97%   Weight: 127 kg (279 lb)    Height: 5' 6\" (1.676 m)    Oxygen Therapy  SpO2: 97 %  Oxygen Therapy: None (Room air)  .  Wt Readings from Last 3 Encounters:   10/18/24 127 kg (279 lb)   10/07/24 127 kg (280 lb 6.4 oz)   10/04/24 125 kg (275 lb)     Body mass index is 45.03 kg/m².    Physical Exam  Constitutional:       General: He is not in acute distress.     Appearance: Normal appearance. He is well-developed. He is not ill-appearing.   HENT:      Head: Normocephalic and atraumatic.      Mouth/Throat:      Pharynx: Oropharynx is clear.   Eyes:      Pupils: Pupils are equal, round, and reactive to light.   Cardiovascular:      Rate and Rhythm: Normal rate and regular rhythm.   Pulmonary:      Effort: Pulmonary effort is normal. No respiratory distress.      Breath sounds: Normal breath sounds. No decreased breath sounds, wheezing, rhonchi or rales.   Abdominal:      General: Abdomen is flat. There is no distension.   Musculoskeletal:         General: Normal range of motion.      Cervical back: Normal range of motion.      Right lower leg: No edema.      Left lower leg: No edema.   Skin:     General: Skin is warm and dry.      Findings: No rash.   Neurological:      Mental Status: He is alert and oriented to person, place, and time.   Psychiatric:         Mood and Affect: Mood normal.         Behavior: Behavior normal.         Lab Review:   Lab Results   Component Value Date    K 3.7 10/04/2024     10/04/2024    CO2 31 10/04/2024    BUN 13 10/04/2024    CREATININE 0.79 10/04/2024    CALCIUM 9.2 10/04/2024     Lab Results   Component Value Date    WBC 4.79 " 10/04/2024    HGB 13.9 10/04/2024    HCT 41.1 10/04/2024    MCV 84 10/04/2024     10/04/2024       Diagnostics:    Reviewed CT scan images  Office Spirometry Results:     ESS:    No results found.  Answers submitted by the patient for this visit:  Pulmonology Questionnaire (Submitted on 10/17/2024)  Chief Complaint: Primary symptoms  Do you have shortness of breath that occurs with effort or exertion?: Yes  Do you have ear congestion?: No  Do you have heartburn?: No  Do you have fatigue?: Yes  Do you have nasal congestion?: No  Do you have shortness of breath when lying flat?: No  Do you have shortness of breath when you wake up?: No  Do you have sweats?: No  Have you experienced weight loss?: No  Which of the following makes your symptoms worse?: climbing stairs, occupational exposure  Which of the following makes your symptoms better?: nothing

## 2024-10-23 ENCOUNTER — TELEPHONE (OUTPATIENT)
Dept: NEPHROLOGY | Facility: CLINIC | Age: 29
End: 2024-10-23

## 2024-10-23 DIAGNOSIS — N17.9 AKI (ACUTE KIDNEY INJURY) (HCC): Primary | ICD-10-CM

## 2024-10-23 NOTE — TELEPHONE ENCOUNTER
Pt stated he has been sick since last week and his joints are starting to hurt and pt stated he is still swollen.     Please advise

## 2024-10-24 ENCOUNTER — APPOINTMENT (OUTPATIENT)
Dept: LAB | Facility: HOSPITAL | Age: 29
End: 2024-10-24
Payer: COMMERCIAL

## 2024-10-24 ENCOUNTER — TELEPHONE (OUTPATIENT)
Age: 29
End: 2024-10-24

## 2024-10-24 ENCOUNTER — TELEPHONE (OUTPATIENT)
Dept: NEPHROLOGY | Facility: CLINIC | Age: 29
End: 2024-10-24

## 2024-10-24 DIAGNOSIS — N05.8 C3 GLOMERULONEPHRITIS: ICD-10-CM

## 2024-10-24 DIAGNOSIS — R80.1 PERSISTENT PROTEINURIA: ICD-10-CM

## 2024-10-24 DIAGNOSIS — I10 ESSENTIAL HYPERTENSION: ICD-10-CM

## 2024-10-24 DIAGNOSIS — R31.9 HEMATURIA, UNSPECIFIED TYPE: ICD-10-CM

## 2024-10-24 DIAGNOSIS — N17.9 AKI (ACUTE KIDNEY INJURY) (HCC): ICD-10-CM

## 2024-10-24 LAB
ANION GAP SERPL CALCULATED.3IONS-SCNC: 5 MMOL/L (ref 4–13)
BACTERIA UR QL AUTO: ABNORMAL /HPF
BASOPHILS # BLD AUTO: 0.02 THOUSANDS/ΜL (ref 0–0.1)
BASOPHILS NFR BLD AUTO: 0 % (ref 0–1)
BILIRUB UR QL STRIP: NEGATIVE
BUN SERPL-MCNC: 10 MG/DL (ref 5–25)
CALCIUM SERPL-MCNC: 9.5 MG/DL (ref 8.4–10.2)
CHLORIDE SERPL-SCNC: 102 MMOL/L (ref 96–108)
CLARITY UR: CLEAR
CO2 SERPL-SCNC: 30 MMOL/L (ref 21–32)
COLOR UR: YELLOW
CREAT SERPL-MCNC: 0.74 MG/DL (ref 0.6–1.3)
CREAT UR-MCNC: 292.3 MG/DL
EOSINOPHIL # BLD AUTO: 0.21 THOUSAND/ΜL (ref 0–0.61)
EOSINOPHIL NFR BLD AUTO: 4 % (ref 0–6)
ERYTHROCYTE [DISTWIDTH] IN BLOOD BY AUTOMATED COUNT: 11.5 % (ref 11.6–15.1)
GFR SERPL CREATININE-BSD FRML MDRD: 124 ML/MIN/1.73SQ M
GLUCOSE P FAST SERPL-MCNC: 88 MG/DL (ref 65–99)
GLUCOSE UR STRIP-MCNC: NEGATIVE MG/DL
HCT VFR BLD AUTO: 42.8 % (ref 36.5–49.3)
HGB BLD-MCNC: 14.3 G/DL (ref 12–17)
HGB UR QL STRIP.AUTO: ABNORMAL
IMM GRANULOCYTES # BLD AUTO: 0.03 THOUSAND/UL (ref 0–0.2)
IMM GRANULOCYTES NFR BLD AUTO: 1 % (ref 0–2)
KETONES UR STRIP-MCNC: NEGATIVE MG/DL
LEUKOCYTE ESTERASE UR QL STRIP: NEGATIVE
LYMPHOCYTES # BLD AUTO: 1.66 THOUSANDS/ΜL (ref 0.6–4.47)
LYMPHOCYTES NFR BLD AUTO: 34 % (ref 14–44)
MAGNESIUM SERPL-MCNC: 2 MG/DL (ref 1.9–2.7)
MCH RBC QN AUTO: 27.5 PG (ref 26.8–34.3)
MCHC RBC AUTO-ENTMCNC: 33.4 G/DL (ref 31.4–37.4)
MCV RBC AUTO: 82 FL (ref 82–98)
MONOCYTES # BLD AUTO: 0.56 THOUSAND/ΜL (ref 0.17–1.22)
MONOCYTES NFR BLD AUTO: 11 % (ref 4–12)
MUCOUS THREADS UR QL AUTO: ABNORMAL
NEUTROPHILS # BLD AUTO: 2.42 THOUSANDS/ΜL (ref 1.85–7.62)
NEUTS SEG NFR BLD AUTO: 50 % (ref 43–75)
NITRITE UR QL STRIP: NEGATIVE
NON-SQ EPI CELLS URNS QL MICRO: ABNORMAL /HPF
NRBC BLD AUTO-RTO: 0 /100 WBCS
PH UR STRIP.AUTO: 6 [PH]
PLATELET # BLD AUTO: 325 THOUSANDS/UL (ref 149–390)
PMV BLD AUTO: 9.2 FL (ref 8.9–12.7)
POTASSIUM SERPL-SCNC: 4 MMOL/L (ref 3.5–5.3)
PROT UR STRIP-MCNC: ABNORMAL MG/DL
PROT UR-MCNC: 25.7 MG/DL
PROT/CREAT UR: 0.1 MG/G{CREAT} (ref 0–0.1)
RBC # BLD AUTO: 5.2 MILLION/UL (ref 3.88–5.62)
RBC #/AREA URNS AUTO: ABNORMAL /HPF
SODIUM SERPL-SCNC: 137 MMOL/L (ref 135–147)
SP GR UR STRIP.AUTO: 1.03 (ref 1–1.03)
UROBILINOGEN UR STRIP-ACNC: <2 MG/DL
WBC # BLD AUTO: 4.9 THOUSAND/UL (ref 4.31–10.16)
WBC #/AREA URNS AUTO: ABNORMAL /HPF

## 2024-10-24 PROCEDURE — 36415 COLL VENOUS BLD VENIPUNCTURE: CPT

## 2024-10-24 PROCEDURE — 82570 ASSAY OF URINE CREATININE: CPT

## 2024-10-24 PROCEDURE — 84156 ASSAY OF PROTEIN URINE: CPT

## 2024-10-24 PROCEDURE — 81001 URINALYSIS AUTO W/SCOPE: CPT

## 2024-10-24 PROCEDURE — 85025 COMPLETE CBC W/AUTO DIFF WBC: CPT

## 2024-10-24 PROCEDURE — 80048 BASIC METABOLIC PNL TOTAL CA: CPT

## 2024-10-24 PROCEDURE — 83735 ASSAY OF MAGNESIUM: CPT

## 2024-10-24 NOTE — TELEPHONE ENCOUNTER
MARTIN Shearer  10/24/2024  9:46 AM EDT     Magnesium is okay did nephrology call him about his urine test?    Zari Chan MA  10/24/2024  3:03 PM EDT     Mailbox is full      Patient called back after a missed call.   Patient stated he saw his results via [employer].   He had no further questions and stated his wife was going to stop by his Neph office to speak with them about his urine labs     Thank you.

## 2024-10-24 NOTE — TELEPHONE ENCOUNTER
Pt wife came in office and stated pt saw his results and is worried about having blood and protein in his urine again. Pt stated he would like to do a echo for his kidneys.     Please advise.

## 2024-10-25 ENCOUNTER — TELEPHONE (OUTPATIENT)
Age: 29
End: 2024-10-25

## 2024-10-25 NOTE — TELEPHONE ENCOUNTER
Patient's mother called stating patient needs a sooner appointment to be seen because his levels are the same as when he was in ICU.  Patient does not want to wait until January to be seen.  Can someone please reach out to him.

## 2024-10-30 ENCOUNTER — TELEPHONE (OUTPATIENT)
Dept: SLEEP CENTER | Facility: CLINIC | Age: 29
End: 2024-10-30

## 2024-10-30 DIAGNOSIS — R06.83 SNORING: Primary | ICD-10-CM

## 2024-10-30 NOTE — TELEPHONE ENCOUNTER
Referral placed for a home sleep study.  Patient requires a face to face visit assessing sleep issues prior to ordering a sleep study. Patient will need to be evaluated with a consultation.      Please place new referral for a sleep consultation.      Ordering and co-signing providers notified.

## 2024-11-06 ENCOUNTER — OFFICE VISIT (OUTPATIENT)
Dept: NEPHROLOGY | Facility: CLINIC | Age: 29
End: 2024-11-06
Payer: COMMERCIAL

## 2024-11-06 VITALS
SYSTOLIC BLOOD PRESSURE: 140 MMHG | WEIGHT: 279 LBS | HEART RATE: 110 BPM | DIASTOLIC BLOOD PRESSURE: 80 MMHG | BODY MASS INDEX: 44.84 KG/M2 | HEIGHT: 66 IN | OXYGEN SATURATION: 96 % | RESPIRATION RATE: 18 BRPM | TEMPERATURE: 97.6 F

## 2024-11-06 DIAGNOSIS — R80.1 PERSISTENT PROTEINURIA: ICD-10-CM

## 2024-11-06 DIAGNOSIS — E66.01 CLASS 3 SEVERE OBESITY DUE TO EXCESS CALORIES WITHOUT SERIOUS COMORBIDITY WITH BODY MASS INDEX (BMI) OF 40.0 TO 44.9 IN ADULT (HCC): ICD-10-CM

## 2024-11-06 DIAGNOSIS — N05.8 C3 GLOMERULONEPHRITIS: Primary | ICD-10-CM

## 2024-11-06 DIAGNOSIS — E66.813 CLASS 3 SEVERE OBESITY DUE TO EXCESS CALORIES WITHOUT SERIOUS COMORBIDITY WITH BODY MASS INDEX (BMI) OF 40.0 TO 44.9 IN ADULT (HCC): ICD-10-CM

## 2024-11-06 DIAGNOSIS — I10 ESSENTIAL HYPERTENSION: ICD-10-CM

## 2024-11-06 PROCEDURE — 99214 OFFICE O/P EST MOD 30 MIN: CPT | Performed by: INTERNAL MEDICINE

## 2024-11-06 NOTE — ASSESSMENT & PLAN NOTE
Need to reduce weight.  Patient also has some leg swelling and on diuretic.  This is likely secondary to amlodipine

## 2024-11-06 NOTE — ASSESSMENT & PLAN NOTE
Well-controlled though patient is on amlodipine which causing leg swelling.  I will take him off amlodipine advised to monitor blood pressure at home and if he started going up we will try different medication

## 2024-11-06 NOTE — PROGRESS NOTES
Ambulatory Visit  Name: Ben Daniel      : 1995      MRN: [id number]  Encounter Provider: Randy Wells MD  Encounter Date: 2024   Encounter department: [facility]    Assessment & Plan  C3 glomerulonephritis  Patient treated with prednisone and seems to be remission right now.  GFR is normal and does not have any protein loss.  Will continue to monitor       Persistent proteinuria  Because of glomerulonephritis and responding well to prednisone and seems to be in remission       Class 3 severe obesity due to excess calories without serious comorbidity with body mass index (BMI) of 40.0 to 44.9 in adult (HCC)  Need to reduce weight.  Patient also has some leg swelling and on diuretic.  This is likely secondary to amlodipine       Essential hypertension  Well-controlled though patient is on amlodipine which causing leg swelling.  I will take him off amlodipine advised to monitor blood pressure at home and if he started going up we will try different medication           Everything discussed at length with the patient.  I will see him back in 6 months.  He is going to monitor blood pressure at home and if his remain high he will give me a call.  Will get blood and urine test before that visit      History of Present Illness     Ben Daniel is a 29 y.o. male who presents CKD follow-up    Patient with C3 glomerulonephritis    Overall doing quite well    Gaining weight and complaining of leg swelling    On diuretic    No shortness of breath no chest pain no palpitation    No nausea no vomit      Review of Systems   Constitutional:  Negative for fatigue.   HENT:  Negative for congestion.    Eyes:  Negative for photophobia and pain.   Respiratory:  Negative for chest tightness and shortness of breath.    Cardiovascular:  Negative for chest pain and palpitations.   Gastrointestinal:  Negative for abdominal distention, abdominal pain and blood in stool.   Endocrine: Negative  "for polydipsia.   Genitourinary:  Negative for difficulty urinating, dysuria, flank pain, hematuria and urgency.   Musculoskeletal:  Negative for arthralgias and back pain.   Skin:  Negative for rash.   Neurological:  Negative for dizziness, light-headedness and headaches.   Hematological:  Does not bruise/bleed easily.   Psychiatric/Behavioral:  Negative for behavioral problems. The patient is not nervous/anxious.            Objective     Pulse (!) 110   Temp 97.6 °F (36.4 °C) (Temporal)   Resp 18   Ht 5' 6\" (1.676 m)   Wt 127 kg (279 lb)   SpO2 96%   BMI 45.03 kg/m²     Physical Exam  Constitutional:       General: He is not in acute distress.     Appearance: He is well-developed. He is obese.   HENT:      Head: Normocephalic.      Mouth/Throat:      Mouth: Mucous membranes are moist.   Eyes:      General: No scleral icterus.     Conjunctiva/sclera: Conjunctivae normal.   Neck:      Vascular: No JVD.   Cardiovascular:      Rate and Rhythm: Normal rate.      Heart sounds: Normal heart sounds.   Pulmonary:      Effort: Pulmonary effort is normal.      Breath sounds: No wheezing.   Abdominal:      Palpations: Abdomen is soft.      Tenderness: There is no abdominal tenderness.   Musculoskeletal:         General: Normal range of motion.      Cervical back: Neck supple.   Skin:     General: Skin is warm.      Findings: No rash.   Neurological:      Mental Status: He is alert and oriented to person, place, and time.   Psychiatric:         Behavior: Behavior normal.       "

## 2024-11-06 NOTE — ASSESSMENT & PLAN NOTE
Patient treated with prednisone and seems to be remission right now.  GFR is normal and does not have any protein loss.  Will continue to monitor

## 2024-11-07 ENCOUNTER — NURSE TRIAGE (OUTPATIENT)
Age: 29
End: 2024-11-07

## 2024-11-07 NOTE — TELEPHONE ENCOUNTER
"Pt called in stating that he has had leg swelling that started about 3 weeks ago. He states that his knees can be painful. He also reports having redness and itching as well. He reports weight gain as well. Pt is due for an appointment in February. Scheduled pt for an appointment tomorrow with Dr. Fitch.     Reason for Disposition   MILD swelling of both ankles (i.e., pedal edema) AND new-onset or getting worse    Answer Assessment - Initial Assessment Questions  1. ONSET: \"When did the swelling start?\" (e.g., minutes, hours, days)      3 weeks ago   2. LOCATION: \"What part of the leg is swollen?\"  \"Are both legs swollen or just one leg?\"      Feet, ankle, calves to the knee  3. SEVERITY: \"How bad is the swelling?\" (e.g., localized; mild, moderate, severe)      Moderate   4. REDNESS: \"Does the swelling look red or infected?\"      Redness   5. PAIN: \"Is the swelling painful to touch?\" If Yes, ask: \"How painful is it?\"   (Scale 1-10; mild, moderate or severe)      Knee pain - 5 pain scale  6. FEVER: \"Do you have a fever?\" If Yes, ask: \"What is it, how was it measured, and when did it start?\"       Denies   7. CAUSE: \"What do you think is causing the leg swelling?\"      Unsure   8. MEDICAL HISTORY: \"Do you have a history of blood clots (e.g., DVT), cancer, heart failure, kidney disease, or liver failure?\"      ROSI and was hospitalized but things are back to normal   9. RECURRENT SYMPTOM: \"Have you had leg swelling before?\" If Yes, ask: \"When was the last time?\" \"What happened that time?\"      On going   10. OTHER SYMPTOMS: \"Do you have any other symptoms?\" (e.g., chest pain, difficulty breathing)        Denies    Protocols used: Leg Swelling and Edema-Adult-[place]    "

## 2024-11-08 ENCOUNTER — OFFICE VISIT (OUTPATIENT)
Dept: CARDIOLOGY CLINIC | Facility: CLINIC | Age: 29
End: 2024-11-08
Payer: COMMERCIAL

## 2024-11-08 VITALS
RESPIRATION RATE: 16 BRPM | OXYGEN SATURATION: 98 % | WEIGHT: 277 LBS | BODY MASS INDEX: 44.52 KG/M2 | SYSTOLIC BLOOD PRESSURE: 142 MMHG | HEIGHT: 66 IN | HEART RATE: 92 BPM | DIASTOLIC BLOOD PRESSURE: 76 MMHG

## 2024-11-08 DIAGNOSIS — R60.9 EDEMA, UNSPECIFIED TYPE: ICD-10-CM

## 2024-11-08 DIAGNOSIS — I10 ESSENTIAL HYPERTENSION: ICD-10-CM

## 2024-11-08 DIAGNOSIS — I10 PRIMARY HYPERTENSION: Primary | ICD-10-CM

## 2024-11-08 PROCEDURE — 99213 OFFICE O/P EST LOW 20 MIN: CPT | Performed by: INTERNAL MEDICINE

## 2024-11-08 RX ORDER — HYDROCHLOROTHIAZIDE 12.5 MG/1
12.5 TABLET ORAL DAILY
Qty: 90 TABLET | Refills: 2 | Status: SHIPPED | OUTPATIENT
Start: 2024-11-08

## 2024-11-08 NOTE — PROGRESS NOTES
Cardiology Follow Up    Ben Daniel  1995  [id number]  [facility] [place]  [address] [address] [phone]    1. Primary hypertension  -     hydroCHLOROthiazide 12.5 mg tablet; Take 1 tablet (12.5 mg total) by mouth daily  -     Basic metabolic panel; Future; Expected date: 11/22/2024  2. Essential hypertension  3. Edema, unspecified type        Interval History: 29-year-old morbidly obese man who apparently had glomerulonephritis in the past year.  He had been taking amlodipine for hypertension.  More recently he has had swelling of the ankles.  He was given furosemide which has helped.  He is wearing support stockings.    He said he is trying to watch his salt.  He is now on Wegovy to help him lose weight.    I had ordered a renal vascular study.  This had not been done as of yet.    Patient Active Problem List   Diagnosis    Essential hypertension    Fatigue    Cervical strain    Scrotal pain    Acute right-sided low back pain without sciatica    Angiokeratoma of scrotum    Anxiety    Class 3 severe obesity due to excess calories without serious comorbidity with body mass index (BMI) of 40.0 to 44.9 in adult (HCC)    Palpitations    Eczema    Insomnia    Vitamin D deficiency    Hypothyroidism    Family history of cancer    Other neutropenia (HCC)    Nutritional anemia    Abnormal white blood cell (WBC)    Hypergammaglobulinemia    ROSI (acute kidney injury) ([facility])    SOB (shortness of breath)    Acute respiratory failure with hypoxia ([facility])    Hypotension    ARDS (adult respiratory distress syndrome) ([facility])    Hemoptysis    Hyponatremia    C3 glomerulonephritis    Persistent proteinuria    Post-infectious glomerulonephritis    Immunosuppression (HCC)    Metabolic alkalosis    Hematuria     Past Medical History:   Diagnosis Date    Anxiety     Chronic kidney disease     Hypertension      Pneumonia 7/6/24     Social History     Socioeconomic History    Marital status: Single     Spouse name: Not on file    Number of children: Not on file    Years of education: Not on file    Highest education level: Not on file   Occupational History    Not on file   Tobacco Use    Smoking status: Never     Passive exposure: Never    Smokeless tobacco: Never   Vaping Use    Vaping status: Never Used   Substance and Sexual Activity    Alcohol use: Yes     Alcohol/week: 1.0 standard drink of alcohol     Comment: Rare occ    Drug use: No    Sexual activity: Yes     Partners: Female     Birth control/protection: None   Other Topics Concern    Not on file   Social History Narrative    Not on file     Social Determinants of Health     Financial Resource Strain: Not on file   Food Insecurity: No Food Insecurity (7/8/2024)    Nursing - Inadequate Food Risk Classification     Worried About Running Out of Food in the Last Year: Never true     Ran Out of Food in the Last Year: Never true     Ran Out of Food in the Last Year: Not on file   Transportation Needs: No Transportation Needs (7/8/2024)    PRAPARE - Transportation     Lack of Transportation (Medical): No     Lack of Transportation (Non-Medical): No   Physical Activity: Not on file   Stress: Not on file   Social Connections: Not on file   Intimate Partner Violence: Not on file   Housing Stability: Low Risk  (7/8/2024)    Housing Stability Vital Sign     Unable to Pay for Housing in the Last Year: No     Number of Times Moved in the Last Year: 0     Homeless in the Last Year: No      Family History   Problem Relation Age of Onset    Heart failure Maternal Grandmother     Heart failure Maternal Grandfather     Diabetes Maternal Grandfather     Hypertension Maternal Grandfather     Hypertension Mother     Cancer Paternal Grandfather     Cancer Paternal Grandmother         Lung cancer    Lung cancer Paternal Grandmother     Hypertension Brother      Past Surgical History:    Procedure Laterality Date    ANKLE SURGERY      BRONCHOSCOPY  7/9/24    FETAL SURGERY FOR CONGENITAL HERNIA      IR BIOPSY KIDNEY RANDOM  07/09/2024    RENAL BIOPSY  July 9th       Current Outpatient Medications:     ergocalciferol (VITAMIN D2) 50,000 units, Take 1 capsule (50,000 Units total) by mouth once a week, Disp: 16 capsule, Rfl: 1    hydroCHLOROthiazide 12.5 mg tablet, Take 1 tablet (12.5 mg total) by mouth daily, Disp: 90 tablet, Rfl: 2    levothyroxine 50 mcg tablet, Take 1 tablet (50 mcg total) by mouth daily, Disp: 90 tablet, Rfl: 1    losartan (COZAAR) 100 MG tablet, Take 1 tablet (100 mg total) by mouth daily, Disp: 90 tablet, Rfl: 0    pantoprazole (PROTONIX) 40 mg tablet, Take 1 tablet (40 mg total) by mouth daily in the early morning, Disp: 90 tablet, Rfl: 1    Semaglutide-Weight Management (WEGOVY) 0.25 MG/0.5ML, Inject 0.5 mL (0.25 mg total) under the skin once a week, Disp: 2 mL, Rfl: 0  No Known Allergies    Labs:  Appointment on 10/24/2024   Component Date Value    WBC 10/24/2024 4.90     RBC 10/24/2024 5.20     Hemoglobin 10/24/2024 14.3     Hematocrit 10/24/2024 42.8     MCV 10/24/2024 82     MCH 10/24/2024 27.5     MCHC 10/24/2024 33.4     RDW 10/24/2024 11.5 (L)     MPV 10/24/2024 9.2     Platelets 10/24/2024 325     nRBC 10/24/2024 0     Segmented % 10/24/2024 50     Immature Grans % 10/24/2024 1     Lymphocytes % 10/24/2024 34     Monocytes % 10/24/2024 11     Eosinophils Relative 10/24/2024 4     Basophils Relative 10/24/2024 0     Absolute Neutrophils 10/24/2024 2.42     Absolute Immature Grans 10/24/2024 0.03     Absolute Lymphocytes 10/24/2024 1.66     Absolute Monocytes 10/24/2024 0.56     Eosinophils Absolute 10/24/2024 0.21     Basophils Absolute 10/24/2024 0.02     Magnesium 10/24/2024 2.0     Sodium 10/24/2024 137     Potassium 10/24/2024 4.0     Chloride 10/24/2024 102     CO2 10/24/2024 30     ANION GAP 10/24/2024 5     BUN 10/24/2024 10     Creatinine 10/24/2024 0.74      Glucose, Fasting 10/24/2024 88     Calcium 10/24/2024 9.5     eGFR 10/24/2024 124     Creatinine, Ur 10/24/2024 292.3     Protein Urine Random 10/24/2024 25.7     Prot/Creat Ratio, Ur 10/24/2024 0.1     Color, UA 10/24/2024 Yellow     Clarity, UA 10/24/2024 Clear     Specific Gravity, UA 10/24/2024 1.030     pH, UA 10/24/2024 6.0     Leukocytes, UA 10/24/2024 Negative     Nitrite, UA 10/24/2024 Negative     Protein, UA 10/24/2024 Trace (A)     Glucose, UA 10/24/2024 Negative     Ketones, UA 10/24/2024 Negative     Urobilinogen, UA 10/24/2024 <2.0     Bilirubin, UA 10/24/2024 Negative     Occult Blood, UA 10/24/2024 Trace (A)     RBC, UA 10/24/2024 2-4 (A)     WBC, UA 10/24/2024 4-10 (A)     Epithelial Cells 10/24/2024 Occasional     Bacteria, UA 10/24/2024 None Seen     MUCUS THREADS 10/24/2024 Innumerable (A)    Office Visit on 10/04/2024   Component Date Value    POST-VOID RESIDUAL VOLUM* 10/04/2024 44     LEUKOCYTE ESTERASE,UA 10/04/2024 -     NITRITE,UA 10/04/2024 -     SL AMB POCT UROBILINOGEN 10/04/2024 0.2     POCT URINE PROTEIN 10/04/2024 +      PH,UA 10/04/2024 6.0     BLOOD,UA 10/04/2024 ++     SPECIFIC GRAVITY,UA 10/04/2024 1.0     KETONES,UA 10/04/2024 -     BILIRUBIN,UA 10/04/2024 -     GLUCOSE, UA 10/04/2024 -      COLOR,UA 10/04/2024 yellow     CLARITY,UA 10/04/2024 clear     Color, UA 10/04/2024 Colorless     Clarity, UA 10/04/2024 Clear     Specific Gravity, UA 10/04/2024 1.011     pH, UA 10/04/2024 6.5     Leukocytes, UA 10/04/2024 Negative     Nitrite, UA 10/04/2024 Negative     Protein, UA 10/04/2024 Negative     Glucose, UA 10/04/2024 Negative     Ketones, UA 10/04/2024 Negative     Urobilinogen, UA 10/04/2024 <2.0     Bilirubin, UA 10/04/2024 Negative     Occult Blood, UA 10/04/2024 Negative     RBC, UA 10/04/2024 None Seen     WBC, UA 10/04/2024 None Seen     Epithelial Cells 10/04/2024 None Seen     Bacteria, UA 10/04/2024 None Seen    Appointment on 10/04/2024   Component Date Value    WBC  10/04/2024 4.79     RBC 10/04/2024 4.91     Hemoglobin 10/04/2024 13.9     Hematocrit 10/04/2024 41.1     MCV 10/04/2024 84     MCH 10/04/2024 28.3     MCHC 10/04/2024 33.8     RDW 10/04/2024 11.9     MPV 10/04/2024 9.2     Platelets 10/04/2024 287     nRBC 10/04/2024 0     Segmented % 10/04/2024 49     Immature Grans % 10/04/2024 0     Lymphocytes % 10/04/2024 35     Monocytes % 10/04/2024 13 (H)     Eosinophils Relative 10/04/2024 3     Basophils Relative 10/04/2024 0     Absolute Neutrophils 10/04/2024 2.32     Absolute Immature Grans 10/04/2024 0.02     Absolute Lymphocytes 10/04/2024 1.67     Absolute Monocytes 10/04/2024 0.62     Eosinophils Absolute 10/04/2024 0.14     Basophils Absolute 10/04/2024 0.02     Creatinine, Ur 10/04/2024 62.7     Protein Urine Random 10/04/2024 4.0     Prot/Creat Ratio, Ur 10/04/2024 0.1     Color, UA 10/04/2024 Colorless     Clarity, UA 10/04/2024 Clear     Specific Gravity, UA 10/04/2024 1.007     pH, UA 10/04/2024 6.0     Leukocytes, UA 10/04/2024 Negative     Nitrite, UA 10/04/2024 Negative     Protein, UA 10/04/2024 Negative     Glucose, UA 10/04/2024 Negative     Ketones, UA 10/04/2024 Negative     Urobilinogen, UA 10/04/2024 <2.0     Bilirubin, UA 10/04/2024 Negative     Occult Blood, UA 10/04/2024 Small (A)     RBC, UA 10/04/2024 1-2     WBC, UA 10/04/2024 1-2     Epithelial Cells 10/04/2024 None Seen     Bacteria, UA 10/04/2024 None Seen     Sodium 10/04/2024 137     Potassium 10/04/2024 3.7     Chloride 10/04/2024 100     CO2 10/04/2024 31     ANION GAP 10/04/2024 6     BUN 10/04/2024 13     Creatinine 10/04/2024 0.79     Glucose, Fasting 10/04/2024 92     Calcium 10/04/2024 9.2     AST 10/04/2024 16     ALT 10/04/2024 17     Alkaline Phosphatase 10/04/2024 61     Total Protein 10/04/2024 7.4     Albumin 10/04/2024 4.1     Total Bilirubin 10/04/2024 0.60     eGFR 10/04/2024 121     Magnesium 10/04/2024 2.0    Appointment on 09/14/2024   Component Date Value    WBC  09/14/2024 4.98     RBC 09/14/2024 4.78     Hemoglobin 09/14/2024 13.4     Hematocrit 09/14/2024 40.8     MCV 09/14/2024 85     MCH 09/14/2024 28.0     MCHC 09/14/2024 32.8     RDW 09/14/2024 12.8     MPV 09/14/2024 9.0     Platelets 09/14/2024 257     nRBC 09/14/2024 0     Segmented % 09/14/2024 50     Immature Grans % 09/14/2024 1     Lymphocytes % 09/14/2024 36     Monocytes % 09/14/2024 11     Eosinophils Relative 09/14/2024 2     Basophils Relative 09/14/2024 0     Absolute Neutrophils 09/14/2024 2.44     Absolute Immature Grans 09/14/2024 0.06     Absolute Lymphocytes 09/14/2024 1.79     Absolute Monocytes 09/14/2024 0.57     Eosinophils Absolute 09/14/2024 0.10     Basophils Absolute 09/14/2024 0.02     Creatinine, Ur 09/14/2024 174.0     Protein Urine Random 09/14/2024 13.8     Prot/Creat Ratio, Ur 09/14/2024 0.1     C3 Complement 09/14/2024 164     C4, COMPLEMENT 09/14/2024 37     Sodium 09/14/2024 137     Potassium 09/14/2024 4.0     Chloride 09/14/2024 102     CO2 09/14/2024 28     ANION GAP 09/14/2024 7     BUN 09/14/2024 15     Creatinine 09/14/2024 0.79     Glucose, Fasting 09/14/2024 90     Calcium 09/14/2024 9.1     AST 09/14/2024 14     ALT 09/14/2024 23     Alkaline Phosphatase 09/14/2024 59     Total Protein 09/14/2024 7.1     Albumin 09/14/2024 4.0     Total Bilirubin 09/14/2024 0.72     eGFR 09/14/2024 121     Color, UA 09/14/2024 Yellow     Clarity, UA 09/14/2024 Clear     Specific Gravity, UA 09/14/2024 1.022     pH, UA 09/14/2024 5.5     Leukocytes, UA 09/14/2024 Negative     Nitrite, UA 09/14/2024 Negative     Protein, UA 09/14/2024 Trace (A)     Glucose, UA 09/14/2024 Negative     Ketones, UA 09/14/2024 Negative     Urobilinogen, UA 09/14/2024 <2.0     Bilirubin, UA 09/14/2024 Negative     Occult Blood, UA 09/14/2024 Moderate (A)     RBC, UA 09/14/2024 4-10 (A)     WBC, UA 09/14/2024 2-4 (A)     Epithelial Cells 09/14/2024 Occasional     Bacteria, UA 09/14/2024 None Seen     MUCUS THREADS  09/14/2024 Occasional (A)      Imaging: CT chest without contrast    Result Date: 10/19/2024  Narrative: CT CHEST WITHOUT IV CONTRAST INDICATION: J15.3: Pneumonia due to Streptococcus, group b. COMPARISON: CT chest 7/11/2024, 7/6/2024 TECHNIQUE: CT examination of the chest was performed without intravenous contrast. Multiplanar 2D reformatted images were created from the source data. This examination, like all CT scans performed in the [facility], was performed utilizing techniques to minimize radiation dose exposure, including the use of iterative reconstruction and automated exposure control. Radiation dose length product (DLP) for this visit: 683 mGy-cm FINDINGS: LUNGS: Lungs are clear. There is no tracheal or endobronchial lesion. PLEURA: Unremarkable. HEART/GREAT VESSELS: Heart is unremarkable for patient's age. No thoracic aortic aneurysm. MEDIASTINUM AND BARTOLOME: Unremarkable. CHEST WALL AND LOWER NECK: Unremarkable. VISUALIZED STRUCTURES IN THE UPPER ABDOMEN: Unremarkable. OSSEOUS STRUCTURES: No acute fracture or destructive osseous lesion.     Impression: Resolution of previously seen bilateral pneumonia. Workstation performed: [id number]       Review of Systems:  Review of Systems    Physical Exam:  Morbidly obese.  Blood pressure 142/76 (has been controlled at home).  Heart rate 90 and regular.  Lungs clear.  Regular rhythm.  No murmurs or gallops.  Trace edema.    Discussion/Summary:    1.  Hypertension-controlled  2.  Mild edema possibly secondary to amlodipine    Recommendations:    1.  DC amlodipine and start hydrochlorothiazide 12.5 mg daily  2.  DC Lasix  3.  Continue support stockings  4.  Elevate legs when possible  5.  Renal vascular study pending  6.  Use low-salt preparation which has some potassium in it  7.  Recheck potassium in 2 weeks      Paddy Fitch MD

## 2024-11-15 DIAGNOSIS — E66.01 CLASS 3 SEVERE OBESITY DUE TO EXCESS CALORIES WITHOUT SERIOUS COMORBIDITY WITH BODY MASS INDEX (BMI) OF 40.0 TO 44.9 IN ADULT (HCC): ICD-10-CM

## 2024-11-15 DIAGNOSIS — E66.813 CLASS 3 SEVERE OBESITY DUE TO EXCESS CALORIES WITHOUT SERIOUS COMORBIDITY WITH BODY MASS INDEX (BMI) OF 40.0 TO 44.9 IN ADULT (HCC): ICD-10-CM

## 2024-11-15 NOTE — TELEPHONE ENCOUNTER
Pt called in requesting refill of the following medication:    Requested Prescriptions     Pending Prescriptions Disp Refills    Semaglutide-Weight Management (WEGOVY) 0.25 MG/0.5ML 2 mL 0     Sig: Inject 0.5 mL (0.25 mg total) under the skin once a week     Script going to the following pharmacy:  [facility] [address] [phone]

## 2024-12-07 DIAGNOSIS — R80.1 PERSISTENT PROTEINURIA: ICD-10-CM

## 2024-12-07 DIAGNOSIS — I10 ESSENTIAL HYPERTENSION: ICD-10-CM

## 2024-12-09 RX ORDER — LOSARTAN POTASSIUM 100 MG/1
100 TABLET ORAL DAILY
Qty: 90 TABLET | Refills: 1 | Status: SHIPPED | OUTPATIENT
Start: 2024-12-09

## 2025-01-02 ENCOUNTER — HOSPITAL ENCOUNTER (OUTPATIENT)
Dept: VASCULAR ULTRASOUND | Facility: HOSPITAL | Age: 30
Discharge: HOME | End: 2025-01-02
Payer: COMMERCIAL

## 2025-01-02 DIAGNOSIS — I10 ESSENTIAL HYPERTENSION: ICD-10-CM

## 2025-01-02 PROCEDURE — 93975 VASCULAR STUDY: CPT

## 2025-01-02 PROCEDURE — 93975 VASCULAR STUDY: CPT | Performed by: INTERNAL MEDICINE

## 2025-01-03 ENCOUNTER — RESULTS FOLLOW-UP (OUTPATIENT)
Dept: FAMILY MEDICINE CLINIC | Facility: CLINIC | Age: 30
End: 2025-01-03

## 2025-01-27 ENCOUNTER — TELEPHONE (OUTPATIENT)
Dept: NEPHROLOGY | Facility: CLINIC | Age: 30
End: 2025-01-27

## 2025-01-27 NOTE — TELEPHONE ENCOUNTER
Spoke with patient's spouse marco antonio in person advised patient has to get fasting labs done 1 week prior to scheduled follow-up appointment with . patient's spouse marco antonio verbalized understanding and is okay with it.

## 2025-02-03 ENCOUNTER — OFFICE VISIT (OUTPATIENT)
Dept: FAMILY MEDICINE CLINIC | Facility: CLINIC | Age: 30
End: 2025-02-03
Payer: COMMERCIAL

## 2025-02-03 VITALS
SYSTOLIC BLOOD PRESSURE: 144 MMHG | HEIGHT: 66 IN | DIASTOLIC BLOOD PRESSURE: 82 MMHG | HEART RATE: 95 BPM | BODY MASS INDEX: 44.69 KG/M2 | OXYGEN SATURATION: 98 % | TEMPERATURE: 97.3 F | WEIGHT: 278.1 LBS

## 2025-02-03 DIAGNOSIS — E66.01 CLASS 3 SEVERE OBESITY DUE TO EXCESS CALORIES WITHOUT SERIOUS COMORBIDITY WITH BODY MASS INDEX (BMI) OF 40.0 TO 44.9 IN ADULT (HCC): ICD-10-CM

## 2025-02-03 DIAGNOSIS — E66.813 CLASS 3 SEVERE OBESITY DUE TO EXCESS CALORIES WITHOUT SERIOUS COMORBIDITY WITH BODY MASS INDEX (BMI) OF 40.0 TO 44.9 IN ADULT (HCC): ICD-10-CM

## 2025-02-03 DIAGNOSIS — U07.1 COVID-19: ICD-10-CM

## 2025-02-03 DIAGNOSIS — I10 ESSENTIAL HYPERTENSION: ICD-10-CM

## 2025-02-03 DIAGNOSIS — E03.9 HYPOTHYROIDISM, UNSPECIFIED TYPE: ICD-10-CM

## 2025-02-03 DIAGNOSIS — R05.9 COUGH, UNSPECIFIED TYPE: Primary | ICD-10-CM

## 2025-02-03 LAB
SARS-COV-2 AG UPPER RESP QL IA: POSITIVE
SL AMB POCT RAPID FLU A: NEGATIVE
SL AMB POCT RAPID FLU B: NEGATIVE
VALID CONTROL: ABNORMAL

## 2025-02-03 PROCEDURE — 99214 OFFICE O/P EST MOD 30 MIN: CPT | Performed by: FAMILY MEDICINE

## 2025-02-03 PROCEDURE — 87804 INFLUENZA ASSAY W/OPTIC: CPT | Performed by: FAMILY MEDICINE

## 2025-02-03 PROCEDURE — 87811 SARS-COV-2 COVID19 W/OPTIC: CPT | Performed by: FAMILY MEDICINE

## 2025-02-03 RX ORDER — NIRMATRELVIR AND RITONAVIR 300-100 MG
3 KIT ORAL 2 TIMES DAILY
Qty: 30 TABLET | Refills: 0 | Status: SHIPPED | OUTPATIENT
Start: 2025-02-03 | End: 2025-02-08

## 2025-02-03 NOTE — ASSESSMENT & PLAN NOTE
Encouraged to continue current weight loss strategies    Orders:    Semaglutide-Weight Management (WEGOVY) 0.25 MG/0.5ML; Inject 0.5 mL (0.25 mg total) under the skin once a week

## 2025-02-03 NOTE — PROGRESS NOTES
COVID-19 Outpatient Progress Note  Name: Ben Daniel      : 1995      MRN: [id number]  Encounter Provider: MARTIN Cunha  Encounter Date: 2/3/2025   Encounter department: [facility] [address]    Assessment & Plan  COVID-19    Orders:    nirmatrelvir & ritonavir (Paxlovid, 300/100,) tablet therapy pack; Take 3 tablets by mouth 2 (two) times a day for 5 days Take 2 nirmatrelvir tablets + 1 ritonavir tablet together per dose    Class 3 severe obesity due to excess calories without serious comorbidity with body mass index (BMI) of 40.0 to 44.9 in adult (HCC)  Encouraged to continue current weight loss strategies    Orders:    Semaglutide-Weight Management (WEGOVY) 0.25 MG/0.5ML; Inject 0.5 mL (0.25 mg total) under the skin once a week    Hypothyroidism, unspecified type  Stable, continue current dosing levothyroxine       Essential hypertension  Stable, blood pressure mildly elevated on today's reading, continue current medications encouraged weight loss strategies       Cough, unspecified type  Stable without complaints of shortness of breath difficulty breathing wheezing  Orders:    POCT Rapid Covid Ag    POCT rapid flu A and B      Disposition:     Discussed symptom directed medication options with patient. Discussed vitamin D, vitamin C, and/or zinc supplementation with patient.   Discussed plan of care with patient present with the improvement in overall symptoms, would hold antiviral at this point call for any changes worsening    Patient meets criteria for Paxlovid and they have been counseled appropriately regarding risks, benefits, side effects, and alternative treatment options. After discussion, patient agrees to treatment.    Possible side effects of Paxlovid?    Possible side effects of Paxlovid are:  - Liver Problems. Notify us right away if you start to experience loss of appetite, yellowing of your skin and the whites of eyes (jaundice), dark-colored  urine, pale colored stools and itchy skin, stomach area (abdominal) pain.  - Resistance to HIV Medicines. If you have untreated HIV infection, Paxlovid may lead to some HIV medicines not working as well in the future.  - Other possible side effects include: altered sense of taste, diarrhea, high blood pressure, or muscle aches.    I have spent a total time of 20 minutes on the day of the encounter for this patient including discussing prognosis, risks and benefits of treatment options, instructions for management, patient and family education, importance of treatment compliance, risk factor reductions, impressions, counseling/coordination of care, documenting in the medical record, reviewing/ordering tests, medicine, procedures, obtaining or reviewing history and communicating with other healthcare professionals.       Depression Screening and Follow-up Plan: Patient was screened for depression during today's encounter. They screened negative with a PHQ-2 score of 0.        Encounter provider: MARTIN Cunha     Provider located at: [address] [id number] [phone]     Recent Visits  No visits were found meeting these conditions.  Showing recent visits within past 7 days and meeting all other requirements  Today's Visits  Date Type Provider Dept   02/03/25 Office Visit MARTIN Cunha [facility] [address] [place] [address]   Showing today's visits and meeting all other requirements  Future Appointments  No visits were found meeting these conditions.  Showing future appointments within next 150 days and meeting all other requirements    History of Present Illness      Subjective:   Ben Daniel is a 30 y.o. male who is concerned about COVID-19. Patient's symptoms include nasal congestion and rhinorrhea. Patient denies fever, chills, cough, shortness of breath and chest tightness.     - Date of symptom onset:  "2/1/2025      COVID-19 vaccination status: Partially vaccinated    Exposure:   Contact with a person who is under investigation (PUI) for or who is positive for COVID-19 within the last 14 days?: Yes    Hospitalized recently for fever and/or lower respiratory symptoms?: No      Currently a healthcare worker that is involved in direct patient care?: No      Works in a special setting where the risk of COVID-19 transmission may be high? (this may include long-term care, correctional and care home facilities; homeless shelters; assisted-living facilities and group homes.): No      Resident in a special setting where the risk of COVID-19 transmission may be high? (this may include long-term care, correctional and care home facilities; homeless shelters; assisted-living facilities and group homes.): No      Over all symptom improvement from onset     Lab Results   Component Value Date    SARSCOV2 COMMENT 07/10/2024    SARSCOVAG Positive (A) 02/03/2025       Review of Systems   Constitutional:  Negative for chills and fever.   HENT:  Positive for congestion and rhinorrhea.    Respiratory:  Negative for cough, chest tightness and shortness of breath.      Objective   /82 (BP Location: Left arm, Patient Position: Sitting)   Pulse 95   Temp (!) 97.3 °F (36.3 °C)   Ht 5' 6\" (1.676 m)   Wt 126 kg (278 lb 1.6 oz)   SpO2 98%   BMI 44.89 kg/m²     Physical Exam  Constitutional:       General: He is not in acute distress.     Appearance: He is well-developed. He is obese. He is not ill-appearing or toxic-appearing.   HENT:      Head: Normocephalic and atraumatic.      Right Ear: Tympanic membrane normal.      Left Ear: Tympanic membrane normal.      Nose: Nose normal. No congestion or rhinorrhea.      Mouth/Throat:      Mouth: Mucous membranes are moist.      Pharynx: No posterior oropharyngeal erythema.   Eyes:      Conjunctiva/sclera: Conjunctivae normal.   Cardiovascular:      Rate and Rhythm: Normal rate and " regular rhythm.      Pulses: Normal pulses.      Heart sounds: Normal heart sounds.   Pulmonary:      Effort: Pulmonary effort is normal.      Breath sounds: Normal breath sounds.   Musculoskeletal:         General: No tenderness. Normal range of motion.      Cervical back: Normal range of motion and neck supple.   Lymphadenopathy:      Cervical: No cervical adenopathy.   Skin:     General: Skin is warm and dry.      Findings: No rash.   Neurological:      General: No focal deficit present.      Mental Status: He is alert and oriented to person, place, and time.      Deep Tendon Reflexes: Reflexes are normal and symmetric.   Psychiatric:         Behavior: Behavior normal.         Thought Content: Thought content normal.         Judgment: Judgment normal.

## 2025-02-03 NOTE — TELEPHONE ENCOUNTER
PA for     Semaglutide-Weight Management (WEGOVY) 0.25 MG/0.5ML   SUBMITTED to [facility]    via    []CMM-KEY:   [x]Surescripts-Case ID # [id number]  []Availity-Auth ID # NDC #   []Faxed to plan   []Other website   []Phone call Case ID #     [x]PA sent as URGENT    All office notes, labs and other pertaining documents and studies sent. Clinical questions answered. Awaiting determination from insurance company.     Turnaround time for your insurance to make a decision on your Prior Authorization can take 7-21 business days.

## 2025-02-03 NOTE — ASSESSMENT & PLAN NOTE
Stable, blood pressure mildly elevated on today's reading, continue current medications encouraged weight loss strategies

## 2025-02-11 ENCOUNTER — NURSE TRIAGE (OUTPATIENT)
Age: 30
End: 2025-02-11

## 2025-02-11 DIAGNOSIS — R80.1 PERSISTENT PROTEINURIA: ICD-10-CM

## 2025-02-11 DIAGNOSIS — I10 ESSENTIAL HYPERTENSION: ICD-10-CM

## 2025-02-11 RX ORDER — LOSARTAN POTASSIUM 100 MG/1
100 TABLET ORAL DAILY
Qty: 90 TABLET | Refills: 1 | Status: SHIPPED | OUTPATIENT
Start: 2025-02-11

## 2025-02-11 NOTE — TELEPHONE ENCOUNTER
"Pt warm transferred from [place], states that he has had elevated BP for the last few days. Today before his call he states his BP is 160/90. He jeny any chest pain, shortness of breathe, headaches or any neuro defects. While reviewing medication with patient he was unaware that Losartan 100 mg orally was ordered for him. No office note to verify reasoning for restarting from when the medication was ordered on 12/9.He has not been taking this the last time he was on this medication was September. He was calling today because he believed he needed another BP medications added to his regimen, so I advised that [facility] will verify with Marisela that patient should be taking this medication and that we will call him back to inform him. He would need a new prescription called into [facility] pharmacy if PCP agrees. Patient can be reached at [phone]       Reason for Disposition   Ran out of BP medications    Answer Assessment - Initial Assessment Questions  1. BLOOD PRESSURE: \"What is the blood pressure?\" \"Did you take at least two measurements 5 minutes apart?\"      Bp 160/90 has been elevated last few days.  2. ONSET: \"When did you take your blood pressure?\"      today  3. HOW: \"How did you obtain the blood pressure?\" (e.g., visiting nurse, automatic home BP monitor)      Automatic home bp machine  4. HISTORY: \"Do you have a history of high blood pressure?\"      yes  5. MEDICATIONS: \"Are you taking any medications for blood pressure?\" \"Have you missed any doses recently?\"      Taking HCTZ, states he has not been taking Losartan   6. OTHER SYMPTOMS: \"Do you have any symptoms?\" (e.g., headache, chest pain, blurred vision, difficulty breathing, weakness)      No other symptoms    Protocols used: High Blood Pressure-ADULT-[place]    "

## 2025-02-12 ENCOUNTER — TELEPHONE (OUTPATIENT)
Age: 30
End: 2025-02-12

## 2025-02-12 NOTE — TELEPHONE ENCOUNTER
Patient called back said his blood pressure was 143 over 78 today. Patient made follow up with Marisela scheduled for 02/28. Thank you!

## 2025-02-12 NOTE — TELEPHONE ENCOUNTER
Called patient and apologized not sure how it was filled as non of our providers filled it or with in [facility]. I told him I think it might have been on automatic refill possibly but for him to not  or take as he was d/c from it as advised Dr Fitch and Dr Wells note

## 2025-02-12 NOTE — TELEPHONE ENCOUNTER
Patient called, states, he went to  his medication last night. He noticed Amlodipine had been filled, patient is questioning should he be taking this medication when it causes feet, leg edema?    Amlodipine is not on active medication list.    Please advise and notify patient through [employer] portal.  Thank you.

## 2025-02-28 ENCOUNTER — OFFICE VISIT (OUTPATIENT)
Dept: FAMILY MEDICINE CLINIC | Facility: CLINIC | Age: 30
End: 2025-02-28
Payer: COMMERCIAL

## 2025-02-28 VITALS
SYSTOLIC BLOOD PRESSURE: 132 MMHG | HEART RATE: 96 BPM | WEIGHT: 282.2 LBS | HEIGHT: 66 IN | OXYGEN SATURATION: 97 % | BODY MASS INDEX: 45.35 KG/M2 | DIASTOLIC BLOOD PRESSURE: 76 MMHG

## 2025-02-28 DIAGNOSIS — E66.813 CLASS 3 SEVERE OBESITY DUE TO EXCESS CALORIES WITHOUT SERIOUS COMORBIDITY WITH BODY MASS INDEX (BMI) OF 40.0 TO 44.9 IN ADULT (HCC): ICD-10-CM

## 2025-02-28 DIAGNOSIS — J01.11 ACUTE RECURRENT FRONTAL SINUSITIS: ICD-10-CM

## 2025-02-28 DIAGNOSIS — R06.83 SNORING: ICD-10-CM

## 2025-02-28 DIAGNOSIS — E66.01 CLASS 3 SEVERE OBESITY DUE TO EXCESS CALORIES WITHOUT SERIOUS COMORBIDITY WITH BODY MASS INDEX (BMI) OF 40.0 TO 44.9 IN ADULT (HCC): ICD-10-CM

## 2025-02-28 DIAGNOSIS — H66.93 OTITIS OF BOTH EARS: ICD-10-CM

## 2025-02-28 DIAGNOSIS — R06.81 APNEA: ICD-10-CM

## 2025-02-28 DIAGNOSIS — R53.83 FATIGUE, UNSPECIFIED TYPE: ICD-10-CM

## 2025-02-28 DIAGNOSIS — I10 ESSENTIAL HYPERTENSION: Primary | ICD-10-CM

## 2025-02-28 DIAGNOSIS — H61.23 HEARING LOSS OF BOTH EARS DUE TO CERUMEN IMPACTION: ICD-10-CM

## 2025-02-28 PROCEDURE — 69210 REMOVE IMPACTED EAR WAX UNI: CPT

## 2025-02-28 PROCEDURE — 99214 OFFICE O/P EST MOD 30 MIN: CPT

## 2025-02-28 RX ORDER — CIPROFLOXACIN AND DEXAMETHASONE 3; 1 MG/ML; MG/ML
4 SUSPENSION/ DROPS AURICULAR (OTIC) 2 TIMES DAILY
Qty: 7.5 ML | Refills: 0 | Status: SHIPPED | OUTPATIENT
Start: 2025-02-28 | End: 2025-02-28

## 2025-02-28 RX ORDER — CIPROFLOXACIN AND DEXAMETHASONE 3; 1 MG/ML; MG/ML
4 SUSPENSION/ DROPS AURICULAR (OTIC) 2 TIMES DAILY
Qty: 7.5 ML | Refills: 0 | Status: SHIPPED | OUTPATIENT
Start: 2025-02-28

## 2025-02-28 RX ORDER — SEMAGLUTIDE 0.5 MG/.5ML
INJECTION, SOLUTION SUBCUTANEOUS
Qty: 2 ML | Refills: 0 | Status: SHIPPED | OUTPATIENT
Start: 2025-02-28

## 2025-02-28 RX ORDER — LEVOCETIRIZINE DIHYDROCHLORIDE 5 MG/1
5 TABLET, FILM COATED ORAL EVERY EVENING
Qty: 30 TABLET | Refills: 1 | Status: SHIPPED | OUTPATIENT
Start: 2025-02-28 | End: 2025-02-28

## 2025-02-28 RX ORDER — LEVOCETIRIZINE DIHYDROCHLORIDE 5 MG/1
5 TABLET, FILM COATED ORAL EVERY EVENING
Qty: 30 TABLET | Refills: 1 | Status: SHIPPED | OUTPATIENT
Start: 2025-02-28

## 2025-02-28 RX ORDER — FLUTICASONE PROPIONATE 50 MCG
1 SPRAY, SUSPENSION (ML) NASAL DAILY
Qty: 15.8 ML | Refills: 3 | Status: SHIPPED | OUTPATIENT
Start: 2025-02-28

## 2025-02-28 RX ORDER — SEMAGLUTIDE 0.5 MG/.5ML
INJECTION, SOLUTION SUBCUTANEOUS
Qty: 2 ML | Refills: 0 | Status: SHIPPED | OUTPATIENT
Start: 2025-02-28 | End: 2025-02-28

## 2025-02-28 RX ORDER — AMLODIPINE BESYLATE 5 MG/1
1 TABLET ORAL DAILY
COMMUNITY
Start: 2025-02-11

## 2025-02-28 NOTE — PROGRESS NOTES
Name: Ben Daniel      : 1995      MRN: [id number]  Encounter Provider: MARTIN Shearer  Encounter Date: 2025   Encounter department: [facility] [address]  :  Assessment & Plan  Essential hypertension  Blood pressure acceptable today will work on weight loss will continue to monitor  Orders:  •  Semaglutide-Weight Management (Wegovy) 0.5 MG/0.5ML; Inject 0.5 mg under the skin weekly    Class 3 severe obesity due to excess calories without serious comorbidity with body mass index (BMI) of 40.0 to 44.9 in adult (HCC)  Prior Authorization Clinical Questions for Weight Management Pharmacotherapy    1. Does the patient have a contrainidcation to medication prescribed for weight management?: No  2. Does the patient have a diagnosis of obesity, confirmed by a BMI greater than or equal to 30 kg/m^2?: Yes  3. Does the patient have a BMI of greater than or equal to 27 kg/m^2 with at least one weight-related comorbidity/risk factor/complication (e.g. diabetes, dyslipidemia, coronary artery disease)?: Yes  4. Weight-related co-morbidities/risk factors: prediabetes, dyslipidemia, hypertension, obstructive sleep apnea (MIRIAM), GERD  5. WEGOVY CVA Indication: Does patient have established documented cardiovascular disease (history of a prior heart attack (myocardial infarction), stroke, or symptomatic peripheral arterial disease (PAD)?: N/A  6. ZEPBOUND MIRIAM Indication: Does patient have documented MIRIAM diagnosed via sleep study (insurance will require copy of sleep study results for approval)?: N/A  7. Has the patient been on a weight loss regimen of low-calorie diet, increased physical activity, and lifestyle modifications for a minimum of 6 months?: Yes  8. Has the patient completed a comprehensive weight loss program (ie, Weight Watchers, Noom, Bariatrics, other kristian on phone)? If so, what?: No  9. Does the patient have a history of type 2 diabetes?: No  10. Has the member  tried and failed other weight loss medication within the past 12 months?: No  11. Will the member use requested medication in combination with another GLP agonist or weight loss drug?: No  12. Is the medication a controlled substance?: No     Baseline weight (in pounds): 282 lbs  Current weight (in pounds): 282 lbs  Weight loss percentage: 0%       Will restart Wegovy, recommend my [email or web address] 2 and half hours of exercise daily follow-up in 1 month or sooner have lab work we will call with results  Orders:  •  Semaglutide-Weight Management (Wegovy) 0.5 MG/0.5ML; Inject 0.5 mg under the skin weekly  •  CBC and differential; Future  •  Comprehensive metabolic panel; Future  •  Hemoglobin A1C; Future  •  Lipid panel; Future  •  TSH, 3rd generation with Free T4 reflex; Future  •  Vitamin D 25 hydroxy; Future    Snoring  Discussed snoring apnea and fatigue and correlation with sleep apnea will do sleep study and call with results  Orders:  •  Ambulatory Referral to Sleep Medicine; Future    Fatigue, unspecified type  Discussed snoring apnea and fatigue and correlation with sleep apnea will do sleep study and call with results  Orders:  •  Ambulatory Referral to Sleep Medicine; Future    Apnea  Witnessed apneic eventsDiscussed snoring apnea and fatigue and correlation with sleep apnea will do sleep study and call with results  Orders:  •  Ambulatory Referral to Sleep Medicine; Future    Otitis of both ears  Ciprodex twice daily ears successfully cleaned today  Orders:  •  ciprofloxacin-dexamethasone (CIPRODEX) otic suspension; Administer 4 drops into both ears 2 (two) times a day    Hearing loss of both ears due to cerumen impaction  Ears successfully cleaned today  Orders:  •  ciprofloxacin-dexamethasone (CIPRODEX) otic suspension; Administer 4 drops into both ears 2 (two) times a day  •  Ear cerumen removal    Acute recurrent frontal sinusitis  Augmentin twice daily Xyzal in the evening do recommend Flonase will continue  "to monitor  Orders:  •  amoxicillin-clavulanate (AUGMENTIN) 875-125 mg per tablet; Take 1 tablet by mouth every 12 (twelve) hours for 10 days  •  levocetirizine (XYZAL) 5 MG tablet; Take 1 tablet (5 mg total) by mouth every evening  •  fluticasone (FLONASE) 50 mcg/act nasal spray; 1 spray into each nostril daily           History of Present Illness   HPI  Review of Systems   Constitutional:  Positive for fatigue. Negative for chills, diaphoresis and fever.   HENT:  Positive for congestion, postnasal drip, rhinorrhea, sinus pressure, sinus pain and voice change. Negative for ear pain and sore throat.    Respiratory:  Negative for cough, chest tightness, shortness of breath and wheezing.    Cardiovascular:  Negative for chest pain and palpitations.   Gastrointestinal:  Negative for abdominal pain, constipation, diarrhea, nausea and vomiting.   Genitourinary:  Negative for dysuria and hematuria.   Musculoskeletal:  Positive for arthralgias and myalgias. Negative for back pain.   Skin:  Negative for rash.   Neurological:  Negative for dizziness, seizures, syncope, light-headedness and headaches.   Psychiatric/Behavioral:  Negative for dysphoric mood and sleep disturbance. The patient is not nervous/anxious.    All other systems reviewed and are negative.      Objective   /76 (BP Location: Left arm, Patient Position: Sitting, Cuff Size: Large)   Pulse 96   Ht 5' 6\" (1.676 m)   Wt 128 kg (282 lb 3.2 oz)   SpO2 97%   BMI 45.55 kg/m²      Physical Exam  Vitals and nursing note reviewed.   Constitutional:       General: He is not in acute distress.     Appearance: Normal appearance. He is well-developed. He is not ill-appearing.   HENT:      Head: Normocephalic and atraumatic.      Right Ear: Tympanic membrane, ear canal and external ear normal. There is impacted cerumen.      Left Ear: Tympanic membrane, ear canal and external ear normal. There is impacted cerumen.      Nose: Congestion present.      " "Mouth/Throat:      Mouth: Mucous membranes are moist.      Pharynx: Posterior oropharyngeal erythema present. No oropharyngeal exudate.   Eyes:      Extraocular Movements: Extraocular movements intact.      Conjunctiva/sclera: Conjunctivae normal.      Pupils: Pupils are equal, round, and reactive to light.   Cardiovascular:      Rate and Rhythm: Normal rate and regular rhythm.      Heart sounds: No murmur heard.  Pulmonary:      Effort: Pulmonary effort is normal. No respiratory distress.      Breath sounds: Normal breath sounds.   Abdominal:      Palpations: Abdomen is soft.      Tenderness: There is no abdominal tenderness.   Musculoskeletal:         General: No swelling.      Cervical back: Normal range of motion and neck supple.      Right lower leg: No edema.      Left lower leg: No edema.   Lymphadenopathy:      Cervical: No cervical adenopathy.   Skin:     General: Skin is warm and dry.      Capillary Refill: Capillary refill takes less than 2 seconds.   Neurological:      General: No focal deficit present.      Mental Status: He is alert and oriented to person, place, and time.   Psychiatric:         Mood and Affect: Mood normal.         Behavior: Behavior normal.     Ear cerumen removal    Date/Time: 2/28/2025 10:00 AM    Performed by: MARTIN Shearer  Authorized by: MARTIN Shearer  Universal Protocol:  procedure performed by consultantConsent: Verbal consent obtained.  Risks and benefits: risks, benefits and alternatives were discussed  Consent given by: patient  Time out: Immediately prior to procedure a \"time out\" was called to verify the correct patient, procedure, equipment, support staff and site/side marked as required.  Timeout called at: 2/28/2025 10:59 AM.  Patient understanding: patient states understanding of the procedure being performed  Patient consent: the patient's understanding of the procedure matches consent given  Procedure consent: procedure consent matches procedure " scheduled  Required items: required blood products, implants, devices, and special equipment available  Patient identity confirmed: verbally with patient    Patient location:  Clinic  Procedure details:     Local anesthetic:  None    Location:  Both ears    Procedure type: irrigation with instrumentation      Instrumentation: curette      Approach:  External  Post-procedure details:     Complication:  None    Hearing quality:  Improved    Patient tolerance of procedure:  Tolerated well, no immediate complications

## 2025-02-28 NOTE — ASSESSMENT & PLAN NOTE
Prior Authorization Clinical Questions for Weight Management Pharmacotherapy    1. Does the patient have a contrainidcation to medication prescribed for weight management?: No  2. Does the patient have a diagnosis of obesity, confirmed by a BMI greater than or equal to 30 kg/m^2?: Yes  3. Does the patient have a BMI of greater than or equal to 27 kg/m^2 with at least one weight-related comorbidity/risk factor/complication (e.g. diabetes, dyslipidemia, coronary artery disease)?: Yes  4. Weight-related co-morbidities/risk factors: prediabetes, dyslipidemia, hypertension, obstructive sleep apnea (MIRIAM), GERD  5. WEGOVY CVA Indication: Does patient have established documented cardiovascular disease (history of a prior heart attack (myocardial infarction), stroke, or symptomatic peripheral arterial disease (PAD)?: N/A  6. ZEPBOUND MIRIAM Indication: Does patient have documented MIRIAM diagnosed via sleep study (insurance will require copy of sleep study results for approval)?: N/A  7. Has the patient been on a weight loss regimen of low-calorie diet, increased physical activity, and lifestyle modifications for a minimum of 6 months?: Yes  8. Has the patient completed a comprehensive weight loss program (ie, Weight Watchers, Noom, Bariatrics, other kristian on phone)? If so, what?: No  9. Does the patient have a history of type 2 diabetes?: No  10. Has the member tried and failed other weight loss medication within the past 12 months?: No  11. Will the member use requested medication in combination with another GLP agonist or weight loss drug?: No  12. Is the medication a controlled substance?: No     Baseline weight (in pounds): 282 lbs  Current weight (in pounds): 282 lbs  Weight loss percentage: 0%       Will restart Wegovy, recommend my [email or web address] 2 and half hours of exercise daily follow-up in 1 month or sooner have lab work we will call with results  Orders:  •  Semaglutide-Weight Management (Wegovy) 0.5 MG/0.5ML; Inject 0.5 mg  under the skin weekly  •  CBC and differential; Future  •  Comprehensive metabolic panel; Future  •  Hemoglobin A1C; Future  •  Lipid panel; Future  •  TSH, 3rd generation with Free T4 reflex; Future  •  Vitamin D 25 hydroxy; Future

## 2025-02-28 NOTE — ASSESSMENT & PLAN NOTE
Blood pressure acceptable today will work on weight loss will continue to monitor  Orders:  •  Semaglutide-Weight Management (Wegovy) 0.5 MG/0.5ML; Inject 0.5 mg under the skin weekly

## 2025-02-28 NOTE — ASSESSMENT & PLAN NOTE
Discussed snoring apnea and fatigue and correlation with sleep apnea will do sleep study and call with results  Orders:  •  Ambulatory Referral to Sleep Medicine; Future

## 2025-03-03 ENCOUNTER — TRANSCRIBE ORDERS (OUTPATIENT)
Dept: SLEEP CENTER | Facility: CLINIC | Age: 30
End: 2025-03-03

## 2025-03-03 DIAGNOSIS — R06.83 SNORING: Primary | ICD-10-CM

## 2025-03-03 DIAGNOSIS — R53.83 FATIGUE, UNSPECIFIED TYPE: ICD-10-CM

## 2025-03-03 DIAGNOSIS — G47.33 OSA (OBSTRUCTIVE SLEEP APNEA): ICD-10-CM

## 2025-03-07 DIAGNOSIS — F41.9 ANXIETY: Primary | ICD-10-CM

## 2025-03-07 DIAGNOSIS — E03.9 HYPOTHYROIDISM, UNSPECIFIED TYPE: ICD-10-CM

## 2025-03-07 RX ORDER — ALPRAZOLAM 2 MG/1
2 TABLET ORAL ONCE
Qty: 1 TABLET | Refills: 0 | Status: SHIPPED | OUTPATIENT
Start: 2025-03-07 | End: 2025-03-07

## 2025-03-07 RX ORDER — LEVOTHYROXINE SODIUM 50 UG/1
50 TABLET ORAL DAILY
Qty: 90 TABLET | Refills: 1 | Status: SHIPPED | OUTPATIENT
Start: 2025-03-07

## 2025-03-07 NOTE — TELEPHONE ENCOUNTER
Patient called to request a refill on     levothyroxine 50 mcg tablet             [id number] - [place] - [address]  [address], [facility] [address]  Phone: [phone]  Fax: [phone]

## 2025-03-14 DIAGNOSIS — R80.1 PERSISTENT PROTEINURIA: ICD-10-CM

## 2025-03-14 DIAGNOSIS — I10 ESSENTIAL HYPERTENSION: ICD-10-CM

## 2025-03-14 RX ORDER — LOSARTAN POTASSIUM 100 MG/1
100 TABLET ORAL DAILY
Qty: 90 TABLET | Refills: 1 | Status: SHIPPED | OUTPATIENT
Start: 2025-03-14

## 2025-03-22 ENCOUNTER — APPOINTMENT (OUTPATIENT)
Dept: LAB | Facility: HOSPITAL | Age: 30
End: 2025-03-22
Payer: COMMERCIAL

## 2025-03-22 DIAGNOSIS — N05.8 C3 GLOMERULONEPHRITIS: ICD-10-CM

## 2025-03-22 DIAGNOSIS — R31.9 HEMATURIA, UNSPECIFIED TYPE: ICD-10-CM

## 2025-03-22 DIAGNOSIS — R80.1 PERSISTENT PROTEINURIA: ICD-10-CM

## 2025-03-22 DIAGNOSIS — E66.01 CLASS 3 SEVERE OBESITY DUE TO EXCESS CALORIES WITHOUT SERIOUS COMORBIDITY WITH BODY MASS INDEX (BMI) OF 40.0 TO 44.9 IN ADULT (HCC): ICD-10-CM

## 2025-03-22 DIAGNOSIS — E66.813 CLASS 3 SEVERE OBESITY DUE TO EXCESS CALORIES WITHOUT SERIOUS COMORBIDITY WITH BODY MASS INDEX (BMI) OF 40.0 TO 44.9 IN ADULT (HCC): ICD-10-CM

## 2025-03-22 DIAGNOSIS — I10 ESSENTIAL HYPERTENSION: ICD-10-CM

## 2025-03-22 DIAGNOSIS — N17.9 AKI (ACUTE KIDNEY INJURY) (HCC): ICD-10-CM

## 2025-03-22 LAB
ALBUMIN SERPL BCG-MCNC: 4.3 G/DL (ref 3.5–5)
ALP SERPL-CCNC: 71 U/L (ref 34–104)
ALT SERPL W P-5'-P-CCNC: 19 U/L (ref 7–52)
ANION GAP SERPL CALCULATED.3IONS-SCNC: 8 MMOL/L (ref 4–13)
AST SERPL W P-5'-P-CCNC: 20 U/L (ref 13–39)
BACTERIA UR QL AUTO: ABNORMAL /HPF
BASOPHILS # BLD AUTO: 0.02 THOUSANDS/ÂΜL (ref 0–0.1)
BASOPHILS NFR BLD AUTO: 0 % (ref 0–1)
BILIRUB SERPL-MCNC: 0.67 MG/DL (ref 0.2–1)
BILIRUB UR QL STRIP: NEGATIVE
BUN SERPL-MCNC: 15 MG/DL (ref 5–25)
CALCIUM SERPL-MCNC: 9.2 MG/DL (ref 8.4–10.2)
CHLORIDE SERPL-SCNC: 102 MMOL/L (ref 96–108)
CHOLEST SERPL-MCNC: 151 MG/DL (ref ?–200)
CLARITY UR: CLEAR
CO2 SERPL-SCNC: 27 MMOL/L (ref 21–32)
COLOR UR: ABNORMAL
CREAT SERPL-MCNC: 0.74 MG/DL (ref 0.6–1.3)
CREAT UR-MCNC: 135.1 MG/DL
EOSINOPHIL # BLD AUTO: 0.08 THOUSAND/ÂΜL (ref 0–0.61)
EOSINOPHIL NFR BLD AUTO: 2 % (ref 0–6)
ERYTHROCYTE [DISTWIDTH] IN BLOOD BY AUTOMATED COUNT: 12.8 % (ref 11.6–15.1)
GFR SERPL CREATININE-BSD FRML MDRD: 123 ML/MIN/1.73SQ M
GLUCOSE P FAST SERPL-MCNC: 88 MG/DL (ref 65–99)
GLUCOSE UR STRIP-MCNC: NEGATIVE MG/DL
HCT VFR BLD AUTO: 43.8 % (ref 36.5–49.3)
HDLC SERPL-MCNC: 33 MG/DL
HGB BLD-MCNC: 14.7 G/DL (ref 12–17)
HGB UR QL STRIP.AUTO: NEGATIVE
IMM GRANULOCYTES # BLD AUTO: 0.01 THOUSAND/UL (ref 0–0.2)
IMM GRANULOCYTES NFR BLD AUTO: 0 % (ref 0–2)
KETONES UR STRIP-MCNC: NEGATIVE MG/DL
LDLC SERPL CALC-MCNC: 72 MG/DL (ref 0–100)
LEUKOCYTE ESTERASE UR QL STRIP: ABNORMAL
LYMPHOCYTES # BLD AUTO: 1.9 THOUSANDS/ÂΜL (ref 0.6–4.47)
LYMPHOCYTES NFR BLD AUTO: 39 % (ref 14–44)
MAGNESIUM SERPL-MCNC: 2 MG/DL (ref 1.9–2.7)
MCH RBC QN AUTO: 27.2 PG (ref 26.8–34.3)
MCHC RBC AUTO-ENTMCNC: 33.6 G/DL (ref 31.4–37.4)
MCV RBC AUTO: 81 FL (ref 82–98)
MONOCYTES # BLD AUTO: 0.5 THOUSAND/ÂΜL (ref 0.17–1.22)
MONOCYTES NFR BLD AUTO: 10 % (ref 4–12)
MUCOUS THREADS UR QL AUTO: ABNORMAL
NEUTROPHILS # BLD AUTO: 2.38 THOUSANDS/ÂΜL (ref 1.85–7.62)
NEUTS SEG NFR BLD AUTO: 49 % (ref 43–75)
NITRITE UR QL STRIP: NEGATIVE
NON-SQ EPI CELLS URNS QL MICRO: ABNORMAL /HPF
NONHDLC SERPL-MCNC: 118 MG/DL
NRBC BLD AUTO-RTO: 0 /100 WBCS
PH UR STRIP.AUTO: 5.5 [PH]
PLATELET # BLD AUTO: 258 THOUSANDS/UL (ref 149–390)
PMV BLD AUTO: 9.1 FL (ref 8.9–12.7)
POTASSIUM SERPL-SCNC: 4 MMOL/L (ref 3.5–5.3)
PROT SERPL-MCNC: 8.1 G/DL (ref 6.4–8.4)
PROT UR STRIP-MCNC: NEGATIVE MG/DL
PROT UR-MCNC: 15.7 MG/DL
PROT/CREAT UR: 0.1 MG/G{CREAT} (ref 0–0.1)
RBC # BLD AUTO: 5.41 MILLION/UL (ref 3.88–5.62)
RBC #/AREA URNS AUTO: ABNORMAL /HPF
SODIUM SERPL-SCNC: 137 MMOL/L (ref 135–147)
SP GR UR STRIP.AUTO: 1.02 (ref 1–1.03)
TRIGL SERPL-MCNC: 231 MG/DL (ref ?–150)
TSH SERPL DL<=0.05 MIU/L-ACNC: 12.17 UIU/ML (ref 0.45–4.5)
UROBILINOGEN UR STRIP-ACNC: <2 MG/DL
WBC # BLD AUTO: 4.89 THOUSAND/UL (ref 4.31–10.16)
WBC #/AREA URNS AUTO: ABNORMAL /HPF

## 2025-03-22 PROCEDURE — 83036 HEMOGLOBIN GLYCOSYLATED A1C: CPT

## 2025-03-22 PROCEDURE — 81001 URINALYSIS AUTO W/SCOPE: CPT

## 2025-03-22 PROCEDURE — 82306 VITAMIN D 25 HYDROXY: CPT

## 2025-03-22 PROCEDURE — 84156 ASSAY OF PROTEIN URINE: CPT

## 2025-03-22 PROCEDURE — 84439 ASSAY OF FREE THYROXINE: CPT

## 2025-03-22 PROCEDURE — 80053 COMPREHEN METABOLIC PANEL: CPT

## 2025-03-22 PROCEDURE — 36415 COLL VENOUS BLD VENIPUNCTURE: CPT

## 2025-03-22 PROCEDURE — 80061 LIPID PANEL: CPT

## 2025-03-22 PROCEDURE — 83735 ASSAY OF MAGNESIUM: CPT

## 2025-03-22 PROCEDURE — 85025 COMPLETE CBC W/AUTO DIFF WBC: CPT

## 2025-03-22 PROCEDURE — 82570 ASSAY OF URINE CREATININE: CPT

## 2025-03-22 PROCEDURE — 84443 ASSAY THYROID STIM HORMONE: CPT

## 2025-03-23 LAB
25(OH)D3 SERPL-MCNC: 15.9 NG/ML (ref 30–100)
EST. AVERAGE GLUCOSE BLD GHB EST-MCNC: 123 MG/DL
HBA1C MFR BLD: 5.9 %
T4 FREE SERPL-MCNC: 0.68 NG/DL (ref 0.61–1.12)

## 2025-03-24 DIAGNOSIS — I10 PRIMARY HYPERTENSION: ICD-10-CM

## 2025-03-24 RX ORDER — HYDROCHLOROTHIAZIDE 12.5 MG/1
12.5 TABLET ORAL DAILY
Qty: 90 TABLET | Refills: 2 | Status: SHIPPED | OUTPATIENT
Start: 2025-03-24

## 2025-03-24 NOTE — TELEPHONE ENCOUNTER
Medication: Hydrochlorothiazide 12.5mg tablet    Dose/Frequency: Take 1 tablet by mouth daily    Quantity: 90 refills 2    Pharmacy: [facility]    Office:   [] PCP/Provider - Dr. Paddy Fitch  [] Speciality/Provider -     Does the patient have enough for 3 days?   [x] Yes   [] No - Send as HP to POD

## 2025-04-03 ENCOUNTER — OFFICE VISIT (OUTPATIENT)
Dept: FAMILY MEDICINE CLINIC | Facility: CLINIC | Age: 30
End: 2025-04-03
Payer: COMMERCIAL

## 2025-04-03 VITALS
OXYGEN SATURATION: 98 % | BODY MASS INDEX: 44.84 KG/M2 | SYSTOLIC BLOOD PRESSURE: 116 MMHG | DIASTOLIC BLOOD PRESSURE: 72 MMHG | HEIGHT: 66 IN | WEIGHT: 279 LBS | HEART RATE: 95 BPM

## 2025-04-03 DIAGNOSIS — E66.813 CLASS 3 SEVERE OBESITY DUE TO EXCESS CALORIES WITHOUT SERIOUS COMORBIDITY WITH BODY MASS INDEX (BMI) OF 40.0 TO 44.9 IN ADULT (HCC): ICD-10-CM

## 2025-04-03 DIAGNOSIS — I10 ESSENTIAL HYPERTENSION: Primary | ICD-10-CM

## 2025-04-03 DIAGNOSIS — E03.9 HYPOTHYROIDISM, UNSPECIFIED TYPE: ICD-10-CM

## 2025-04-03 DIAGNOSIS — E66.01 CLASS 3 SEVERE OBESITY DUE TO EXCESS CALORIES WITHOUT SERIOUS COMORBIDITY WITH BODY MASS INDEX (BMI) OF 40.0 TO 44.9 IN ADULT (HCC): ICD-10-CM

## 2025-04-03 DIAGNOSIS — I10 ESSENTIAL HYPERTENSION: ICD-10-CM

## 2025-04-03 DIAGNOSIS — E55.9 VITAMIN D DEFICIENCY: ICD-10-CM

## 2025-04-03 DIAGNOSIS — E03.9 HYPOTHYROIDISM, UNSPECIFIED TYPE: Primary | ICD-10-CM

## 2025-04-03 DIAGNOSIS — G56.03 BILATERAL CARPAL TUNNEL SYNDROME: ICD-10-CM

## 2025-04-03 DIAGNOSIS — R20.0 ANESTHESIA OF SKIN: ICD-10-CM

## 2025-04-03 DIAGNOSIS — E78.1 HYPERTRIGLYCERIDEMIA: ICD-10-CM

## 2025-04-03 PROCEDURE — 99214 OFFICE O/P EST MOD 30 MIN: CPT

## 2025-04-03 RX ORDER — SEMAGLUTIDE 1 MG/.5ML
INJECTION, SOLUTION SUBCUTANEOUS
Qty: 2 ML | Refills: 0 | Status: SHIPPED | OUTPATIENT
Start: 2025-04-03 | End: 2025-04-03 | Stop reason: SDUPTHER

## 2025-04-03 RX ORDER — SEMAGLUTIDE 1 MG/.5ML
INJECTION, SOLUTION SUBCUTANEOUS
Qty: 2 ML | Refills: 0 | Status: SHIPPED | OUTPATIENT
Start: 2025-04-03

## 2025-04-03 NOTE — ASSESSMENT & PLAN NOTE
Prior Authorization Clinical Questions for Weight Management Pharmacotherapy    1. Does the patient have a contrainidcation to medication prescribed for weight management?: No  2. Does the patient have a diagnosis of obesity, confirmed by a BMI greater than or equal to 30 kg/m^2?: Yes  3. Does the patient have a BMI of greater than or equal to 27 kg/m^2 with at least one weight-related comorbidity/risk factor/complication (e.g. diabetes, dyslipidemia, coronary artery disease)?: Yes  4. Weight-related co-morbidities/risk factors: prediabetes, dyslipidemia, hypertension  5. WEGOVY CVA Indication: Does patient have established documented cardiovascular disease (history of a prior heart attack (myocardial infarction), stroke, or symptomatic peripheral arterial disease (PAD)?: N/A  6. ZEPBOUND MIRIAM Indication: Does patient have documented MIRIAM diagnosed via sleep study (insurance will require copy of sleep study results for approval)?: N/A  7. Has the patient been on a weight loss regimen of low-calorie diet, increased physical activity, and lifestyle modifications for a minimum of 6 months?: Yes  8. Has the patient completed a comprehensive weight loss program (ie, Weight Watchers, Noom, Bariatrics, other kristian on phone)? If so, what?: No  9. Does the patient have a history of type 2 diabetes?: No  10. Has the member tried and failed other weight loss medication within the past 12 months?: No  11. Will the member use requested medication in combination with another GLP agonist or weight loss drug?: No  12. Is the medication a controlled substance?: No  For renewals: Has the patient had a positive outcome with current weight management medication (i.e., change in body weight of at least 4-5% after 12-16 weeks on maximally tolerated dose)?: Yes     Baseline weight (in pounds): 282 lbs  Current weight (in pounds): 279 lbs  Weight loss percentage: -1.06%       Great job with weight loss given my plate diet do recommend  increasing activity 2-1/2 hours weekly.  Orders:  •  Semaglutide-Weight Management (Wegovy) 1 MG/0.5ML; Inject 1 mg under the skin weekly

## 2025-04-03 NOTE — ASSESSMENT & PLAN NOTE
Blood pressure is perfect we will continue to work on weight loss  Orders:  •  Semaglutide-Weight Management (Wegovy) 1 MG/0.5ML; Inject 1 mg under the skin weekly

## 2025-04-03 NOTE — PROGRESS NOTES
Name: Ben Daniel      : 1995      MRN: [id number]  Encounter Provider: MARTIN Shearer  Encounter Date: 4/3/2025   Encounter department: [facility] [address]  :  Assessment & Plan  Essential hypertension  Blood pressure is perfect we will continue to work on weight loss  Orders:  •  Semaglutide-Weight Management (Wegovy) 1 MG/0.5ML; Inject 1 mg under the skin weekly    Hypothyroidism, unspecified type  Reviewed lab work ultrasound of thyroid is scheduled to repeat lab work in 1 month just increase levothyroxine  Orders:  •  Semaglutide-Weight Management (Wegovy) 1 MG/0.5ML; Inject 1 mg under the skin weekly    Class 3 severe obesity due to excess calories without serious comorbidity with body mass index (BMI) of 40.0 to 44.9 in adult (HCC)  Prior Authorization Clinical Questions for Weight Management Pharmacotherapy    1. Does the patient have a contrainidcation to medication prescribed for weight management?: No  2. Does the patient have a diagnosis of obesity, confirmed by a BMI greater than or equal to 30 kg/m^2?: Yes  3. Does the patient have a BMI of greater than or equal to 27 kg/m^2 with at least one weight-related comorbidity/risk factor/complication (e.g. diabetes, dyslipidemia, coronary artery disease)?: Yes  4. Weight-related co-morbidities/risk factors: prediabetes, dyslipidemia, hypertension  5. WEGOVY CVA Indication: Does patient have established documented cardiovascular disease (history of a prior heart attack (myocardial infarction), stroke, or symptomatic peripheral arterial disease (PAD)?: N/A  6. ZEPBOUND MIRIAM Indication: Does patient have documented MIRIAM diagnosed via sleep study (insurance will require copy of sleep study results for approval)?: N/A  7. Has the patient been on a weight loss regimen of low-calorie diet, increased physical activity, and lifestyle modifications for a minimum of 6 months?: Yes  8. Has the patient completed a  comprehensive weight loss program (ie, Weight Watchers, Noom, Bariatrics, other kristian on phone)? If so, what?: No  9. Does the patient have a history of type 2 diabetes?: No  10. Has the member tried and failed other weight loss medication within the past 12 months?: No  11. Will the member use requested medication in combination with another GLP agonist or weight loss drug?: No  12. Is the medication a controlled substance?: No  For renewals: Has the patient had a positive outcome with current weight management medication (i.e., change in body weight of at least 4-5% after 12-16 weeks on maximally tolerated dose)?: Yes     Baseline weight (in pounds): 282 lbs  Current weight (in pounds): 279 lbs  Weight loss percentage: -1.06%       Great job with weight loss given my plate diet do recommend increasing activity 2-1/2 hours weekly.  Orders:  •  Semaglutide-Weight Management (Wegovy) 1 MG/0.5ML; Inject 1 mg under the skin weekly    Vitamin D deficiency  Vitamin D is low, will send weekly prescription and also suggest nightly D3 4000 u or more         Hypertriglyceridemia  Reviewed labs was eating high triglyceride food will repeat fasting lab work prior to next visit. Cholesterol is still high, continue to try to cut down on high cholesterol foods such as full fat daily, butter, red meat, eggs, rudd/pork, mayonnaise and increase high fiber foods suck as oatmeal and vegetables. I also suggest increasing healthy fats such as olive oil, nuts/nut butters and avocados.     Orders:  •  Lipid panel; Future  •  Semaglutide-Weight Management (Wegovy) 1 MG/0.5ML; Inject 1 mg under the skin weekly    Bilateral carpal tunnel syndrome  Do recommend wearing a brace but do ultrasound to look for carpal and cubital tunnel  Orders:  •  US MSK limited; Future    Anesthesia of skin  Have lab work we will call with results  Orders:  •  Vitamin B12; Future  •  Folate; Future  •  Iron Panel (Includes Ferritin, Iron Sat%, Iron, and TIBC);  "Future  •  US MSK limited; Future           History of Present Illness   Ben is here for follow up, he is doing well and down 3 lbs.       Review of Systems   Constitutional:  Positive for fatigue. Negative for chills, diaphoresis and fever.   HENT:  Negative for congestion, ear pain, sinus pain and sore throat.    Eyes:  Negative for pain.   Respiratory:  Positive for apnea. Negative for cough and shortness of breath.    Cardiovascular:  Negative for chest pain and palpitations.   Gastrointestinal:  Negative for abdominal pain, constipation, diarrhea, nausea and vomiting.   Genitourinary:  Negative for dysuria, frequency and hematuria.   Musculoskeletal:  Negative for arthralgias, back pain and myalgias.   Skin:  Negative for rash.   Neurological:  Negative for dizziness, seizures, syncope, light-headedness and headaches.   Psychiatric/Behavioral:  Positive for sleep disturbance. Negative for dysphoric mood. The patient is not nervous/anxious.    All other systems reviewed and are negative.      Objective   /72   Pulse 95   Ht 5' 6\" (1.676 m)   Wt 127 kg (279 lb)   SpO2 98%   BMI 45.03 kg/m²      Physical Exam  Vitals and nursing note reviewed.   Constitutional:       General: He is not in acute distress.     Appearance: Normal appearance. He is well-developed. He is not ill-appearing.   HENT:      Head: Normocephalic and atraumatic.      Right Ear: Tympanic membrane, ear canal and external ear normal. There is no impacted cerumen.      Left Ear: Tympanic membrane, ear canal and external ear normal. There is no impacted cerumen.      Nose: Nose normal. No congestion.      Mouth/Throat:      Mouth: Mucous membranes are moist.      Pharynx: No oropharyngeal exudate or posterior oropharyngeal erythema.   Eyes:      Extraocular Movements: Extraocular movements intact.      Conjunctiva/sclera: Conjunctivae normal.      Pupils: Pupils are equal, round, and reactive to light.   Cardiovascular:      Rate and " Rhythm: Normal rate and regular rhythm.      Heart sounds: No murmur heard.  Pulmonary:      Effort: Pulmonary effort is normal. No respiratory distress.      Breath sounds: Normal breath sounds.   Abdominal:      Palpations: Abdomen is soft.      Tenderness: There is no abdominal tenderness.   Musculoskeletal:         General: No swelling.      Cervical back: Normal range of motion and neck supple.      Right lower leg: No edema.      Left lower leg: No edema.   Lymphadenopathy:      Cervical: No cervical adenopathy.   Skin:     General: Skin is warm and dry.      Capillary Refill: Capillary refill takes less than 2 seconds.   Neurological:      General: No focal deficit present.      Mental Status: He is alert and oriented to person, place, and time.   Psychiatric:         Mood and Affect: Mood normal.         Behavior: Behavior normal.

## 2025-04-03 NOTE — ASSESSMENT & PLAN NOTE
Reviewed lab work ultrasound of thyroid is scheduled to repeat lab work in 1 month just increase levothyroxine  Orders:  •  Semaglutide-Weight Management (Wegovy) 1 MG/0.5ML; Inject 1 mg under the skin weekly

## 2025-04-04 DIAGNOSIS — G56.03 BILATERAL CARPAL TUNNEL SYNDROME: Primary | ICD-10-CM

## 2025-04-14 ENCOUNTER — HOSPITAL ENCOUNTER (OUTPATIENT)
Dept: ULTRASOUND IMAGING | Facility: HOSPITAL | Age: 30
Discharge: HOME | End: 2025-04-14
Payer: COMMERCIAL

## 2025-04-14 DIAGNOSIS — E66.813 CLASS 3 SEVERE OBESITY DUE TO EXCESS CALORIES WITHOUT SERIOUS COMORBIDITY WITH BODY MASS INDEX (BMI) OF 40.0 TO 44.9 IN ADULT: ICD-10-CM

## 2025-04-14 DIAGNOSIS — E03.9 HYPOTHYROIDISM, UNSPECIFIED TYPE: ICD-10-CM

## 2025-04-14 DIAGNOSIS — R80.1 PERSISTENT PROTEINURIA: ICD-10-CM

## 2025-04-14 DIAGNOSIS — I10 ESSENTIAL HYPERTENSION: ICD-10-CM

## 2025-04-14 DIAGNOSIS — E78.1 HYPERTRIGLYCERIDEMIA: ICD-10-CM

## 2025-04-14 PROCEDURE — 76536 US EXAM OF HEAD AND NECK: CPT

## 2025-04-15 RX ORDER — SEMAGLUTIDE 1 MG/.5ML
INJECTION, SOLUTION SUBCUTANEOUS
Qty: 2 ML | Refills: 0 | Status: SHIPPED | OUTPATIENT
Start: 2025-04-15

## 2025-04-15 RX ORDER — LOSARTAN POTASSIUM 100 MG/1
100 TABLET ORAL DAILY
Qty: 90 TABLET | Refills: 1 | Status: SHIPPED | OUTPATIENT
Start: 2025-04-15

## 2025-04-15 NOTE — TELEPHONE ENCOUNTER
Patient called for refill on losartan 100mg, noted in process. Confirmed [facility]. He is out of the medication

## 2025-04-17 ENCOUNTER — HOSPITAL ENCOUNTER (OUTPATIENT)
Dept: ULTRASOUND IMAGING | Facility: HOSPITAL | Age: 30
End: 2025-04-17
Payer: COMMERCIAL

## 2025-04-17 DIAGNOSIS — G56.03 BILATERAL CARPAL TUNNEL SYNDROME: ICD-10-CM

## 2025-04-17 PROCEDURE — 76882 US LMTD JT/FCL EVL NVASC XTR: CPT

## 2025-04-19 ENCOUNTER — RESULTS FOLLOW-UP (OUTPATIENT)
Dept: FAMILY MEDICINE CLINIC | Facility: CLINIC | Age: 30
End: 2025-04-19

## 2025-04-23 ENCOUNTER — RESULTS FOLLOW-UP (OUTPATIENT)
Dept: FAMILY MEDICINE CLINIC | Facility: CLINIC | Age: 30
End: 2025-04-23

## 2025-04-23 DIAGNOSIS — G56.22 CUBITAL TUNNEL SYNDROME ON LEFT: ICD-10-CM

## 2025-04-23 DIAGNOSIS — G56.03 BILATERAL CARPAL TUNNEL SYNDROME: Primary | ICD-10-CM

## 2025-04-23 NOTE — TELEPHONE ENCOUNTER
Pt returning call, read provider note verbatim, Pt verbalized understanding.     Please advise, thank you.

## 2025-04-24 DIAGNOSIS — E03.9 HYPOTHYROIDISM, UNSPECIFIED TYPE: ICD-10-CM

## 2025-04-24 DIAGNOSIS — I10 PRIMARY HYPERTENSION: ICD-10-CM

## 2025-04-24 RX ORDER — LEVOTHYROXINE SODIUM 88 UG/1
88 TABLET ORAL
Qty: 100 TABLET | Refills: 1 | Status: SHIPPED | OUTPATIENT
Start: 2025-04-24

## 2025-04-24 RX ORDER — HYDROCHLOROTHIAZIDE 12.5 MG/1
12.5 TABLET ORAL DAILY
Qty: 90 TABLET | Refills: 1 | Status: SHIPPED | OUTPATIENT
Start: 2025-04-24

## 2025-05-01 ENCOUNTER — TELEPHONE (OUTPATIENT)
Dept: UROLOGY | Facility: CLINIC | Age: 30
End: 2025-05-01

## 2025-05-01 ENCOUNTER — OFFICE VISIT (OUTPATIENT)
Dept: FAMILY MEDICINE CLINIC | Facility: CLINIC | Age: 30
End: 2025-05-01
Payer: COMMERCIAL

## 2025-05-01 VITALS
HEART RATE: 92 BPM | WEIGHT: 281 LBS | DIASTOLIC BLOOD PRESSURE: 72 MMHG | SYSTOLIC BLOOD PRESSURE: 120 MMHG | HEIGHT: 66 IN | OXYGEN SATURATION: 98 % | BODY MASS INDEX: 45.16 KG/M2

## 2025-05-01 DIAGNOSIS — E66.01 MORBID OBESITY WITH BMI OF 45.0-49.9, ADULT (HCC): ICD-10-CM

## 2025-05-01 DIAGNOSIS — E78.00 PURE HYPERCHOLESTEROLEMIA: ICD-10-CM

## 2025-05-01 DIAGNOSIS — Z00.00 ANNUAL PHYSICAL EXAM: Primary | ICD-10-CM

## 2025-05-01 DIAGNOSIS — E66.813 CLASS 3 SEVERE OBESITY DUE TO EXCESS CALORIES WITHOUT SERIOUS COMORBIDITY WITH BODY MASS INDEX (BMI) OF 40.0 TO 44.9 IN ADULT: ICD-10-CM

## 2025-05-01 DIAGNOSIS — I10 ESSENTIAL HYPERTENSION: ICD-10-CM

## 2025-05-01 DIAGNOSIS — R33.9 INCOMPLETE BLADDER EMPTYING: ICD-10-CM

## 2025-05-01 PROCEDURE — 99214 OFFICE O/P EST MOD 30 MIN: CPT

## 2025-05-01 PROCEDURE — 99395 PREV VISIT EST AGE 18-39: CPT

## 2025-05-01 RX ORDER — SEMAGLUTIDE 1.7 MG/.75ML
INJECTION, SOLUTION SUBCUTANEOUS
Qty: 3 ML | Refills: 0 | Status: SHIPPED | OUTPATIENT
Start: 2025-05-01

## 2025-05-01 NOTE — ASSESSMENT & PLAN NOTE
Prior Authorization Clinical Questions for Weight Management Pharmacotherapy    1. Does the patient have a contrainidcation to medication prescribed for weight management?: No  2. Does the patient have a diagnosis of obesity, confirmed by a BMI greater than or equal to 30 kg/m^2?: Yes  3. Does the patient have a BMI of greater than or equal to 27 kg/m^2 with at least one weight-related comorbidity/risk factor/complication (e.g. diabetes, dyslipidemia, coronary artery disease)?: Yes  4. Weight-related co-morbidities/risk factors: dyslipidemia, hypertension  5. WEGOVY CVA Indication: Does patient have established documented cardiovascular disease (history of a prior heart attack (myocardial infarction), stroke, or symptomatic peripheral arterial disease (PAD)?: N/A  6. ZEPBOUND MIRIAM Indication: Does patient have documented MIRIAM diagnosed via sleep study (insurance will require copy of sleep study results for approval)?: N/A  7. Has the patient been on a weight loss regimen of low-calorie diet, increased physical activity, and lifestyle modifications for a minimum of 6 months?: Yes  8. Has the patient completed a comprehensive weight loss program (ie, Weight Watchers, Noom, Bariatrics, other kristian on phone)? If so, what?: No  9. Does the patient have a history of type 2 diabetes?: No  10. Has the member tried and failed other weight loss medication within the past 12 months?: No  11. Will the member use requested medication in combination with another GLP agonist or weight loss drug?: No  12. Is the medication a controlled substance?: No     Baseline weight (in pounds): 282 lbs  Current weight (in pounds): 281 lbs  Weight loss percentage: -0.35%       recommend [email or web address] and 2 and half hours of exercise weekly continue your weight loss journey please have the ordered labs  Orders:  •  Semaglutide-Weight Management (Wegovy) 1.7 MG/0.75ML; Inject 1.7 mg under the skin weekly

## 2025-05-01 NOTE — TELEPHONE ENCOUNTER
This nurse called patient to inquire about his current urinary status since seeing Catia KAYLEE in October 2024. Patient states he has not had any visible blood in his urine since October, denies any pain/discomfort upon urination as well. Patient is made aware per Dr. Mclain that since he is no longer having blood in urine and because of his recent urine testing being negative for micro hematuria, there is no need for patient to have scheduled cysto in May or [place] Urogram. Patient verbalizes complete understanding and is thankful for call.

## 2025-05-01 NOTE — PROGRESS NOTES
Adult Annual Physical  Name: Ben Daniel      : 1995      MRN: [id number]  Encounter Provider: MARTIN Shearer  Encounter Date: 2025   Encounter department: [facility] [address]    :  Assessment & Plan  Annual physical exam  Recommend my [email or web address] and 2 and half hours of exercise weekly continue your weight loss journey please have the ordered labs       Morbid obesity with BMI of 45.0-49.9, adult (HCC)  Prior Authorization Clinical Questions for Weight Management Pharmacotherapy    1. Does the patient have a contrainidcation to medication prescribed for weight management?: No  2. Does the patient have a diagnosis of obesity, confirmed by a BMI greater than or equal to 30 kg/m^2?: Yes  3. Does the patient have a BMI of greater than or equal to 27 kg/m^2 with at least one weight-related comorbidity/risk factor/complication (e.g. diabetes, dyslipidemia, coronary artery disease)?: Yes  4. Weight-related co-morbidities/risk factors: dyslipidemia, hypertension  5. WEGOVY CVA Indication: Does patient have established documented cardiovascular disease (history of a prior heart attack (myocardial infarction), stroke, or symptomatic peripheral arterial disease (PAD)?: N/A  6. ZEPBOUND MIRIAM Indication: Does patient have documented MIRIAM diagnosed via sleep study (insurance will require copy of sleep study results for approval)?: N/A  7. Has the patient been on a weight loss regimen of low-calorie diet, increased physical activity, and lifestyle modifications for a minimum of 6 months?: Yes  8. Has the patient completed a comprehensive weight loss program (ie, Weight Watchers, Noom, Bariatrics, other kristian on phone)? If so, what?: No  9. Does the patient have a history of type 2 diabetes?: No  10. Has the member tried and failed other weight loss medication within the past 12 months?: No  11. Will the member use requested medication in combination with another GLP agonist or  weight loss drug?: No  12. Is the medication a controlled substance?: No     Baseline weight (in pounds): 282 lbs  Current weight (in pounds): 281 lbs  Weight loss percentage: -0.35%       Recommend my [email or web address] and 2 and half hours of exercise weekly continue your weight loss journey please have the ordered labs       Essential hypertension  BP is perfect continue current medications continue weight loss journey  Orders:  •  Semaglutide-Weight Management (Wegovy) 1.7 MG/0.75ML; Inject 1.7 mg under the skin weekly    Class 3 severe obesity due to excess calories without serious comorbidity with body mass index (BMI) of 40.0 to 44.9 in adult  Prior Authorization Clinical Questions for Weight Management Pharmacotherapy    1. Does the patient have a contrainidcation to medication prescribed for weight management?: No  2. Does the patient have a diagnosis of obesity, confirmed by a BMI greater than or equal to 30 kg/m^2?: Yes  3. Does the patient have a BMI of greater than or equal to 27 kg/m^2 with at least one weight-related comorbidity/risk factor/complication (e.g. diabetes, dyslipidemia, coronary artery disease)?: Yes  4. Weight-related co-morbidities/risk factors: dyslipidemia, hypertension  5. WEGOVY CVA Indication: Does patient have established documented cardiovascular disease (history of a prior heart attack (myocardial infarction), stroke, or symptomatic peripheral arterial disease (PAD)?: N/A  6. ZEPBOUND MIRIAM Indication: Does patient have documented MIRIAM diagnosed via sleep study (insurance will require copy of sleep study results for approval)?: N/A  7. Has the patient been on a weight loss regimen of low-calorie diet, increased physical activity, and lifestyle modifications for a minimum of 6 months?: Yes  8. Has the patient completed a comprehensive weight loss program (ie, Weight Watchers, Noom, Bariatrics, other kristian on phone)? If so, what?: No  9. Does the patient have a history of type 2 diabetes?:  No  10. Has the member tried and failed other weight loss medication within the past 12 months?: No  11. Will the member use requested medication in combination with another GLP agonist or weight loss drug?: No  12. Is the medication a controlled substance?: No     Baseline weight (in pounds): 282 lbs  Current weight (in pounds): 281 lbs  Weight loss percentage: -0.35%       recommend my [email or web address] and 2 and half hours of exercise weekly continue your weight loss journey please have the ordered labs  Orders:  •  Semaglutide-Weight Management (Wegovy) 1.7 MG/0.75ML; Inject 1.7 mg under the skin weekly    Pure hypercholesterolemia  Please have the ordered fasting lab work we will call with results  Orders:  •  Semaglutide-Weight Management (Wegovy) 1.7 MG/0.75ML; Inject 1.7 mg under the skin weekly    Incomplete bladder emptying  Patient reports recent onset of incomplete bladder emptying, will check urine for infection increase hydration call if symptoms not improving  Orders:  •  Urinalysis with microscopic; Future  •  Urine culture; Future    Annual physical exam         Morbid obesity with BMI of 45.0-49.9, adult ([facility])  Prior Authorization Clinical Questions for Weight Management Pharmacotherapy    1. Does the patient have a contrainidcation to medication prescribed for weight management?: No  2. Does the patient have a diagnosis of obesity, confirmed by a BMI greater than or equal to 30 kg/m^2?: Yes  3. Does the patient have a BMI of greater than or equal to 27 kg/m^2 with at least one weight-related comorbidity/risk factor/complication (e.g. diabetes, dyslipidemia, coronary artery disease)?: Yes  4. Weight-related co-morbidities/risk factors: dyslipidemia, hypertension  5. WEGOVY CVA Indication: Does patient have established documented cardiovascular disease (history of a prior heart attack (myocardial infarction), stroke, or symptomatic peripheral arterial disease (PAD)?: N/A  6. ZEPBOUND MIRIAM Indication: Does  patient have documented MIRIAM diagnosed via sleep study (insurance will require copy of sleep study results for approval)?: N/A  7. Has the patient been on a weight loss regimen of low-calorie diet, increased physical activity, and lifestyle modifications for a minimum of 6 months?: Yes  8. Has the patient completed a comprehensive weight loss program (ie, Weight Watchers, Noom, Bariatrics, other kristian on phone)? If so, what?: No  9. Does the patient have a history of type 2 diabetes?: No  10. Has the member tried and failed other weight loss medication within the past 12 months?: No  11. Will the member use requested medication in combination with another GLP agonist or weight loss drug?: No  12. Is the medication a controlled substance?: No     Baseline weight (in pounds): 282 lbs  Current weight (in pounds): 281 lbs  Weight loss percentage: -0.35%                  Preventive Screenings:  - Diabetes Screening: screening up-to-date  - Cholesterol Screening: screening not indicated, has hyperlipidemia and screening up-to-date   - Hepatitis C screening: screening up-to-date   - HIV screening: screening up-to-date   - Colon cancer screening: screening not indicated   - Lung cancer screening: screening not indicated   - Prostate cancer screening: screening not indicated     Immunizations:  - Immunizations due: Prevnar 20, Tdap, Zoster (Shingrix) and Hepatitis A    Counseling/Anticipatory Guidance:  - Alcohol: discussed moderation in alcohol intake and recommendations for healthy alcohol use.   - Drug use: discussed harms of illicit drug use and how it can negatively impact mental/physical health.   - Tobacco use: discussed harms of tobacco use and management options for quitting.   - Dental health: discussed importance of regular tooth brushing, flossing, and dental visits.   - Sexual health: discussed sexually transmitted diseases, partner selection, use of condoms, avoidance of unintended pregnancy, and contraceptive  alternatives.   - Diet: discussed recommendations for a healthy/well-balanced diet.   - Exercise: the importance of regular exercise/physical activity was discussed. Recommend exercise 3-5 times per week for at least 30 minutes.   - Injury prevention: discussed safety/seat belts, safety helmets, smoke detectors, carbon monoxide detectors, and smoking near bedding or upholstery.          History of Present Illness     Adult Annual Physical:  Patient presents for annual physical. Ben is here for follow up and annual .     Diet and Physical Activity:  - Diet/Nutrition: limited junk food and low carb diet.  - Exercise: walking and 1-2 hours on average.    General Health:  - Sleep: 7-8 hours of sleep on average.  - Hearing: normal hearing right ear and normal hearing left ear.  - Vision: wears glasses and most recent eye exam < 1 year ago.  - Dental: no dental visits for > 1 year.     Health:  - History of STDs: no.   - Urinary symptoms: incomplete bladder emptying.     Advanced Care Planning:  - Has an advanced directive?: no    - Has a durable medical POA?: no    - ACP document given to patient?: no      Review of Systems   Constitutional:  Negative for chills, diaphoresis and fever.   HENT:  Negative for congestion, ear pain, sinus pressure, sinus pain and sore throat.    Eyes:  Negative for visual disturbance.   Respiratory:  Negative for cough, chest tightness, shortness of breath and wheezing.    Cardiovascular:  Negative for chest pain and palpitations.   Gastrointestinal:  Negative for abdominal pain, constipation, diarrhea, nausea and vomiting.   Genitourinary:  Positive for decreased urine volume. Negative for dysuria and hematuria.   Musculoskeletal:  Negative for arthralgias, back pain and myalgias.   Neurological:  Positive for numbness. Negative for dizziness, seizures, syncope, light-headedness and headaches.   Psychiatric/Behavioral:  Negative for dysphoric mood and sleep disturbance. The patient is  not nervous/anxious.    All other systems reviewed and are negative.    Pertinent Medical History           Medical History Reviewed by provider this encounter:  Tobacco  Allergies  Meds  Problems  Med Hx  Surg Hx  Fam Hx     .  Past Medical History   Past Medical History:   Diagnosis Date   • Anxiety    • Chronic kidney disease    • Hypertension    • Pneumonia 7/6/24     Past Surgical History:   Procedure Laterality Date   • ANKLE SURGERY     • BRONCHOSCOPY  7/9/24   • FETAL SURGERY FOR CONGENITAL HERNIA     • IR BIOPSY KIDNEY RANDOM  07/09/2024   • RENAL BIOPSY  July 9th     Family History   Problem Relation Age of Onset   • Heart failure Maternal Grandmother    • Heart failure Maternal Grandfather    • Diabetes Maternal Grandfather    • Hypertension Maternal Grandfather    • Hypertension Mother    • Cancer Paternal Grandfather    • Cancer Paternal Grandmother         Lung cancer   • Lung cancer Paternal Grandmother    • Hypertension Brother       reports that he has never smoked. He has never been exposed to tobacco smoke. He has never used smokeless tobacco. He reports current alcohol use of about 1.0 standard drink of alcohol per week. He reports that he does not use drugs.  Current Outpatient Medications   Medication Instructions   • ergocalciferol (VITAMIN D2) 50,000 Units, Oral, Weekly   • hydroCHLOROthiazide 12.5 mg, Oral, Daily   • levocetirizine (XYZAL) 5 mg, Oral, Every evening   • levothyroxine 88 mcg, Oral, Daily (early morning)   • losartan (COZAAR) 100 mg, Oral, Daily   • Semaglutide-Weight Management (Wegovy) 1.7 MG/0.75ML Inject 1.7 mg under the skin weekly   No Known Allergies   Current Outpatient Medications on File Prior to Visit   Medication Sig Dispense Refill   • ergocalciferol (VITAMIN D2) 50,000 units Take 1 capsule (50,000 Units total) by mouth once a week 16 capsule 1   • hydroCHLOROthiazide 12.5 mg tablet Take 1 tablet (12.5 mg total) by mouth daily 90 tablet 1   • levocetirizine  "(XYZAL) 5 MG tablet Take 1 tablet (5 mg total) by mouth every evening 30 tablet 1   • levothyroxine 88 mcg tablet Take 1 tablet (88 mcg total) by mouth daily in the early morning 100 tablet 1   • losartan (COZAAR) 100 MG tablet Take 1 tablet (100 mg total) by mouth daily 90 tablet 1   • [DISCONTINUED] Semaglutide-Weight Management (Wegovy) 1 MG/0.5ML Inject 1 mg under the skin weekly 2 mL 0     No current facility-administered medications on file prior to visit.      Social History     Tobacco Use   • Smoking status: Never     Passive exposure: Never   • Smokeless tobacco: Never   Vaping Use   • Vaping status: Never Used   Substance and Sexual Activity   • Alcohol use: Yes     Alcohol/week: 1.0 standard drink of alcohol     Comment: Rare occ   • Drug use: No   • Sexual activity: Yes     Partners: Female     Birth control/protection: None       Objective   /72   Pulse 92   Ht 5' 6\" (1.676 m)   Wt 127 kg (281 lb)   SpO2 98%   BMI 45.35 kg/m²     Physical Exam  Vitals and nursing note reviewed.   Constitutional:       General: He is not in acute distress.     Appearance: Normal appearance. He is well-developed. He is not ill-appearing.   HENT:      Head: Normocephalic and atraumatic.      Right Ear: Tympanic membrane, ear canal and external ear normal. There is no impacted cerumen.      Left Ear: Tympanic membrane, ear canal and external ear normal. There is no impacted cerumen.      Nose: Nose normal. No congestion.      Mouth/Throat:      Mouth: Mucous membranes are moist.      Pharynx: No oropharyngeal exudate or posterior oropharyngeal erythema.   Eyes:      Extraocular Movements: Extraocular movements intact.      Conjunctiva/sclera: Conjunctivae normal.      Pupils: Pupils are equal, round, and reactive to light.   Cardiovascular:      Rate and Rhythm: Normal rate and regular rhythm.      Heart sounds: No murmur heard.  Pulmonary:      Effort: Pulmonary effort is normal. No respiratory distress.      " Breath sounds: Normal breath sounds.   Abdominal:      Palpations: Abdomen is soft.      Tenderness: There is no abdominal tenderness.   Musculoskeletal:         General: No swelling.      Cervical back: Normal range of motion and neck supple.      Right lower leg: No edema.      Left lower leg: No edema.   Lymphadenopathy:      Cervical: No cervical adenopathy.   Skin:     General: Skin is warm and dry.      Capillary Refill: Capillary refill takes less than 2 seconds.   Neurological:      General: No focal deficit present.      Mental Status: He is alert and oriented to person, place, and time.   Psychiatric:         Mood and Affect: Mood normal.         Behavior: Behavior normal.

## 2025-05-01 NOTE — PATIENT INSTRUCTIONS
"Patient Education     Routine physical for adults   The Basics   Written by the doctors and editors at [facility]   What is a physical? -- A physical is a routine visit, or \"check-up,\" with your doctor. You might also hear it called a \"wellness visit\" or \"preventive visit.\"  During each visit, the doctor will:   Ask about your physical and mental health   Ask about your habits, behaviors, and lifestyle   Do an exam   Give you vaccines if needed   Talk to you about any medicines you take   Give advice about your health   Answer your questions  Getting regular check-ups is an important part of taking care of your health. It can help your doctor find and treat any problems you have. But it's also important for preventing health problems.  A routine physical is different from a \"sick visit.\" A sick visit is when you see a doctor because of a health concern or problem. Since physicals are scheduled ahead of time, you can think about what you want to ask the doctor.  How often should I get a physical? -- It depends on your age and health. In general, for people age 21 years and older:   If you are younger than 50 years, you might be able to get a physical every 3 years.   If you are 50 years or older, your doctor might recommend a physical every year.  If you have an ongoing health condition, like diabetes or high blood pressure, your doctor will probably want to see you more often.  What happens during a physical? -- In general, each visit will include:   Physical exam - The doctor or nurse will check your height, weight, heart rate, and blood pressure. They will also look at your eyes and ears. They will ask about how you are feeling and whether you have any symptoms that bother you.   Medicines - It's a good idea to bring a list of all the medicines you take to each doctor visit. Your doctor will talk to you about your medicines and answer any questions. Tell them if you are having any side effects that bother you. You " "should also tell them if you are having trouble paying for any of your medicines.   Habits and behaviors - This includes:   Your diet   Your exercise habits   Whether you smoke, drink alcohol, or use drugs   Whether you are sexually active   Whether you feel safe at home  Your doctor will talk to you about things you can do to improve your health and lower your risk of health problems. They will also offer help and support. For example, if you want to quit smoking, they can give you advice and might prescribe medicines. If you want to improve your diet or get more physical activity, they can help you with this, too.   Lab tests, if needed - The tests you get will depend on your age and situation. For example, your doctor might want to check your:   Cholesterol   Blood sugar   Iron level   Vaccines - The recommended vaccines will depend on your age, health, and what vaccines you already had. Vaccines are very important because they can prevent certain serious or deadly infections.   Discussion of screening - \"Screening\" means checking for diseases or other health problems before they cause symptoms. Your doctor can recommend screening based on your age, risk, and preferences. This might include tests to check for:   Cancer, such as breast, prostate, cervical, ovarian, colorectal, prostate, lung, or skin cancer   Sexually transmitted infections, such as chlamydia and gonorrhea   Mental health conditions like depression and anxiety  Your doctor will talk to you about the different types of screening tests. They can help you decide which screenings to have. They can also explain what the results might mean.   Answering questions - The physical is a good time to ask the doctor or nurse questions about your health. If needed, they can refer you to other doctors or specialists, too.  Adults older than 65 years often need other care, too. As you get older, your doctor will talk to you about:   How to prevent falling at " home   Hearing or vision tests   Memory testing   How to take your medicines safely   Making sure that you have the help and support you need at home  All topics are updated as new evidence becomes available and our peer review process is complete.  This topic retrieved from [facility] on: May 02, 2024.  Topic 292708 Version 1.0  Release: 32.4.3 - C32.122  © 2024 UpToDate, Inc. and/or its affiliates. All rights reserved.  Consumer Information Use and Disclaimer   Disclaimer: This generalized information is a limited summary of diagnosis, treatment, and/or medication information. It is not meant to be comprehensive and should be used as a tool to help the user understand and/or assess potential diagnostic and treatment options. It does NOT include all information about conditions, treatments, medications, side effects, or risks that may apply to a specific patient. It is not intended to be medical advice or a substitute for the medical advice, diagnosis, or treatment of a health care provider based on the health care provider's examination and assessment of a patient's specific and unique circumstances. Patients must speak with a health care provider for complete information about their health, medical questions, and treatment options, including any risks or benefits regarding use of medications. This information does not endorse any treatments or medications as safe, effective, or approved for treating a specific patient. UpToDate, Inc. and its affiliates disclaim any warranty or liability relating to this information or the use thereof.The use of this information is governed by the Terms of Use, available at https://www.[employer] Systemsuwer.com/en/know/clinical-effectiveness-terms. 2024© UpToDate, Inc. and its affiliates and/or licensors. All rights reserved.  Copyright   © 2024 UpToDate, Inc. and/or its affiliates. All rights reserved.

## 2025-05-01 NOTE — ASSESSMENT & PLAN NOTE
BP is perfect continue current medications continue weight loss journey  Orders:  •  Semaglutide-Weight Management (Wegovy) 1.7 MG/0.75ML; Inject 1.7 mg under the skin weekly

## 2025-05-02 ENCOUNTER — OFFICE VISIT (OUTPATIENT)
Dept: NEPHROLOGY | Facility: CLINIC | Age: 30
End: 2025-05-02
Payer: COMMERCIAL

## 2025-05-02 VITALS
HEIGHT: 67 IN | DIASTOLIC BLOOD PRESSURE: 100 MMHG | TEMPERATURE: 97.4 F | BODY MASS INDEX: 43.95 KG/M2 | SYSTOLIC BLOOD PRESSURE: 140 MMHG | OXYGEN SATURATION: 98 % | WEIGHT: 280 LBS | RESPIRATION RATE: 16 BRPM | HEART RATE: 88 BPM

## 2025-05-02 DIAGNOSIS — I10 ESSENTIAL HYPERTENSION: ICD-10-CM

## 2025-05-02 DIAGNOSIS — E66.813 CLASS 3 SEVERE OBESITY DUE TO EXCESS CALORIES WITHOUT SERIOUS COMORBIDITY WITH BODY MASS INDEX (BMI) OF 40.0 TO 44.9 IN ADULT: ICD-10-CM

## 2025-05-02 DIAGNOSIS — N05.8 C3 GLOMERULONEPHRITIS: Primary | ICD-10-CM

## 2025-05-02 DIAGNOSIS — R80.1 PERSISTENT PROTEINURIA: ICD-10-CM

## 2025-05-02 PROCEDURE — 99214 OFFICE O/P EST MOD 30 MIN: CPT | Performed by: INTERNAL MEDICINE

## 2025-05-02 NOTE — PROGRESS NOTES
NEPHROLOGY OFFICE FOLLOW UP  Ben Daniel 30 y.o.male YOB: 1995 MRN: [id number]    Encounter: [id number] DATE: 5/2/2025    REASON FOR VISIT: Ben Daniel is a 30 y.o. male who is here on 5/2/2025 for Acute Renal Failure and Follow-up      ASSESSMENT and PLAN:  Ben was seen today for acute renal failure and follow-up.    Diagnoses and all orders for this visit:    C3 glomerulonephritis  -     Basic metabolic panel; Future  -     CBC and differential; Future  -     Protein / creatinine ratio, urine; Future  -     UA (URINE) with reflex to Scope; Future    Persistent proteinuria  -     Basic metabolic panel; Future  -     CBC and differential; Future  -     Protein / creatinine ratio, urine; Future  -     UA (URINE) with reflex to Scope; Future    Class 3 severe obesity due to excess calories without serious comorbidity with body mass index (BMI) of 40.0 to 44.9 in adult    Essential hypertension  -     Basic metabolic panel; Future  -     CBC and differential; Future  -     Protein / creatinine ratio, urine; Future  -     UA (URINE) with reflex to Scope; Future      Assessment & Plan  C3 glomerulonephritis  Biopsy-proven.  Was treated with the prednisone and now in remission.  Will continue to monitor  Persistent proteinuria  Does not have any more proteinuria responding very well to steroid.  Class 3 severe obesity due to excess calories without serious comorbidity with body mass index (BMI) of 40.0 to 44.9 in adult  Trying to lose weight  Essential hypertension  Blood pressure is high here but at home is within acceptable range according to him and his wife.  Advised to monitor blood pressure at home    Everything discussed at length with the patient and his wife.  I will see him back in 1 year.  Advised to monitor blood pressure.  Will get blood and urine test before next visit          HPI:    Patient came in today for follow-up of C3 glomerulonephritis from the kidney biopsy    Patient had acute  kidney injury and proteinuria requiring biopsy    He was treated with steroid and doing much better now and seems to be remission    No chest pain no palpitation or shortness of breath    No nausea no vomiting    Denies any leg swelling    In between his hematuria and was seen by urologist.  Does not have blood anymore        REVIEW OF SYSTEMS:    Review of Systems   Constitutional:  Negative for fatigue.   HENT:  Negative for congestion.    Eyes:  Negative for photophobia and pain.   Respiratory:  Negative for chest tightness and shortness of breath.    Cardiovascular:  Negative for chest pain and palpitations.   Gastrointestinal:  Negative for abdominal distention, abdominal pain and blood in stool.   Endocrine: Negative for polydipsia.   Genitourinary:  Negative for difficulty urinating, dysuria, flank pain, hematuria and urgency.   Musculoskeletal:  Negative for arthralgias and back pain.   Skin:  Negative for rash.   Neurological:  Negative for dizziness, light-headedness and headaches.   Hematological:  Does not bruise/bleed easily.   Psychiatric/Behavioral:  Negative for behavioral problems. The patient is not nervous/anxious.          PAST MEDICAL HISTORY:  Past Medical History:   Diagnosis Date    Anxiety     Chronic kidney disease     Hypertension     Pneumonia 7/6/24       PAST SURGICAL HISTORY:  Past Surgical History:   Procedure Laterality Date    ANKLE SURGERY      BRONCHOSCOPY  7/9/24    FETAL SURGERY FOR CONGENITAL HERNIA      IR BIOPSY KIDNEY RANDOM  07/09/2024    RENAL BIOPSY  July 9th       SOCIAL HISTORY:  Social History     Substance and Sexual Activity   Alcohol Use Yes    Alcohol/week: 1.0 standard drink of alcohol    Comment: Rare occ     Social History     Substance and Sexual Activity   Drug Use No     Social History     Tobacco Use   Smoking Status Never    Passive exposure: Never   Smokeless Tobacco Never       FAMILY HISTORY:  Family History   Problem Relation Age of Onset    Heart  "failure Maternal Grandmother     Heart failure Maternal Grandfather     Diabetes Maternal Grandfather     Hypertension Maternal Grandfather     Hypertension Mother     Cancer Paternal Grandfather     Cancer Paternal Grandmother         Lung cancer    Lung cancer Paternal Grandmother     Hypertension Brother        ALLERGY:  No Known Allergies    MEDICATIONS:    Current Outpatient Medications:     ergocalciferol (VITAMIN D2) 50,000 units, Take 1 capsule (50,000 Units total) by mouth once a week, Disp: 16 capsule, Rfl: 1    hydroCHLOROthiazide 12.5 mg tablet, Take 1 tablet (12.5 mg total) by mouth daily, Disp: 90 tablet, Rfl: 1    levocetirizine (XYZAL) 5 MG tablet, Take 1 tablet (5 mg total) by mouth every evening, Disp: 30 tablet, Rfl: 1    levothyroxine 88 mcg tablet, Take 1 tablet (88 mcg total) by mouth daily in the early morning, Disp: 100 tablet, Rfl: 1    losartan (COZAAR) 100 MG tablet, Take 1 tablet (100 mg total) by mouth daily, Disp: 90 tablet, Rfl: 1    Semaglutide-Weight Management (Wegovy) 1.7 MG/0.75ML, Inject 1.7 mg under the skin weekly, Disp: 3 mL, Rfl: 0    PHYSICAL EXAM:  Vitals:    05/02/25 0930   BP: 140/100   BP Location: Right arm   Patient Position: Sitting   Cuff Size: Large   Pulse: 88   Resp: 16   Temp: (!) 97.4 °F (36.3 °C)   TempSrc: Temporal   SpO2: 98%   Weight: 127 kg (280 lb)   Height: 5' 6.5\" (1.689 m)     Body mass index is 44.52 kg/m².    Physical Exam  Constitutional:       General: He is not in acute distress.     Appearance: He is well-developed.   HENT:      Head: Normocephalic.      Mouth/Throat:      Mouth: Mucous membranes are moist.   Eyes:      General: No scleral icterus.     Conjunctiva/sclera: Conjunctivae normal.   Neck:      Vascular: No JVD.   Cardiovascular:      Rate and Rhythm: Normal rate.      Heart sounds: Normal heart sounds.   Pulmonary:      Effort: Pulmonary effort is normal.      Breath sounds: No wheezing.   Abdominal:      Palpations: Abdomen is soft. "      Tenderness: There is no abdominal tenderness.   Musculoskeletal:         General: Normal range of motion.      Cervical back: Neck supple.   Skin:     General: Skin is warm.      Findings: No rash.   Neurological:      Mental Status: He is alert and oriented to person, place, and time.   Psychiatric:         Behavior: Behavior normal.         LAB RESULTS:  Results for orders placed or performed in visit on 03/22/25   Comprehensive metabolic panel   Result Value Ref Range    Sodium 137 135 - 147 mmol/L    Potassium 4.0 3.5 - 5.3 mmol/L    Chloride 102 96 - 108 mmol/L    CO2 27 21 - 32 mmol/L    ANION GAP 8 4 - 13 mmol/L    BUN 15 5 - 25 mg/dL    Creatinine 0.74 0.60 - 1.30 mg/dL    Glucose, Fasting 88 65 - 99 mg/dL    Calcium 9.2 8.4 - 10.2 mg/dL    AST 20 13 - 39 U/L    ALT 19 7 - 52 U/L    Alkaline Phosphatase 71 34 - 104 U/L    Total Protein 8.1 6.4 - 8.4 g/dL    Albumin 4.3 3.5 - 5.0 g/dL    Total Bilirubin 0.67 0.20 - 1.00 mg/dL    eGFR 123 ml/min/1.73sq m   Hemoglobin A1C   Result Value Ref Range    Hemoglobin A1C 5.9 (H) Normal 4.0-5.6%; PreDiabetic 5.7-6.4%; Diabetic >=6.5%; Glycemic control for adults with diabetes <7.0% %     mg/dl   Lipid panel   Result Value Ref Range    Cholesterol 151 See Comment mg/dL    Triglycerides 231 (H) See Comment mg/dL    HDL, Direct 33 (L) >=40 mg/dL    LDL Calculated 72 0 - 100 mg/dL    Non-HDL-Chol (CHOL-HDL) 118 mg/dl   TSH, 3rd generation with Free T4 reflex   Result Value Ref Range    TSH 3RD GENERATON 12.166 (H) 0.450 - 4.500 uIU/mL   Vitamin D 25 hydroxy   Result Value Ref Range    Vit D, 25-Hydroxy 15.9 (L) 30.0 - 100.0 ng/mL   Magnesium   Result Value Ref Range    Magnesium 2.0 1.9 - 2.7 mg/dL   CBC and differential   Result Value Ref Range    WBC 4.89 4.31 - 10.16 Thousand/uL    RBC 5.41 3.88 - 5.62 Million/uL    Hemoglobin 14.7 12.0 - 17.0 g/dL    Hematocrit 43.8 36.5 - 49.3 %    MCV 81 (L) 82 - 98 fL    MCH 27.2 26.8 - 34.3 pg    MCHC 33.6 31.4 - 37.4  "g/dL    RDW 12.8 11.6 - 15.1 %    MPV 9.1 8.9 - 12.7 fL    Platelets 258 149 - 390 Thousands/uL    nRBC 0 /100 WBCs    Segmented % 49 43 - 75 %    Immature Grans % 0 0 - 2 %    Lymphocytes % 39 14 - 44 %    Monocytes % 10 4 - 12 %    Eosinophils Relative 2 0 - 6 %    Basophils Relative 0 0 - 1 %    Absolute Neutrophils 2.38 1.85 - 7.62 Thousands/µL    Absolute Immature Grans 0.01 0.00 - 0.20 Thousand/uL    Absolute Lymphocytes 1.90 0.60 - 4.47 Thousands/µL    Absolute Monocytes 0.50 0.17 - 1.22 Thousand/µL    Eosinophils Absolute 0.08 0.00 - 0.61 Thousand/µL    Basophils Absolute 0.02 0.00 - 0.10 Thousands/µL   Protein / creatinine ratio, urine   Result Value Ref Range    Creatinine, Ur 135.1 Reference range not established. mg/dL    Protein Urine Random 15.7 Reference range not established. mg/dL    Prot/Creat Ratio, Ur 0.1 0.0 - 0.1   UA (URINE) with reflex to Scope   Result Value Ref Range    Color, UA Light Yellow     Clarity, UA Clear     Specific Gravity, UA 1.025 1.003 - 1.030    pH, UA 5.5 4.5, 5.0, 5.5, 6.0, 6.5, 7.0, 7.5, 8.0    Leukocytes, UA Moderate (A) Negative    Nitrite, UA Negative Negative    Protein, UA Negative Negative mg/dl    Glucose, UA Negative Negative mg/dl    Ketones, UA Negative Negative mg/dl    Urobilinogen, UA <2.0 <2.0 mg/dl mg/dl    Bilirubin, UA Negative Negative    Occult Blood, UA Negative Negative   Urine Microscopic   Result Value Ref Range    RBC, UA None Seen None Seen, 1-2 /hpf    WBC, UA 2-4 (A) None Seen, 1-2 /hpf    Epithelial Cells Occasional None Seen, Occasional /hpf    Bacteria, UA None Seen None Seen, Occasional /hpf    MUCUS THREADS Occasional (A) None Seen   T4, free   Result Value Ref Range    Free T4 0.68 0.61 - 1.12 ng/dL         Portions of the record may have been created with voice recognition software. Occasional wrong word or \"sound a like\" substitutions may have occurred due to the inherent limitations of voice recognition software. Read the chart " carefully and recognize, using context, where substitutions have occurred. If you have any questions, please contact the dictating provider.

## 2025-05-02 NOTE — ASSESSMENT & PLAN NOTE
Blood pressure is high here but at home is within acceptable range according to him and his wife.  Advised to monitor blood pressure at home    Everything discussed at length with the patient and his wife.  I will see him back in 1 year.  Advised to monitor blood pressure.  Will get blood and urine test before next visit

## 2025-05-05 ENCOUNTER — OFFICE VISIT (OUTPATIENT)
Dept: UROLOGY | Facility: CLINIC | Age: 30
End: 2025-05-05
Payer: COMMERCIAL

## 2025-05-05 ENCOUNTER — RESULTS FOLLOW-UP (OUTPATIENT)
Dept: OTHER | Facility: HOSPITAL | Age: 30
End: 2025-05-05

## 2025-05-05 VITALS
BODY MASS INDEX: 43.79 KG/M2 | HEIGHT: 67 IN | OXYGEN SATURATION: 97 % | WEIGHT: 279 LBS | TEMPERATURE: 97.6 F | HEART RATE: 97 BPM | SYSTOLIC BLOOD PRESSURE: 132 MMHG | DIASTOLIC BLOOD PRESSURE: 90 MMHG

## 2025-05-05 DIAGNOSIS — R39.11 URINARY HESITANCY: Primary | ICD-10-CM

## 2025-05-05 LAB
BACTERIA UR QL AUTO: ABNORMAL /HPF
BILIRUB UR QL STRIP: NEGATIVE
CLARITY UR: CLEAR
COLOR UR: YELLOW
GLUCOSE UR STRIP-MCNC: NEGATIVE MG/DL
HGB UR QL STRIP.AUTO: NEGATIVE
KETONES UR STRIP-MCNC: NEGATIVE MG/DL
LEUKOCYTE ESTERASE UR QL STRIP: NEGATIVE
MUCOUS THREADS UR QL AUTO: ABNORMAL
NITRITE UR QL STRIP: NEGATIVE
NON-SQ EPI CELLS URNS QL MICRO: ABNORMAL /HPF
PH UR STRIP.AUTO: 6 [PH]
POST-VOID RESIDUAL VOLUME, ML POC: 51 ML
PROT UR STRIP-MCNC: ABNORMAL MG/DL
RBC #/AREA URNS AUTO: ABNORMAL /HPF
SL AMB  POCT GLUCOSE, UA: NORMAL
SL AMB LEUKOCYTE ESTERASE,UA: NORMAL
SL AMB POCT BILIRUBIN,UA: NORMAL
SL AMB POCT BLOOD,UA: NORMAL
SL AMB POCT CLARITY,UA: CLEAR
SL AMB POCT COLOR,UA: YELLOW
SL AMB POCT KETONES,UA: NORMAL
SL AMB POCT NITRITE,UA: NORMAL
SL AMB POCT PH,UA: 5
SL AMB POCT SPECIFIC GRAVITY,UA: 1.02
SL AMB POCT URINE PROTEIN: NORMAL
SL AMB POCT UROBILINOGEN: 0.2
SP GR UR STRIP.AUTO: 1.03 (ref 1–1.03)
UROBILINOGEN UR STRIP-ACNC: <2 MG/DL
WBC #/AREA URNS AUTO: ABNORMAL /HPF

## 2025-05-05 PROCEDURE — 81001 URINALYSIS AUTO W/SCOPE: CPT | Performed by: PHYSICIAN ASSISTANT

## 2025-05-05 PROCEDURE — 81002 URINALYSIS NONAUTO W/O SCOPE: CPT | Performed by: PHYSICIAN ASSISTANT

## 2025-05-05 PROCEDURE — 99213 OFFICE O/P EST LOW 20 MIN: CPT | Performed by: PHYSICIAN ASSISTANT

## 2025-05-05 PROCEDURE — 51798 US URINE CAPACITY MEASURE: CPT | Performed by: PHYSICIAN ASSISTANT

## 2025-05-05 RX ORDER — OXYBUTYNIN CHLORIDE 10 MG/1
10 TABLET, EXTENDED RELEASE ORAL
Qty: 90 TABLET | Refills: 3 | Status: SHIPPED | OUTPATIENT
Start: 2025-05-05

## 2025-05-05 NOTE — PROGRESS NOTES
"Name: Ben Daniel      : 1995      MRN: [id number]  Encounter Provider: Linda Rayo PA-C  Encounter Date: 2025   Encounter department: [facility] [place]  :  Assessment & Plan  Urinary hesitancy  UA today positive for blood and protein. Will send for micro  PVR today 50 mL  Discussed conservative measures  PSA ordered   Trial of oxybutynin   Orders:    POCT Measure PVR    oxybutynin (DITROPAN-XL) 10 MG 24 hr tablet; Take 1 tablet (10 mg total) by mouth daily at bedtime    PSA Total, Diagnostic; Future        History of Present Illness   Ben Daniel is a 30 y.o. male who presents for follow up.    Patient has Feeling of incomplete bladder emptying. Denies weakened urinary stream, fevers, perineal pain, gross hematuria, dysuria. Drinks adequate water and denies bladder irritants.     Review of Systems   Constitutional:  Negative for activity change, appetite change, chills and fever.   HENT:  Negative for congestion and trouble swallowing.    Respiratory:  Negative for cough and shortness of breath.    Cardiovascular:  Negative for chest pain, palpitations and leg swelling.   Gastrointestinal:  Negative for abdominal pain, constipation, diarrhea, nausea and vomiting.   Genitourinary:  Positive for difficulty urinating. Negative for dysuria, flank pain, frequency, hematuria and urgency.   Musculoskeletal:  Negative for back pain and gait problem.   Skin:  Negative for wound.   Allergic/Immunologic: Negative for immunocompromised state.   Neurological:  Negative for dizziness and syncope.   Hematological:  Does not bruise/bleed easily.   Psychiatric/Behavioral:  Negative for confusion.    All other systems reviewed and are negative.         Objective   /90 (Patient Position: Sitting, Cuff Size: Standard)   Pulse 97   Temp 97.6 °F (36.4 °C) (Temporal)   Ht 5' 6.5\" (1.689 m)   Wt 127 kg (279 lb)   SpO2 97%   BMI 44.36 kg/m²     Physical Exam  Constitutional:       " "Appearance: Normal appearance.   HENT:      Head: Normocephalic.      Nose: Nose normal.      Mouth/Throat:      Pharynx: Oropharynx is clear.   Eyes:      Extraocular Movements: Extraocular movements intact.      Pupils: Pupils are equal, round, and reactive to light.   Pulmonary:      Effort: Pulmonary effort is normal.   Musculoskeletal:         General: Normal range of motion.      Cervical back: Normal range of motion.   Skin:     General: Skin is warm.   Neurological:      General: No focal deficit present.      Mental Status: He is alert and oriented to person, place, and time. Mental status is at baseline.   Psychiatric:         Mood and Affect: Mood normal.         Behavior: Behavior normal.         Thought Content: Thought content normal.         Judgment: Judgment normal.           Results   No results found for: \"PSA\"  Lab Results   Component Value Date    CALCIUM 9.2 03/22/2025    K 4.0 03/22/2025    CO2 27 03/22/2025     03/22/2025    BUN 15 03/22/2025    CREATININE 0.74 03/22/2025     Lab Results   Component Value Date    WBC 4.89 03/22/2025    HGB 14.7 03/22/2025    HCT 43.8 03/22/2025    MCV 81 (L) 03/22/2025     03/22/2025       Office Urine Dip  Recent Results (from the past hour)   POCT Measure PVR    Collection Time: 05/05/25 11:48 AM   Result Value Ref Range    POST-VOID RESIDUAL VOLUME, ML POC 51 mL         "

## 2025-05-06 ENCOUNTER — TELEPHONE (OUTPATIENT)
Dept: NEPHROLOGY | Facility: CLINIC | Age: 30
End: 2025-05-06

## 2025-05-06 DIAGNOSIS — R80.1 PERSISTENT PROTEINURIA: Primary | ICD-10-CM

## 2025-05-06 NOTE — RESULT ENCOUNTER NOTE
Spoke with the PT wife Pam with results and Linda PAGE recommendations. PT wife verbalized understanding.

## 2025-05-07 ENCOUNTER — OFFICE VISIT (OUTPATIENT)
Dept: OBGYN CLINIC | Facility: CLINIC | Age: 30
End: 2025-05-07
Payer: COMMERCIAL

## 2025-05-07 VITALS — HEIGHT: 67 IN | WEIGHT: 280 LBS | BODY MASS INDEX: 43.95 KG/M2

## 2025-05-07 DIAGNOSIS — G56.01 CARPAL TUNNEL SYNDROME ON RIGHT: ICD-10-CM

## 2025-05-07 DIAGNOSIS — G56.02 CARPAL TUNNEL SYNDROME OF LEFT WRIST: Primary | ICD-10-CM

## 2025-05-07 DIAGNOSIS — G56.32 RADIAL NERVE IRRITATION, LEFT: ICD-10-CM

## 2025-05-07 PROCEDURE — 99204 OFFICE O/P NEW MOD 45 MIN: CPT | Performed by: SURGERY

## 2025-05-07 RX ORDER — SODIUM CHLORIDE 9 MG/ML
75 INJECTION, SOLUTION INTRAVENOUS CONTINUOUS
OUTPATIENT
Start: 2025-05-07

## 2025-05-07 RX ORDER — CHLORHEXIDINE GLUCONATE ORAL RINSE 1.2 MG/ML
15 SOLUTION DENTAL ONCE
OUTPATIENT
Start: 2025-05-07 | End: 2025-05-07

## 2025-05-07 NOTE — H&P
Tara Lazcano M.D.  Attending, Orthopaedic Surgery  Hand, Wrist, and Elbow Surgery  [facility]      ORTHOPAEDIC HAND, WRIST, AND ELBOW OFFICE  VISIT       ASSESSMENT/PLAN:        Assessment & Plan  Carpal tunnel syndrome of left wrist  US results were reviewed, bilateral carpal tunnel syndrome. Right side is currently asymptomatic.   The etiology of carpal tunnel syndrome was discussed along with treatment options.   He did try nighttime bracing and notes this caused increased pain.   We discussed the possibility of a carpal tunnel CSI, which can improve symptoms but is not curative. We discussed a left carpal tunnel release, with the understanding numbness and tingling may not fully resolve. His symptoms are constant and we discussed US does not grade severity of nerve compression. He can expect to see improvement in symptoms up to 18 months post op.  We discussed the possibility of pillar pain, which can last 4-8 months and is best combated with scar massage.   Common risks include bleeding, infection, injury to nearby structures (vessels, nerves, tendons, ligaments), blood clots (deep vein thrombosis / pulmonary embolus), and wound healing problems. Infection may result in an infection of the blood stream and/or the need for oral or intravenous antibiotics. Additional risks include impaired limb function, loss of limb and/or failure to achieve desired results. Nerve injury can result in numbness, incomplete or worsening nerve recovery. There is a risk for unacceptable cosmetic results (such as scarring). Surgery of the hand is associated with bleeding, infection, scar, pain, wound healing problems, damage to nerves, arteries, tendons, ligaments, failure to give desired result, instability, nonunion, malunion hardware issues, and need for more surgery.  He elected to proceed with a left carpal tunnel release and informed electronic surgical consent was signed. Post op  instructions/expectations were reviewed and provided in AVS.   May call the office for a post operative work note if needed, discussed approx. 2-3 weeks off of work.   I will see him back in the office 10-14 days post op for suture removal.   Orders:    Ambulatory Referral to Orthopedic Surgery    Radial nerve irritation, left  Discussed the numbness he gets when he rubs the radial aspect of his wrist that radiates to the dorsal aspect of the thumb is secondary to dorsal radial sensroy nerve branch irritation and not carpal tunnel syndrome. The etiology of dorsal radial sensory nerve branch irritation was discussed. We discussed this will not improve with surgery. Offered OT for desensitization, he declined as this only occurs if he rubs over the area.        Carpal tunnel syndrome on right  Right side is currently asymptomatic          The patient verbalized understanding of exam findings and treatment plan. We engaged in the shared decision-making process and treatment options were discussed at length with the patient. Surgical and conservative management discussed today along with risks and benefits.    Diagnoses and all orders for this visit:    Carpal tunnel syndrome of left wrist  -     Ambulatory Referral to Orthopedic Surgery    Radial nerve irritation, left    Carpal tunnel syndrome on right      Follow Up:  Return for 10-14 days, post op.    To Do Next Visit:  Sutures out      Operative Discussions:  Open Carpal Tunnel Release:   The anatomy and physiology of carpal tunnel syndrome was discussed with the patient today.  Increase pressure localized under the transverse carpal ligament can cause pain, numbness, tingling, or dysesthesias within the median nerve distribution as well as feelings of fatigue, clumsiness, or awkwardness.  These symptoms typically occur at night and worse in the morning upon waking.  Eventually, untreated carpal tunnel syndrome can result in weakness and permanent loss of muscle  within the thenar compartment of the hand.  Treatment options were discussed with the patient.  Conservative treatment includes nocturnal resting splints to keep the nerve in a neutral position, ergonomic changes within the work or home environment, activity modification, and tendon gliding exercises.  Vitamin B6 one tablet daily over the counter may helpful to reduce symptoms.  Steroid injections within the carpal canal can help a majority of patients, however this is often self-limited in a majority of patients.  Surgical intervention to divide the transverse carpal ligament typically results in a long-lasting relief of the patient's complaints, with the recurrence rate of less than 1%. The patient has elected to undergo an open carpal tunnel release.  The palmar incision technique was discussed in the office with the patient today.   In the postoperative period, light activities are allowed immediately, driving is allowed when narcotic medication has stopped, and the bandages may be removed and incision may get wet after 2 days.  Heavy activities (lifting more than approximately 10 pounds) will be allowed after follow up appointment in 1-2 weeks.  While night symptoms (waking from sleep, pain, and discomfort in the hands) generally improves rapidly, the numbness and tingling as well as the strength will slowly improve over weeks to months depending on the chronicity and severity of the carpal tunnel syndrome.  Pillar pain and scar discomfort were discussed with the patient which are self-limiting conditions.  The risks of bleeding and infection from the surgery are less than 1%.  Risk of recurrence is approximately 0.5%.  The risks of nerve injury or nerve damage or damage to the blood vessels is approximately 1 in 1200. The patient has an understanding of the above mentioned discussion.The risks and benefits of the procedure were explained to the patient, which include, but are not limited to: Bleeding,  infection, recurrence, pain, scar, damage to tendons, damage to nerves, and damage to blood vessels, failure to give desired results and complications related to anesthesia.  These risks, along with alternative conservative treatment options, and postoperative protocols were voiced back and understood by the patient.  All questions were answered to the patient's satisfaction.  The patient agrees to comply with a standard postoperative protocol, and is willing to proceed.  Education was provided via written and auditory forms.  There were no barriers to learning. Written handouts regarding wound care, incision and scar care, and general preoperative information was provided to the patient.  Prior to surgery, the patient may be requested to stop all anti-inflammatory medications.  Prophylactic aspirin, Plavix, and Coumadin may be allowed to be continued.  Medications including vitamin E., ginkgo, and fish oil are requested to be stopped approximately one week prior to surgery.      ____________________________________________________________________________________________________________________________________________      CHIEF COMPLAINT:  Chief Complaint   Patient presents with    Right Hand - Numbness    Left Hand - Numbness    Left Elbow - Numbness       SUBJECTIVE:  Ben Daniel is a 30 y.o. year old RHD male who presents to the office for left hand numbness and tingling. He notes constant numbness and tingling to his left radial 3 digits. Symptoms have been ongoing for approx. 3 months. He feels symptoms are constant, but intensity has improved. He did try nighttime bracing and notes this caused increased pain. He denies right sided symptoms, as the right sided symptoms have since resolved. He also notes if he rubs over the radial aspect of his left wrist he will experience numbness and tingling that radiates to the dorsal aspect of the thumb, he is wondering if this is also related to his carpal tunnel  syndrome.      Pain/symptom timing:  Worse during the day when active  Pain/symptom context:  Worse with activites and work  Pain/symptom modifying factors:  Rest makes better, activities make worse  Pain/symptom associated signs/symptoms: none    Prior treatment   NSAIDsNo   Injections No   Bracing/Orthotics Yes    Physical Therapy No     I have personally reviewed all the relevant PMH, PSH, SH, FH, Medications and allergies      PAST MEDICAL HISTORY:  Past Medical History:   Diagnosis Date    Anxiety     Chronic kidney disease     Hypertension     Pneumonia 7/6/24       PAST SURGICAL HISTORY:  Past Surgical History:   Procedure Laterality Date    ANKLE SURGERY      BRONCHOSCOPY  7/9/24    FETAL SURGERY FOR CONGENITAL HERNIA      IR BIOPSY KIDNEY RANDOM  07/09/2024    RENAL BIOPSY  July 9th       FAMILY HISTORY:  Family History   Problem Relation Age of Onset    Heart failure Maternal Grandmother     Heart failure Maternal Grandfather     Diabetes Maternal Grandfather     Hypertension Maternal Grandfather     Hypertension Mother     Cancer Paternal Grandfather     Cancer Paternal Grandmother         Lung cancer    Lung cancer Paternal Grandmother     Hypertension Brother        SOCIAL HISTORY:  Social History     Tobacco Use    Smoking status: Never     Passive exposure: Never    Smokeless tobacco: Never   Vaping Use    Vaping status: Never Used   Substance Use Topics    Alcohol use: Yes     Alcohol/week: 1.0 standard drink of alcohol     Comment: Rare occ    Drug use: No       MEDICATIONS:    Current Outpatient Medications:     ergocalciferol (VITAMIN D2) 50,000 units, Take 1 capsule (50,000 Units total) by mouth once a week, Disp: 16 capsule, Rfl: 1    hydroCHLOROthiazide 12.5 mg tablet, Take 1 tablet (12.5 mg total) by mouth daily, Disp: 90 tablet, Rfl: 1    levocetirizine (XYZAL) 5 MG tablet, Take 1 tablet (5 mg total) by mouth every evening, Disp: 30 tablet, Rfl: 1    levothyroxine 88 mcg tablet, Take 1 tablet  (88 mcg total) by mouth daily in the early morning, Disp: 100 tablet, Rfl: 1    losartan (COZAAR) 100 MG tablet, Take 1 tablet (100 mg total) by mouth daily, Disp: 90 tablet, Rfl: 1    oxybutynin (DITROPAN-XL) 10 MG 24 hr tablet, Take 1 tablet (10 mg total) by mouth daily at bedtime, Disp: 90 tablet, Rfl: 3    Semaglutide-Weight Management (Wegovy) 1.7 MG/0.75ML, Inject 1.7 mg under the skin weekly, Disp: 3 mL, Rfl: 0    ALLERGIES:  No Known Allergies        REVIEW OF SYSTEMS:  Review of Systems   Constitutional:  Negative for chills, fever and unexpected weight change.   HENT:  Negative for hearing loss, nosebleeds and sore throat.    Eyes:  Negative for pain, redness and visual disturbance.   Respiratory:  Negative for cough, shortness of breath and wheezing.    Cardiovascular:  Negative for chest pain, palpitations and leg swelling.   Gastrointestinal:  Negative for abdominal pain, nausea and vomiting.   Endocrine: Negative for polydipsia and polyuria.   Genitourinary:  Negative for difficulty urinating and hematuria.   Musculoskeletal:  Negative for arthralgias, joint swelling and myalgias.   Skin:  Negative for rash and wound.   Neurological:  Positive for numbness. Negative for dizziness and headaches.   Psychiatric/Behavioral:  Negative for decreased concentration, dysphoric mood and suicidal ideas. The patient is not nervous/anxious.        VITALS:  There were no vitals filed for this visit.    LABS:      _____________________________________________________  PHYSICAL EXAMINATION:  General: well developed and well nourished, alert, oriented times 3, and appears comfortable  Psychiatric: Normal  HEENT: Normocephalic, Atraumatic Trachea Midline, No torticollis  Pulmonary: No audible wheezing or respiratory distress   Abdomen/GI: Non tender, non distended   Cardiovascular: No pitting edema, 2+ radial pulse   Skin: No masses, erythema, lacerations, fluctation, ulcerations  Neurovascular: Sensation Intact to the  Median, Ulnar, Radial Nerve, Motor Intact to the Median, Ulnar, Radial Nerve, and Pulses Intact  Musculoskeletal: Normal, except as noted in detailed exam and in HPI.      MUSCULOSKELETAL EXAMINATION:    Left Carpal Tunnel Exam:    Negative thenar atrophy. Positive phalen's test. Positive carpal tunnel compression. Negative tinels over median nerve at the wrist.  Opposition strength 5/5.  Abduction strength 5/5.      ___________________________________________________  STUDIES REVIEWED:  I have personally reviewed and interpreted  US of carpal tunnel which demonstrate RIGHT SIDE: Carpal tunnel syndrome ruled in based on marked abnormal CSA >/= 14 sq mm. LEFT SIDE: Bifid left median nerve. Carpal tunnel syndrome ruled in based on marked abnormal CSA >/= 14 sq mm.    LABS REVIEWED:    HgA1c:   Lab Results   Component Value Date    HGBA1C 5.9 (H) 03/22/2025     BMP:   Lab Results   Component Value Date    CALCIUM 9.2 03/22/2025    K 4.0 03/22/2025    CO2 27 03/22/2025     03/22/2025    BUN 15 03/22/2025    CREATININE 0.74 03/22/2025               PROCEDURES PERFORMED:  Procedures  No Procedures performed today    _____________________________________________________      Scribe Attestation      I,:  Marian Lopez MA am acting as a scribe while in the presence of the attending physician.:       I,:  Tara Lazcano MD personally performed the services described in this documentation    as scribed in my presence.:

## 2025-05-07 NOTE — PROGRESS NOTES
Tara Lazcano M.D.  Attending, Orthopaedic Surgery  Hand, Wrist, and Elbow Surgery  [facility]      ORTHOPAEDIC HAND, WRIST, AND ELBOW OFFICE  VISIT       ASSESSMENT/PLAN:        Assessment & Plan  Carpal tunnel syndrome of left wrist  US results were reviewed, bilateral carpal tunnel syndrome. Right side is currently asymptomatic.   The etiology of carpal tunnel syndrome was discussed along with treatment options.   He did try nighttime bracing and notes this caused increased pain.   We discussed the possibility of a carpal tunnel CSI, which can improve symptoms but is not curative. We discussed a left carpal tunnel release, with the understanding numbness and tingling may not fully resolve. His symptoms are constant and we discussed US does not grade severity of nerve compression. He can expect to see improvement in symptoms up to 18 months post op.  We discussed the possibility of pillar pain, which can last 4-8 months and is best combated with scar massage.   Common risks include bleeding, infection, injury to nearby structures (vessels, nerves, tendons, ligaments), blood clots (deep vein thrombosis / pulmonary embolus), and wound healing problems. Infection may result in an infection of the blood stream and/or the need for oral or intravenous antibiotics. Additional risks include impaired limb function, loss of limb and/or failure to achieve desired results. Nerve injury can result in numbness, incomplete or worsening nerve recovery. There is a risk for unacceptable cosmetic results (such as scarring). Surgery of the hand is associated with bleeding, infection, scar, pain, wound healing problems, damage to nerves, arteries, tendons, ligaments, failure to give desired result, instability, nonunion, malunion hardware issues, and need for more surgery.  He elected to proceed with a left carpal tunnel release and informed electronic surgical consent was signed. Post op  instructions/expectations were reviewed and provided in AVS.   May call the office for a post operative work note if needed, discussed approx. 2-3 weeks off of work.   I will see him back in the office 10-14 days post op for suture removal.   Orders:    Ambulatory Referral to Orthopedic Surgery    Radial nerve irritation, left  Discussed the numbness he gets when he rubs the radial aspect of his wrist that radiates to the dorsal aspect of the thumb is secondary to dorsal radial sensroy nerve branch irritation and not carpal tunnel syndrome. The etiology of dorsal radial sensory nerve branch irritation was discussed. We discussed this will not improve with surgery. Offered OT for desensitization, he declined as this only occurs if he rubs over the area.        Carpal tunnel syndrome on right  Right side is currently asymptomatic          The patient verbalized understanding of exam findings and treatment plan. We engaged in the shared decision-making process and treatment options were discussed at length with the patient. Surgical and conservative management discussed today along with risks and benefits.    Diagnoses and all orders for this visit:    Carpal tunnel syndrome of left wrist  -     Ambulatory Referral to Orthopedic Surgery    Radial nerve irritation, left    Carpal tunnel syndrome on right      Follow Up:  Return for 10-14 days, post op.    To Do Next Visit:  Sutures out      Operative Discussions:  Open Carpal Tunnel Release:   The anatomy and physiology of carpal tunnel syndrome was discussed with the patient today.  Increase pressure localized under the transverse carpal ligament can cause pain, numbness, tingling, or dysesthesias within the median nerve distribution as well as feelings of fatigue, clumsiness, or awkwardness.  These symptoms typically occur at night and worse in the morning upon waking.  Eventually, untreated carpal tunnel syndrome can result in weakness and permanent loss of muscle  within the thenar compartment of the hand.  Treatment options were discussed with the patient.  Conservative treatment includes nocturnal resting splints to keep the nerve in a neutral position, ergonomic changes within the work or home environment, activity modification, and tendon gliding exercises.  Vitamin B6 one tablet daily over the counter may helpful to reduce symptoms.  Steroid injections within the carpal canal can help a majority of patients, however this is often self-limited in a majority of patients.  Surgical intervention to divide the transverse carpal ligament typically results in a long-lasting relief of the patient's complaints, with the recurrence rate of less than 1%. The patient has elected to undergo an open carpal tunnel release.  The palmar incision technique was discussed in the office with the patient today.   In the postoperative period, light activities are allowed immediately, driving is allowed when narcotic medication has stopped, and the bandages may be removed and incision may get wet after 2 days.  Heavy activities (lifting more than approximately 10 pounds) will be allowed after follow up appointment in 1-2 weeks.  While night symptoms (waking from sleep, pain, and discomfort in the hands) generally improves rapidly, the numbness and tingling as well as the strength will slowly improve over weeks to months depending on the chronicity and severity of the carpal tunnel syndrome.  Pillar pain and scar discomfort were discussed with the patient which are self-limiting conditions.  The risks of bleeding and infection from the surgery are less than 1%.  Risk of recurrence is approximately 0.5%.  The risks of nerve injury or nerve damage or damage to the blood vessels is approximately 1 in 1200. The patient has an understanding of the above mentioned discussion.The risks and benefits of the procedure were explained to the patient, which include, but are not limited to: Bleeding,  infection, recurrence, pain, scar, damage to tendons, damage to nerves, and damage to blood vessels, failure to give desired results and complications related to anesthesia.  These risks, along with alternative conservative treatment options, and postoperative protocols were voiced back and understood by the patient.  All questions were answered to the patient's satisfaction.  The patient agrees to comply with a standard postoperative protocol, and is willing to proceed.  Education was provided via written and auditory forms.  There were no barriers to learning. Written handouts regarding wound care, incision and scar care, and general preoperative information was provided to the patient.  Prior to surgery, the patient may be requested to stop all anti-inflammatory medications.  Prophylactic aspirin, Plavix, and Coumadin may be allowed to be continued.  Medications including vitamin E., ginkgo, and fish oil are requested to be stopped approximately one week prior to surgery.      ____________________________________________________________________________________________________________________________________________      CHIEF COMPLAINT:  Chief Complaint   Patient presents with    Right Hand - Numbness    Left Hand - Numbness    Left Elbow - Numbness       SUBJECTIVE:  Ben Daniel is a 30 y.o. year old RHD male who presents to the office for left hand numbness and tingling. He notes constant numbness and tingling to his left radial 3 digits. Symptoms have been ongoing for approx. 3 months. He feels symptoms are constant, but intensity has improved. He did try nighttime bracing and notes this caused increased pain. He denies right sided symptoms, as the right sided symptoms have since resolved. He also notes if he rubs over the radial aspect of his left wrist he will experience numbness and tingling that radiates to the dorsal aspect of the thumb, he is wondering if this is also related to his carpal tunnel  syndrome.      Pain/symptom timing:  Worse during the day when active  Pain/symptom context:  Worse with activites and work  Pain/symptom modifying factors:  Rest makes better, activities make worse  Pain/symptom associated signs/symptoms: none    Prior treatment   NSAIDsNo   Injections No   Bracing/Orthotics Yes    Physical Therapy No     I have personally reviewed all the relevant PMH, PSH, SH, FH, Medications and allergies      PAST MEDICAL HISTORY:  Past Medical History:   Diagnosis Date    Anxiety     Chronic kidney disease     Hypertension     Pneumonia 7/6/24       PAST SURGICAL HISTORY:  Past Surgical History:   Procedure Laterality Date    ANKLE SURGERY      BRONCHOSCOPY  7/9/24    FETAL SURGERY FOR CONGENITAL HERNIA      IR BIOPSY KIDNEY RANDOM  07/09/2024    RENAL BIOPSY  July 9th       FAMILY HISTORY:  Family History   Problem Relation Age of Onset    Heart failure Maternal Grandmother     Heart failure Maternal Grandfather     Diabetes Maternal Grandfather     Hypertension Maternal Grandfather     Hypertension Mother     Cancer Paternal Grandfather     Cancer Paternal Grandmother         Lung cancer    Lung cancer Paternal Grandmother     Hypertension Brother        SOCIAL HISTORY:  Social History     Tobacco Use    Smoking status: Never     Passive exposure: Never    Smokeless tobacco: Never   Vaping Use    Vaping status: Never Used   Substance Use Topics    Alcohol use: Yes     Alcohol/week: 1.0 standard drink of alcohol     Comment: Rare occ    Drug use: No       MEDICATIONS:    Current Outpatient Medications:     ergocalciferol (VITAMIN D2) 50,000 units, Take 1 capsule (50,000 Units total) by mouth once a week, Disp: 16 capsule, Rfl: 1    hydroCHLOROthiazide 12.5 mg tablet, Take 1 tablet (12.5 mg total) by mouth daily, Disp: 90 tablet, Rfl: 1    levocetirizine (XYZAL) 5 MG tablet, Take 1 tablet (5 mg total) by mouth every evening, Disp: 30 tablet, Rfl: 1    levothyroxine 88 mcg tablet, Take 1 tablet  (88 mcg total) by mouth daily in the early morning, Disp: 100 tablet, Rfl: 1    losartan (COZAAR) 100 MG tablet, Take 1 tablet (100 mg total) by mouth daily, Disp: 90 tablet, Rfl: 1    oxybutynin (DITROPAN-XL) 10 MG 24 hr tablet, Take 1 tablet (10 mg total) by mouth daily at bedtime, Disp: 90 tablet, Rfl: 3    Semaglutide-Weight Management (Wegovy) 1.7 MG/0.75ML, Inject 1.7 mg under the skin weekly, Disp: 3 mL, Rfl: 0    ALLERGIES:  No Known Allergies        REVIEW OF SYSTEMS:  Review of Systems   Constitutional:  Negative for chills, fever and unexpected weight change.   HENT:  Negative for hearing loss, nosebleeds and sore throat.    Eyes:  Negative for pain, redness and visual disturbance.   Respiratory:  Negative for cough, shortness of breath and wheezing.    Cardiovascular:  Negative for chest pain, palpitations and leg swelling.   Gastrointestinal:  Negative for abdominal pain, nausea and vomiting.   Endocrine: Negative for polydipsia and polyuria.   Genitourinary:  Negative for difficulty urinating and hematuria.   Musculoskeletal:  Negative for arthralgias, joint swelling and myalgias.   Skin:  Negative for rash and wound.   Neurological:  Positive for numbness. Negative for dizziness and headaches.   Psychiatric/Behavioral:  Negative for decreased concentration, dysphoric mood and suicidal ideas. The patient is not nervous/anxious.        VITALS:  There were no vitals filed for this visit.    LABS:      _____________________________________________________  PHYSICAL EXAMINATION:  General: well developed and well nourished, alert, oriented times 3, and appears comfortable  Psychiatric: Normal  HEENT: Normocephalic, Atraumatic Trachea Midline, No torticollis  Pulmonary: No audible wheezing or respiratory distress   Abdomen/GI: Non tender, non distended   Cardiovascular: No pitting edema, 2+ radial pulse   Skin: No masses, erythema, lacerations, fluctation, ulcerations  Neurovascular: Sensation Intact to the  Median, Ulnar, Radial Nerve, Motor Intact to the Median, Ulnar, Radial Nerve, and Pulses Intact  Musculoskeletal: Normal, except as noted in detailed exam and in HPI.      MUSCULOSKELETAL EXAMINATION:    Left Carpal Tunnel Exam:    Negative thenar atrophy. Positive phalen's test. Positive carpal tunnel compression. Negative tinels over median nerve at the wrist.  Opposition strength 5/5.  Abduction strength 5/5.      ___________________________________________________  STUDIES REVIEWED:  I have personally reviewed and interpreted  US of carpal tunnel which demonstrate RIGHT SIDE: Carpal tunnel syndrome ruled in based on marked abnormal CSA >/= 14 sq mm. LEFT SIDE: Bifid left median nerve. Carpal tunnel syndrome ruled in based on marked abnormal CSA >/= 14 sq mm.    LABS REVIEWED:    HgA1c:   Lab Results   Component Value Date    HGBA1C 5.9 (H) 03/22/2025     BMP:   Lab Results   Component Value Date    CALCIUM 9.2 03/22/2025    K 4.0 03/22/2025    CO2 27 03/22/2025     03/22/2025    BUN 15 03/22/2025    CREATININE 0.74 03/22/2025               PROCEDURES PERFORMED:  Procedures  No Procedures performed today    _____________________________________________________      Scribe Attestation      I,:  Marian Lopez MA am acting as a scribe while in the presence of the attending physician.:       I,:  Tara Lazcano MD personally performed the services described in this documentation    as scribed in my presence.:

## 2025-05-14 DIAGNOSIS — I10 ESSENTIAL HYPERTENSION: ICD-10-CM

## 2025-05-14 DIAGNOSIS — E78.00 PURE HYPERCHOLESTEROLEMIA: ICD-10-CM

## 2025-05-14 DIAGNOSIS — E66.813 CLASS 3 SEVERE OBESITY DUE TO EXCESS CALORIES WITHOUT SERIOUS COMORBIDITY WITH BODY MASS INDEX (BMI) OF 40.0 TO 44.9 IN ADULT: ICD-10-CM

## 2025-05-14 RX ORDER — SEMAGLUTIDE 1.7 MG/.75ML
INJECTION, SOLUTION SUBCUTANEOUS
Qty: 9 ML | Refills: 0 | Status: SHIPPED | OUTPATIENT
Start: 2025-05-14

## 2025-05-29 ENCOUNTER — OFFICE VISIT (OUTPATIENT)
Dept: FAMILY MEDICINE CLINIC | Facility: CLINIC | Age: 30
End: 2025-05-29
Payer: COMMERCIAL

## 2025-05-29 VITALS
WEIGHT: 278 LBS | BODY MASS INDEX: 43.63 KG/M2 | OXYGEN SATURATION: 99 % | SYSTOLIC BLOOD PRESSURE: 116 MMHG | HEART RATE: 92 BPM | HEIGHT: 67 IN | DIASTOLIC BLOOD PRESSURE: 64 MMHG

## 2025-05-29 DIAGNOSIS — R42 VERTIGO: ICD-10-CM

## 2025-05-29 DIAGNOSIS — E55.9 VITAMIN D DEFICIENCY: ICD-10-CM

## 2025-05-29 DIAGNOSIS — I10 ESSENTIAL HYPERTENSION: Primary | ICD-10-CM

## 2025-05-29 DIAGNOSIS — H61.23 HEARING LOSS OF BOTH EARS DUE TO CERUMEN IMPACTION: ICD-10-CM

## 2025-05-29 DIAGNOSIS — E66.813 CLASS 3 SEVERE OBESITY DUE TO EXCESS CALORIES WITHOUT SERIOUS COMORBIDITY WITH BODY MASS INDEX (BMI) OF 40.0 TO 44.9 IN ADULT: ICD-10-CM

## 2025-05-29 PROCEDURE — 99214 OFFICE O/P EST MOD 30 MIN: CPT

## 2025-05-29 PROCEDURE — 69210 REMOVE IMPACTED EAR WAX UNI: CPT

## 2025-05-29 RX ORDER — ERGOCALCIFEROL 1.25 MG/1
50000 CAPSULE, LIQUID FILLED ORAL WEEKLY
Qty: 16 CAPSULE | Refills: 1 | Status: SHIPPED | OUTPATIENT
Start: 2025-05-29

## 2025-05-29 RX ORDER — MECLIZINE HYDROCHLORIDE 25 MG/1
25 TABLET ORAL 3 TIMES DAILY PRN
Qty: 30 TABLET | Refills: 0 | Status: SHIPPED | OUTPATIENT
Start: 2025-05-29 | End: 2025-05-29

## 2025-05-29 RX ORDER — SEMAGLUTIDE 2.4 MG/.75ML
INJECTION, SOLUTION SUBCUTANEOUS
Qty: 3 ML | Refills: 0 | Status: SHIPPED | OUTPATIENT
Start: 2025-05-29

## 2025-05-29 RX ORDER — MECLIZINE HYDROCHLORIDE 25 MG/1
25 TABLET ORAL 3 TIMES DAILY PRN
Qty: 30 TABLET | Refills: 0 | Status: SHIPPED | OUTPATIENT
Start: 2025-05-29

## 2025-05-29 RX ORDER — SEMAGLUTIDE 2.4 MG/.75ML
INJECTION, SOLUTION SUBCUTANEOUS
Qty: 3 ML | Refills: 0 | Status: SHIPPED | OUTPATIENT
Start: 2025-05-29 | End: 2025-05-29

## 2025-05-29 RX ORDER — ERGOCALCIFEROL 1.25 MG/1
50000 CAPSULE, LIQUID FILLED ORAL WEEKLY
Qty: 16 CAPSULE | Refills: 1 | Status: SHIPPED | OUTPATIENT
Start: 2025-05-29 | End: 2025-05-29

## 2025-05-29 NOTE — PROGRESS NOTES
Name: Ben Daniel      : 1995      MRN: [id number]  Encounter Provider: MARTIN Shearer  Encounter Date: 2025   Encounter department: [facility] [address]  :  Assessment & Plan  Essential hypertension  BP perfect, continue current medications and continue to work on weight loss  Orders:  •  Semaglutide-Weight Management (Wegovy) 2.4 MG/0.75ML; Inject 2.4 mg under the skin weekly    Class 3 severe obesity due to excess calories without serious comorbidity with body mass index (BMI) of 40.0 to 44.9 in adult  Prior Authorization Clinical Questions for Weight Management Pharmacotherapy    1. Does the patient have a contrainidcation to medication prescribed for weight management?: No  2. Does the patient have a diagnosis of obesity, confirmed by a BMI greater than or equal to 30 kg/m^2?: Yes  3. Does the patient have a BMI of greater than or equal to 27 kg/m^2 with at least one weight-related comorbidity/risk factor/complication (e.g. diabetes, dyslipidemia, coronary artery disease)?: Yes  4. Weight-related co-morbidities/risk factors: dyslipidemia, hypertension  5. WEGOVY CVA Indication: Does patient have established documented cardiovascular disease (history of a prior heart attack (myocardial infarction), stroke, or symptomatic peripheral arterial disease (PAD)?: N/A  6. ZEPBOUND MIRIAM Indication: Does patient have documented MIRIAM diagnosed via sleep study (insurance will require copy of sleep study results for approval)?: N/A  7. Has the patient been on a weight loss regimen of low-calorie diet, increased physical activity, and lifestyle modifications for a minimum of 6 months?: Yes  8. Has the patient completed a comprehensive weight loss program (ie, Weight Watchers, Noom, Bariatrics, other kristian on phone)? If so, what?: No  9. Does the patient have a history of type 2 diabetes?: No  10. Has the member tried and failed other weight loss medication within the  past 12 months?: No  11. Will the member use requested medication in combination with another GLP agonist or weight loss drug?: No  12. Is the medication a controlled substance?: No  For renewals: Has the patient had a positive outcome with current weight management medication (i.e., change in body weight of at least 4-5% after 12-16 weeks on maximally tolerated dose)?: Yes     Baseline weight (in pounds): 282 lbs  Current weight (in pounds): 278 lbs  Weight loss percentage: -1.42%     Great job with weight loss. Will increase dose of wegovy.   Suggest my fitness Pal.  Reduce daily calories by 300 calories a day.  Increase activity to 150 minutes a week.  Suggest lean protein high fiber diet.    Eats small portions every 3 hours. 5-9 fruits and vegetables a day.  Do not drink sugary drinks.  Follow up in 3 months or sooner if needed.     Orders:  •  Semaglutide-Weight Management (Wegovy) 2.4 MG/0.75ML; Inject 2.4 mg under the skin weekly    Vitamin D deficiency  Vitamin D is low, will send weekly prescription and also suggest nightly D3 4000 u or more    Orders:  •  ergocalciferol (VITAMIN D2) 50,000 units; Take 1 capsule (50,000 Units total) by mouth once a week    Vertigo  Meclazine as needed.   Orders:  •  meclizine (ANTIVERT) 25 mg tablet; Take 1 tablet (25 mg total) by mouth 3 (three) times a day as needed for dizziness    Hearing loss of both ears due to cerumen impaction  Successfully cleaned today  Orders:  •  Ear cerumen removal           History of Present Illness   Ben is here for follow up, doing well with wegovy.       Review of Systems   Constitutional:  Negative for chills, diaphoresis, fatigue and fever.   HENT:  Negative for congestion, ear pain, sinus pressure, sinus pain and sore throat.    Respiratory:  Negative for cough, chest tightness and shortness of breath.    Cardiovascular:  Negative for chest pain and palpitations.   Gastrointestinal:  Negative for abdominal pain, constipation,  "diarrhea, nausea and vomiting.   Musculoskeletal:  Positive for arthralgias and myalgias. Negative for back pain.   Neurological:  Negative for dizziness, syncope, light-headedness and headaches.   Psychiatric/Behavioral:  Negative for dysphoric mood and sleep disturbance. The patient is not nervous/anxious.    All other systems reviewed and are negative.      Objective   /64   Pulse 92   Ht 5' 6.5\" (1.689 m)   Wt 126 kg (278 lb)   SpO2 99%   BMI 44.20 kg/m²      Physical Exam  Vitals and nursing note reviewed.   Constitutional:       General: He is not in acute distress.     Appearance: Normal appearance. He is well-developed. He is not ill-appearing.   HENT:      Head: Normocephalic and atraumatic.      Right Ear: Tympanic membrane, ear canal and external ear normal. There is no impacted cerumen.      Left Ear: Tympanic membrane, ear canal and external ear normal. There is no impacted cerumen.      Nose: Nose normal. No congestion.      Mouth/Throat:      Mouth: Mucous membranes are moist.      Pharynx: No oropharyngeal exudate or posterior oropharyngeal erythema.     Eyes:      Extraocular Movements: Extraocular movements intact.      Conjunctiva/sclera: Conjunctivae normal.      Pupils: Pupils are equal, round, and reactive to light.       Cardiovascular:      Rate and Rhythm: Normal rate and regular rhythm.      Heart sounds: No murmur heard.  Pulmonary:      Effort: Pulmonary effort is normal. No respiratory distress.      Breath sounds: Normal breath sounds.   Abdominal:      Palpations: Abdomen is soft.      Tenderness: There is no abdominal tenderness.     Musculoskeletal:         General: No swelling.      Cervical back: Normal range of motion and neck supple.      Right lower leg: No edema.      Left lower leg: No edema.   Lymphadenopathy:      Cervical: No cervical adenopathy.     Skin:     General: Skin is warm and dry.      Capillary Refill: Capillary refill takes less than 2 seconds. " "    Neurological:      General: No focal deficit present.      Mental Status: He is alert and oriented to person, place, and time.     Psychiatric:         Mood and Affect: Mood normal.         Behavior: Behavior normal.       Ear cerumen removal    Date/Time: 5/29/2025 9:20 AM    Performed by: MARTIN Shearer  Authorized by: MARTIN Shearer    Universal Protocol:  procedure performed by consultantConsent: Verbal consent obtained  Risks and benefits: risks, benefits and alternatives were discussed  Consent given by: patient  Time out: Immediately prior to procedure a \"time out\" was called to verify the correct patient, procedure, equipment, support staff and site/side marked as required.  Timeout called at: 5/29/2025 9:57 AM.  Patient understanding: patient states understanding of the procedure being performed  Patient consent: the patient's understanding of the procedure matches consent given  Procedure consent: procedure consent matches procedure scheduled  Relevant documents: relevant documents present and verified  Required items: required blood products, implants, devices, and special equipment available  Patient identity confirmed: verbally with patient    Patient location:  Clinic  Procedure details:     Local anesthetic:  None    Location:  Both ears    Procedure type: irrigation with instrumentation      Instrumentation: curette      Approach:  External  Post-procedure details:     Complication:  None    Hearing quality:  Improved    Patient tolerance of procedure:  Tolerated well, no immediate complications        "

## 2025-05-29 NOTE — ASSESSMENT & PLAN NOTE
Prior Authorization Clinical Questions for Weight Management Pharmacotherapy    1. Does the patient have a contrainidcation to medication prescribed for weight management?: No  2. Does the patient have a diagnosis of obesity, confirmed by a BMI greater than or equal to 30 kg/m^2?: Yes  3. Does the patient have a BMI of greater than or equal to 27 kg/m^2 with at least one weight-related comorbidity/risk factor/complication (e.g. diabetes, dyslipidemia, coronary artery disease)?: Yes  4. Weight-related co-morbidities/risk factors: dyslipidemia, hypertension  5. WEGOVY CVA Indication: Does patient have established documented cardiovascular disease (history of a prior heart attack (myocardial infarction), stroke, or symptomatic peripheral arterial disease (PAD)?: N/A  6. ZEPBOUND MIRIAM Indication: Does patient have documented MIRIAM diagnosed via sleep study (insurance will require copy of sleep study results for approval)?: N/A  7. Has the patient been on a weight loss regimen of low-calorie diet, increased physical activity, and lifestyle modifications for a minimum of 6 months?: Yes  8. Has the patient completed a comprehensive weight loss program (ie, Weight Watchers, Noom, Bariatrics, other kristian on phone)? If so, what?: No  9. Does the patient have a history of type 2 diabetes?: No  10. Has the member tried and failed other weight loss medication within the past 12 months?: No  11. Will the member use requested medication in combination with another GLP agonist or weight loss drug?: No  12. Is the medication a controlled substance?: No  For renewals: Has the patient had a positive outcome with current weight management medication (i.e., change in body weight of at least 4-5% after 12-16 weeks on maximally tolerated dose)?: Yes     Baseline weight (in pounds): 282 lbs  Current weight (in pounds): 278 lbs  Weight loss percentage: -1.42%     Great job with weight loss. Will increase dose of wegovy.   Suggest my fitness Pal.   Reduce daily calories by 300 calories a day.  Increase activity to 150 minutes a week.  Suggest lean protein high fiber diet.    Eats small portions every 3 hours. 5-9 fruits and vegetables a day.  Do not drink sugary drinks.  Follow up in 3 months or sooner if needed.     Orders:  •  Semaglutide-Weight Management (Wegovy) 2.4 MG/0.75ML; Inject 2.4 mg under the skin weekly

## 2025-05-29 NOTE — ASSESSMENT & PLAN NOTE
Vitamin D is low, will send weekly prescription and also suggest nightly D3 4000 u or more    Orders:  •  ergocalciferol (VITAMIN D2) 50,000 units; Take 1 capsule (50,000 Units total) by mouth once a week

## 2025-05-29 NOTE — ASSESSMENT & PLAN NOTE
BP perfect, continue current medications and continue to work on weight loss  Orders:  •  Semaglutide-Weight Management (Wegovy) 2.4 MG/0.75ML; Inject 2.4 mg under the skin weekly

## 2025-06-20 NOTE — PROGRESS NOTES
Name: Ben Daniel      : 1995      MRN: [id number]  Encounter Provider: MARTIN Arias  Encounter Date: 2025   Encounter department: [facility]  :  Assessment & Plan  Acquired hypothyroidism  Presents clinically euthyroid, biochemically hypothyroid.    Reviewed basic pathophysiology of hypothyroidism including relevance of TSH and free T4 values.  Reviewed thyroid ultrasound consistent with diagnosis of hypothyroidism, likely secondary to Hashimoto's thyroiditis.    Repeat labs in 6 weeks following movement of multivitamin administration from LT4 regimen by 4 hours.  Will also evaluate for antibodies for Hashimoto's thyroiditis at that time.    For now, continue levothyroxine 88 mcg daily.  Await receipt of lab work prior to making dose adjustments.  Discussed symptoms of over/under supplementation.    Follow-up in 6 months.  Labs prior to next visit, or sooner if symptomatic.  Orders:  •  T4, free  •  TSH, 3rd generation  •  Thyroid Antibodies Panel  •  TSH, 3rd generation; Future  •  T4, free; Future  •  levothyroxine 88 mcg tablet; Take 1 tablet (88 mcg total) by mouth daily in the early morning    Class 3 severe obesity due to excess calories with serious comorbidity and body mass index (BMI) of 40.0 to 44.9 in adult  BMI 44.93    Continue Wegovy 2.4 mg weekly.  Discussed weight loss of greater than 10 pounds may necessitate dose adjustments.    Continue to decrease caloric intake, follow balanced diet, avoid processed foods and sweetened beverages, and stay well-hydrated.       Vitamin D deficiency  Continue prescription vitamin D2 50,000 IU once weekly as prescribed by primary care.             History of Present Illness     Ben Daniel is a 30 y.o. male who presents to the office today care for hypothyroidism.  He was initially diagnosed around a year ago through routine blood work, without symptoms.  He underwent thyroid  ultrasound 4/14/2025 which demonstrated mild right thyroid hypertrophy with diffuse heterogeneous echotexture of the parenchyma, which can represent chronic lymphocytic thyroiditis.  There were no suspicious thyroid nodules observed.  He does not have updated thyroid function test for today's visit; last thyroid function tests completed 3/23/2025 which demonstrated elevated TSH with normal free T4.  He has not previously followed with endocrinology, and his primary care provider has been managing his hypothyroidism thus far.    No history of external radiation to head/neck/chest.  No recent iodine loading in form of medication, kelp supplements, or radiological diagnostic studies.  He is currently taking multivitamin, unsure if it contains biotin.    Family history of thyroid disease includes: Mother with Hashimoto's,  Family history of thyroid cancer includes: No known members    Current Medication Regimen:   Levothyroxine 88 mcg daily --  taking MVI within 4 hrs    Eye Exam: UTD, [facility] [place]    Component      Latest Ref Rng 3/22/2025   TSH 3RD GENERATON      0.450 - 4.500 uIU/mL 12.166 (H)    FREE T4      0.61 - 1.12 ng/dL 0.68        THYROID ULTRASOUND 4/14/2025  INDICATION: E03.9: Hypothyroidism, unspecified.  COMPARISON: None   TECHNIQUE: Ultrasound of the thyroid was performed with a high frequency linear transducer in transverse and sagittal planes including volumetric imaging sweeps as well as traditional still imaging technique.     FINDINGS:  Thyroid texture: Thyroid parenchyma is diffusely heterogeneous in echotexture. There is mild hypertrophy of the right thyroid lobe.     Right lobe: 6.7 x 2.5 x 2.1 cm. Volume 17.1 mL  Left lobe: 4.8 x 1.9 x 2.2 cm. Volume 9.5 mL  Isthmus: 0.8 cm.     No thyroid nodules are demonstrated.     IMPRESSION:  Mild right thyroid hypertrophy with diffuse heterogenous echotexture of the parenchyma, which can represent chronic lymphocytic thyroiditis in appropriate  "clinical settings. No suspicious focal thyroid nodule.      History obtained from: patient    Review of Systems   Constitutional:  Positive for fatigue. Negative for unexpected weight change.   HENT:  Negative for trouble swallowing and voice change.    Eyes:  Negative for visual disturbance.   Cardiovascular:  Negative for leg swelling.   Gastrointestinal:  Negative for constipation and diarrhea.   Endocrine: Negative for cold intolerance and heat intolerance.   Musculoskeletal:  Negative for gait problem.   Skin:  Negative for rash.   Neurological:  Negative for tremors.   Psychiatric/Behavioral:  Positive for sleep disturbance.      Medications Ordered Prior to Encounter[1]   Social History[2]     Objective   /78 (BP Location: Right arm, Patient Position: Sitting, Cuff Size: Standard)   Pulse 76   Temp 97.8 °F (36.6 °C) (Temporal)   Ht 5' 6.5\" (1.689 m)   Wt 128 kg (282 lb 9.6 oz)   SpO2 98%   BMI 44.93 kg/m²      Physical Exam  Vitals reviewed.   Constitutional:       General: He is not in acute distress.     Appearance: Normal appearance. He is well-groomed. He is morbidly obese.   HENT:      Head: Normocephalic and atraumatic.      Mouth/Throat:      Mouth: Mucous membranes are moist.     Eyes:      Conjunctiva/sclera: Conjunctivae normal.     Neck:      Thyroid: Thyromegaly present. No thyroid mass or thyroid tenderness.     Cardiovascular:      Rate and Rhythm: Normal rate.   Pulmonary:      Effort: Pulmonary effort is normal. No respiratory distress.   Abdominal:      General: There is no distension.      Palpations: Abdomen is soft.     Musculoskeletal:         General: No swelling.      Cervical back: Normal range of motion.     Skin:     General: Skin is warm and dry.     Neurological:      Mental Status: He is alert and oriented to person, place, and time.     Psychiatric:         Mood and Affect: Mood normal.         Behavior: Behavior normal. Behavior is cooperative.         Thought " Content: Thought content normal.         Judgment: Judgment normal.       Administrative Statements   I have spent a total time of 40 minutes in caring for this patient on the day of the visit/encounter including Diagnostic results, Prognosis, Risks and benefits of tx options, Instructions for management, Patient and family education, Importance of tx compliance, Risk factor reductions, Impressions, Counseling / Coordination of care, Documenting in the medical record, Reviewing/placing orders in the medical record (including tests, medications, and/or procedures), and Obtaining or reviewing history  .         [1]  Current Outpatient Medications on File Prior to Visit   Medication Sig Dispense Refill   • ergocalciferol (VITAMIN D2) 50,000 units Take 1 capsule (50,000 Units total) by mouth once a week 16 capsule 1   • hydroCHLOROthiazide 12.5 mg tablet Take 1 tablet (12.5 mg total) by mouth daily 90 tablet 1   • losartan (COZAAR) 100 MG tablet Take 1 tablet (100 mg total) by mouth daily 90 tablet 1   • meclizine (ANTIVERT) 25 mg tablet Take 1 tablet (25 mg total) by mouth 3 (three) times a day as needed for dizziness 30 tablet 0   • Semaglutide-Weight Management (Wegovy) 2.4 MG/0.75ML Inject 2.4 mg under the skin weekly 3 mL 0   • [DISCONTINUED] levothyroxine 88 mcg tablet Take 1 tablet (88 mcg total) by mouth daily in the early morning 100 tablet 1   • [DISCONTINUED] levocetirizine (XYZAL) 5 MG tablet Take 1 tablet (5 mg total) by mouth every evening (Patient not taking: Reported on 6/23/2025) 30 tablet 1   • [DISCONTINUED] oxybutynin (DITROPAN-XL) 10 MG 24 hr tablet Take 1 tablet (10 mg total) by mouth daily at bedtime (Patient not taking: Reported on 6/23/2025) 90 tablet 3     No current facility-administered medications on file prior to visit.   [2]  Social History  Tobacco Use   • Smoking status: Never     Passive exposure: Never   • Smokeless tobacco: Never   Vaping Use   • Vaping status: Never Used   Substance  and Sexual Activity   • Alcohol use: Yes     Alcohol/week: 1.0 standard drink of alcohol     Comment: Rare occ   • Drug use: No   • Sexual activity: Yes     Partners: Female     Birth control/protection: None

## 2025-06-23 ENCOUNTER — OFFICE VISIT (OUTPATIENT)
Age: 30
End: 2025-06-23
Payer: COMMERCIAL

## 2025-06-23 VITALS
HEIGHT: 67 IN | OXYGEN SATURATION: 98 % | TEMPERATURE: 97.8 F | DIASTOLIC BLOOD PRESSURE: 78 MMHG | HEART RATE: 76 BPM | WEIGHT: 282.6 LBS | SYSTOLIC BLOOD PRESSURE: 120 MMHG | BODY MASS INDEX: 44.36 KG/M2

## 2025-06-23 DIAGNOSIS — E55.9 VITAMIN D DEFICIENCY: ICD-10-CM

## 2025-06-23 DIAGNOSIS — E66.813 CLASS 3 SEVERE OBESITY DUE TO EXCESS CALORIES WITHOUT SERIOUS COMORBIDITY WITH BODY MASS INDEX (BMI) OF 40.0 TO 44.9 IN ADULT: ICD-10-CM

## 2025-06-23 DIAGNOSIS — I10 ESSENTIAL HYPERTENSION: ICD-10-CM

## 2025-06-23 DIAGNOSIS — E66.813 CLASS 3 SEVERE OBESITY DUE TO EXCESS CALORIES WITH SERIOUS COMORBIDITY AND BODY MASS INDEX (BMI) OF 40.0 TO 44.9 IN ADULT: ICD-10-CM

## 2025-06-23 DIAGNOSIS — E03.9 ACQUIRED HYPOTHYROIDISM: Primary | ICD-10-CM

## 2025-06-23 PROBLEM — D84.9 IMMUNOSUPPRESSION (HCC): Status: RESOLVED | Noted: 2024-09-03 | Resolved: 2025-06-23

## 2025-06-23 PROBLEM — I95.9 HYPOTENSION: Status: RESOLVED | Noted: 2024-07-10 | Resolved: 2025-06-23

## 2025-06-23 PROBLEM — R31.9 HEMATURIA: Status: RESOLVED | Noted: 2024-09-23 | Resolved: 2025-06-23

## 2025-06-23 PROBLEM — R04.2 HEMOPTYSIS: Status: RESOLVED | Noted: 2024-07-11 | Resolved: 2025-06-23

## 2025-06-23 PROBLEM — N17.9 AKI (ACUTE KIDNEY INJURY) (HCC): Status: RESOLVED | Noted: 2024-07-06 | Resolved: 2025-06-23

## 2025-06-23 PROCEDURE — 99244 OFF/OP CNSLTJ NEW/EST MOD 40: CPT

## 2025-06-23 RX ORDER — LEVOTHYROXINE SODIUM 88 UG/1
88 TABLET ORAL
Qty: 100 TABLET | Refills: 1 | Status: SHIPPED | OUTPATIENT
Start: 2025-06-23

## 2025-06-23 NOTE — PATIENT INSTRUCTIONS
Labs in 6 weeks, after 8/4  Continue Levothyroxine 88 mcg daily  Take on an empty stomach, with only water. Wait approximately 30- 60 minutes before consuming anything else besides water.  Medications like multivitamins, iron, calcium, tums, or antacids should be taken approximately 4 hrs apart from Levothyroxine as they may decrease its absorption.    If you miss a dose, you may take 2 tablets the next day.  Do not take more than 2 tablets in one day.

## 2025-06-23 NOTE — ASSESSMENT & PLAN NOTE
BMI 44.93    Continue Wegovy 2.4 mg weekly.  Discussed weight loss of greater than 10 pounds may necessitate dose adjustments.    Continue to decrease caloric intake, follow balanced diet, avoid processed foods and sweetened beverages, and stay well-hydrated.

## 2025-06-23 NOTE — ASSESSMENT & PLAN NOTE
Presents clinically euthyroid, biochemically hypothyroid.    Reviewed basic pathophysiology of hypothyroidism including relevance of TSH and free T4 values.  Reviewed thyroid ultrasound consistent with diagnosis of hypothyroidism, likely secondary to Hashimoto's thyroiditis.    Repeat labs in 6 weeks following movement of multivitamin administration from LT4 regimen by 4 hours.  Will also evaluate for antibodies for Hashimoto's thyroiditis at that time.    For now, continue levothyroxine 88 mcg daily.  Await receipt of lab work prior to making dose adjustments.  Discussed symptoms of over/under supplementation.    Follow-up in 6 months.  Labs prior to next visit, or sooner if symptomatic.  Orders:  •  T4, free  •  TSH, 3rd generation  •  Thyroid Antibodies Panel  •  TSH, 3rd generation; Future  •  T4, free; Future  •  levothyroxine 88 mcg tablet; Take 1 tablet (88 mcg total) by mouth daily in the early morning

## 2025-06-23 NOTE — ASSESSMENT & PLAN NOTE
Orders:  •  levothyroxine 88 mcg tablet; Take 1 tablet (88 mcg total) by mouth daily in the early morning

## 2025-06-24 RX ORDER — SEMAGLUTIDE 2.4 MG/.75ML
INJECTION, SOLUTION SUBCUTANEOUS
Qty: 3 ML | Refills: 0 | Status: SHIPPED | OUTPATIENT
Start: 2025-06-24

## 2025-06-30 ENCOUNTER — ANESTHESIA EVENT (OUTPATIENT)
Dept: ANESTHESIOLOGY | Facility: HOSPITAL | Age: 30
End: 2025-06-30

## 2025-06-30 ENCOUNTER — ANESTHESIA (OUTPATIENT)
Dept: ANESTHESIOLOGY | Facility: HOSPITAL | Age: 30
End: 2025-06-30

## 2025-07-07 ENCOUNTER — TELEPHONE (OUTPATIENT)
Age: 30
End: 2025-07-07

## 2025-07-07 NOTE — TELEPHONE ENCOUNTER
Caller: Patient    Doctor: Dr. Lazcano    Reason for call:     Patient is scheduled for surgery on 7/15/25 for Left carpal tunnel release (Left: Wrist).  He would like to cancel the surgery and have an injection instead  Please call the patient to discuss.    Call back#: [phone]

## 2025-07-08 DIAGNOSIS — R42 VERTIGO: ICD-10-CM

## 2025-07-08 DIAGNOSIS — M25.571 CHRONIC PAIN OF RIGHT ANKLE: ICD-10-CM

## 2025-07-08 DIAGNOSIS — G89.29 CHRONIC PAIN OF RIGHT ANKLE: ICD-10-CM

## 2025-07-08 DIAGNOSIS — E66.813 CLASS 3 SEVERE OBESITY DUE TO EXCESS CALORIES WITHOUT SERIOUS COMORBIDITY WITH BODY MASS INDEX (BMI) OF 40.0 TO 44.9 IN ADULT: Primary | ICD-10-CM

## 2025-07-08 RX ORDER — TIRZEPATIDE 2.5 MG/.5ML
2.5 INJECTION, SOLUTION SUBCUTANEOUS WEEKLY
Qty: 2 ML | Refills: 0 | Status: SHIPPED | OUTPATIENT
Start: 2025-07-08 | End: 2025-08-05

## 2025-07-09 ENCOUNTER — TELEPHONE (OUTPATIENT)
Age: 30
End: 2025-07-09

## 2025-07-10 RX ORDER — MECLIZINE HYDROCHLORIDE 25 MG/1
25 TABLET ORAL 3 TIMES DAILY PRN
Qty: 30 TABLET | Refills: 0 | Status: SHIPPED | OUTPATIENT
Start: 2025-07-10

## 2025-07-14 NOTE — TELEPHONE ENCOUNTER
PA for ZEPBOUND 2.5MG  APPROVED     Date(s) approved           Patient advised by          [x]MyChart Message  []Phone call   []LMOM  []L/M to call office as no active Communication consent on file  []Unable to leave detailed message as VM not approved on Communication consent       Pharmacy advised by    [x]Fax  []Phone call  []Secure Chat    Approval letter scanned into Media No TBD

## 2025-07-16 VITALS — BODY MASS INDEX: 44.29 KG/M2 | HEIGHT: 67 IN | WEIGHT: 282.19 LBS

## 2025-07-16 DIAGNOSIS — G56.02 CARPAL TUNNEL SYNDROME OF LEFT WRIST: Primary | ICD-10-CM

## 2025-07-16 PROCEDURE — 99213 OFFICE O/P EST LOW 20 MIN: CPT | Performed by: SURGERY

## 2025-07-16 PROCEDURE — 20526 THER INJECTION CARP TUNNEL: CPT | Performed by: SURGERY

## 2025-07-16 RX ORDER — LIDOCAINE HYDROCHLORIDE 10 MG/ML
1 INJECTION, SOLUTION INFILTRATION; PERINEURAL
Status: COMPLETED | OUTPATIENT
Start: 2025-07-16 | End: 2025-07-16

## 2025-07-16 RX ORDER — DEXAMETHASONE SODIUM PHOSPHATE 10 MG/ML
40 INJECTION, SOLUTION INTRAMUSCULAR; INTRAVENOUS
Status: COMPLETED | OUTPATIENT
Start: 2025-07-16 | End: 2025-07-16

## 2025-07-16 RX ADMIN — LIDOCAINE HYDROCHLORIDE 1 ML: 10 INJECTION, SOLUTION INFILTRATION; PERINEURAL at 15:15

## 2025-07-16 RX ADMIN — DEXAMETHASONE SODIUM PHOSPHATE 40 MG: 10 INJECTION, SOLUTION INTRAMUSCULAR; INTRAVENOUS at 15:15

## 2025-07-16 NOTE — PROGRESS NOTES
ASSESSMENT/PLAN:      Assessment & Plan  Carpal tunnel syndrome of left wrist  A cortisone injection was provided ot the left carpal tunnel today. He tolerated the injecton well.   Continue with the wrist brace.   We will follow up as needed           The patient verbalized understanding of exam findings and treatment plan. We engaged in the shared decision-making process and treatment options were discussed at length with the patient. Surgical and conservative management discussed today along with risks and benefits.    Diagnoses and all orders for this visit:    Carpal tunnel syndrome of left wrist          Open Carpal Tunnel Release:   The anatomy and physiology of carpal tunnel syndrome was discussed with the patient today.  Increase pressure localized under the transverse carpal ligament can cause pain, numbness, tingling, or dysesthesias within the median nerve distribution as well as feelings of fatigue, clumsiness, or awkwardness.  These symptoms typically occur at night and worse in the morning upon waking.  Eventually, untreated carpal tunnel syndrome can result in weakness and permanent loss of muscle within the thenar compartment of the hand.  Treatment options were discussed with the patient.  Conservative treatment includes nocturnal resting splints to keep the nerve in a neutral position, ergonomic changes within the work or home environment, activity modification, and tendon gliding exercises.  Vitamin B6 one tablet daily over the counter may helpful to reduce symptoms.  Steroid injections within the carpal canal can help a majority of patients, however this is often self-limited in a majority of patients.  Surgical intervention to divide the transverse carpal ligament typically results in a long-lasting relief of the patient's complaints, with the recurrence rate of less than 1%. The patient has elected to undergo an open carpal tunnel release.  The palmar incision technique was discussed in the  office with the patient today.   In the postoperative period, light activities are allowed immediately, driving is allowed when narcotic medication has stopped, and the bandages may be removed and incision may get wet after 2 days.  Heavy activities (lifting more than approximately 10 pounds) will be allowed after follow up appointment in 1-2 weeks.  While night symptoms (waking from sleep, pain, and discomfort in the hands) generally improves rapidly, the numbness and tingling as well as the strength will slowly improve over weeks to months depending on the chronicity and severity of the carpal tunnel syndrome.  Pillar pain and scar discomfort were discussed with the patient which are self-limiting conditions.  The risks of bleeding and infection from the surgery are less than 1%.  Risk of recurrence is approximately 0.5%.  The risks of nerve injury or nerve damage or damage to the blood vessels is approximately 1 in 1200. The patient has an understanding of the above mentioned discussion.The risks and benefits of the procedure were explained to the patient, which include, but are not limited to: Bleeding, infection, recurrence, pain, scar, damage to tendons, damage to nerves, and damage to blood vessels, failure to give desired results and complications related to anesthesia.  These risks, along with alternative conservative treatment options, and postoperative protocols were voiced back and understood by the patient.  All questions were answered to the patient's satisfaction.  The patient agrees to comply with a standard postoperative protocol, and is willing to proceed.  Education was provided via written and auditory forms.  There were no barriers to learning. Written handouts regarding wound care, incision and scar care, and general preoperative information was provided to the patient.  Prior to surgery, the patient may be requested to stop all anti-inflammatory medications.  Prophylactic aspirin, Plavix, and  Coumadin may be allowed to be continued.  Medications including vitamin E., ginkgo, and fish oil are requested to be stopped approximately one week prior to surgery.  Hypertensive medications and beta blockers, if taken, s    Follow Up:  No follow-ups on file.      To Do Next Visit:  Re-evaluation of current issue    ____________________________________________________________________________________________________________________________________________      CHIEF COMPLAINT:  Chief Complaint   Patient presents with    Left Wrist - Pain     No sensation to index middle and ring finger, wants csi, pain score 0       SUBJECTIVE:  Ben Daniel is a 30 y.o. year old RHD male who presents today for his left carpal tunnel syndrome.  Patient was previously scheduled for 7/15/2025 for left carpal tunnel release, but then canceled due to his work schedule. At this time, he has constant numbness in the left thumb, index and long fingers. He has difficulty with feeling patterns. He wishes to try a cortisone injection.        I have personally reviewed all the relevant PMH, PSH, SH, FH, Medications and allergies.     PAST MEDICAL HISTORY:  Past Medical History[1]    PAST SURGICAL HISTORY:  Past Surgical History[2]    FAMILY HISTORY:  Family History[3]    SOCIAL HISTORY:  Social History[4]    MEDICATIONS:  Current Medications[5]    ALLERGIES:  Allergies[6]    REVIEW OF SYSTEMS:  Review of Systems   Constitutional:  Negative for chills, fever and unexpected weight change.   HENT:  Negative for hearing loss, nosebleeds and sore throat.    Eyes:  Negative for pain, redness and visual disturbance.   Respiratory:  Negative for cough, shortness of breath and wheezing.    Cardiovascular:  Negative for chest pain, palpitations and leg swelling.   Gastrointestinal:  Negative for abdominal pain, nausea and vomiting.   Endocrine: Negative for polydipsia and polyuria.   Genitourinary:  Negative for dysuria and hematuria.   Skin:  Negative  for rash and wound.   Neurological:  Positive for numbness. Negative for dizziness, light-headedness and headaches.   Psychiatric/Behavioral:  Negative for decreased concentration, dysphoric mood and suicidal ideas. The patient is not nervous/anxious.        VITALS:  There were no vitals filed for this visit.      _____________________________________________________  PHYSICAL EXAMINATION:  General: well developed and well nourished, alert, oriented times 3, and appears comfortable  Psychiatric: Normal  HEENT: Normocephalic, Atraumatic Trachea Midline, No torticollis  Pulmonary: No audible wheezing or respiratory distress   Cardiovascular: No pitting edema, 2+ radial pulse   Abdominal/GI: abdomen non tender, non distended   Skin: No masses, erythema, lacerations, fluctation, ulcerations  Neurovascular: Sensation intact to the Ulnar Nerve, Sensation Intact to the Radial Nerve, Decreased Sensation to  the Median Nerve, Motor Intact to the Median, Ulnar, Radial Nerve, and Pulses Intact  Musculoskeletal: Normal, except as noted in detailed exam and in HPI.      MUSCULOSKELETAL EXAMINATION:  Left Carpal Tunnel Exam:     Negative thenar atrophy. Positive phalen's test. Positive carpal tunnel compression. Negative tinels over median nerve at the wrist.  Opposition strength 5/5.  Abduction strength 5/5.      ___________________________________________________    STUDIES REVIEWED:    I have personally reviewed and interpreted US of carpal tunnel which demonstrate RIGHT SIDE: Carpal tunnel syndrome ruled in based on marked abnormal CSA >/= 14 sq mm. LEFT SIDE: Bifid left median nerve. Carpal tunnel syndrome ruled in based on marked abnormal CSA >/= 14 sq mm       LABS REVIEWED:    HgA1c:   Lab Results   Component Value Date    HGBA1C 5.9 (H) 03/22/2025     BMP:   Lab Results   Component Value Date    CALCIUM 9.2 03/22/2025    K 4.0 03/22/2025    CO2 27 03/22/2025     03/22/2025    BUN 15 03/22/2025    CREATININE 0.74  "03/22/2025           PROCEDURES PERFORMED:  Hand/upper extremity injection: L carpal tunnel    [place] Protocol:  Consent: Verbal consent obtained  Risks and benefits: risks, benefits and alternatives were discussed  Consent given by: patient  Time out: Immediately prior to procedure a \"time out\" was called to verify the correct patient, procedure, equipment, support staff and site/side marked as required.  Timeout called at: 7/16/2025 3:45 PM.  Patient understanding: patient states understanding of the procedure being performed  Site marked: the operative site was marked  Patient identity confirmed: verbally with patient  Supporting Documentation  Indications: tendon swelling and pain   Procedure Details  Condition:carpal tunnel syndrome Site: L carpal tunnel   Preparation: Patient was prepped and draped in the usual sterile fashion  Needle size: 25 G  Ultrasound guidance: no  Approach: volar  Medications administered: 1 mL lidocaine 1 %; 40 mg dexamethasone 100 mg/10 mL  Patient tolerance: patient tolerated the procedure well with no immediate complications  Dressing:  Sterile dressing applied         _____________________________________________________      Scribe Attestation      I,:  Dina Allred am acting as a scribe while in the presence of the attending physician.:       I,:  Tara Lazcano MD personally performed the services described in this documentation    as scribed in my presence.:                         [1]   Past Medical History:  Diagnosis Date    ROSI (acute kidney injury) ([facility]) 07/06/2024    Anxiety     Chronic kidney disease     Hematuria 09/23/2024    Hemoptysis 07/11/2024    Hypertension     Hypotension 07/10/2024    Immunosuppression ([facility]) 09/03/2024    Pneumonia 7/6/24   [2]   Past Surgical History:  Procedure Laterality Date    ANKLE SURGERY      BRONCHOSCOPY  7/9/24    FETAL SURGERY FOR CONGENITAL HERNIA      IR BIOPSY KIDNEY RANDOM  07/09/2024    RENAL BIOPSY  July 9th   [3] "   Family History  Problem Relation Name Age of Onset    Hypertension Mother Nevaeh     Hashimoto's thyroiditis Mother Nevaeh     No Known Problems Father      Hypertension Brother Devyn     Heart failure Maternal Grandmother Joy carmen     Heart failure Maternal Grandfather Tray carmen     Diabetes Maternal Grandfather Tray kermit     Hypertension Maternal Grandfather Tray carmen     Cancer Paternal Grandmother Sherrill         Lung cancer    Lung cancer Paternal Grandmother Sherrill     Cancer Paternal Grandfather Jc Zavala    [4]   Social History  Tobacco Use    Smoking status: Never     Passive exposure: Never    Smokeless tobacco: Never   Vaping Use    Vaping status: Never Used   Substance Use Topics    Alcohol use: Yes     Alcohol/week: 1.0 standard drink of alcohol     Comment: Rare occ    Drug use: Not Currently     Types: Marijuana     Comment: over 15 years ago   [5]   Current Outpatient Medications:     ergocalciferol (VITAMIN D2) 50,000 units, Take 1 capsule (50,000 Units total) by mouth once a week, Disp: 16 capsule, Rfl: 1    hydroCHLOROthiazide 12.5 mg tablet, Take 1 tablet (12.5 mg total) by mouth daily, Disp: 90 tablet, Rfl: 1    levothyroxine 88 mcg tablet, Take 1 tablet (88 mcg total) by mouth daily in the early morning, Disp: 100 tablet, Rfl: 1    losartan (COZAAR) 100 MG tablet, Take 1 tablet (100 mg total) by mouth daily, Disp: 90 tablet, Rfl: 1    meclizine (ANTIVERT) 25 mg tablet, Take 1 tablet (25 mg total) by mouth 3 (three) times a day as needed for dizziness, Disp: 30 tablet, Rfl: 0    tirzepatide (Zepbound) 2.5 mg/0.5 mL auto-injector, Inject 0.5 mL (2.5 mg total) under the skin once a week for 28 days, Disp: 2 mL, Rfl: 0  [6] No Known Allergies

## 2025-07-18 DIAGNOSIS — Z00.6 ENCOUNTER FOR EXAMINATION FOR NORMAL COMPARISON OR CONTROL IN CLINICAL RESEARCH PROGRAM: ICD-10-CM

## 2025-07-21 ENCOUNTER — APPOINTMENT (OUTPATIENT)
Dept: LAB | Facility: HOSPITAL | Age: 30
End: 2025-07-21
Payer: COMMERCIAL

## 2025-07-21 DIAGNOSIS — N05.8 C3 GLOMERULONEPHRITIS: ICD-10-CM

## 2025-07-21 DIAGNOSIS — R39.11 URINARY HESITANCY: ICD-10-CM

## 2025-07-21 DIAGNOSIS — R33.9 INCOMPLETE BLADDER EMPTYING: ICD-10-CM

## 2025-07-21 DIAGNOSIS — I10 PRIMARY HYPERTENSION: ICD-10-CM

## 2025-07-21 DIAGNOSIS — R80.1 PERSISTENT PROTEINURIA: ICD-10-CM

## 2025-07-21 DIAGNOSIS — N17.9 AKI (ACUTE KIDNEY INJURY) (HCC): ICD-10-CM

## 2025-07-21 DIAGNOSIS — R20.0 ANESTHESIA OF SKIN: ICD-10-CM

## 2025-07-21 DIAGNOSIS — E78.1 HYPERTRIGLYCERIDEMIA: ICD-10-CM

## 2025-07-21 DIAGNOSIS — I10 ESSENTIAL HYPERTENSION: ICD-10-CM

## 2025-07-21 DIAGNOSIS — R31.9 HEMATURIA, UNSPECIFIED TYPE: ICD-10-CM

## 2025-07-21 DIAGNOSIS — Z00.6 ENCOUNTER FOR EXAMINATION FOR NORMAL COMPARISON OR CONTROL IN CLINICAL RESEARCH PROGRAM: ICD-10-CM

## 2025-07-21 LAB
ANION GAP SERPL CALCULATED.3IONS-SCNC: 8 MMOL/L (ref 4–13)
ANISOCYTOSIS BLD QL SMEAR: PRESENT
BACTERIA UR QL AUTO: NORMAL /HPF
BASOPHILS # BLD MANUAL: 0.05 THOUSAND/UL (ref 0–0.1)
BASOPHILS NFR MAR MANUAL: 1 % (ref 0–1)
BILIRUB UR QL STRIP: NEGATIVE
BUN SERPL-MCNC: 14 MG/DL (ref 5–25)
CALCIUM SERPL-MCNC: 9.4 MG/DL (ref 8.4–10.2)
CHLORIDE SERPL-SCNC: 102 MMOL/L (ref 96–108)
CHOLEST SERPL-MCNC: 145 MG/DL (ref ?–200)
CLARITY UR: CLEAR
CO2 SERPL-SCNC: 26 MMOL/L (ref 21–32)
COLOR UR: NORMAL
CREAT SERPL-MCNC: 0.7 MG/DL (ref 0.6–1.3)
CREAT UR-MCNC: 122.7 MG/DL
EOSINOPHIL # BLD MANUAL: 0.1 THOUSAND/UL (ref 0–0.4)
EOSINOPHIL NFR BLD MANUAL: 2 % (ref 0–6)
ERYTHROCYTE [DISTWIDTH] IN BLOOD BY AUTOMATED COUNT: 12.1 % (ref 11.6–15.1)
FERRITIN SERPL-MCNC: 39 NG/ML (ref 30–336)
FOLATE SERPL-MCNC: >22.3 NG/ML
GFR SERPL CREATININE-BSD FRML MDRD: 126 ML/MIN/1.73SQ M
GLUCOSE P FAST SERPL-MCNC: 90 MG/DL (ref 65–99)
GLUCOSE UR STRIP-MCNC: NEGATIVE MG/DL
HCT VFR BLD AUTO: 43.4 % (ref 36.5–49.3)
HDLC SERPL-MCNC: 28 MG/DL
HGB BLD-MCNC: 14.8 G/DL (ref 12–17)
HGB UR QL STRIP.AUTO: NEGATIVE
IRON SATN MFR SERPL: 21 % (ref 15–50)
IRON SERPL-MCNC: 83 UG/DL (ref 50–212)
KETONES UR STRIP-MCNC: NEGATIVE MG/DL
LDLC SERPL CALC-MCNC: 57 MG/DL (ref 0–100)
LEUKOCYTE ESTERASE UR QL STRIP: NEGATIVE
LYMPHOCYTES # BLD AUTO: 2.37 THOUSAND/UL (ref 0.6–4.47)
LYMPHOCYTES # BLD AUTO: 48 % (ref 14–44)
MAGNESIUM SERPL-MCNC: 2 MG/DL (ref 1.9–2.7)
MCH RBC QN AUTO: 27.6 PG (ref 26.8–34.3)
MCHC RBC AUTO-ENTMCNC: 34.1 G/DL (ref 31.4–37.4)
MCV RBC AUTO: 81 FL (ref 82–98)
MICROCYTES BLD QL AUTO: PRESENT
MONOCYTES # BLD AUTO: 0.2 THOUSAND/UL (ref 0–1.22)
MONOCYTES NFR BLD: 4 % (ref 4–12)
MYELOCYTE ABSOLUTE CT: 0.05 THOUSAND/UL (ref 0–0.1)
MYELOCYTES NFR BLD MANUAL: 1 % (ref 0–1)
NEUTROPHILS # BLD MANUAL: 2.17 THOUSAND/UL (ref 1.85–7.62)
NEUTS SEG NFR BLD AUTO: 44 % (ref 43–75)
NITRITE UR QL STRIP: NEGATIVE
NON-SQ EPI CELLS URNS QL MICRO: NORMAL /HPF
NONHDLC SERPL-MCNC: 117 MG/DL
PH UR STRIP.AUTO: 6 [PH]
PLATELET # BLD AUTO: 268 THOUSANDS/UL (ref 149–390)
PLATELET BLD QL SMEAR: ADEQUATE
PMV BLD AUTO: 9.5 FL (ref 8.9–12.7)
POTASSIUM SERPL-SCNC: 3.6 MMOL/L (ref 3.5–5.3)
PROT UR STRIP-MCNC: NEGATIVE MG/DL
PROT UR-MCNC: 11 MG/DL
PROT/CREAT UR: 0.1 MG/G{CREAT}
PSA SERPL-MCNC: 0.35 NG/ML (ref 0–4)
RBC # BLD AUTO: 5.36 MILLION/UL (ref 3.88–5.62)
RBC #/AREA URNS AUTO: NORMAL /HPF
RBC MORPH BLD: PRESENT
SODIUM SERPL-SCNC: 136 MMOL/L (ref 135–147)
SP GR UR STRIP.AUTO: 1.02 (ref 1–1.03)
T4 FREE SERPL-MCNC: 1.1 NG/DL (ref 0.61–1.12)
TIBC SERPL-MCNC: 404.6 UG/DL (ref 250–450)
TRANSFERRIN SERPL-MCNC: 289 MG/DL (ref 203–362)
TRIGL SERPL-MCNC: 301 MG/DL (ref ?–150)
TSH SERPL DL<=0.05 MIU/L-ACNC: 4.64 UIU/ML (ref 0.45–4.5)
UIBC SERPL-MCNC: 322 UG/DL (ref 155–355)
UROBILINOGEN UR STRIP-ACNC: <2 MG/DL
VIT B12 SERPL-MCNC: 329 PG/ML (ref 180–914)
WBC # BLD AUTO: 4.94 THOUSAND/UL (ref 4.31–10.16)
WBC #/AREA URNS AUTO: NORMAL /HPF

## 2025-07-21 PROCEDURE — 80061 LIPID PANEL: CPT

## 2025-07-21 PROCEDURE — 82746 ASSAY OF FOLIC ACID SERUM: CPT

## 2025-07-21 PROCEDURE — 80048 BASIC METABOLIC PNL TOTAL CA: CPT

## 2025-07-21 PROCEDURE — 84153 ASSAY OF PSA TOTAL: CPT

## 2025-07-21 PROCEDURE — 82728 ASSAY OF FERRITIN: CPT

## 2025-07-21 PROCEDURE — 87086 URINE CULTURE/COLONY COUNT: CPT

## 2025-07-21 PROCEDURE — 85007 BL SMEAR W/DIFF WBC COUNT: CPT

## 2025-07-21 PROCEDURE — 85027 COMPLETE CBC AUTOMATED: CPT

## 2025-07-21 PROCEDURE — 84156 ASSAY OF PROTEIN URINE: CPT

## 2025-07-21 PROCEDURE — 83540 ASSAY OF IRON: CPT

## 2025-07-21 PROCEDURE — 82570 ASSAY OF URINE CREATININE: CPT

## 2025-07-21 PROCEDURE — 83735 ASSAY OF MAGNESIUM: CPT

## 2025-07-21 PROCEDURE — 83550 IRON BINDING TEST: CPT

## 2025-07-21 PROCEDURE — 82607 VITAMIN B-12: CPT

## 2025-07-21 PROCEDURE — 81001 URINALYSIS AUTO W/SCOPE: CPT

## 2025-07-22 LAB
BACTERIA UR CULT: NORMAL
THYROGLOB AB SERPL-ACNC: 887.7 IU/ML (ref 0–0.9)
THYROPEROXIDASE AB SERPL-ACNC: 338 IU/ML (ref 0–34)

## 2025-07-23 ENCOUNTER — RESULTS FOLLOW-UP (OUTPATIENT)
Dept: FAMILY MEDICINE CLINIC | Facility: CLINIC | Age: 30
End: 2025-07-23

## 2025-07-23 NOTE — TELEPHONE ENCOUNTER
Patient returned call. Relayed provider's question and comments.    Patient states he does not know what time he last ate anything the evening prior to lab testing, but he did not eat anything the morning of.    Warm transferred call to medical assistance as instructed in message.

## 2025-07-25 DIAGNOSIS — I10 ESSENTIAL HYPERTENSION: ICD-10-CM

## 2025-07-25 DIAGNOSIS — E78.1 HYPERTRIGLYCERIDEMIA: ICD-10-CM

## 2025-07-25 DIAGNOSIS — E66.813 CLASS 3 SEVERE OBESITY DUE TO EXCESS CALORIES WITHOUT SERIOUS COMORBIDITY WITH BODY MASS INDEX (BMI) OF 40.0 TO 44.9 IN ADULT: Primary | ICD-10-CM

## 2025-07-25 DIAGNOSIS — R73.09 ELEVATED HEMOGLOBIN A1C: ICD-10-CM

## 2025-07-28 ENCOUNTER — TELEPHONE (OUTPATIENT)
Dept: NEPHROLOGY | Facility: CLINIC | Age: 30
End: 2025-07-28

## 2025-07-29 LAB
APOB+LDLR+PCSK9 GENE MUT ANL BLD/T: NOT DETECTED
BRCA1+BRCA2 DEL+DUP + FULL MUT ANL BLD/T: NOT DETECTED
MLH1+MSH2+MSH6+PMS2 GN DEL+DUP+FUL M: NOT DETECTED

## 2025-07-30 ENCOUNTER — TELEPHONE (OUTPATIENT)
Dept: NEPHROLOGY | Facility: CLINIC | Age: 30
End: 2025-07-30

## 2025-08-21 DIAGNOSIS — I10 PRIMARY HYPERTENSION: ICD-10-CM

## 2025-08-22 RX ORDER — HYDROCHLOROTHIAZIDE 12.5 MG/1
12.5 TABLET ORAL DAILY
Qty: 90 TABLET | Refills: 1 | Status: SHIPPED | OUTPATIENT
Start: 2025-08-22